# Patient Record
Sex: MALE | Race: WHITE | NOT HISPANIC OR LATINO
[De-identification: names, ages, dates, MRNs, and addresses within clinical notes are randomized per-mention and may not be internally consistent; named-entity substitution may affect disease eponyms.]

---

## 2017-01-13 ENCOUNTER — APPOINTMENT (OUTPATIENT)
Dept: FAMILY MEDICINE | Facility: CLINIC | Age: 67
End: 2017-01-13

## 2017-01-13 VITALS
SYSTOLIC BLOOD PRESSURE: 130 MMHG | BODY MASS INDEX: 23.62 KG/M2 | TEMPERATURE: 98.8 F | RESPIRATION RATE: 16 BRPM | DIASTOLIC BLOOD PRESSURE: 90 MMHG | HEIGHT: 70 IN | WEIGHT: 165 LBS | HEART RATE: 80 BPM

## 2017-01-17 ENCOUNTER — RX RENEWAL (OUTPATIENT)
Age: 67
End: 2017-01-17

## 2017-01-20 ENCOUNTER — APPOINTMENT (OUTPATIENT)
Dept: ULTRASOUND IMAGING | Facility: HOSPITAL | Age: 67
End: 2017-01-20

## 2017-01-20 ENCOUNTER — OUTPATIENT (OUTPATIENT)
Dept: OUTPATIENT SERVICES | Facility: HOSPITAL | Age: 67
LOS: 1 days | End: 2017-01-20
Payer: COMMERCIAL

## 2017-01-20 LAB
25(OH)D3 SERPL-MCNC: 39.4 NG/ML
ALBUMIN SERPL ELPH-MCNC: 4 G/DL
ALP BLD-CCNC: 72 U/L
ALT SERPL-CCNC: 29 U/L
ANION GAP SERPL CALC-SCNC: 14 MMOL/L
APPEARANCE: CLEAR
AST SERPL-CCNC: 29 U/L
BASOPHILS # BLD AUTO: 0.01 K/UL
BASOPHILS NFR BLD AUTO: 0.2 %
BILIRUB SERPL-MCNC: 0.5 MG/DL
BILIRUBIN URINE: NEGATIVE
BLOOD URINE: NEGATIVE
BUN SERPL-MCNC: 16 MG/DL
CALCIUM SERPL-MCNC: 9.2 MG/DL
CHLORIDE SERPL-SCNC: 93 MMOL/L
CO2 SERPL-SCNC: 27 MMOL/L
COLOR: YELLOW
CREAT SERPL-MCNC: 0.93 MG/DL
CRP SERPL HS-MCNC: 0.6 MG/L
CRP SERPL-MCNC: <0.2 MG/DL
EOSINOPHIL # BLD AUTO: 0.1 K/UL
EOSINOPHIL NFR BLD AUTO: 1.8 %
ERYTHROCYTE [SEDIMENTATION RATE] IN BLOOD BY WESTERGREN METHOD: 5 MM/HR
FOLATE SERPL-MCNC: >20 NG/ML
GLUCOSE QUALITATIVE U: NORMAL MG/DL
GLUCOSE SERPL-MCNC: 67 MG/DL
HCT VFR BLD CALC: 42.2 %
HGB BLD-MCNC: 14.7 G/DL
IMM GRANULOCYTES NFR BLD AUTO: 0.2 %
KETONES URINE: NEGATIVE
LEUKOCYTE ESTERASE URINE: NEGATIVE
LYMPHOCYTES # BLD AUTO: 1.85 K/UL
LYMPHOCYTES NFR BLD AUTO: 33.1 %
MAN DIFF?: NORMAL
MCHC RBC-ENTMCNC: 31.7 PG
MCHC RBC-ENTMCNC: 34.8 GM/DL
MCV RBC AUTO: 91.1 FL
MONOCYTES # BLD AUTO: 0.36 K/UL
MONOCYTES NFR BLD AUTO: 6.4 %
NEUTROPHILS # BLD AUTO: 3.26 K/UL
NEUTROPHILS NFR BLD AUTO: 58.3 %
NITRITE URINE: NEGATIVE
PH URINE: 7
PLATELET # BLD AUTO: 225 K/UL
POTASSIUM SERPL-SCNC: 3.6 MMOL/L
PROT SERPL-MCNC: 6.5 G/DL
PROTEIN URINE: NEGATIVE MG/DL
PSA FREE FLD-MCNC: 34.9 %
PSA FREE SERPL-MCNC: 0.45 NG/ML
PSA SERPL-MCNC: 1.3 NG/ML
RBC # BLD: 4.63 M/UL
RBC # FLD: 12.8 %
SODIUM SERPL-SCNC: 134 MMOL/L
SPECIFIC GRAVITY URINE: 1.01
T4 FREE SERPL-MCNC: 1.4 NG/DL
TSH SERPL-ACNC: 1.61 UIU/ML
UROBILINOGEN URINE: NORMAL MG/DL
VIT B12 SERPL-MCNC: 411 PG/ML
WBC # FLD AUTO: 5.59 K/UL

## 2017-01-20 PROCEDURE — 93880 EXTRACRANIAL BILAT STUDY: CPT

## 2017-01-27 ENCOUNTER — APPOINTMENT (OUTPATIENT)
Dept: FAMILY MEDICINE | Facility: CLINIC | Age: 67
End: 2017-01-27

## 2017-01-27 VITALS
WEIGHT: 165 LBS | DIASTOLIC BLOOD PRESSURE: 78 MMHG | SYSTOLIC BLOOD PRESSURE: 120 MMHG | RESPIRATION RATE: 14 BRPM | HEIGHT: 70 IN | TEMPERATURE: 98.6 F | BODY MASS INDEX: 23.62 KG/M2 | HEART RATE: 74 BPM

## 2017-02-02 ENCOUNTER — RX RENEWAL (OUTPATIENT)
Age: 67
End: 2017-02-02

## 2017-02-07 ENCOUNTER — APPOINTMENT (OUTPATIENT)
Dept: MRI IMAGING | Facility: HOSPITAL | Age: 67
End: 2017-02-07

## 2017-02-07 ENCOUNTER — OUTPATIENT (OUTPATIENT)
Dept: OUTPATIENT SERVICES | Facility: HOSPITAL | Age: 67
LOS: 1 days | End: 2017-02-07
Payer: COMMERCIAL

## 2017-02-07 PROCEDURE — 70547 MR ANGIOGRAPHY NECK W/O DYE: CPT

## 2017-02-07 PROCEDURE — 70544 MR ANGIOGRAPHY HEAD W/O DYE: CPT

## 2017-02-07 PROCEDURE — 70551 MRI BRAIN STEM W/O DYE: CPT

## 2017-03-10 ENCOUNTER — OUTPATIENT (OUTPATIENT)
Dept: OUTPATIENT SERVICES | Facility: HOSPITAL | Age: 67
LOS: 1 days | End: 2017-03-10
Payer: COMMERCIAL

## 2017-03-10 PROCEDURE — 70498 CT ANGIOGRAPHY NECK: CPT

## 2017-03-10 PROCEDURE — 70496 CT ANGIOGRAPHY HEAD: CPT

## 2017-03-10 PROCEDURE — 70496 CT ANGIOGRAPHY HEAD: CPT | Mod: 26

## 2017-03-10 PROCEDURE — 70498 CT ANGIOGRAPHY NECK: CPT | Mod: 26

## 2017-03-13 ENCOUNTER — RX RENEWAL (OUTPATIENT)
Age: 67
End: 2017-03-13

## 2017-03-23 ENCOUNTER — RX RENEWAL (OUTPATIENT)
Age: 67
End: 2017-03-23

## 2017-04-04 ENCOUNTER — APPOINTMENT (OUTPATIENT)
Dept: FAMILY MEDICINE | Facility: CLINIC | Age: 67
End: 2017-04-04

## 2017-04-04 VITALS
DIASTOLIC BLOOD PRESSURE: 70 MMHG | RESPIRATION RATE: 20 BRPM | BODY MASS INDEX: 24.34 KG/M2 | HEIGHT: 70 IN | WEIGHT: 170 LBS | SYSTOLIC BLOOD PRESSURE: 122 MMHG | HEART RATE: 64 BPM

## 2017-04-07 ENCOUNTER — RESULT REVIEW (OUTPATIENT)
Age: 67
End: 2017-04-07

## 2017-04-07 LAB
ALBUMIN SERPL ELPH-MCNC: 4.1 G/DL
ALP BLD-CCNC: 78 U/L
ALT SERPL-CCNC: 27 U/L
ANION GAP SERPL CALC-SCNC: 18 MMOL/L
AST SERPL-CCNC: 30 U/L
BASOPHILS # BLD AUTO: 0.02 K/UL
BASOPHILS NFR BLD AUTO: 0.3 %
BILIRUB SERPL-MCNC: 0.5 MG/DL
BUN SERPL-MCNC: 17 MG/DL
CALCIUM SERPL-MCNC: 9.3 MG/DL
CHLORIDE SERPL-SCNC: 94 MMOL/L
CHOLEST SERPL-MCNC: 165 MG/DL
CHOLEST/HDLC SERPL: 2.7 RATIO
CO2 SERPL-SCNC: 26 MMOL/L
CREAT SERPL-MCNC: 1 MG/DL
EOSINOPHIL # BLD AUTO: 0.12 K/UL
EOSINOPHIL NFR BLD AUTO: 2 %
GLUCOSE SERPL-MCNC: 84 MG/DL
HBA1C MFR BLD HPLC: 4.9 %
HCT VFR BLD CALC: 43.6 %
HDLC SERPL-MCNC: 62 MG/DL
HGB BLD-MCNC: 15 G/DL
IMM GRANULOCYTES NFR BLD AUTO: 0.2 %
LDLC SERPL CALC-MCNC: 68 MG/DL
LYMPHOCYTES # BLD AUTO: 1.98 K/UL
LYMPHOCYTES NFR BLD AUTO: 33.7 %
MAN DIFF?: NORMAL
MCHC RBC-ENTMCNC: 31.6 PG
MCHC RBC-ENTMCNC: 34.4 GM/DL
MCV RBC AUTO: 92 FL
MONOCYTES # BLD AUTO: 0.39 K/UL
MONOCYTES NFR BLD AUTO: 6.6 %
NEUTROPHILS # BLD AUTO: 3.36 K/UL
NEUTROPHILS NFR BLD AUTO: 57.2 %
PLATELET # BLD AUTO: 205 K/UL
POTASSIUM SERPL-SCNC: 3.9 MMOL/L
PROT SERPL-MCNC: 6.9 G/DL
PSA SERPL-MCNC: 1.3 NG/ML
RBC # BLD: 4.74 M/UL
RBC # FLD: 12.9 %
SODIUM SERPL-SCNC: 138 MMOL/L
TRIGL SERPL-MCNC: 173 MG/DL
WBC # FLD AUTO: 5.88 K/UL

## 2017-04-10 ENCOUNTER — RESULT REVIEW (OUTPATIENT)
Age: 67
End: 2017-04-10

## 2017-04-11 ENCOUNTER — APPOINTMENT (OUTPATIENT)
Dept: NEUROLOGY | Facility: CLINIC | Age: 67
End: 2017-04-11

## 2017-04-11 VITALS
DIASTOLIC BLOOD PRESSURE: 99 MMHG | SYSTOLIC BLOOD PRESSURE: 157 MMHG | HEART RATE: 61 BPM | OXYGEN SATURATION: 97 % | RESPIRATION RATE: 14 BRPM

## 2017-05-24 ENCOUNTER — RX RENEWAL (OUTPATIENT)
Age: 67
End: 2017-05-24

## 2017-06-09 ENCOUNTER — MEDICATION RENEWAL (OUTPATIENT)
Age: 67
End: 2017-06-09

## 2017-06-15 ENCOUNTER — APPOINTMENT (OUTPATIENT)
Dept: UROLOGY | Facility: CLINIC | Age: 67
End: 2017-06-15

## 2017-06-15 VITALS
HEIGHT: 70 IN | WEIGHT: 165 LBS | SYSTOLIC BLOOD PRESSURE: 157 MMHG | TEMPERATURE: 97.9 F | HEART RATE: 63 BPM | DIASTOLIC BLOOD PRESSURE: 94 MMHG | RESPIRATION RATE: 16 BRPM | BODY MASS INDEX: 23.62 KG/M2

## 2017-06-15 DIAGNOSIS — N52.9 MALE ERECTILE DYSFUNCTION, UNSPECIFIED: ICD-10-CM

## 2017-06-16 LAB
25(OH)D3 SERPL-MCNC: 42.2 NG/ML
ALBUMIN SERPL ELPH-MCNC: 4 G/DL
ALP BLD-CCNC: 77 U/L
ALT SERPL-CCNC: 22 U/L
ANION GAP SERPL CALC-SCNC: 18 MMOL/L
APPEARANCE: CLEAR
AST SERPL-CCNC: 29 U/L
BASOPHILS # BLD AUTO: 0.02 K/UL
BASOPHILS NFR BLD AUTO: 0.3 %
BILIRUB SERPL-MCNC: 0.5 MG/DL
BILIRUBIN URINE: NEGATIVE
BLOOD URINE: NEGATIVE
BUN SERPL-MCNC: 17 MG/DL
CALCIUM SERPL-MCNC: 9.3 MG/DL
CHLORIDE SERPL-SCNC: 94 MMOL/L
CHOLEST SERPL-MCNC: 140 MG/DL
CHOLEST/HDLC SERPL: 3 RATIO
CO2 SERPL-SCNC: 23 MMOL/L
COLOR: YELLOW
CREAT SERPL-MCNC: 0.92 MG/DL
EOSINOPHIL # BLD AUTO: 0.12 K/UL
EOSINOPHIL NFR BLD AUTO: 1.5 %
ESTRADIOL SERPL-MCNC: 23 PG/ML
FSH SERPL-MCNC: 7.8 IU/L
GLUCOSE QUALITATIVE U: NORMAL MG/DL
GLUCOSE SERPL-MCNC: 88 MG/DL
HBA1C MFR BLD HPLC: 5 %
HCT VFR BLD CALC: 40.7 %
HDLC SERPL-MCNC: 47 MG/DL
HGB BLD-MCNC: 14.5 G/DL
IMM GRANULOCYTES NFR BLD AUTO: 0 %
KETONES URINE: NEGATIVE
LDLC SERPL CALC-MCNC: 62 MG/DL
LEUKOCYTE ESTERASE URINE: NEGATIVE
LH SERPL-ACNC: 7.8 IU/L
LYMPHOCYTES # BLD AUTO: 2.05 K/UL
LYMPHOCYTES NFR BLD AUTO: 25.8 %
MAN DIFF?: NORMAL
MCHC RBC-ENTMCNC: 32.2 PG
MCHC RBC-ENTMCNC: 35.6 GM/DL
MCV RBC AUTO: 90.2 FL
MONOCYTES # BLD AUTO: 0.62 K/UL
MONOCYTES NFR BLD AUTO: 7.8 %
NEUTROPHILS # BLD AUTO: 5.14 K/UL
NEUTROPHILS NFR BLD AUTO: 64.6 %
NITRITE URINE: NEGATIVE
PH URINE: 7
PLATELET # BLD AUTO: 228 K/UL
POTASSIUM SERPL-SCNC: 3.9 MMOL/L
PROLACTIN SERPL-MCNC: 9.7 NG/ML
PROT SERPL-MCNC: 6.6 G/DL
PROTEIN URINE: NEGATIVE MG/DL
PSA SERPL-MCNC: 0.96 NG/ML
RBC # BLD: 4.51 M/UL
RBC # FLD: 12.8 %
SODIUM SERPL-SCNC: 135 MMOL/L
SPECIFIC GRAVITY URINE: 1.01
TRIGL SERPL-MCNC: 155 MG/DL
UROBILINOGEN URINE: NORMAL MG/DL
WBC # FLD AUTO: 7.95 K/UL

## 2017-06-20 LAB
TESTOST BND SERPL-MCNC: 5.4 PG/ML
TESTOST SERPL-MCNC: 551.5 NG/DL

## 2017-07-10 ENCOUNTER — RX RENEWAL (OUTPATIENT)
Age: 67
End: 2017-07-10

## 2017-07-10 DIAGNOSIS — J30.2 OTHER SEASONAL ALLERGIC RHINITIS: ICD-10-CM

## 2017-07-13 ENCOUNTER — APPOINTMENT (OUTPATIENT)
Dept: UROLOGY | Facility: CLINIC | Age: 67
End: 2017-07-13

## 2017-07-13 DIAGNOSIS — F52.32 MALE ORGASMIC DISORDER: ICD-10-CM

## 2017-07-23 ENCOUNTER — RX RENEWAL (OUTPATIENT)
Age: 67
End: 2017-07-23

## 2017-08-23 ENCOUNTER — RX RENEWAL (OUTPATIENT)
Age: 67
End: 2017-08-23

## 2017-09-10 ENCOUNTER — RX RENEWAL (OUTPATIENT)
Age: 67
End: 2017-09-10

## 2017-09-20 ENCOUNTER — RX RENEWAL (OUTPATIENT)
Age: 67
End: 2017-09-20

## 2017-11-27 RX ORDER — CLOPIDOGREL BISULFATE 75 MG/1
75 TABLET, FILM COATED ORAL DAILY
Qty: 90 | Refills: 3 | Status: DISCONTINUED | COMMUNITY
Start: 2017-01-27 | End: 2017-11-27

## 2017-12-01 ENCOUNTER — RX RENEWAL (OUTPATIENT)
Age: 67
End: 2017-12-01

## 2017-12-14 ENCOUNTER — APPOINTMENT (OUTPATIENT)
Dept: CARDIOLOGY | Facility: CLINIC | Age: 67
End: 2017-12-14
Payer: COMMERCIAL

## 2017-12-14 ENCOUNTER — NON-APPOINTMENT (OUTPATIENT)
Age: 67
End: 2017-12-14

## 2017-12-14 VITALS
OXYGEN SATURATION: 100 % | WEIGHT: 169 LBS | RESPIRATION RATE: 15 BRPM | SYSTOLIC BLOOD PRESSURE: 124 MMHG | BODY MASS INDEX: 24.2 KG/M2 | DIASTOLIC BLOOD PRESSURE: 82 MMHG | HEIGHT: 70 IN | HEART RATE: 62 BPM

## 2017-12-14 DIAGNOSIS — R74.8 ABNORMAL LEVELS OF OTHER SERUM ENZYMES: ICD-10-CM

## 2017-12-14 PROCEDURE — 93000 ELECTROCARDIOGRAM COMPLETE: CPT

## 2017-12-14 PROCEDURE — 93306 TTE W/DOPPLER COMPLETE: CPT

## 2017-12-14 PROCEDURE — 99215 OFFICE O/P EST HI 40 MIN: CPT

## 2017-12-19 ENCOUNTER — APPOINTMENT (OUTPATIENT)
Dept: CARDIOLOGY | Facility: CLINIC | Age: 67
End: 2017-12-19
Payer: COMMERCIAL

## 2017-12-19 PROCEDURE — 93224 XTRNL ECG REC UP TO 48 HRS: CPT

## 2017-12-22 ENCOUNTER — NON-APPOINTMENT (OUTPATIENT)
Age: 67
End: 2017-12-22

## 2017-12-26 ENCOUNTER — RX RENEWAL (OUTPATIENT)
Age: 67
End: 2017-12-26

## 2017-12-26 ENCOUNTER — NON-APPOINTMENT (OUTPATIENT)
Age: 67
End: 2017-12-26

## 2018-01-09 ENCOUNTER — RX RENEWAL (OUTPATIENT)
Age: 68
End: 2018-01-09

## 2018-01-09 RX ORDER — METOPROLOL SUCCINATE 25 MG/1
25 TABLET, EXTENDED RELEASE ORAL
Qty: 30 | Refills: 3 | Status: DISCONTINUED | COMMUNITY
Start: 2017-09-10 | End: 2018-01-09

## 2018-01-15 ENCOUNTER — APPOINTMENT (OUTPATIENT)
Dept: CARDIOLOGY | Facility: CLINIC | Age: 68
End: 2018-01-15
Payer: COMMERCIAL

## 2018-01-15 PROCEDURE — 78452 HT MUSCLE IMAGE SPECT MULT: CPT

## 2018-01-15 PROCEDURE — 93015 CV STRESS TEST SUPVJ I&R: CPT

## 2018-01-15 PROCEDURE — A9500: CPT

## 2018-01-22 ENCOUNTER — RX RENEWAL (OUTPATIENT)
Age: 68
End: 2018-01-22

## 2018-04-06 ENCOUNTER — APPOINTMENT (OUTPATIENT)
Dept: MRI IMAGING | Facility: HOSPITAL | Age: 68
End: 2018-04-06
Payer: COMMERCIAL

## 2018-04-06 ENCOUNTER — OUTPATIENT (OUTPATIENT)
Dept: OUTPATIENT SERVICES | Facility: HOSPITAL | Age: 68
LOS: 1 days | End: 2018-04-06
Payer: COMMERCIAL

## 2018-04-06 DIAGNOSIS — Z00.8 ENCOUNTER FOR OTHER GENERAL EXAMINATION: ICD-10-CM

## 2018-04-06 PROCEDURE — 70547 MR ANGIOGRAPHY NECK W/O DYE: CPT | Mod: 26

## 2018-04-06 PROCEDURE — 70551 MRI BRAIN STEM W/O DYE: CPT | Mod: 26

## 2018-04-06 PROCEDURE — 70551 MRI BRAIN STEM W/O DYE: CPT

## 2018-04-06 PROCEDURE — 70544 MR ANGIOGRAPHY HEAD W/O DYE: CPT

## 2018-04-06 PROCEDURE — 70547 MR ANGIOGRAPHY NECK W/O DYE: CPT

## 2018-04-27 ENCOUNTER — RX RENEWAL (OUTPATIENT)
Age: 68
End: 2018-04-27

## 2018-06-12 ENCOUNTER — APPOINTMENT (OUTPATIENT)
Dept: FAMILY MEDICINE | Facility: CLINIC | Age: 68
End: 2018-06-12
Payer: COMMERCIAL

## 2018-06-12 VITALS
TEMPERATURE: 97.7 F | HEART RATE: 55 BPM | OXYGEN SATURATION: 100 % | WEIGHT: 169 LBS | DIASTOLIC BLOOD PRESSURE: 94 MMHG | SYSTOLIC BLOOD PRESSURE: 144 MMHG | HEIGHT: 68 IN | BODY MASS INDEX: 25.61 KG/M2

## 2018-06-12 PROCEDURE — 96372 THER/PROPH/DIAG INJ SC/IM: CPT

## 2018-06-12 PROCEDURE — 99215 OFFICE O/P EST HI 40 MIN: CPT | Mod: 25

## 2018-06-12 RX ORDER — FLUTICASONE PROPIONATE 50 UG/1
50 SPRAY, METERED NASAL
Qty: 3 | Refills: 2 | Status: COMPLETED | COMMUNITY
Start: 2017-04-06 | End: 2018-06-12

## 2018-06-12 RX ORDER — METHYLPRED ACET/NACL,ISO-OS/PF 40 MG/ML
40 VIAL (ML) INJECTION
Qty: 1 | Refills: 0 | Status: DISCONTINUED | COMMUNITY
Start: 2018-06-12 | End: 2018-06-12

## 2018-06-18 NOTE — HISTORY OF PRESENT ILLNESS
[___ Weeks ago] : [unfilled] weeks ago [Constant] : constant [Rest] : rest [Activity] : with activity [Stable] : stable [de-identified] : Left Achilles Tendon pain for 1 week [FreeTextEntry8] : Patient states he plays tennis and has noted a pain of the left Achilles tendon for 1 week. He has been going to physical therapy  but the pain persists.  Patient states the pain is mild to moderate but persistent and increase  with activity.  The patient has not stopped playing tennis.  Patient states he has some pain when walking but denies any foot or ankle pain or instability with gait.  He has been wearing an bandage.\par No recent  injury or fall stated.

## 2018-06-18 NOTE — HEALTH RISK ASSESSMENT
[No falls in past year] : Patient reported no falls in the past year [0] : 2) Feeling down, depressed, or hopeless: Not at all (0) [] : No [de-identified] : none

## 2018-06-18 NOTE — PHYSICAL EXAM
[No Acute Distress] : no acute distress [Well Nourished] : well nourished [Well Developed] : well developed [Well-Appearing] : well-appearing [Normal Sclera/Conjunctiva] : normal sclera/conjunctiva [PERRL] : pupils equal round and reactive to light [EOMI] : extraocular movements intact [Normal Outer Ear/Nose] : the outer ears and nose were normal in appearance [Normal Oropharynx] : the oropharynx was normal [No JVD] : no jugular venous distention [Supple] : supple [No Lymphadenopathy] : no lymphadenopathy [Thyroid Normal, No Nodules] : the thyroid was normal and there were no nodules present [No Respiratory Distress] : no respiratory distress  [Clear to Auscultation] : lungs were clear to auscultation bilaterally [No Accessory Muscle Use] : no accessory muscle use [Normal Rate] : normal rate  [Regular Rhythm] : with a regular rhythm [Normal S1, S2] : normal S1 and S2 [No Murmur] : no murmur heard [No Carotid Bruits] : no carotid bruits [No Abdominal Bruit] : a ~M bruit was not heard ~T in the abdomen [No Varicosities] : no varicosities [Pedal Pulses Present] : the pedal pulses are present [No Edema] : there was no peripheral edema [No Extremity Clubbing/Cyanosis] : no extremity clubbing/cyanosis [No Palpable Aorta] : no palpable aorta [Soft] : abdomen soft [Non Tender] : non-tender [Non-distended] : non-distended [No Masses] : no abdominal mass palpated [No HSM] : no HSM [Normal Bowel Sounds] : normal bowel sounds [Declined Rectal Exam] : declined rectal exam [Normal Posterior Cervical Nodes] : no posterior cervical lymphadenopathy [Normal Anterior Cervical Nodes] : no anterior cervical lymphadenopathy [No CVA Tenderness] : no CVA  tenderness [No Spinal Tenderness] : no spinal tenderness [No Joint Swelling] : no joint swelling [Grossly Normal Strength/Tone] : grossly normal strength/tone [No Rash] : no rash [Normal Gait] : normal gait [Coordination Grossly Intact] : coordination grossly intact [No Focal Deficits] : no focal deficits [Deep Tendon Reflexes (DTR)] : deep tendon reflexes were 2+ and symmetric [Speech Grossly Normal] : speech grossly normal [Memory Grossly Normal] : memory grossly normal [Normal Affect] : the affect was normal [Normal Mood] : the mood was normal [Normal Insight/Judgement] : insight and judgment were intact [Kyphosis] : no kyphosis [Scoliosis] : no scoliosis [Acne] : no acne [de-identified] : point tenderness of left Achilles tendon no problem with flexion of left foot

## 2018-06-18 NOTE — PLAN
[FreeTextEntry1] : Blood work requisition sent to home .\par Recommend to make an appointment for annual physical which is due.

## 2018-06-19 DIAGNOSIS — M76.60 ACHILLES TENDINITIS, UNSPECIFIED LEG: ICD-10-CM

## 2018-06-19 LAB
24R-OH-CALCIDIOL SERPL-MCNC: 35.9 PG/ML
ALBUMIN SERPL ELPH-MCNC: 3.7 G/DL
ALP BLD-CCNC: 58 U/L
ALT SERPL-CCNC: 19 U/L
ANION GAP SERPL CALC-SCNC: 14 MMOL/L
AST SERPL-CCNC: 23 U/L
BASOPHILS # BLD AUTO: 0.02 K/UL
BASOPHILS NFR BLD AUTO: 0.2 %
BILIRUB SERPL-MCNC: 0.6 MG/DL
BUN SERPL-MCNC: 23 MG/DL
CALCIUM SERPL-MCNC: 9.4 MG/DL
CHLORIDE SERPL-SCNC: 100 MMOL/L
CO2 SERPL-SCNC: 23 MMOL/L
CREAT SERPL-MCNC: 0.94 MG/DL
EOSINOPHIL # BLD AUTO: 0.16 K/UL
EOSINOPHIL NFR BLD AUTO: 1.9 %
ERYTHROCYTE [SEDIMENTATION RATE] IN BLOOD BY WESTERGREN METHOD: 4 MM/HR
FOLATE SERPL-MCNC: >20 NG/ML
GLUCOSE SERPL-MCNC: 78 MG/DL
HCT VFR BLD CALC: 42.8 %
HGB BLD-MCNC: 14.9 G/DL
IMM GRANULOCYTES NFR BLD AUTO: 0.2 %
LYMPHOCYTES # BLD AUTO: 2.9 K/UL
LYMPHOCYTES NFR BLD AUTO: 33.7 %
MAN DIFF?: NORMAL
MCHC RBC-ENTMCNC: 31.9 PG
MCHC RBC-ENTMCNC: 34.8 GM/DL
MCV RBC AUTO: 91.6 FL
MONOCYTES # BLD AUTO: 0.75 K/UL
MONOCYTES NFR BLD AUTO: 8.7 %
NEUTROPHILS # BLD AUTO: 4.75 K/UL
NEUTROPHILS NFR BLD AUTO: 55.3 %
PLATELET # BLD AUTO: 210 K/UL
POTASSIUM SERPL-SCNC: 3.8 MMOL/L
PROT SERPL-MCNC: 6.5 G/DL
RBC # BLD: 4.67 M/UL
RBC # FLD: 13.2 %
RHEUMATOID FACT SER QL: <7 IU/ML
SODIUM SERPL-SCNC: 137 MMOL/L
VIT B12 SERPL-MCNC: 631 PG/ML
WBC # FLD AUTO: 8.6 K/UL

## 2018-06-22 ENCOUNTER — RX RENEWAL (OUTPATIENT)
Age: 68
End: 2018-06-22

## 2018-07-10 ENCOUNTER — OUTPATIENT (OUTPATIENT)
Dept: OUTPATIENT SERVICES | Facility: HOSPITAL | Age: 68
LOS: 1 days | End: 2018-07-10
Payer: COMMERCIAL

## 2018-07-10 ENCOUNTER — APPOINTMENT (OUTPATIENT)
Dept: RADIOLOGY | Facility: HOSPITAL | Age: 68
End: 2018-07-10
Payer: COMMERCIAL

## 2018-07-10 DIAGNOSIS — M72.2 PLANTAR FASCIAL FIBROMATOSIS: ICD-10-CM

## 2018-07-10 PROCEDURE — 73630 X-RAY EXAM OF FOOT: CPT

## 2018-07-10 PROCEDURE — 73630 X-RAY EXAM OF FOOT: CPT | Mod: 26,LT

## 2018-07-17 ENCOUNTER — APPOINTMENT (OUTPATIENT)
Dept: MRI IMAGING | Facility: HOSPITAL | Age: 68
End: 2018-07-17

## 2018-07-17 ENCOUNTER — OUTPATIENT (OUTPATIENT)
Dept: OUTPATIENT SERVICES | Facility: HOSPITAL | Age: 68
LOS: 1 days | End: 2018-07-17
Payer: COMMERCIAL

## 2018-07-17 DIAGNOSIS — Z00.8 ENCOUNTER FOR OTHER GENERAL EXAMINATION: ICD-10-CM

## 2018-07-17 PROCEDURE — 73721 MRI JNT OF LWR EXTRE W/O DYE: CPT

## 2018-07-17 PROCEDURE — 73721 MRI JNT OF LWR EXTRE W/O DYE: CPT | Mod: 26,LT

## 2018-08-21 ENCOUNTER — APPOINTMENT (OUTPATIENT)
Dept: FAMILY MEDICINE | Facility: CLINIC | Age: 68
End: 2018-08-21
Payer: COMMERCIAL

## 2018-08-21 VITALS
HEART RATE: 68 BPM | HEIGHT: 68 IN | BODY MASS INDEX: 23.79 KG/M2 | DIASTOLIC BLOOD PRESSURE: 80 MMHG | SYSTOLIC BLOOD PRESSURE: 128 MMHG | RESPIRATION RATE: 20 BRPM | WEIGHT: 157 LBS

## 2018-08-21 PROCEDURE — 99496 TRANSJ CARE MGMT HIGH F2F 7D: CPT

## 2018-08-21 RX ORDER — ASPIRIN ENTERIC COATED TABLETS 81 MG 81 MG/1
81 TABLET, DELAYED RELEASE ORAL
Refills: 0 | Status: ACTIVE | COMMUNITY
Start: 2018-08-21

## 2018-08-21 RX ORDER — METOPROLOL SUCCINATE 25 MG/1
25 TABLET, EXTENDED RELEASE ORAL
Qty: 45 | Refills: 3 | Status: DISCONTINUED | COMMUNITY
Start: 2017-11-27 | End: 2018-08-21

## 2018-08-21 NOTE — HISTORY OF PRESENT ILLNESS
[Post-hospitalization from ___ Hospital] : Post-hospitalization from [unfilled] Hospital [Admitted on: ___] : The patient was admitted on [unfilled] [Discharged on ___] : discharged on [unfilled] [Discharge Summary] : discharge summary [Pertinent Labs] : pertinent labs [Discharge Med List] : discharge medication list [Med Reconciliation] : medication reconciliation has been completed [Patient Contacted By: ____] : and contacted by [unfilled] [FreeTextEntry2] : Admitted for acute CVA with R hemiplegia; noted to be bradycardic requiring PPM; transferred to inpatient rehab prior to discharge.

## 2018-08-21 NOTE — ASSESSMENT
[FreeTextEntry1] : S/P CVA with R hemiplegia; S/P PPM for bradyarrhythmia; BP stable at this encounter\par Cardiac exam stable

## 2018-08-21 NOTE — PLAN
[FreeTextEntry1] : Current medication reviewed and transmitted to pharmacy; F/U here one month; referrals for PT, OT, Speech Therapy placed; to F/U with Neuro and Cardiology

## 2018-08-21 NOTE — PHYSICAL EXAM
[No Acute Distress] : no acute distress [No JVD] : no jugular venous distention [Supple] : supple [No Respiratory Distress] : no respiratory distress  [Clear to Auscultation] : lungs were clear to auscultation bilaterally [No Accessory Muscle Use] : no accessory muscle use [Normal Rate] : normal rate  [Regular Rhythm] : with a regular rhythm [Normal S1, S2] : normal S1 and S2 [No Murmur] : no murmur heard [No Edema] : there was no peripheral edema [Soft] : abdomen soft [Non Tender] : non-tender [Non-distended] : non-distended [No Masses] : no abdominal mass palpated [No HSM] : no HSM [Normal Bowel Sounds] : normal bowel sounds [Hemiplegia Of Right Side] : hemiplegia was present on the right [Limited Balance] : the patient's balance was impaired [Alert and Oriented x3] : oriented to person, place, and time [de-identified] : very mild dysarthria with mild R hemiplegia

## 2018-08-22 ENCOUNTER — OUTPATIENT (OUTPATIENT)
Dept: OUTPATIENT SERVICES | Facility: HOSPITAL | Age: 68
LOS: 1 days | End: 2018-08-22
Payer: COMMERCIAL

## 2018-08-22 DIAGNOSIS — I67.9 CEREBROVASCULAR DISEASE, UNSPECIFIED: ICD-10-CM

## 2018-08-28 ENCOUNTER — CLINICAL ADVICE (OUTPATIENT)
Age: 68
End: 2018-08-28

## 2018-09-05 ENCOUNTER — APPOINTMENT (OUTPATIENT)
Dept: FAMILY MEDICINE | Facility: CLINIC | Age: 68
End: 2018-09-05
Payer: COMMERCIAL

## 2018-09-05 VITALS
BODY MASS INDEX: 23.95 KG/M2 | SYSTOLIC BLOOD PRESSURE: 130 MMHG | WEIGHT: 158 LBS | HEIGHT: 68 IN | DIASTOLIC BLOOD PRESSURE: 80 MMHG | HEART RATE: 68 BPM | RESPIRATION RATE: 20 BRPM

## 2018-09-05 PROCEDURE — 99213 OFFICE O/P EST LOW 20 MIN: CPT

## 2018-09-05 NOTE — HISTORY OF PRESENT ILLNESS
[de-identified] : Presents for BP check.  Note BP's had "spiked" - now on Ramipril 10mg; BP's at home running in 130's-140's/80; pt has no new complaints.

## 2018-09-05 NOTE — ASSESSMENT
[FreeTextEntry1] : BP acceptable at this encounter - will maintain present medication regime; plan F/U 1 month

## 2018-09-05 NOTE — PHYSICAL EXAM
[No Acute Distress] : no acute distress [Supple] : supple [No Respiratory Distress] : no respiratory distress  [Clear to Auscultation] : lungs were clear to auscultation bilaterally [No Accessory Muscle Use] : no accessory muscle use [Normal Rate] : normal rate  [Regular Rhythm] : with a regular rhythm [Normal S1, S2] : normal S1 and S2 [No Murmur] : no murmur heard [No Edema] : there was no peripheral edema [Soft] : abdomen soft [Non Tender] : non-tender

## 2018-09-13 ENCOUNTER — APPOINTMENT (OUTPATIENT)
Dept: CARDIOLOGY | Facility: CLINIC | Age: 68
End: 2018-09-13
Payer: COMMERCIAL

## 2018-09-13 ENCOUNTER — NON-APPOINTMENT (OUTPATIENT)
Age: 68
End: 2018-09-13

## 2018-09-13 VITALS
OXYGEN SATURATION: 97 % | HEART RATE: 60 BPM | SYSTOLIC BLOOD PRESSURE: 131 MMHG | BODY MASS INDEX: 29.1 KG/M2 | DIASTOLIC BLOOD PRESSURE: 77 MMHG | WEIGHT: 192 LBS | RESPIRATION RATE: 17 BRPM | HEIGHT: 68 IN

## 2018-09-13 PROCEDURE — 99214 OFFICE O/P EST MOD 30 MIN: CPT

## 2018-09-13 PROCEDURE — 93000 ELECTROCARDIOGRAM COMPLETE: CPT

## 2018-09-18 ENCOUNTER — APPOINTMENT (OUTPATIENT)
Dept: FAMILY MEDICINE | Facility: CLINIC | Age: 68
End: 2018-09-18

## 2018-10-01 ENCOUNTER — OUTPATIENT (OUTPATIENT)
Dept: OUTPATIENT SERVICES | Facility: HOSPITAL | Age: 68
LOS: 1 days | End: 2018-10-01
Payer: MEDICARE

## 2018-10-02 DIAGNOSIS — I63.9 CEREBRAL INFARCTION, UNSPECIFIED: ICD-10-CM

## 2018-10-02 DIAGNOSIS — I67.9 CEREBROVASCULAR DISEASE, UNSPECIFIED: ICD-10-CM

## 2018-10-11 ENCOUNTER — APPOINTMENT (OUTPATIENT)
Dept: FAMILY MEDICINE | Facility: CLINIC | Age: 68
End: 2018-10-11
Payer: MEDICARE

## 2018-10-11 VITALS
RESPIRATION RATE: 20 BRPM | WEIGHT: 158 LBS | HEIGHT: 68 IN | HEART RATE: 64 BPM | DIASTOLIC BLOOD PRESSURE: 70 MMHG | SYSTOLIC BLOOD PRESSURE: 124 MMHG | BODY MASS INDEX: 23.95 KG/M2

## 2018-10-11 PROCEDURE — G0008: CPT

## 2018-10-11 PROCEDURE — 90686 IIV4 VACC NO PRSV 0.5 ML IM: CPT

## 2018-10-11 PROCEDURE — 90732 PPSV23 VACC 2 YRS+ SUBQ/IM: CPT

## 2018-10-11 PROCEDURE — 36415 COLL VENOUS BLD VENIPUNCTURE: CPT

## 2018-10-11 PROCEDURE — 99215 OFFICE O/P EST HI 40 MIN: CPT | Mod: 25

## 2018-10-11 PROCEDURE — G0009: CPT

## 2018-10-11 NOTE — ASSESSMENT
[FreeTextEntry1] : Comprehensive exam reveals patient to be hemodynamically stable with acceptable BP\par Cardiac findings stable; neuro findings consistent with history\par Lab profiles sent\par Flu vaccine given L deltoid\par Pneumovax given R deltoid

## 2018-10-11 NOTE — HISTORY OF PRESENT ILLNESS
[FreeTextEntry1] : Requests comprehensive exam [de-identified] : Presents for comprehensive exam.  States feeling generally well; feels he is improving; continues in PT.  Continues to have some mobility and speaking issues.  Reviewed immunizations - due for flu vaccine and Pneumovax - pt in agreement.  Also discussed the recommendation for Hep C screening - pt is amenable.

## 2018-10-11 NOTE — PHYSICAL EXAM
[No Acute Distress] : no acute distress [Well Nourished] : well nourished [Well Developed] : well developed [Well-Appearing] : well-appearing [Normal Sclera/Conjunctiva] : normal sclera/conjunctiva [PERRL] : pupils equal round and reactive to light [EOMI] : extraocular movements intact [Normal Outer Ear/Nose] : the outer ears and nose were normal in appearance [Normal Oropharynx] : the oropharynx was normal [Normal TMs] : both tympanic membranes were normal [Normal Nasal Mucosa] : the nasal mucosa was normal [No JVD] : no jugular venous distention [Supple] : supple [No Lymphadenopathy] : no lymphadenopathy [Thyroid Normal, No Nodules] : the thyroid was normal and there were no nodules present [No Respiratory Distress] : no respiratory distress  [Clear to Auscultation] : lungs were clear to auscultation bilaterally [No Accessory Muscle Use] : no accessory muscle use [Normal Rate] : normal rate  [Regular Rhythm] : with a regular rhythm [Normal S1, S2] : normal S1 and S2 [No Murmur] : no murmur heard [No Carotid Bruits] : no carotid bruits [No Abdominal Bruit] : a ~M bruit was not heard ~T in the abdomen [No Varicosities] : no varicosities [Pedal Pulses Present] : the pedal pulses are present [No Edema] : there was no peripheral edema [No Extremity Clubbing/Cyanosis] : no extremity clubbing/cyanosis [No Palpable Aorta] : no palpable aorta [Soft] : abdomen soft [Non Tender] : non-tender [Non-distended] : non-distended [No Masses] : no abdominal mass palpated [No HSM] : no HSM [Normal Bowel Sounds] : normal bowel sounds [Normal Posterior Cervical Nodes] : no posterior cervical lymphadenopathy [Normal Femoral Nodes] : no femoral lymphadenopathy [Normal Anterior Cervical Nodes] : no anterior cervical lymphadenopathy [Normal Inguinal Nodes] : no inguinal lymphadenopathy [No CVA Tenderness] : no CVA  tenderness [No Spinal Tenderness] : no spinal tenderness [Paresis Pronator Drift Right-Sided] : a right-sided pronator drift was present [Motor Strength Upper Extremities Right] : there was weakness of the right upper extremity [Motor Strength Lower Extremities Right] : there was weakness of the right lower extremity [Limited Balance] : the patient's balance was impaired [Normal Affect] : the affect was normal [Alert and Oriented x3] : oriented to person, place, and time [Normal Mood] : the mood was normal [Normal Insight/Judgement] : insight and judgment were intact [de-identified] : mild R-sided weakness and expressive aphasia consistent with history

## 2018-10-12 LAB
ALBUMIN SERPL ELPH-MCNC: 4.3 G/DL
ALP BLD-CCNC: 82 U/L
ALT SERPL-CCNC: 23 U/L
ANION GAP SERPL CALC-SCNC: 18 MMOL/L
AST SERPL-CCNC: 25 U/L
BASOPHILS # BLD AUTO: 0.03 K/UL
BASOPHILS NFR BLD AUTO: 0.5 %
BILIRUB SERPL-MCNC: 0.6 MG/DL
BUN SERPL-MCNC: 15 MG/DL
CALCIUM SERPL-MCNC: 9.5 MG/DL
CHLORIDE SERPL-SCNC: 99 MMOL/L
CHOLEST SERPL-MCNC: 156 MG/DL
CHOLEST/HDLC SERPL: 2.5 RATIO
CO2 SERPL-SCNC: 25 MMOL/L
CREAT SERPL-MCNC: 0.92 MG/DL
EOSINOPHIL # BLD AUTO: 0.13 K/UL
EOSINOPHIL NFR BLD AUTO: 2.1 %
GLUCOSE SERPL-MCNC: 63 MG/DL
HBA1C MFR BLD HPLC: 5 %
HCT VFR BLD CALC: 44.6 %
HCV AB SER QL: NONREACTIVE
HCV S/CO RATIO: 0.16 S/CO
HDLC SERPL-MCNC: 62 MG/DL
HGB BLD-MCNC: 14.4 G/DL
IMM GRANULOCYTES NFR BLD AUTO: 0.2 %
LDLC SERPL CALC-MCNC: 66 MG/DL
LYMPHOCYTES # BLD AUTO: 2.21 K/UL
LYMPHOCYTES NFR BLD AUTO: 36.5 %
MAN DIFF?: NORMAL
MCHC RBC-ENTMCNC: 30.4 PG
MCHC RBC-ENTMCNC: 32.3 GM/DL
MCV RBC AUTO: 94.3 FL
MONOCYTES # BLD AUTO: 0.58 K/UL
MONOCYTES NFR BLD AUTO: 9.6 %
NEUTROPHILS # BLD AUTO: 3.09 K/UL
NEUTROPHILS NFR BLD AUTO: 51.1 %
PLATELET # BLD AUTO: 215 K/UL
POTASSIUM SERPL-SCNC: 4.2 MMOL/L
PROT SERPL-MCNC: 6.8 G/DL
PSA SERPL-MCNC: 1.32 NG/ML
RBC # BLD: 4.73 M/UL
RBC # FLD: 13 %
SODIUM SERPL-SCNC: 142 MMOL/L
T4 FREE SERPL-MCNC: 1.3 NG/DL
TRIGL SERPL-MCNC: 141 MG/DL
TSH SERPL-ACNC: 2.2 UIU/ML
WBC # FLD AUTO: 6.05 K/UL

## 2018-11-15 ENCOUNTER — APPOINTMENT (OUTPATIENT)
Dept: FAMILY MEDICINE | Facility: CLINIC | Age: 68
End: 2018-11-15
Payer: MEDICARE

## 2018-11-15 ENCOUNTER — FORM ENCOUNTER (OUTPATIENT)
Age: 68
End: 2018-11-15

## 2018-11-15 VITALS — RESPIRATION RATE: 20 BRPM | HEART RATE: 68 BPM | DIASTOLIC BLOOD PRESSURE: 70 MMHG | SYSTOLIC BLOOD PRESSURE: 122 MMHG

## 2018-11-15 DIAGNOSIS — M54.2 CERVICALGIA: ICD-10-CM

## 2018-11-15 PROCEDURE — 99213 OFFICE O/P EST LOW 20 MIN: CPT

## 2018-11-15 NOTE — PHYSICAL EXAM
[No Acute Distress] : no acute distress [Tenderness] : was tender [Left Side] : left side of the posterior [No Respiratory Distress] : no respiratory distress  [Clear to Auscultation] : lungs were clear to auscultation bilaterally [No Accessory Muscle Use] : no accessory muscle use [Normal Rate] : normal rate  [Regular Rhythm] : with a regular rhythm [Normal S1, S2] : normal S1 and S2 [No Murmur] : no murmur heard [Muscle Spasms, Left] : left-sided muscle spasms [de-identified] : palpable spasm; mild decreased ROM on rotation

## 2018-11-15 NOTE — HISTORY OF PRESENT ILLNESS
[FreeTextEntry8] : Presents on acute basis - C/O persistent pain L side of neck; has been to Chiropractor with some relief - does not wish to proceed without x-ray.  States occasionally noted pain L upper arm, but denies weakness, numbness, tingling.  No recollection of direct trauma; does state he holds cane in L hand.
Detail Level: Detailed

## 2018-11-16 ENCOUNTER — APPOINTMENT (OUTPATIENT)
Dept: RADIOLOGY | Facility: HOSPITAL | Age: 68
End: 2018-11-16
Payer: MEDICARE

## 2018-11-16 ENCOUNTER — OUTPATIENT (OUTPATIENT)
Dept: OUTPATIENT SERVICES | Facility: HOSPITAL | Age: 68
LOS: 1 days | End: 2018-11-16
Payer: MEDICARE

## 2018-11-16 DIAGNOSIS — M54.2 CERVICALGIA: ICD-10-CM

## 2018-11-16 PROCEDURE — 72040 X-RAY EXAM NECK SPINE 2-3 VW: CPT | Mod: 26

## 2018-11-16 PROCEDURE — 72040 X-RAY EXAM NECK SPINE 2-3 VW: CPT

## 2018-11-19 ENCOUNTER — RESULT REVIEW (OUTPATIENT)
Age: 68
End: 2018-11-19

## 2018-12-05 PROCEDURE — 97116 GAIT TRAINING THERAPY: CPT

## 2018-12-05 PROCEDURE — 97124 MASSAGE THERAPY: CPT

## 2018-12-05 PROCEDURE — 97110 THERAPEUTIC EXERCISES: CPT

## 2018-12-05 PROCEDURE — G8984: CPT | Mod: CK

## 2018-12-05 PROCEDURE — 97140 MANUAL THERAPY 1/> REGIONS: CPT

## 2018-12-05 PROCEDURE — 92507 TX SP LANG VOICE COMM INDIV: CPT

## 2018-12-05 PROCEDURE — G8979: CPT | Mod: CJ

## 2018-12-05 PROCEDURE — 97530 THERAPEUTIC ACTIVITIES: CPT

## 2018-12-05 PROCEDURE — G8985: CPT | Mod: CI

## 2018-12-05 PROCEDURE — G8978: CPT | Mod: CL

## 2018-12-05 PROCEDURE — 97112 NEUROMUSCULAR REEDUCATION: CPT

## 2018-12-05 PROCEDURE — 97162 PT EVAL MOD COMPLEX 30 MIN: CPT

## 2018-12-05 PROCEDURE — 92523 SPEECH SOUND LANG COMPREHEN: CPT

## 2018-12-05 PROCEDURE — 97167 OT EVAL HIGH COMPLEX 60 MIN: CPT

## 2018-12-13 ENCOUNTER — APPOINTMENT (OUTPATIENT)
Dept: CARDIOLOGY | Facility: CLINIC | Age: 68
End: 2018-12-13
Payer: MEDICARE

## 2018-12-13 ENCOUNTER — NON-APPOINTMENT (OUTPATIENT)
Age: 68
End: 2018-12-13

## 2018-12-13 VITALS
OXYGEN SATURATION: 98 % | SYSTOLIC BLOOD PRESSURE: 147 MMHG | RESPIRATION RATE: 15 BRPM | DIASTOLIC BLOOD PRESSURE: 84 MMHG | HEART RATE: 66 BPM | HEIGHT: 68 IN | BODY MASS INDEX: 25.16 KG/M2 | WEIGHT: 166 LBS

## 2018-12-13 VITALS — DIASTOLIC BLOOD PRESSURE: 76 MMHG | SYSTOLIC BLOOD PRESSURE: 128 MMHG

## 2018-12-13 PROCEDURE — 93000 ELECTROCARDIOGRAM COMPLETE: CPT

## 2018-12-13 PROCEDURE — 99214 OFFICE O/P EST MOD 30 MIN: CPT

## 2018-12-13 NOTE — REVIEW OF SYSTEMS
[Urinary Frequency] : urinary frequency [Nocturia] : nocturia [see HPI] : see HPI [Negative] : Heme/Lymph

## 2018-12-13 NOTE — PHYSICAL EXAM
[General Appearance - Well Developed] : well developed [Normal Appearance] : normal appearance [Well Groomed] : well groomed [General Appearance - Well Nourished] : well nourished [No Deformities] : no deformities [General Appearance - In No Acute Distress] : no acute distress [Normal Conjunctiva] : the conjunctiva exhibited no abnormalities [Eyelids - No Xanthelasma] : the eyelids demonstrated no xanthelasmas [Normal Oral Mucosa] : normal oral mucosa [No Oral Pallor] : no oral pallor [No Oral Cyanosis] : no oral cyanosis [FreeTextEntry1] : JVP normal. No bruits. [Respiration, Rhythm And Depth] : normal respiratory rhythm and effort [Exaggerated Use Of Accessory Muscles For Inspiration] : no accessory muscle use [Auscultation Breath Sounds / Voice Sounds] : lungs were clear to auscultation bilaterally [Heart Rate And Rhythm] : heart rate and rhythm were normal [Heart Sounds] : normal S1 and S2 [Murmurs] : no murmurs present [Arterial Pulses Normal] : the arterial pulses were normal [Edema] : no peripheral edema present [Abdomen Soft] : soft [Abdomen Tenderness] : non-tender [Abdomen Mass (___ Cm)] : no abdominal mass palpated [Abnormal Walk] : normal gait [Gait - Sufficient For Exercise Testing] : the gait was sufficient for exercise testing [Nail Clubbing] : no clubbing of the fingernails [Cyanosis, Localized] : no localized cyanosis [Petechial Hemorrhages (___cm)] : no petechial hemorrhages [] : no ischemic changes [Skin Color & Pigmentation] : normal skin color and pigmentation [No Venous Stasis] : no venous stasis [Affect] : the affect was normal [Mood] : the mood was normal

## 2018-12-13 NOTE — ASSESSMENT
[FreeTextEntry1] : Patient status post stroke status post pacemaker insertion paroxysmal A. fib. Hypertension controlled. Improving residua from his CVA.

## 2018-12-13 NOTE — HISTORY OF PRESENT ILLNESS
[FreeTextEntry1] : \par \par Patient s/p CVA with history of hypertension,  history of bradycardia and insertion of pacemaker. Iscompleting rehabilitation.\par \par Has been doing well, no bleeding, chest pain, palpitations, dyspnea, edema.\par

## 2018-12-13 NOTE — DISCUSSION/SUMMARY
[FreeTextEntry1] : Discussed with patient. Will have pacemaker interrogation next week. Should be repeated in 6 months. We'll see him after next interrogation. Patient given verbal and written information on low sodium diet. Discussed cardiac risk factor management in terms of diet reduction of red meat Mediterranean diet. Follow with PMD. Continue oral anticoagulation. Interactions with medications and avoidance of nonsteroidal drugs discussed

## 2018-12-19 ENCOUNTER — APPOINTMENT (OUTPATIENT)
Dept: CARDIOLOGY | Facility: CLINIC | Age: 68
End: 2018-12-19
Payer: MEDICARE

## 2018-12-19 PROCEDURE — 93288 INTERROG EVL PM/LDLS PM IP: CPT

## 2018-12-19 NOTE — PROCEDURE
[Pacemaker] : pacemaker [AAIR] : AAIR [Voltage: ___ volts] : Voltage was [unfilled] volts [Longevity: ___ months] : The estimated remaining battery life is [unfilled] months [Lead Imp:  ___ohms] : lead impedance was [unfilled] ohms [Sensing Amplitude ___mv] : sensing amplitude was [unfilled] mv [___V @] : [unfilled] V [___ ms] : [unfilled] ms [Asense-Vsense ___ %] : Asense-Vsense [unfilled]% [Asense-Vpace ___ %] : Asense-Vpace [unfilled]% [Apace-Vsense ___ %] : Apace-Vsense [unfilled]% [Apace-Vpace ___ %] : Apace-Vpace [unfilled]% [de-identified] : a sense v sense [de-identified] : [employer] [de-identified] : ASSURE  [de-identified] : 8/3/2018 [de-identified] :  [de-identified] : ONE EPISODE OF AFLUTTER 2:1 9 BEATS RATE 180 BPM\par normal pm function

## 2018-12-26 PROCEDURE — G8988: CPT | Mod: CJ

## 2018-12-26 PROCEDURE — G0515: CPT

## 2018-12-26 PROCEDURE — 97535 SELF CARE MNGMENT TRAINING: CPT | Mod: KX

## 2018-12-26 PROCEDURE — 97140 MANUAL THERAPY 1/> REGIONS: CPT | Mod: KX

## 2018-12-26 PROCEDURE — G8987: CPT | Mod: CK

## 2018-12-26 PROCEDURE — 97530 THERAPEUTIC ACTIVITIES: CPT

## 2018-12-26 PROCEDURE — 97116 GAIT TRAINING THERAPY: CPT

## 2018-12-26 PROCEDURE — 92507 TX SP LANG VOICE COMM INDIV: CPT

## 2018-12-26 PROCEDURE — G8978: CPT | Mod: CJ

## 2018-12-26 PROCEDURE — G8979: CPT | Mod: CJ

## 2018-12-26 PROCEDURE — 97110 THERAPEUTIC EXERCISES: CPT

## 2018-12-26 PROCEDURE — G9164: CPT | Mod: CH

## 2018-12-26 PROCEDURE — G8980: CPT | Mod: CI

## 2018-12-26 PROCEDURE — G8989: CPT | Mod: KX,CJ

## 2018-12-26 PROCEDURE — G8990: CPT | Mod: KX,CJ

## 2018-12-26 PROCEDURE — G9162: CPT | Mod: CI

## 2018-12-26 PROCEDURE — G9163: CPT | Mod: CH

## 2018-12-26 PROCEDURE — 97112 NEUROMUSCULAR REEDUCATION: CPT | Mod: KX

## 2018-12-26 PROCEDURE — 97162 PT EVAL MOD COMPLEX 30 MIN: CPT

## 2019-01-09 ENCOUNTER — RX RENEWAL (OUTPATIENT)
Age: 69
End: 2019-01-09

## 2019-01-10 ENCOUNTER — APPOINTMENT (OUTPATIENT)
Dept: FAMILY MEDICINE | Facility: CLINIC | Age: 69
End: 2019-01-10
Payer: MEDICARE

## 2019-01-10 VITALS
DIASTOLIC BLOOD PRESSURE: 70 MMHG | HEIGHT: 68 IN | SYSTOLIC BLOOD PRESSURE: 118 MMHG | OXYGEN SATURATION: 96 % | WEIGHT: 164 LBS | BODY MASS INDEX: 24.86 KG/M2 | RESPIRATION RATE: 20 BRPM | HEART RATE: 64 BPM

## 2019-01-10 PROCEDURE — 36415 COLL VENOUS BLD VENIPUNCTURE: CPT

## 2019-01-10 PROCEDURE — 99214 OFFICE O/P EST MOD 30 MIN: CPT | Mod: 25

## 2019-01-10 NOTE — HISTORY OF PRESENT ILLNESS
[de-identified] : Presents for BP check, labs, and general follow-up.  Note has completed PT - "goals achieved" - and will be starting exercise program ath the "[employer]" ("[employer]").  Reviewed recent visits with Neuro and Cardiology - no med changes.

## 2019-01-10 NOTE — ASSESSMENT
[FreeTextEntry1] : Hemodynamically stable with acceptable BP\par Cardiac exam stable with regular rhythm clinically\par Very minimal R-sided weakness consistent with history\par Lab profiles sent

## 2019-01-10 NOTE — PHYSICAL EXAM
[No Acute Distress] : no acute distress [No JVD] : no jugular venous distention [Supple] : supple [No Lymphadenopathy] : no lymphadenopathy [Thyroid Normal, No Nodules] : the thyroid was normal and there were no nodules present [No Respiratory Distress] : no respiratory distress  [Clear to Auscultation] : lungs were clear to auscultation bilaterally [No Accessory Muscle Use] : no accessory muscle use [Normal Rate] : normal rate  [Regular Rhythm] : with a regular rhythm [Normal S1, S2] : normal S1 and S2 [No Murmur] : no murmur heard [No Edema] : there was no peripheral edema [Soft] : abdomen soft [Non Tender] : non-tender [Non-distended] : non-distended [No Masses] : no abdominal mass palpated [No HSM] : no HSM [Normal Bowel Sounds] : normal bowel sounds [Normal Gait] : normal gait [Coordination Grossly Intact] : coordination grossly intact [Normal Affect] : the affect was normal [de-identified] : very minimal R ulnar drift

## 2019-01-11 LAB
ALBUMIN SERPL ELPH-MCNC: 3.9 G/DL
ALP BLD-CCNC: 79 U/L
ALT SERPL-CCNC: 28 U/L
ANION GAP SERPL CALC-SCNC: 11 MMOL/L
AST SERPL-CCNC: 28 U/L
BASOPHILS # BLD AUTO: 0.02 K/UL
BASOPHILS NFR BLD AUTO: 0.3 %
BILIRUB SERPL-MCNC: 0.7 MG/DL
BUN SERPL-MCNC: 16 MG/DL
CALCIUM SERPL-MCNC: 9.3 MG/DL
CHLORIDE SERPL-SCNC: 101 MMOL/L
CHOLEST SERPL-MCNC: 159 MG/DL
CHOLEST/HDLC SERPL: 2.5 RATIO
CO2 SERPL-SCNC: 30 MMOL/L
CREAT SERPL-MCNC: 0.96 MG/DL
EOSINOPHIL # BLD AUTO: 0.25 K/UL
EOSINOPHIL NFR BLD AUTO: 4.2 %
GLUCOSE SERPL-MCNC: 74 MG/DL
HBA1C MFR BLD HPLC: 6 %
HCT VFR BLD CALC: 44.5 %
HDLC SERPL-MCNC: 63 MG/DL
HGB BLD-MCNC: 14.4 G/DL
IMM GRANULOCYTES NFR BLD AUTO: 0.2 %
LDLC SERPL CALC-MCNC: 69 MG/DL
LYMPHOCYTES # BLD AUTO: 1.65 K/UL
LYMPHOCYTES NFR BLD AUTO: 27.7 %
MAN DIFF?: NORMAL
MCHC RBC-ENTMCNC: 30.3 PG
MCHC RBC-ENTMCNC: 32.4 GM/DL
MCV RBC AUTO: 93.5 FL
MONOCYTES # BLD AUTO: 0.49 K/UL
MONOCYTES NFR BLD AUTO: 8.2 %
NEUTROPHILS # BLD AUTO: 3.54 K/UL
NEUTROPHILS NFR BLD AUTO: 59.4 %
PLATELET # BLD AUTO: 273 K/UL
POTASSIUM SERPL-SCNC: 4.1 MMOL/L
PROT SERPL-MCNC: 6.9 G/DL
RBC # BLD: 4.76 M/UL
RBC # FLD: 13.3 %
SODIUM SERPL-SCNC: 142 MMOL/L
TRIGL SERPL-MCNC: 136 MG/DL
WBC # FLD AUTO: 5.96 K/UL

## 2019-01-24 ENCOUNTER — APPOINTMENT (OUTPATIENT)
Dept: FAMILY MEDICINE | Facility: CLINIC | Age: 69
End: 2019-01-24
Payer: MEDICARE

## 2019-01-24 DIAGNOSIS — H61.23 IMPACTED CERUMEN, BILATERAL: ICD-10-CM

## 2019-01-24 PROCEDURE — 69210 REMOVE IMPACTED EAR WAX UNI: CPT

## 2019-01-24 NOTE — ASSESSMENT
[FreeTextEntry1] : 120/75\par Bilateral impacted cerumen - ear irrigation bilat with lg amt cerumen evacuated; TMs wnl post procedure.

## 2019-02-12 ENCOUNTER — APPOINTMENT (OUTPATIENT)
Dept: FAMILY MEDICINE | Facility: CLINIC | Age: 69
End: 2019-02-12
Payer: MEDICARE

## 2019-02-12 VITALS
WEIGHT: 164 LBS | TEMPERATURE: 97.5 F | OXYGEN SATURATION: 95 % | BODY MASS INDEX: 24.86 KG/M2 | DIASTOLIC BLOOD PRESSURE: 74 MMHG | SYSTOLIC BLOOD PRESSURE: 112 MMHG | HEART RATE: 60 BPM | HEIGHT: 68 IN

## 2019-02-12 VITALS — SYSTOLIC BLOOD PRESSURE: 110 MMHG | DIASTOLIC BLOOD PRESSURE: 80 MMHG

## 2019-02-12 PROCEDURE — 99213 OFFICE O/P EST LOW 20 MIN: CPT

## 2019-02-12 RX ORDER — CYANOCOBALAMIN 1000 UG/ML
1000 INJECTION INTRAMUSCULAR; SUBCUTANEOUS
Refills: 0 | Status: DISCONTINUED | COMMUNITY
Start: 2018-06-12 | End: 2019-02-12

## 2019-02-12 RX ORDER — METHYLPREDNISOLONE 4 MG/1
4 TABLET ORAL
Qty: 1 | Refills: 0 | Status: DISCONTINUED | COMMUNITY
Start: 2018-06-12 | End: 2019-02-12

## 2019-02-12 NOTE — REVIEW OF SYSTEMS
[Negative] : Heme/Lymph [Fever] : no fever [Chills] : no chills [Pain] : no pain [Earache] : no earache [Hearing Loss] : no hearing loss [Chest Pain] : no chest pain [Palpitations] : no palpitations [Shortness Of Breath] : no shortness of breath [Abdominal Pain] : no abdominal pain [Nausea] : no nausea [Constipation] : no constipation [Dysuria] : no dysuria [Joint Pain] : no joint pain [Skin Rash] : no skin rash [Headache] : no headache [Dizziness] : no dizziness [Suicidal] : not suicidal

## 2019-02-12 NOTE — HISTORY OF PRESENT ILLNESS
[FreeTextEntry8] : CC: bp check\par Pt is a 69 yo here for bp check. Pt states been medications as prescribed regularly. Over the past couple of days, noticed higher than normal bp. /80 to 160/90 recorded at home, however denies any headaches, dizziness, nausea, vomiting. Pt also has seen cardiologist Dr. Hartman in Dec 2018, had pace maker interogated, and recommend to continue with same medication.

## 2019-02-12 NOTE — HEALTH RISK ASSESSMENT
[No falls in past year] : Patient reported no falls in the past year [0] : 2) Feeling down, depressed, or hopeless: Not at all (0) [] : No [de-identified] : red wine [[id number]] : 0

## 2019-02-12 NOTE — COUNSELING
[Weight management counseling provided] : Weight management [Healthy eating counseling provided] : healthy eating [Activity counseling provided] : activity [___ min/wk activity recommended] : [unfilled] min/wk activity recommended [Sleep ___ hours/day] : Sleep [unfilled] hours/day

## 2019-02-12 NOTE — PHYSICAL EXAM
[No Acute Distress] : no acute distress [Well Nourished] : well nourished [Well Developed] : well developed [PERRL] : pupils equal round and reactive to light [EOMI] : extraocular movements intact [Normal Outer Ear/Nose] : the outer ears and nose were normal in appearance [Normal Oropharynx] : the oropharynx was normal [Supple] : supple [No Respiratory Distress] : no respiratory distress  [Clear to Auscultation] : lungs were clear to auscultation bilaterally [Normal Rate] : normal rate  [Normal S1, S2] : normal S1 and S2 [No Edema] : there was no peripheral edema [Soft] : abdomen soft [Non Tender] : non-tender [Normal Anterior Cervical Nodes] : no anterior cervical lymphadenopathy [No Joint Swelling] : no joint swelling [No Rash] : no rash [Normal Gait] : normal gait

## 2019-02-20 ENCOUNTER — RX RENEWAL (OUTPATIENT)
Age: 69
End: 2019-02-20

## 2019-03-18 ENCOUNTER — RX RENEWAL (OUTPATIENT)
Age: 69
End: 2019-03-18

## 2019-03-19 ENCOUNTER — RX RENEWAL (OUTPATIENT)
Age: 69
End: 2019-03-19

## 2019-06-03 PROBLEM — N52.9 MALE ERECTILE DISORDER: Status: ACTIVE | Noted: 2017-06-15

## 2019-06-19 ENCOUNTER — APPOINTMENT (OUTPATIENT)
Dept: CARDIOLOGY | Facility: CLINIC | Age: 69
End: 2019-06-19
Payer: MEDICARE

## 2019-06-19 PROCEDURE — 93288 INTERROG EVL PM/LDLS PM IP: CPT

## 2019-06-19 NOTE — PROCEDURE
[Pacemaker] : pacemaker [AAIR] : AAIR [Voltage: ___ volts] : Voltage was [unfilled] volts [Longevity: ___ months] : The estimated remaining battery life is [unfilled] months [Lead Imp:  ___ohms] : lead impedance was [unfilled] ohms [Sensing Amplitude ___mv] : sensing amplitude was [unfilled] mv [___V @] : [unfilled] V [___ ms] : [unfilled] ms [Asense-Vsense ___ %] : Asense-Vsense [unfilled]% [Asense-Vpace ___ %] : Asense-Vpace [unfilled]% [Apace-Vsense ___ %] : Apace-Vsense [unfilled]% [Apace-Vpace ___ %] : Apace-Vpace [unfilled]% [de-identified] : a pace  v sense [de-identified] : [employer] [de-identified] : ASSURE  [de-identified] : 8/3/2018 [de-identified] :  [de-identified] : a pace 41 per cent 2 per cent v pace\par ONE episode svt 7 beats  158 bpm\par normal pm function

## 2019-06-27 ENCOUNTER — APPOINTMENT (OUTPATIENT)
Dept: CARDIOLOGY | Facility: CLINIC | Age: 69
End: 2019-06-27
Payer: MEDICARE

## 2019-06-27 ENCOUNTER — NON-APPOINTMENT (OUTPATIENT)
Age: 69
End: 2019-06-27

## 2019-06-27 VITALS
WEIGHT: 164 LBS | DIASTOLIC BLOOD PRESSURE: 75 MMHG | HEIGHT: 68 IN | OXYGEN SATURATION: 97 % | BODY MASS INDEX: 24.86 KG/M2 | HEART RATE: 60 BPM | RESPIRATION RATE: 17 BRPM | SYSTOLIC BLOOD PRESSURE: 110 MMHG

## 2019-06-27 PROCEDURE — 99214 OFFICE O/P EST MOD 30 MIN: CPT

## 2019-06-27 PROCEDURE — 93000 ELECTROCARDIOGRAM COMPLETE: CPT

## 2019-06-27 NOTE — ASSESSMENT
[FreeTextEntry1] : 68-year-old man status post a stroke. Hypertension in therapy. Fatigue perhaps related to medication and relative hypotension. Paroxysmal A. fib. Status post pacemaker.

## 2019-06-27 NOTE — REVIEW OF SYSTEMS
[Feeling Fatigued] : feeling fatigued [Urinary Frequency] : urinary frequency [Nocturia] : nocturia [see HPI] : see HPI [Negative] : Heme/Lymph

## 2019-06-27 NOTE — PHYSICAL EXAM
[Normal Appearance] : normal appearance [General Appearance - Well Developed] : well developed [Well Groomed] : well groomed [No Deformities] : no deformities [General Appearance - Well Nourished] : well nourished [Normal Conjunctiva] : the conjunctiva exhibited no abnormalities [Eyelids - No Xanthelasma] : the eyelids demonstrated no xanthelasmas [General Appearance - In No Acute Distress] : no acute distress [No Oral Pallor] : no oral pallor [Normal Oral Mucosa] : normal oral mucosa [No Oral Cyanosis] : no oral cyanosis [FreeTextEntry1] : JVP normal. No bruits. [Exaggerated Use Of Accessory Muscles For Inspiration] : no accessory muscle use [Respiration, Rhythm And Depth] : normal respiratory rhythm and effort [Auscultation Breath Sounds / Voice Sounds] : lungs were clear to auscultation bilaterally [Heart Rate And Rhythm] : heart rate and rhythm were normal [Murmurs] : no murmurs present [Heart Sounds] : normal S1 and S2 [Arterial Pulses Normal] : the arterial pulses were normal [Edema] : no peripheral edema present [Abdomen Soft] : soft [Abdomen Tenderness] : non-tender [Gait - Sufficient For Exercise Testing] : the gait was sufficient for exercise testing [Abnormal Walk] : normal gait [Abdomen Mass (___ Cm)] : no abdominal mass palpated [Cyanosis, Localized] : no localized cyanosis [Nail Clubbing] : no clubbing of the fingernails [] : no ischemic changes [No Venous Stasis] : no venous stasis [Petechial Hemorrhages (___cm)] : no petechial hemorrhages [Skin Color & Pigmentation] : normal skin color and pigmentation [Mood] : the mood was normal [Affect] : the affect was normal

## 2019-06-27 NOTE — DISCUSSION/SUMMARY
[Patient] : the patient [Risks] : risks [Alternatives] : alternatives [___ Month(s)] : [unfilled] month(s) [Benefits] : benefits [FreeTextEntry1] : If he continues to feel fatigued- advised to contact PMD for reevaluation. Would consider reduction of antihypertensive therapy if symptoms persist. Discussed hydration. Reviewed device interrogation. Followup device interrogation December see me again in approximately 6 months earlier as needed. Risk factor management discussed

## 2019-06-27 NOTE — HISTORY OF PRESENT ILLNESS
[FreeTextEntry1] : \par \par Patient s/p CVA with history of hypertension,  history of bradycardia and insertion of pacemaker. \par \par Has been doing well, no bleeding, chest pain, palpitations, dyspnea, edema.\par \par Complains of fatigue for past few weeks.\par \par \par

## 2019-07-15 ENCOUNTER — RX RENEWAL (OUTPATIENT)
Age: 69
End: 2019-07-15

## 2019-08-23 ENCOUNTER — APPOINTMENT (OUTPATIENT)
Dept: FAMILY MEDICINE | Facility: CLINIC | Age: 69
End: 2019-08-23
Payer: MEDICARE

## 2019-08-23 VITALS — HEART RATE: 64 BPM | SYSTOLIC BLOOD PRESSURE: 124 MMHG | DIASTOLIC BLOOD PRESSURE: 78 MMHG | RESPIRATION RATE: 20 BRPM

## 2019-08-23 PROCEDURE — 99214 OFFICE O/P EST MOD 30 MIN: CPT | Mod: 25

## 2019-08-23 PROCEDURE — 36415 COLL VENOUS BLD VENIPUNCTURE: CPT

## 2019-08-23 NOTE — HISTORY OF PRESENT ILLNESS
[de-identified] : Presents for follow-up on emergent basis.  States had gone to the [employer] at [place] yesterday, was "rushing" to make the shuttle van to the car; was noted to be talking "gibberish."  States was unable to form words.  Denies headache, palpitations, or any other associated symptom.  States this lasted 30-40 minutes then "I was fine."  No re-occurrence and now states feels as usual.

## 2019-08-23 NOTE — PHYSICAL EXAM
[No Acute Distress] : no acute distress [Normal] : no jugular venous distention, supple, no lymphadenopathy and the thyroid was normal and there were no nodules present [No Edema] : there was no peripheral edema [Soft] : abdomen soft [No Carotid Bruits] : no carotid bruits [No Focal Deficits] : no focal deficits [Coordination Grossly Intact] : coordination grossly intact [Non Tender] : non-tender [Speech Grossly Normal] : speech grossly normal [Normal Gait] : normal gait [Memory Grossly Normal] : memory grossly normal [Normal Affect] : the affect was normal [Normal Mood] : the mood was normal [Alert and Oriented x3] : oriented to person, place, and time [Normal Insight/Judgement] : insight and judgment were intact

## 2019-08-26 LAB
ALBUMIN SERPL ELPH-MCNC: 4.2 G/DL
ALP BLD-CCNC: 75 U/L
ALT SERPL-CCNC: 22 U/L
ANION GAP SERPL CALC-SCNC: 12 MMOL/L
AST SERPL-CCNC: 26 U/L
BASOPHILS # BLD AUTO: 0.05 K/UL
BASOPHILS NFR BLD AUTO: 0.8 %
BILIRUB SERPL-MCNC: 0.5 MG/DL
BUN SERPL-MCNC: 18 MG/DL
CALCIUM SERPL-MCNC: 9.1 MG/DL
CHLORIDE SERPL-SCNC: 101 MMOL/L
CHOLEST SERPL-MCNC: 145 MG/DL
CHOLEST/HDLC SERPL: 2.7 RATIO
CO2 SERPL-SCNC: 28 MMOL/L
CREAT SERPL-MCNC: 1.1 MG/DL
EOSINOPHIL # BLD AUTO: 0.17 K/UL
EOSINOPHIL NFR BLD AUTO: 2.7 %
ESTIMATED AVERAGE GLUCOSE: 94 MG/DL
FOLATE SERPL-MCNC: 17.3 NG/ML
GLUCOSE SERPL-MCNC: 99 MG/DL
HBA1C MFR BLD HPLC: 4.9 %
HCT VFR BLD CALC: 46 %
HDLC SERPL-MCNC: 54 MG/DL
HGB BLD-MCNC: 15 G/DL
IMM GRANULOCYTES NFR BLD AUTO: 0.2 %
LDLC SERPL CALC-MCNC: 72 MG/DL
LYMPHOCYTES # BLD AUTO: 1.73 K/UL
LYMPHOCYTES NFR BLD AUTO: 27.7 %
MAN DIFF?: NORMAL
MCHC RBC-ENTMCNC: 31.6 PG
MCHC RBC-ENTMCNC: 32.6 GM/DL
MCV RBC AUTO: 97 FL
MONOCYTES # BLD AUTO: 0.48 K/UL
MONOCYTES NFR BLD AUTO: 7.7 %
NEUTROPHILS # BLD AUTO: 3.8 K/UL
NEUTROPHILS NFR BLD AUTO: 60.9 %
PLATELET # BLD AUTO: 188 K/UL
POTASSIUM SERPL-SCNC: 4.4 MMOL/L
PROT SERPL-MCNC: 6.7 G/DL
RBC # BLD: 4.74 M/UL
RBC # FLD: 12.7 %
SODIUM SERPL-SCNC: 141 MMOL/L
T4 FREE SERPL-MCNC: 1.2 NG/DL
TRIGL SERPL-MCNC: 93 MG/DL
TSH SERPL-ACNC: 2.34 UIU/ML
VIT B12 SERPL-MCNC: 346 PG/ML
WBC # FLD AUTO: 6.24 K/UL

## 2019-09-11 ENCOUNTER — APPOINTMENT (OUTPATIENT)
Dept: CT IMAGING | Facility: HOSPITAL | Age: 69
End: 2019-09-11
Payer: MEDICARE

## 2019-09-11 ENCOUNTER — OUTPATIENT (OUTPATIENT)
Dept: OUTPATIENT SERVICES | Facility: HOSPITAL | Age: 69
LOS: 1 days | End: 2019-09-11
Payer: MEDICARE

## 2019-09-11 DIAGNOSIS — I63.9 CEREBRAL INFARCTION, UNSPECIFIED: ICD-10-CM

## 2019-09-11 DIAGNOSIS — G45.9 TRANSIENT CEREBRAL ISCHEMIC ATTACK, UNSPECIFIED: ICD-10-CM

## 2019-09-11 PROCEDURE — 70498 CT ANGIOGRAPHY NECK: CPT | Mod: 26

## 2019-09-11 PROCEDURE — 70496 CT ANGIOGRAPHY HEAD: CPT | Mod: 26

## 2019-09-11 PROCEDURE — 70450 CT HEAD/BRAIN W/O DYE: CPT

## 2019-09-11 PROCEDURE — 70498 CT ANGIOGRAPHY NECK: CPT

## 2019-09-11 PROCEDURE — 70496 CT ANGIOGRAPHY HEAD: CPT

## 2019-09-12 ENCOUNTER — RX RENEWAL (OUTPATIENT)
Age: 69
End: 2019-09-12

## 2019-10-23 ENCOUNTER — APPOINTMENT (OUTPATIENT)
Dept: NEUROLOGY | Facility: CLINIC | Age: 69
End: 2019-10-23
Payer: MEDICARE

## 2019-10-23 VITALS
HEIGHT: 68 IN | HEART RATE: 60 BPM | WEIGHT: 164 LBS | BODY MASS INDEX: 24.86 KG/M2 | RESPIRATION RATE: 16 BRPM | OXYGEN SATURATION: 96 % | SYSTOLIC BLOOD PRESSURE: 124 MMHG | DIASTOLIC BLOOD PRESSURE: 78 MMHG | TEMPERATURE: 97.7 F

## 2019-10-23 DIAGNOSIS — I66.02 OCCLUSION AND STENOSIS OF LEFT MIDDLE CEREBRAL ARTERY: ICD-10-CM

## 2019-10-23 PROCEDURE — 99215 OFFICE O/P EST HI 40 MIN: CPT

## 2019-10-23 NOTE — DISCUSSION/SUMMARY
[FreeTextEntry1] : Mr. Browne is a 68 year old man with a PMHx of HTN, HLD that saw me in the office in 2017 for evaluation of what was at that time an asymptomatic left MCA stenosis. He is 3.5 year s/p lacunar infarct due to small vessel disease and 1.5 year s/p left MCA stroke due to hypotension in the setting of the stenosis. We discussed there is still no role for stenting of the left MCA stenosis since the stroke occurred because of hypotension and he was treated with a pacemaker. He has been stable since with normal BP. I recommend that he continue his current medication regimen and avoid dehydration. He will continue to follow with Dr. Hickman and follow with us as needed. All of his questions and concerns were addressed.

## 2019-10-23 NOTE — REVIEW OF SYSTEMS
[Fever] : no fever [Feeling Poorly] : not feeling poorly [Chills] : no chills [Feeling Tired] : not feeling tired [Suicidal] : not suicidal [Anxiety] : no anxiety [Depression] : no depression [Memory Lapses or Loss] : no memory loss [Decr. Concentrating Ability] : no decrease in concentrating ability [Confused or Disoriented] : no confusion [Difficulty with Language] : no ~M difficulty with language [Changed Thought Patterns] : no change in thought patterns [Repeating Questions] : no repeated questioning about recent events [Arm Weakness] : no arm weakness [Facial Weakness] : no facial weakness [Hand Weakness] : no hand weakness [Leg Weakness] : no leg weakness [Difficulty Writing] : no difficulty writing [Poor Coordination] : good coordination [Numbness] : no numbness [Difficulties in Speech] : no speech difficulties [Tingling] : no tingling [Dizziness] : no dizziness [Seizures] : no convulsions [Vertigo] : no vertigo [Fainting] : no fainting [Lightheadedness] : no lightheadedness [Tension Headache] : no tension-type headache [Difficulty Walking] : no difficulty walking [Inability to Walk] : able to walk [Ataxia] : no ataxia [Red Eyes] : eyes not red [Eye Pain] : no eye pain [Eyesight Problems] : no eyesight problems [Earache] : no earache [Loss Of Hearing] : no hearing loss [Chest Pain] : no chest pain [Palpitations] : no palpitations [Shortness Of Breath] : no shortness of breath [Wheezing] : no wheezing [Cough] : no cough [Diarrhea] : no diarrhea [Constipation] : no constipation [Joint Pain] : no joint pain [Joint Swelling] : no joint swelling [Skin Lesions] : no skin lesions [Easy Bleeding] : no tendency for easy bleeding [Skin Wound] : no skin wound [Easy Bruising] : no tendency for easy bruising

## 2019-10-23 NOTE — PHYSICAL EXAM
[General Appearance - Alert] : alert [General Appearance - Well Nourished] : well nourished [General Appearance - Well Developed] : well developed [Oriented To Time, Place, And Person] : oriented to person, place, and time [Affect] : the affect was normal [Person] : oriented to person [Mood] : the mood was normal [Time] : oriented to time [Place] : oriented to place [Short Term Intact] : short term memory intact [Concentration Intact] : normal concentrating ability [Visual Intact] : visual attention was ~T not ~L decreased [Naming Objects] : no difficulty naming common objects [Writing A Sentence] : no difficulty writing a sentence [Repeating Phrases] : no difficulty repeating a phrase [Fluency] : fluency intact [Comprehension] : comprehension intact [Reading] : reading intact [Cranial Nerves Optic (II)] : visual acuity intact bilaterally,  visual fields full to confrontation, pupils equal round and reactive to light [Past History] : adequate knowledge of personal past history [Cranial Nerves Trigeminal (V)] : facial sensation intact symmetrically [Cranial Nerves Oculomotor (III)] : extraocular motion intact [Cranial Nerves Vestibulocochlear (VIII)] : hearing was intact bilaterally [Cranial Nerves Facial (VII)] : face symmetrical [Cranial Nerves Accessory (XI - Cranial And Spinal)] : head turning and shoulder shrug symmetric [Cranial Nerves Glossopharyngeal (IX)] : tongue and palate midline [Motor Tone] : muscle tone was normal in all four extremities [Cranial Nerves Hypoglossal (XII)] : there was no tongue deviation with protrusion [No Muscle Atrophy] : normal bulk in all four extremities [Motor Strength] : muscle strength was normal in all four extremities [Motor Handedness Right-Handed] : the patient is right hand dominant [Sensation Tactile Decrease] : light touch was intact [Balance] : balance was intact [Extraocular Movements] : extraocular movements were intact [Optic Disc Abnormality] : the optic disc were normal in size and color [Hearing Threshold Finger Rub Not [place]] : hearing was normal [Full Visual Field] : full visual field [] : no respiratory distress [Neck Appearance] : the appearance of the neck was normal [Heart Rate And Rhythm] : heart rate was normal and rhythm regular [Edema] : there was no peripheral edema [Abdomen Soft] : soft [Involuntary Movements] : no involuntary movements were seen [Abnormal Walk] : normal gait [Musculoskeletal - Swelling] : no joint swelling seen [Paresis Pronator Drift Right-Sided] : no pronator drift on the right [Paresis Pronator Drift Left-Sided] : no pronator drift on the left [Motor Strength Upper Extremities Bilaterally] : strength was normal in both upper extremities [Motor Strength Lower Extremities Bilaterally] : strength was normal in both lower extremities [Dysdiadochokinesia Bilaterally] : not present [Coordination - Dysmetria Impaired Finger-to-Nose Bilateral] : not present

## 2019-10-23 NOTE — HISTORY OF PRESENT ILLNESS
[FreeTextEntry1] : Mr. 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 Hickman since the first stroke in 2016. Since our last visit in 2017, he was diagnosed with A. fib in November of 2017 and was started on Eliquis. He was in [place] in July 2018 when he became hypotensive and had a left MCA [place] stroke likely due to the left MCA stenosis. Patient reports having residual decreased fine motor skills in his right hand and some word finding difficulty. Patient comes in today discuss the left MCA stenosis. Patient is on Eliquis/ASA and Crestor for stroke prevention.

## 2019-12-18 ENCOUNTER — APPOINTMENT (OUTPATIENT)
Dept: CARDIOLOGY | Facility: CLINIC | Age: 69
End: 2019-12-18
Payer: MEDICARE

## 2019-12-18 PROCEDURE — 93288 INTERROG EVL PM/LDLS PM IP: CPT

## 2019-12-18 NOTE — PROCEDURE
[Pacemaker] : pacemaker [AAIR] : AAIR [Voltage: ___ volts] : Voltage was [unfilled] volts [Longevity: ___ months] : The estimated remaining battery life is [unfilled] months [Lead Imp:  ___ohms] : lead impedance was [unfilled] ohms [Sensing Amplitude ___mv] : sensing amplitude was [unfilled] mv [___V @] : [unfilled] V [___ ms] : [unfilled] ms [Asense-Vsense ___ %] : Asense-Vsense [unfilled]% [Asense-Vpace ___ %] : Asense-Vpace [unfilled]% [Apace-Vpace ___ %] : Apace-Vpace [unfilled]% [Apace-Vsense ___ %] : Apace-Vsense [unfilled]% [de-identified] : a pace  v sense [de-identified] : [employer] [de-identified] : ASSURE  [de-identified] : 8/3/2018 [de-identified] :  [de-identified] : \par ONE episode nsvt 9 beats  158 bpm\par normal pm function

## 2020-01-13 ENCOUNTER — RX RENEWAL (OUTPATIENT)
Age: 70
End: 2020-01-13

## 2020-01-15 ENCOUNTER — APPOINTMENT (OUTPATIENT)
Dept: FAMILY MEDICINE | Facility: CLINIC | Age: 70
End: 2020-01-15
Payer: MEDICARE

## 2020-01-15 PROCEDURE — G0008: CPT

## 2020-01-15 PROCEDURE — 90686 IIV4 VACC NO PRSV 0.5 ML IM: CPT

## 2020-02-03 ENCOUNTER — NON-APPOINTMENT (OUTPATIENT)
Age: 70
End: 2020-02-03

## 2020-02-03 ENCOUNTER — APPOINTMENT (OUTPATIENT)
Dept: CARDIOLOGY | Facility: CLINIC | Age: 70
End: 2020-02-03
Payer: MEDICARE

## 2020-02-03 VITALS
OXYGEN SATURATION: 96 % | WEIGHT: 172 LBS | SYSTOLIC BLOOD PRESSURE: 119 MMHG | RESPIRATION RATE: 16 BRPM | DIASTOLIC BLOOD PRESSURE: 74 MMHG | HEIGHT: 68 IN | HEART RATE: 63 BPM | BODY MASS INDEX: 26.07 KG/M2

## 2020-02-03 PROCEDURE — 93000 ELECTROCARDIOGRAM COMPLETE: CPT

## 2020-02-03 PROCEDURE — 99214 OFFICE O/P EST MOD 30 MIN: CPT

## 2020-02-03 NOTE — REVIEW OF SYSTEMS
[Feeling Fatigued] : not feeling fatigued [Recent Weight Gain (___ Lbs)] : recent [unfilled] ~Ulb weight gain [Nocturia] : nocturia [Urinary Frequency] : urinary frequency [see HPI] : see HPI [Negative] : Endocrine

## 2020-02-03 NOTE — HISTORY OF PRESENT ILLNESS
[FreeTextEntry1] : \par \par Patient s/p CVA with history of hypertension,  history of bradycardia and insertion of pacemaker.\par History of PAF \par \par Has been doing well, no bleeding, chest pain, palpitations, dyspnea, edema.\par \par Aware of weight gain, although goes to [facility] regularly. \par \par Had recent pacer interrogation\par \par \par

## 2020-02-03 NOTE — DISCUSSION/SUMMARY
[Risks] : risks [Benefits] : benefits [Patient] : the patient [___ Month(s)] : [unfilled] month(s) [Alternatives] : alternatives [FreeTextEntry1] : Reviewe pacer interrogation- wide-complex tachycardia noted nonsustained asymptomatic. Normal pacer function.\par \par Blood pressure well controlled. No bleeding complications on current therapy. Lipids satisfactory.\par \par Discussed with patient maintain current cardiac regimen. Pacemaker followup as scheduled in a few months. See me again in 6 months and follow up with PMD in his neurologist.

## 2020-02-03 NOTE — PHYSICAL EXAM
[General Appearance - Well Developed] : well developed [Normal Appearance] : normal appearance [Well Groomed] : well groomed [General Appearance - Well Nourished] : well nourished [General Appearance - In No Acute Distress] : no acute distress [Normal Conjunctiva] : the conjunctiva exhibited no abnormalities [No Deformities] : no deformities [Eyelids - No Xanthelasma] : the eyelids demonstrated no xanthelasmas [Normal Oral Mucosa] : normal oral mucosa [No Oral Pallor] : no oral pallor [No Oral Cyanosis] : no oral cyanosis [FreeTextEntry1] : JVP normal. No bruits. [Respiration, Rhythm And Depth] : normal respiratory rhythm and effort [Exaggerated Use Of Accessory Muscles For Inspiration] : no accessory muscle use [Heart Rate And Rhythm] : heart rate and rhythm were normal [Heart Sounds] : normal S1 and S2 [Auscultation Breath Sounds / Voice Sounds] : lungs were clear to auscultation bilaterally [Murmurs] : no murmurs present [Arterial Pulses Normal] : the arterial pulses were normal [Abdomen Soft] : soft [Edema] : no peripheral edema present [Abdomen Tenderness] : non-tender [Abdomen Mass (___ Cm)] : no abdominal mass palpated [Nail Clubbing] : no clubbing of the fingernails [Abnormal Walk] : normal gait [Gait - Sufficient For Exercise Testing] : the gait was sufficient for exercise testing [] : no ischemic changes [Cyanosis, Localized] : no localized cyanosis [Petechial Hemorrhages (___cm)] : no petechial hemorrhages [Affect] : the affect was normal [Skin Color & Pigmentation] : normal skin color and pigmentation [No Venous Stasis] : no venous stasis [Mood] : the mood was normal

## 2020-02-03 NOTE — ASSESSMENT
[FreeTextEntry1] : 69-year-old man status post a stroke. Hypertension, controlled. PAF on oral A/c.\par  Status post pacemaker.

## 2020-04-08 ENCOUNTER — RX RENEWAL (OUTPATIENT)
Age: 70
End: 2020-04-08

## 2020-04-23 ENCOUNTER — RX RENEWAL (OUTPATIENT)
Age: 70
End: 2020-04-23

## 2020-08-05 ENCOUNTER — APPOINTMENT (OUTPATIENT)
Dept: CARDIOLOGY | Facility: CLINIC | Age: 70
End: 2020-08-05

## 2020-08-26 ENCOUNTER — APPOINTMENT (OUTPATIENT)
Dept: CARDIOLOGY | Facility: CLINIC | Age: 70
End: 2020-08-26
Payer: MEDICARE

## 2020-08-26 PROCEDURE — 93288 INTERROG EVL PM/LDLS PM IP: CPT

## 2020-08-27 ENCOUNTER — APPOINTMENT (OUTPATIENT)
Dept: CARDIOLOGY | Facility: CLINIC | Age: 70
End: 2020-08-27
Payer: MEDICARE

## 2020-08-27 ENCOUNTER — NON-APPOINTMENT (OUTPATIENT)
Age: 70
End: 2020-08-27

## 2020-08-27 VITALS
OXYGEN SATURATION: 95 % | HEART RATE: 61 BPM | WEIGHT: 165 LBS | HEIGHT: 68 IN | BODY MASS INDEX: 25.01 KG/M2 | DIASTOLIC BLOOD PRESSURE: 83 MMHG | RESPIRATION RATE: 16 BRPM | TEMPERATURE: 97.5 F | SYSTOLIC BLOOD PRESSURE: 122 MMHG

## 2020-08-27 PROCEDURE — 93000 ELECTROCARDIOGRAM COMPLETE: CPT

## 2020-08-27 PROCEDURE — 99214 OFFICE O/P EST MOD 30 MIN: CPT

## 2020-08-27 NOTE — PHYSICAL EXAM
[General Appearance - Well Developed] : well developed [Normal Appearance] : normal appearance [Well Groomed] : well groomed [General Appearance - Well Nourished] : well nourished [General Appearance - In No Acute Distress] : no acute distress [No Deformities] : no deformities [Eyelids - No Xanthelasma] : the eyelids demonstrated no xanthelasmas [Normal Conjunctiva] : the conjunctiva exhibited no abnormalities [FreeTextEntry1] : JVP normal. No bruits. [Exaggerated Use Of Accessory Muscles For Inspiration] : no accessory muscle use [Respiration, Rhythm And Depth] : normal respiratory rhythm and effort [Auscultation Breath Sounds / Voice Sounds] : lungs were clear to auscultation bilaterally [Heart Rate And Rhythm] : heart rate and rhythm were normal [Heart Sounds] : normal S1 and S2 [Murmurs] : no murmurs present [Arterial Pulses Normal] : the arterial pulses were normal [Edema] : no peripheral edema present [Abdomen Tenderness] : non-tender [Abdomen Soft] : soft [Abdomen Mass (___ Cm)] : no abdominal mass palpated [Abnormal Walk] : normal gait [Nail Clubbing] : no clubbing of the fingernails [Gait - Sufficient For Exercise Testing] : the gait was sufficient for exercise testing [Petechial Hemorrhages (___cm)] : no petechial hemorrhages [Cyanosis, Localized] : no localized cyanosis [] : no ischemic changes [Skin Color & Pigmentation] : normal skin color and pigmentation [Affect] : the affect was normal [No Venous Stasis] : no venous stasis [Mood] : the mood was normal

## 2020-08-27 NOTE — REVIEW OF SYSTEMS
[Recent Weight Gain (___ Lbs)] : no recent weight gain [Urinary Frequency] : urinary frequency [Feeling Fatigued] : not feeling fatigued [Nocturia] : nocturia [see HPI] : see HPI [Negative] : Heme/Lymph

## 2020-08-27 NOTE — ASSESSMENT
[FreeTextEntry1] : 69-year-old man status post  stroke. Hypertension, controlled. PAF on oral A/c.\par  Status post pacemaker.

## 2020-08-27 NOTE — DISCUSSION/SUMMARY
[Patient] : the patient [Risks] : risks [Benefits] : benefits [Alternatives] : alternatives [___ Month(s)] : [unfilled] month(s) [FreeTextEntry1] : Reviewed pacer interrogation Normal pacer function.\par \par Blood pressure well controlled. No bleeding complications on current therapy. \par \par Discussed with patient maintain current cardiac regimen. Pacemaker followup as scheduled in 6 months. See me again in 6 months and follow up with PMD and  his neurologist.

## 2020-08-27 NOTE — HISTORY OF PRESENT ILLNESS
[FreeTextEntry1] : \par \par Patient s/p CVA with history of hypertension,  history of bradycardia and insertion of pacemaker.\par History of PAF \par \par Has been doing well, no bleeding, chest pain, palpitations, dyspnea, edema.\par \par Had recent pacer interrogation\par \par Goes for walks/hikes.\par \par \par

## 2020-08-27 NOTE — PROCEDURE
[Pacemaker] : pacemaker [AAIR] : AAIR [Voltage: ___ volts] : Voltage was [unfilled] volts [Longevity: ___ months] : The estimated remaining battery life is [unfilled] months [Lead Imp:  ___ohms] : lead impedance was [unfilled] ohms [Sensing Amplitude ___mv] : sensing amplitude was [unfilled] mv [___V @] : [unfilled] V [___ ms] : [unfilled] ms [Asense-Vsense ___ %] : Asense-Vsense [unfilled]% [Asense-Vpace ___ %] : Asense-Vpace [unfilled]% [Apace-Vsense ___ %] : Apace-Vsense [unfilled]% [Apace-Vpace ___ %] : Apace-Vpace [unfilled]% [de-identified] : [employer] [de-identified] : 8/3/2018 [de-identified] : [id number] [de-identified] : ASSURE  [de-identified] : AP 29.2%\par VP1.8%\par normal pm function [de-identified] :

## 2020-08-28 ENCOUNTER — RX RENEWAL (OUTPATIENT)
Age: 70
End: 2020-08-28

## 2020-09-11 ENCOUNTER — RX RENEWAL (OUTPATIENT)
Age: 70
End: 2020-09-11

## 2020-10-19 ENCOUNTER — RX RENEWAL (OUTPATIENT)
Age: 70
End: 2020-10-19

## 2020-10-24 ENCOUNTER — APPOINTMENT (OUTPATIENT)
Dept: RADIOLOGY | Facility: HOSPITAL | Age: 70
End: 2020-10-24
Payer: MEDICARE

## 2020-10-24 ENCOUNTER — OUTPATIENT (OUTPATIENT)
Dept: OUTPATIENT SERVICES | Facility: HOSPITAL | Age: 70
LOS: 1 days | End: 2020-10-24
Payer: MEDICARE

## 2020-10-24 DIAGNOSIS — Z00.8 ENCOUNTER FOR OTHER GENERAL EXAMINATION: ICD-10-CM

## 2020-10-24 PROCEDURE — 72040 X-RAY EXAM NECK SPINE 2-3 VW: CPT | Mod: 26

## 2020-10-24 PROCEDURE — 72040 X-RAY EXAM NECK SPINE 2-3 VW: CPT

## 2020-11-05 ENCOUNTER — APPOINTMENT (OUTPATIENT)
Dept: FAMILY MEDICINE | Facility: CLINIC | Age: 70
End: 2020-11-05
Payer: MEDICARE

## 2020-11-05 VITALS
HEIGHT: 68 IN | BODY MASS INDEX: 24.86 KG/M2 | SYSTOLIC BLOOD PRESSURE: 125 MMHG | WEIGHT: 164 LBS | DIASTOLIC BLOOD PRESSURE: 78 MMHG | RESPIRATION RATE: 20 BRPM | HEART RATE: 76 BPM

## 2020-11-05 PROCEDURE — 93000 ELECTROCARDIOGRAM COMPLETE: CPT

## 2020-11-05 PROCEDURE — 36415 COLL VENOUS BLD VENIPUNCTURE: CPT

## 2020-11-05 PROCEDURE — 99072 ADDL SUPL MATRL&STAF TM PHE: CPT

## 2020-11-05 PROCEDURE — G0439: CPT

## 2020-11-05 PROCEDURE — 90686 IIV4 VACC NO PRSV 0.5 ML IM: CPT

## 2020-11-05 PROCEDURE — G0008: CPT

## 2020-11-05 NOTE — PHYSICAL EXAM
[Normal Posterior Cervical Nodes] : no posterior cervical lymphadenopathy [Normal Anterior Cervical Nodes] : no anterior cervical lymphadenopathy [Normal] : no rash [Coordination Grossly Intact] : coordination grossly intact [Normal Gait] : normal gait [Memory Grossly Normal] : memory grossly normal [Normal Affect] : the affect was normal [Alert and Oriented x3] : oriented to person, place, and time [Normal Mood] : the mood was normal [Normal Insight/Judgement] : insight and judgment were intact [de-identified] : very mild expressive aphasia consistent with history

## 2020-11-05 NOTE — HISTORY OF PRESENT ILLNESS
[FreeTextEntry1] : Requests comprehensive exam [de-identified] : Presents for comprehensive exam.  States at this time feeling generally well.  Does relate an episode several weeks ago while on a hiking trip of about 30-60 minutes of inability to speak coherently.  Pt states he had eaten and had fluids; states this resolved spontaneously with no subsequent issue.  Of note he had a similar episode while at the [employer] about a year ago.  Did see Neuro recently with no new issues and no changes in medication.  Denies any symptoms at this encounter.  Does request the current flu vaccine.

## 2020-11-05 NOTE — ASSESSMENT
[FreeTextEntry1] : Comprehensive exam reveals patient to be hemodynamically stable with acceptable BP\par Cardiac status stable with rhythm regular clinically and by EKG\par No new focal deficits on neuro exam - mild expressive aphasia consistent with history and is stable\par Strongly advise F/U with Neurology at this time\par Lab profiles drawn in office and sent\par Flu vaccine given L deltoid

## 2020-11-05 NOTE — HEALTH RISK ASSESSMENT
[No] : In the past 12 months have you used drugs other than those required for medical reasons? No [No falls in past year] : Patient reported no falls in the past year [0] : 2) Feeling down, depressed, or hopeless: Not at all (0) [Fully functional (bathing, dressing, toileting, transferring, walking, feeding)] : Fully functional (bathing, dressing, toileting, transferring, walking, feeding) [Fully functional (using the telephone, shopping, preparing meals, housekeeping, doing laundry, using] : Fully functional and needs no help or supervision to perform IADLs (using the telephone, shopping, preparing meals, housekeeping, doing laundry, using transportation, managing medications and managing finances) [With Patient/Caregiver] : With Patient/Caregiver [] : No [YearQuit] : 1973 [Audit-CScore] : 0 [AdvancecareDate] : 11/20 [FreeTextEntry4] : to discuss with wife

## 2020-11-05 NOTE — COUNSELING
[de-identified] : Healthy eating and activities [None] : None [Good understanding] : Patient has a good understanding of lifestyle changes and steps needed to achieve self management goal

## 2020-11-06 LAB
ALBUMIN SERPL ELPH-MCNC: 4.5 G/DL
ALP BLD-CCNC: 83 U/L
ALT SERPL-CCNC: 27 U/L
ANION GAP SERPL CALC-SCNC: 13 MMOL/L
APPEARANCE: CLEAR
AST SERPL-CCNC: 32 U/L
BASOPHILS # BLD AUTO: 0.05 K/UL
BASOPHILS NFR BLD AUTO: 0.7 %
BILIRUB SERPL-MCNC: 0.8 MG/DL
BILIRUBIN URINE: NEGATIVE
BLOOD URINE: NEGATIVE
BUN SERPL-MCNC: 16 MG/DL
CALCIUM SERPL-MCNC: 9.5 MG/DL
CHLORIDE SERPL-SCNC: 100 MMOL/L
CHOLEST SERPL-MCNC: 173 MG/DL
CO2 SERPL-SCNC: 28 MMOL/L
COLOR: NORMAL
CREAT SERPL-MCNC: 0.92 MG/DL
CRP SERPL-MCNC: 0.1 MG/DL
EOSINOPHIL # BLD AUTO: 0.17 K/UL
EOSINOPHIL NFR BLD AUTO: 2.4 %
ERYTHROCYTE [SEDIMENTATION RATE] IN BLOOD BY WESTERGREN METHOD: 17 MM/HR
FOLATE SERPL-MCNC: 18 NG/ML
GLUCOSE QUALITATIVE U: NEGATIVE
GLUCOSE SERPL-MCNC: 76 MG/DL
HCT VFR BLD CALC: 48.2 %
HDLC SERPL-MCNC: 67 MG/DL
HGB BLD-MCNC: 15.7 G/DL
IMM GRANULOCYTES NFR BLD AUTO: 0.1 %
KETONES URINE: NORMAL
LDLC SERPL CALC-MCNC: 88 MG/DL
LEUKOCYTE ESTERASE URINE: NEGATIVE
LYMPHOCYTES # BLD AUTO: 2.4 K/UL
LYMPHOCYTES NFR BLD AUTO: 34.4 %
MAN DIFF?: NORMAL
MCHC RBC-ENTMCNC: 31 PG
MCHC RBC-ENTMCNC: 32.6 GM/DL
MCV RBC AUTO: 95.3 FL
MONOCYTES # BLD AUTO: 0.48 K/UL
MONOCYTES NFR BLD AUTO: 6.9 %
NEUTROPHILS # BLD AUTO: 3.87 K/UL
NEUTROPHILS NFR BLD AUTO: 55.5 %
NITRITE URINE: NEGATIVE
NONHDLC SERPL-MCNC: 106 MG/DL
PH URINE: 6
PLATELET # BLD AUTO: 204 K/UL
POTASSIUM SERPL-SCNC: 4 MMOL/L
PROT SERPL-MCNC: 7.1 G/DL
PROTEIN URINE: NEGATIVE
PSA SERPL-MCNC: 1.79 NG/ML
RBC # BLD: 5.06 M/UL
RBC # FLD: 12.7 %
SARS-COV-2 IGG SERPL IA-ACNC: 0.15 INDEX
SARS-COV-2 IGG SERPL QL IA: NEGATIVE
SODIUM SERPL-SCNC: 141 MMOL/L
SPECIFIC GRAVITY URINE: 1.01
T4 FREE SERPL-MCNC: 1.3 NG/DL
TRIGL SERPL-MCNC: 91 MG/DL
TSH SERPL-ACNC: 2.55 UIU/ML
UROBILINOGEN URINE: NORMAL
VIT B12 SERPL-MCNC: 463 PG/ML
WBC # FLD AUTO: 6.98 K/UL

## 2020-11-11 ENCOUNTER — APPOINTMENT (OUTPATIENT)
Dept: CT IMAGING | Facility: HOSPITAL | Age: 70
End: 2020-11-11
Payer: MEDICARE

## 2020-11-11 ENCOUNTER — OUTPATIENT (OUTPATIENT)
Dept: OUTPATIENT SERVICES | Facility: HOSPITAL | Age: 70
LOS: 1 days | End: 2020-11-11
Payer: MEDICARE

## 2020-11-11 DIAGNOSIS — Z00.8 ENCOUNTER FOR OTHER GENERAL EXAMINATION: ICD-10-CM

## 2020-11-11 PROCEDURE — 70496 CT ANGIOGRAPHY HEAD: CPT | Mod: 26

## 2020-11-11 PROCEDURE — 70498 CT ANGIOGRAPHY NECK: CPT | Mod: 26

## 2020-11-11 PROCEDURE — 70498 CT ANGIOGRAPHY NECK: CPT

## 2020-11-11 PROCEDURE — 70496 CT ANGIOGRAPHY HEAD: CPT

## 2021-01-14 ENCOUNTER — RX RENEWAL (OUTPATIENT)
Age: 71
End: 2021-01-14

## 2021-03-04 ENCOUNTER — NON-APPOINTMENT (OUTPATIENT)
Age: 71
End: 2021-03-04

## 2021-03-25 ENCOUNTER — APPOINTMENT (OUTPATIENT)
Dept: CARDIOLOGY | Facility: CLINIC | Age: 71
End: 2021-03-25

## 2021-04-07 ENCOUNTER — APPOINTMENT (OUTPATIENT)
Dept: CARDIOLOGY | Facility: CLINIC | Age: 71
End: 2021-04-07
Payer: MEDICARE

## 2021-04-07 PROCEDURE — 93288 INTERROG EVL PM/LDLS PM IP: CPT

## 2021-04-07 PROCEDURE — 99072 ADDL SUPL MATRL&STAF TM PHE: CPT

## 2021-04-07 NOTE — PROCEDURE
[Pacemaker] : pacemaker [AAIR] : AAIR [Voltage: ___ volts] : Voltage was [unfilled] volts [Longevity: ___ months] : The estimated remaining battery life is [unfilled] months [Lead Imp:  ___ohms] : lead impedance was [unfilled] ohms [Sensing Amplitude ___mv] : sensing amplitude was [unfilled] mv [___V @] : [unfilled] V [___ ms] : [unfilled] ms [Asense-Vsense ___ %] : Asense-Vsense [unfilled]% [Asense-Vpace ___ %] : Asense-Vpace [unfilled]% [Apace-Vsense ___ %] : Apace-Vsense [unfilled]% [Apace-Vpace ___ %] : Apace-Vpace [unfilled]% [de-identified] : [employer] [de-identified] : ASSURE  [de-identified] : [id number] [de-identified] : 8/3/2018 [de-identified] :  [de-identified] : AP 45.1%\par VP2.7%\par normal pm function

## 2021-04-08 ENCOUNTER — APPOINTMENT (OUTPATIENT)
Dept: CARDIOLOGY | Facility: CLINIC | Age: 71
End: 2021-04-08
Payer: MEDICARE

## 2021-04-08 ENCOUNTER — NON-APPOINTMENT (OUTPATIENT)
Age: 71
End: 2021-04-08

## 2021-04-08 VITALS
WEIGHT: 172 LBS | HEIGHT: 68 IN | SYSTOLIC BLOOD PRESSURE: 126 MMHG | RESPIRATION RATE: 16 BRPM | OXYGEN SATURATION: 96 % | DIASTOLIC BLOOD PRESSURE: 85 MMHG | HEART RATE: 60 BPM | BODY MASS INDEX: 26.07 KG/M2

## 2021-04-08 PROCEDURE — 99214 OFFICE O/P EST MOD 30 MIN: CPT

## 2021-04-08 PROCEDURE — 99072 ADDL SUPL MATRL&STAF TM PHE: CPT

## 2021-04-08 PROCEDURE — 93000 ELECTROCARDIOGRAM COMPLETE: CPT

## 2021-04-08 NOTE — HISTORY OF PRESENT ILLNESS
[FreeTextEntry1] : \par \par Patient s/p CVA with history of hypertension,  history of bradycardia and insertion of pacemaker.\par History of PAF \par \par Has been doing well, no bleeding, chest pain, palpitations, dyspnea, edema.\par \par Had recent pacer interrogation, reviewed Dr. Akbar's note.\par \par \par \par \par

## 2021-04-08 NOTE — REVIEW OF SYSTEMS
[Recent Weight Gain (___ Lbs)] : recent [unfilled] ~Ulb weight gain [Feeling Fatigued] : not feeling fatigued [Urinary Frequency] : urinary frequency [Nocturia] : nocturia [Negative] : Heme/Lymph

## 2021-04-08 NOTE — DISCUSSION/SUMMARY
[Patient] : the patient [Risks] : risks [Benefits] : benefits [Alternatives] : alternatives [___ Month(s)] : [unfilled] month(s) [FreeTextEntry1] : Reviewed pacer interrogation Normal pacer function.\par Last labs noted.\par \par Blood pressure well controlled. No bleeding complications on current therapy. \par \par Discussed with patient maintain current cardiac regimen. Pacemaker followup as scheduled in 6 months. See me again in 6 months and follow up with PMD and  his neurologist.\par \par Patient advised to continue current cardiac medication at this time.\par Patient's questions were addressed to their satisfaction.\par

## 2021-04-08 NOTE — PHYSICAL EXAM
[General Appearance - Well Developed] : well developed [Normal Appearance] : normal appearance [Well Groomed] : well groomed [General Appearance - Well Nourished] : well nourished [No Deformities] : no deformities [General Appearance - In No Acute Distress] : no acute distress [Normal Conjunctiva] : the conjunctiva exhibited no abnormalities [Eyelids - No Xanthelasma] : the eyelids demonstrated no xanthelasmas [FreeTextEntry1] : JVP normal. No bruits. [Respiration, Rhythm And Depth] : normal respiratory rhythm and effort [Exaggerated Use Of Accessory Muscles For Inspiration] : no accessory muscle use [Auscultation Breath Sounds / Voice Sounds] : lungs were clear to auscultation bilaterally [Heart Rate And Rhythm] : heart rate and rhythm were normal [Heart Sounds] : normal S1 and S2 [Murmurs] : no murmurs present [Arterial Pulses Normal] : the arterial pulses were normal [Edema] : no peripheral edema present [Abdomen Soft] : soft [Abdomen Tenderness] : non-tender [Abdomen Mass (___ Cm)] : no abdominal mass palpated [Nail Clubbing] : no clubbing of the fingernails [Cyanosis, Localized] : no localized cyanosis [Petechial Hemorrhages (___cm)] : no petechial hemorrhages [] : no ischemic changes [Affect] : the affect was normal [Mood] : the mood was normal

## 2021-04-16 ENCOUNTER — APPOINTMENT (OUTPATIENT)
Dept: FAMILY MEDICINE | Facility: CLINIC | Age: 71
End: 2021-04-16
Payer: MEDICARE

## 2021-04-16 ENCOUNTER — RX RENEWAL (OUTPATIENT)
Age: 71
End: 2021-04-16

## 2021-04-16 VITALS
WEIGHT: 172 LBS | HEART RATE: 76 BPM | BODY MASS INDEX: 26.07 KG/M2 | DIASTOLIC BLOOD PRESSURE: 80 MMHG | SYSTOLIC BLOOD PRESSURE: 125 MMHG | RESPIRATION RATE: 20 BRPM | HEIGHT: 68 IN

## 2021-04-16 DIAGNOSIS — R06.83 SNORING: ICD-10-CM

## 2021-04-16 PROCEDURE — 36415 COLL VENOUS BLD VENIPUNCTURE: CPT

## 2021-04-16 PROCEDURE — 99072 ADDL SUPL MATRL&STAF TM PHE: CPT

## 2021-04-16 PROCEDURE — 99214 OFFICE O/P EST MOD 30 MIN: CPT | Mod: 25

## 2021-04-16 NOTE — ASSESSMENT
[FreeTextEntry1] : Hemodynamically stable with acceptable BP\par Cardiac status stable with rhythm clinically regular\par Snoring by history - must R/O ISIS - ENT evaluation advised - request placed and staff to assist\par Lab profiles drawn in office and sent

## 2021-04-16 NOTE — PHYSICAL EXAM
[No Acute Distress] : no acute distress [Normal Outer Ear/Nose] : the outer ears and nose were normal in appearance [Normal] : normal rate, regular rhythm, normal S1 and S2 and no murmur heard [No Edema] : there was no peripheral edema [Non Tender] : non-tender [Soft] : abdomen soft [Normal Posterior Cervical Nodes] : no posterior cervical lymphadenopathy [Normal Anterior Cervical Nodes] : no anterior cervical lymphadenopathy [Coordination Grossly Intact] : coordination grossly intact [No Focal Deficits] : no focal deficits [Normal Gait] : normal gait [Alert and Oriented x3] : oriented to person, place, and time [de-identified] : mild dysarthria [de-identified] : Uvula wnl; ? redundant tissue posterior pharynx

## 2021-04-16 NOTE — HISTORY OF PRESENT ILLNESS
[de-identified] : Presents for BP check, labs, and general follow-up.  Also now states he snores "so loud that my wife has to leave the room" - states this occurs several times a week; denies being told he stops breathing; denies waking suddenly with air hunger.  Otherwise states feeling generally well; trying to stay as active as possible.

## 2021-04-17 LAB
ALBUMIN SERPL ELPH-MCNC: 4.5 G/DL
ALP BLD-CCNC: 90 U/L
ALT SERPL-CCNC: 19 U/L
ANION GAP SERPL CALC-SCNC: 11 MMOL/L
AST SERPL-CCNC: 31 U/L
BASOPHILS # BLD AUTO: 0.05 K/UL
BASOPHILS NFR BLD AUTO: 0.8 %
BILIRUB SERPL-MCNC: 0.6 MG/DL
BUN SERPL-MCNC: 18 MG/DL
CALCIUM SERPL-MCNC: 9.2 MG/DL
CHLORIDE SERPL-SCNC: 100 MMOL/L
CHOLEST SERPL-MCNC: 161 MG/DL
CO2 SERPL-SCNC: 29 MMOL/L
CREAT SERPL-MCNC: 0.92 MG/DL
EOSINOPHIL # BLD AUTO: 0.18 K/UL
EOSINOPHIL NFR BLD AUTO: 2.9 %
ESTIMATED AVERAGE GLUCOSE: 97 MG/DL
FOLATE SERPL-MCNC: 11.4 NG/ML
GLUCOSE SERPL-MCNC: 73 MG/DL
HBA1C MFR BLD HPLC: 5 %
HCT VFR BLD CALC: 49.7 %
HDLC SERPL-MCNC: 56 MG/DL
HGB BLD-MCNC: 16.5 G/DL
IMM GRANULOCYTES NFR BLD AUTO: 0.3 %
LDLC SERPL CALC-MCNC: 45 MG/DL
LYMPHOCYTES # BLD AUTO: 2.17 K/UL
LYMPHOCYTES NFR BLD AUTO: 35.4 %
MAN DIFF?: NORMAL
MCHC RBC-ENTMCNC: 31.2 PG
MCHC RBC-ENTMCNC: 33.2 GM/DL
MCV RBC AUTO: 94 FL
MONOCYTES # BLD AUTO: 0.46 K/UL
MONOCYTES NFR BLD AUTO: 7.5 %
NEUTROPHILS # BLD AUTO: 3.25 K/UL
NEUTROPHILS NFR BLD AUTO: 53.1 %
NONHDLC SERPL-MCNC: 105 MG/DL
PLATELET # BLD AUTO: 207 K/UL
POTASSIUM SERPL-SCNC: 4.3 MMOL/L
PROT SERPL-MCNC: 6.9 G/DL
RBC # BLD: 5.29 M/UL
RBC # FLD: 12.4 %
SODIUM SERPL-SCNC: 140 MMOL/L
T4 FREE SERPL-MCNC: 1.2 NG/DL
TRIGL SERPL-MCNC: 298 MG/DL
TSH SERPL-ACNC: 3.24 UIU/ML
VIT B12 SERPL-MCNC: 371 PG/ML
WBC # FLD AUTO: 6.13 K/UL

## 2021-06-28 ENCOUNTER — APPOINTMENT (OUTPATIENT)
Dept: CARDIOLOGY | Facility: CLINIC | Age: 71
End: 2021-06-28
Payer: MEDICARE

## 2021-06-28 ENCOUNTER — NON-APPOINTMENT (OUTPATIENT)
Age: 71
End: 2021-06-28

## 2021-06-28 VITALS
OXYGEN SATURATION: 96 % | DIASTOLIC BLOOD PRESSURE: 85 MMHG | WEIGHT: 172 LBS | RESPIRATION RATE: 16 BRPM | BODY MASS INDEX: 26.07 KG/M2 | SYSTOLIC BLOOD PRESSURE: 131 MMHG | HEIGHT: 68 IN | TEMPERATURE: 97 F | HEART RATE: 59 BPM

## 2021-06-28 DIAGNOSIS — E78.1 PURE HYPERGLYCERIDEMIA: ICD-10-CM

## 2021-06-28 PROCEDURE — 99072 ADDL SUPL MATRL&STAF TM PHE: CPT

## 2021-06-28 PROCEDURE — 93000 ELECTROCARDIOGRAM COMPLETE: CPT

## 2021-06-28 PROCEDURE — 99214 OFFICE O/P EST MOD 30 MIN: CPT

## 2021-06-28 PROCEDURE — 93306 TTE W/DOPPLER COMPLETE: CPT

## 2021-06-28 NOTE — REASON FOR VISIT
[Arrhythmia/ECG Abnorrmalities] : arrhythmia/ECG abnormalities [Hypertension] : hypertension [FreeTextEntry1] : S

## 2021-06-28 NOTE — ASSESSMENT
[FreeTextEntry1] : S/p CVA\par Controlled HBP\par PAF\par S/p pacer, recently interrogation normal function ( April 2021)

## 2021-06-28 NOTE — HISTORY OF PRESENT ILLNESS
[FreeTextEntry1] : \par \par Patient s/p CVA with history of hypertension,  history of bradycardia and insertion of pacemaker.\par History of PAF \par Being evaluated  for ISIS.\par \par Has been doing well, no bleeding, chest pain, palpitations, dyspnea, edema.\par \par Had recent pacer interrogation, reviewed Dr. Akbar's note.\par \par Had recent labs, reviewed.\par \par For cataract surgery next month  Dr. Walls\par \par \par \par \par

## 2021-06-28 NOTE — DISCUSSION/SUMMARY
[FreeTextEntry1] : Labs reviewed, patient counseled regarding carbohydrates/TG level.\par Continue current cardiac medications including Eliquis perioperatively.\par Cardiologically stable for procedure/cataracts.\par F/u 4-6 months.

## 2021-07-09 ENCOUNTER — NON-APPOINTMENT (OUTPATIENT)
Age: 71
End: 2021-07-09

## 2021-07-09 ENCOUNTER — APPOINTMENT (OUTPATIENT)
Dept: FAMILY MEDICINE | Facility: CLINIC | Age: 71
End: 2021-07-09
Payer: MEDICARE

## 2021-07-09 VITALS
HEIGHT: 68 IN | WEIGHT: 172 LBS | HEART RATE: 60 BPM | BODY MASS INDEX: 26.07 KG/M2 | SYSTOLIC BLOOD PRESSURE: 120 MMHG | OXYGEN SATURATION: 98 % | TEMPERATURE: 97.1 F | RESPIRATION RATE: 15 BRPM | DIASTOLIC BLOOD PRESSURE: 82 MMHG

## 2021-07-09 DIAGNOSIS — H26.9 UNSPECIFIED CATARACT: ICD-10-CM

## 2021-07-09 DIAGNOSIS — Z86.73 PERSONAL HISTORY OF TRANSIENT ISCHEMIC ATTACK (TIA), AND CEREBRAL INFARCTION W/OUT RESIDUAL DEFICITS: ICD-10-CM

## 2021-07-09 DIAGNOSIS — Z01.818 ENCOUNTER FOR OTHER PREPROCEDURAL EXAMINATION: ICD-10-CM

## 2021-07-09 DIAGNOSIS — I63.81 OTHER CEREBRAL INFARCTION DUE TO OCCLUSION OR STENOSIS OF SMALL ARTERY: ICD-10-CM

## 2021-07-09 PROCEDURE — 99072 ADDL SUPL MATRL&STAF TM PHE: CPT

## 2021-07-09 PROCEDURE — 99214 OFFICE O/P EST MOD 30 MIN: CPT

## 2021-07-09 NOTE — HISTORY OF PRESENT ILLNESS
[Aortic Stenosis] : no aortic stenosis [Atrial Fibrillation] : atrial fibrillation [Coronary Artery Disease] : coronary artery disease [Recent Myocardial Infarction] : no recent myocardial infarction [Implantable Device/Pacemaker] : implantable device/pacemaker [Asthma] : no asthma [COPD] : no COPD [Sleep Apnea] : sleep apnea [Smoker] : not a smoker [Family Member] : no family member with adverse anesthesia reaction/sudden death [Self] : no previous adverse anesthesia reaction [Chronic Anticoagulation] : chronic anticoagulation [Chronic Kidney Disease] : no chronic kidney disease [Diabetes] : no diabetes [(Patient denies any chest pain, claudication, dyspnea on exertion, orthopnea, palpitations or syncope)] : Patient denies any chest pain, claudication, dyspnea on exertion, orthopnea, palpitations or syncope [FreeTextEntry1] : cataract extraction left eye [FreeTextEntry2] : 07/14/2021 [FreeTextEntry3] : Dr. Walls [FreeTextEntry4] : Mr Everett Browne is a 69 yo male presents today for preop evaluation for left eye cataract extraction/cataract repair, to be done by Dr. Walls on 07/14/2021, at [facility]. Pt has multiple medical comorbidities as listed below, hx of A.fib on Eliquis, bradycardia with pacemaker, hx of CVA. Pt also recently seen by cardiologist, Dr. Butler, advised to continue all medication as prescribed and cleared for surgery.   [FreeTextEntry7] : recently evaluated by cardiology, Dr. Butler, cleared for procedure

## 2021-07-09 NOTE — ASSESSMENT
[High Risk Surgery - Intraperitoneal, Intrathoracic or Supringuinal Vascular Procedures] : High Risk Surgery - Intraperitoneal, Intrathoracic or Supringuinal Vascular Procedures - No (0) [Ischemic Heart Disease] : Ischemic Heart Disease - No (0) [Congestive Heart Failure] : Congestive Heart Failure - No (0) [Prior Cerebrovascular Accident or TIA] : Prior Cerebrovascular Accident or TIA - Yes (1) [Creatinine >= 2mg/dL (1 Point)] : Creatinine >= 2mg/dL - No (0) [Insulin-dependent Diabetic (1 Point)] : Insulin-dependent Diabetic - No (0) [1] : 1 , RCRI Class: II, Risk of Post-Op Cardiac Complications: 6.0%, 95% CI for Risk Estimate: 4.9% - 7.4% [Patient Optimized for Surgery] : Patient optimized for surgery [FreeTextEntry4] : Completed physical Exam, reviewed EKG, pre op labs not indicated\par Advised pt continue all medication as prescribed\par  Avoid any food or drinks past midnight, the day prior to surgery. \par  RTO for a follow up appointment post surgery.\par \par RCRI class II: moderate risk pt for low risk procedure\par Cardiac clearance received\par Pt is now optimized for surgery

## 2021-07-09 NOTE — PHYSICAL EXAM
[No Acute Distress] : no acute distress [Well Nourished] : well nourished [EOMI] : extraocular movements intact [Supple] : supple [Clear to Auscultation] : lungs were clear to auscultation bilaterally [Normal S1, S2] : normal S1 and S2 [No Murmur] : no murmur heard [Non Tender] : non-tender [No CVA Tenderness] : no CVA  tenderness [No Rash] : no rash [Normal Gait] : normal gait [Normal Affect] : the affect was normal

## 2021-08-24 ENCOUNTER — RX RENEWAL (OUTPATIENT)
Age: 71
End: 2021-08-24

## 2021-09-01 ENCOUNTER — RX RENEWAL (OUTPATIENT)
Age: 71
End: 2021-09-01

## 2021-10-04 ENCOUNTER — RX RENEWAL (OUTPATIENT)
Age: 71
End: 2021-10-04

## 2021-10-06 ENCOUNTER — APPOINTMENT (OUTPATIENT)
Dept: CARDIOLOGY | Facility: CLINIC | Age: 71
End: 2021-10-06
Payer: MEDICARE

## 2021-10-06 PROCEDURE — 93288 INTERROG EVL PM/LDLS PM IP: CPT

## 2021-10-06 NOTE — PROCEDURE
[Pacemaker] : pacemaker [AAIR] : AAIR [Voltage: ___ volts] : Voltage was [unfilled] volts [Longevity: ___ months] : The estimated remaining battery life is [unfilled] months [Lead Imp:  ___ohms] : lead impedance was [unfilled] ohms [Sensing Amplitude ___mv] : sensing amplitude was [unfilled] mv [___V @] : [unfilled] V [___ ms] : [unfilled] ms [Asense-Vsense ___ %] : Asense-Vsense [unfilled]% [Asense-Vpace ___ %] : Asense-Vpace [unfilled]% [Apace-Vsense ___ %] : Apace-Vsense [unfilled]% [Apace-Vpace ___ %] : Apace-Vpace [unfilled]% [de-identified] : MEDTRONIC [de-identified] : ASSURE  [de-identified] : [id number] [de-identified] : 8/3/2018 [de-identified] :  [de-identified] : AP 48.9%\par VP2.4%\par normal pm function

## 2021-11-10 ENCOUNTER — APPOINTMENT (OUTPATIENT)
Dept: FAMILY MEDICINE | Facility: CLINIC | Age: 71
End: 2021-11-10
Payer: MEDICARE

## 2021-11-10 VITALS
DIASTOLIC BLOOD PRESSURE: 75 MMHG | BODY MASS INDEX: 25.61 KG/M2 | SYSTOLIC BLOOD PRESSURE: 125 MMHG | HEART RATE: 68 BPM | WEIGHT: 169 LBS | RESPIRATION RATE: 20 BRPM | HEIGHT: 68 IN

## 2021-11-10 DIAGNOSIS — Z87.891 PERSONAL HISTORY OF NICOTINE DEPENDENCE: ICD-10-CM

## 2021-11-10 DIAGNOSIS — K90.49 MALABSORPTION DUE TO INTOLERANCE, NOT ELSEWHERE CLASSIFIED: ICD-10-CM

## 2021-11-10 PROCEDURE — 90686 IIV4 VACC NO PRSV 0.5 ML IM: CPT

## 2021-11-10 PROCEDURE — G0008: CPT

## 2021-11-10 PROCEDURE — G0439: CPT

## 2021-11-10 PROCEDURE — 36415 COLL VENOUS BLD VENIPUNCTURE: CPT

## 2021-11-10 NOTE — HISTORY OF PRESENT ILLNESS
[FreeTextEntry1] : Requests comprehensive exam [de-identified] : Presents for comprehensive exam.  States feeling generally well; trying to watch diet and walk on a regular basis.  Does have some occasional word-finding difficulty, but denies any major neuro episodes within the past year (see last year's comprehensive exam for a description).  Reviewed all recent Cardiology documentation.  Reviewed screening and immunizations - note pt is fully COVID vaccinated; requests current flu vaccine at this encounter; also plans on updating colonoscopy (will be seeing Dr. Templeton).

## 2021-11-10 NOTE — ASSESSMENT
[FreeTextEntry1] : Comprehensive exam reveals patient to be hemodynamically stable with acceptable BP\par Cardiac status stable with rhythm clinically regullar\par Neuro findings consistent with history and are stable; no new deficits appreciated\par Lab profiles drawn in office and sent\par Flu vaccine given L deltoid

## 2021-11-10 NOTE — COUNSELING
[de-identified] : Healthy eating and activities [None] : None [Good understanding] : Patient has a good understanding of lifestyle changes and steps needed to achieve self management goal

## 2021-11-10 NOTE — PHYSICAL EXAM
[Normal Posterior Cervical Nodes] : no posterior cervical lymphadenopathy [Normal Anterior Cervical Nodes] : no anterior cervical lymphadenopathy [Normal] : no rash [Coordination Grossly Intact] : coordination grossly intact [Normal Gait] : normal gait [Memory Grossly Normal] : memory grossly normal [Normal Affect] : the affect was normal [Alert and Oriented x3] : oriented to person, place, and time [Normal Mood] : the mood was normal [Normal Insight/Judgement] : insight and judgment were intact [de-identified] : mild expressive aphasia noted consistent with history

## 2021-11-10 NOTE — HEALTH RISK ASSESSMENT
[Yes] : Yes [2 - 3 times a week (3 pts)] : 2 - 3  times a week (3 points) [1 or 2 (0 pts)] : 1 or 2 (0 points) [Never (0 pts)] : Never (0 points) [No] : In the past 12 months have you used drugs other than those required for medical reasons? No [No falls in past year] : Patient reported no falls in the past year [0] : 2) Feeling down, depressed, or hopeless: Not at all (0) [Fully functional (bathing, dressing, toileting, transferring, walking, feeding)] : Fully functional (bathing, dressing, toileting, transferring, walking, feeding) [Fully functional (using the telephone, shopping, preparing meals, housekeeping, doing laundry, using] : Fully functional and needs no help or supervision to perform IADLs (using the telephone, shopping, preparing meals, housekeeping, doing laundry, using transportation, managing medications and managing finances) [With Patient/Caregiver] : , with patient/caregiver [Reviewed no changes] : Reviewed, no changes [] : No [Audit-CScore] : 3 [AdvancecareDate] : 11/21

## 2021-11-11 LAB
ALBUMIN SERPL ELPH-MCNC: 4 G/DL
ALP BLD-CCNC: 77 U/L
ALT SERPL-CCNC: 23 U/L
ANION GAP SERPL CALC-SCNC: 12 MMOL/L
APPEARANCE: CLEAR
AST SERPL-CCNC: 28 U/L
BASOPHILS # BLD AUTO: 0.05 K/UL
BASOPHILS NFR BLD AUTO: 1 %
BILIRUB SERPL-MCNC: 0.6 MG/DL
BILIRUBIN URINE: NEGATIVE
BLOOD URINE: NEGATIVE
BUN SERPL-MCNC: 15 MG/DL
CALCIUM SERPL-MCNC: 9.1 MG/DL
CHLORIDE SERPL-SCNC: 104 MMOL/L
CHOLEST SERPL-MCNC: 156 MG/DL
CO2 SERPL-SCNC: 24 MMOL/L
COLOR: NORMAL
CREAT SERPL-MCNC: 0.95 MG/DL
EOSINOPHIL # BLD AUTO: 0.19 K/UL
EOSINOPHIL NFR BLD AUTO: 3.9 %
ESTIMATED AVERAGE GLUCOSE: 94 MG/DL
FOLATE SERPL-MCNC: 11.1 NG/ML
GLUCOSE QUALITATIVE U: NEGATIVE
GLUCOSE SERPL-MCNC: 88 MG/DL
HBA1C MFR BLD HPLC: 4.9 %
HCT VFR BLD CALC: 45.7 %
HDLC SERPL-MCNC: 56 MG/DL
HGB BLD-MCNC: 14.9 G/DL
IMM GRANULOCYTES NFR BLD AUTO: 0 %
KETONES URINE: NEGATIVE
LDLC SERPL CALC-MCNC: 74 MG/DL
LEUKOCYTE ESTERASE URINE: NEGATIVE
LYMPHOCYTES # BLD AUTO: 1.54 K/UL
LYMPHOCYTES NFR BLD AUTO: 31.5 %
MAN DIFF?: NORMAL
MCHC RBC-ENTMCNC: 30.7 PG
MCHC RBC-ENTMCNC: 32.6 GM/DL
MCV RBC AUTO: 94.2 FL
MONOCYTES # BLD AUTO: 0.5 K/UL
MONOCYTES NFR BLD AUTO: 10.2 %
NEUTROPHILS # BLD AUTO: 2.61 K/UL
NEUTROPHILS NFR BLD AUTO: 53.4 %
NITRITE URINE: NEGATIVE
NONHDLC SERPL-MCNC: 100 MG/DL
PH URINE: 7
PLATELET # BLD AUTO: 180 K/UL
POTASSIUM SERPL-SCNC: 4 MMOL/L
PROT SERPL-MCNC: 6.2 G/DL
PROTEIN URINE: NEGATIVE
PSA SERPL-MCNC: 1.42 NG/ML
RBC # BLD: 4.85 M/UL
RBC # FLD: 12.9 %
SODIUM SERPL-SCNC: 140 MMOL/L
SPECIFIC GRAVITY URINE: 1.02
T4 FREE SERPL-MCNC: 1.2 NG/DL
TRIGL SERPL-MCNC: 130 MG/DL
TSH SERPL-ACNC: 2.73 UIU/ML
UROBILINOGEN URINE: NORMAL
VIT B12 SERPL-MCNC: 336 PG/ML
WBC # FLD AUTO: 4.89 K/UL

## 2021-12-30 ENCOUNTER — RX RENEWAL (OUTPATIENT)
Age: 71
End: 2021-12-30

## 2022-02-24 ENCOUNTER — NON-APPOINTMENT (OUTPATIENT)
Age: 72
End: 2022-02-24

## 2022-04-15 ENCOUNTER — RX RENEWAL (OUTPATIENT)
Age: 72
End: 2022-04-15

## 2022-05-05 NOTE — ASSESSMENT
[FreeTextEntry1] : Hemodynamically stable with acceptable BP\par Neuro exam unremarkable - feel patient at baseline\par Lab profiles sent\par Advised continue present medication; plan F/U with Neurology (Dr. Hickman - on vacation this week); advised any recurrence present to ER immediately
.

## 2022-05-23 ENCOUNTER — RX RENEWAL (OUTPATIENT)
Age: 72
End: 2022-05-23

## 2022-06-02 ENCOUNTER — RX RENEWAL (OUTPATIENT)
Age: 72
End: 2022-06-02

## 2022-10-05 ENCOUNTER — NON-APPOINTMENT (OUTPATIENT)
Age: 72
End: 2022-10-05

## 2022-10-06 ENCOUNTER — NON-APPOINTMENT (OUTPATIENT)
Age: 72
End: 2022-10-06

## 2022-11-16 ENCOUNTER — APPOINTMENT (OUTPATIENT)
Dept: FAMILY MEDICINE | Facility: CLINIC | Age: 72
End: 2022-11-16

## 2022-11-16 VITALS
DIASTOLIC BLOOD PRESSURE: 78 MMHG | RESPIRATION RATE: 20 BRPM | WEIGHT: 170 LBS | BODY MASS INDEX: 25.76 KG/M2 | SYSTOLIC BLOOD PRESSURE: 122 MMHG | HEART RATE: 64 BPM | HEIGHT: 68 IN

## 2022-11-16 DIAGNOSIS — Z11.59 ENCOUNTER FOR SCREENING FOR OTHER VIRAL DISEASES: ICD-10-CM

## 2022-11-16 PROCEDURE — G0008: CPT

## 2022-11-16 PROCEDURE — 90686 IIV4 VACC NO PRSV 0.5 ML IM: CPT

## 2022-11-16 PROCEDURE — G0439: CPT

## 2022-11-16 PROCEDURE — 93000 ELECTROCARDIOGRAM COMPLETE: CPT

## 2022-11-16 PROCEDURE — 36415 COLL VENOUS BLD VENIPUNCTURE: CPT

## 2022-11-16 NOTE — ASSESSMENT
[FreeTextEntry1] : Hemodynamically stable with acceptable BP\par Lab profiles drawn in office and sent\par Flu vaccine given L deltoid\par Note - advised to make F/U appointments with Dory Hickman and Luke

## 2022-11-16 NOTE — COUNSELING
[de-identified] : Healthy eating and activities [None] : None [Good understanding] : Patient has a good understanding of lifestyle changes and steps needed to achieve self management goal

## 2022-11-16 NOTE — HEALTH RISK ASSESSMENT
[Former] : Former [Yes] : Yes [2 - 3 times a week (3 pts)] : 2 - 3  times a week (3 points) [1 or 2 (0 pts)] : 1 or 2 (0 points) [Never (0 pts)] : Never (0 points) [No] : In the past 12 months have you used drugs other than those required for medical reasons? No [No falls in past year] : Patient reported no falls in the past year [0] : 2) Feeling down, depressed, or hopeless: Not at all (0) [Fully functional (bathing, dressing, toileting, transferring, walking, feeding)] : Fully functional (bathing, dressing, toileting, transferring, walking, feeding) [Fully functional (using the telephone, shopping, preparing meals, housekeeping, doing laundry, using] : Fully functional and needs no help or supervision to perform IADLs (using the telephone, shopping, preparing meals, housekeeping, doing laundry, using transportation, managing medications and managing finances) [With Patient/Caregiver] : , with patient/caregiver [Reviewed no changes] : Reviewed, no changes [Audit-CScore] : 3 [[id number]] : 0 [AdvancecareDate] : 11/22

## 2022-11-16 NOTE — PHYSICAL EXAM
[Normal Posterior Cervical Nodes] : no posterior cervical lymphadenopathy [Normal Anterior Cervical Nodes] : no anterior cervical lymphadenopathy [Normal] : no rash [Coordination Grossly Intact] : coordination grossly intact [Normal Gait] : normal gait [Speech Grossly Normal] : speech grossly normal [Memory Grossly Normal] : memory grossly normal [Normal Affect] : the affect was normal [Alert and Oriented x3] : oriented to person, place, and time [Normal Mood] : the mood was normal [Normal Insight/Judgement] : insight and judgment were intact [de-identified] : very mild word-finding difficulty noted

## 2022-11-16 NOTE — HISTORY OF PRESENT ILLNESS
[FreeTextEntry1] : Requests comprehensive exam [de-identified] : Presents for comprehensive exam.  States feeling generally well; remains very active.  Does have some residual word-finding issues and "a crick" in the RLE, otherwise functioning well S/P CVA several years ago; does plan on resuming PT.  Reviewed screening and immunizations - due for current flu vaccine - pt in agreement.

## 2022-11-17 LAB
ALBUMIN SERPL ELPH-MCNC: 4.1 G/DL
ALP BLD-CCNC: 86 U/L
ALT SERPL-CCNC: 22 U/L
ANION GAP SERPL CALC-SCNC: 9 MMOL/L
APPEARANCE: CLEAR
AST SERPL-CCNC: 27 U/L
BASOPHILS # BLD AUTO: 0.05 K/UL
BASOPHILS NFR BLD AUTO: 0.9 %
BILIRUB SERPL-MCNC: 0.6 MG/DL
BILIRUBIN URINE: NEGATIVE
BLOOD URINE: NEGATIVE
BUN SERPL-MCNC: 13 MG/DL
CALCIUM SERPL-MCNC: 9 MG/DL
CHLORIDE SERPL-SCNC: 104 MMOL/L
CHOLEST SERPL-MCNC: 158 MG/DL
CO2 SERPL-SCNC: 29 MMOL/L
COLOR: COLORLESS
COVID-19 NUCLEOCAPSID  GAM ANTIBODY INTERPRETATION: POSITIVE
COVID-19 SPIKE DOMAIN ANTIBODY INTERPRETATION: POSITIVE
CREAT SERPL-MCNC: 0.92 MG/DL
EGFR: 88 ML/MIN/1.73M2
EOSINOPHIL # BLD AUTO: 0.2 K/UL
EOSINOPHIL NFR BLD AUTO: 3.5 %
ESTIMATED AVERAGE GLUCOSE: 103 MG/DL
FOLATE SERPL-MCNC: 8.8 NG/ML
GLUCOSE QUALITATIVE U: NEGATIVE
GLUCOSE SERPL-MCNC: 78 MG/DL
HBA1C MFR BLD HPLC: 5.2 %
HCT VFR BLD CALC: 46.1 %
HDLC SERPL-MCNC: 59 MG/DL
HGB BLD-MCNC: 15.2 G/DL
IMM GRANULOCYTES NFR BLD AUTO: 0.3 %
KETONES URINE: NEGATIVE
LDLC SERPL CALC-MCNC: 71 MG/DL
LEUKOCYTE ESTERASE URINE: NEGATIVE
LYMPHOCYTES # BLD AUTO: 1.71 K/UL
LYMPHOCYTES NFR BLD AUTO: 29.8 %
MAN DIFF?: NORMAL
MCHC RBC-ENTMCNC: 31.5 PG
MCHC RBC-ENTMCNC: 33 GM/DL
MCV RBC AUTO: 95.6 FL
MONOCYTES # BLD AUTO: 0.54 K/UL
MONOCYTES NFR BLD AUTO: 9.4 %
NEUTROPHILS # BLD AUTO: 3.21 K/UL
NEUTROPHILS NFR BLD AUTO: 56.1 %
NITRITE URINE: NEGATIVE
NONHDLC SERPL-MCNC: 99 MG/DL
PH URINE: 7.5
PLATELET # BLD AUTO: 190 K/UL
POTASSIUM SERPL-SCNC: 4.1 MMOL/L
PROT SERPL-MCNC: 6.5 G/DL
PROTEIN URINE: NEGATIVE
PSA SERPL-MCNC: 1.88 NG/ML
RBC # BLD: 4.82 M/UL
RBC # FLD: 12.7 %
SARS-COV-2 AB SERPL IA-ACNC: >250 U/ML
SARS-COV-2 AB SERPL QL IA: 8.45 INDEX
SODIUM SERPL-SCNC: 142 MMOL/L
SPECIFIC GRAVITY URINE: 1
T4 FREE SERPL-MCNC: 1.1 NG/DL
TRIGL SERPL-MCNC: 139 MG/DL
TSH SERPL-ACNC: 2.64 UIU/ML
UROBILINOGEN URINE: NORMAL
VIT B12 SERPL-MCNC: 293 PG/ML
WBC # FLD AUTO: 5.73 K/UL

## 2022-12-06 ENCOUNTER — NON-APPOINTMENT (OUTPATIENT)
Age: 72
End: 2022-12-06

## 2022-12-07 ENCOUNTER — APPOINTMENT (OUTPATIENT)
Dept: CARDIOLOGY | Facility: CLINIC | Age: 72
End: 2022-12-07

## 2022-12-07 PROCEDURE — 93288 INTERROG EVL PM/LDLS PM IP: CPT

## 2022-12-07 NOTE — PROCEDURE
[Pacemaker] : pacemaker [AAIR] : AAIR [Longevity: ___ months] : The estimated remaining battery life is [unfilled] months [Lead Imp:  ___ohms] : lead impedance was [unfilled] ohms [Sensing Amplitude ___mv] : sensing amplitude was [unfilled] mv [___V @] : [unfilled] V [___ ms] : [unfilled] ms [Asense-Vsense ___ %] : Asense-Vsense [unfilled]% [Asense-Vpace ___ %] : Asense-Vpace [unfilled]% [Apace-Vsense ___ %] : Apace-Vsense [unfilled]% [Apace-Vpace ___ %] : Apace-Vpace [unfilled]% [de-identified] : [employer] [de-identified] : ASSURE  [de-identified] : [id number] [de-identified] : 8/3/2018 [de-identified] :  [de-identified] : AP 41.7%\par VP2.8%\par 3 episodes of nsvt longest 2 seconds\par normal pm function

## 2022-12-08 ENCOUNTER — NON-APPOINTMENT (OUTPATIENT)
Age: 72
End: 2022-12-08

## 2022-12-08 ENCOUNTER — APPOINTMENT (OUTPATIENT)
Dept: CARDIOLOGY | Facility: CLINIC | Age: 72
End: 2022-12-08

## 2022-12-08 VITALS
HEART RATE: 63 BPM | TEMPERATURE: 97.4 F | SYSTOLIC BLOOD PRESSURE: 126 MMHG | OXYGEN SATURATION: 96 % | BODY MASS INDEX: 26.07 KG/M2 | WEIGHT: 172 LBS | HEIGHT: 68 IN | RESPIRATION RATE: 16 BRPM | DIASTOLIC BLOOD PRESSURE: 79 MMHG

## 2022-12-08 PROCEDURE — 93000 ELECTROCARDIOGRAM COMPLETE: CPT

## 2022-12-08 PROCEDURE — 99214 OFFICE O/P EST MOD 30 MIN: CPT | Mod: 25

## 2022-12-08 NOTE — DISCUSSION/SUMMARY
[Patient] : the patient [Risks] : risks [Benefits] : benefits [Alternatives] : alternatives [___ Month(s)] : in [unfilled] month(s) [FreeTextEntry1] : Labs reviewed, patient counseled \par Continue current cardiac medications including Eliquis \par Schedule pharmacologic stress study to assess for ischemia.  Medical follow-up with Dr. Lopez.  Continue pacemaker monitoring [EKG obtained to assist in diagnosis and management of assessed problem(s)] : EKG obtained to assist in diagnosis and management of assessed problem(s)

## 2022-12-08 NOTE — ASSESSMENT
[FreeTextEntry1] : S/p CVA\par Controlled HBP\par PAF\par S/p pacer, recently interrogation normal function and short VT.

## 2022-12-08 NOTE — CARDIOLOGY SUMMARY
[de-identified] : 12/8/22 sinus nstwc [de-identified] : 2018 normal [de-identified] : 2017 normal LV [de-identified] : Aug 3 2018 Medtronic DDD at 58

## 2022-12-08 NOTE — HISTORY OF PRESENT ILLNESS
[FreeTextEntry1] : \par \par Patient s/p CVA with history of hypertension,  history of bradycardia and insertion of pacemaker.\par History of PAF \par \par Has been doing well, no bleeding, chest pain, palpitations, dyspnea, edema.\par \par Had recent pacer interrogation, reviewed Dr. Akbar's note.\par Short VT noted\par \par Had recent labs, reviewed.\par \par \par \par \par \par \par

## 2022-12-31 ENCOUNTER — RX RENEWAL (OUTPATIENT)
Age: 72
End: 2022-12-31

## 2023-01-19 ENCOUNTER — APPOINTMENT (OUTPATIENT)
Dept: CARDIOLOGY | Facility: CLINIC | Age: 73
End: 2023-01-19

## 2023-05-11 ENCOUNTER — APPOINTMENT (OUTPATIENT)
Dept: NEUROLOGY | Facility: CLINIC | Age: 73
End: 2023-05-11
Payer: MEDICARE

## 2023-05-11 VITALS
SYSTOLIC BLOOD PRESSURE: 146 MMHG | TEMPERATURE: 97.2 F | HEIGHT: 68 IN | OXYGEN SATURATION: 99 % | BODY MASS INDEX: 26.07 KG/M2 | HEART RATE: 60 BPM | DIASTOLIC BLOOD PRESSURE: 85 MMHG | RESPIRATION RATE: 16 BRPM | WEIGHT: 172 LBS

## 2023-05-11 PROCEDURE — 99214 OFFICE O/P EST MOD 30 MIN: CPT

## 2023-05-11 PROCEDURE — 99204 OFFICE O/P NEW MOD 45 MIN: CPT

## 2023-05-11 NOTE — ASSESSMENT
[FreeTextEntry1] : Impression: This 72-year-old patient with atrial fibrillation hypertension hyperlipidemia pacemaker who is status post 2/20/2016 right centrum semiovale lacunar infarct and status post left hemisphere infarct July 2018 in the setting of left MCA stenosis new onset atrial fibrillation and hypotension status post pacemaker.  He is also status post TIA 8/22/2019 with transient expressive aphasia without recurrence.  He has been remarkably stable since that last episode.  Neurological exam is stable as compared to 7/21/2020 office visit.\par \par Recommendations:  continue Eliquis statin and blood pressure control.  Office follow-up in 1 year or else as needed.\par

## 2023-05-11 NOTE — CONSULT LETTER
[Dear  ___] : Dear  [unfilled], [Consult Letter:] : I had the pleasure of evaluating your patient, [unfilled]. [Please see my note below.] : Please see my note below. [Consult Closing:] : Thank you very much for allowing me to participate in the care of this patient.  If you have any questions, please do not hesitate to contact me. [Sincerely,] : Sincerely, [FreeTextEntry3] : Baltazar Hickman MD\par

## 2023-05-11 NOTE — HISTORY OF PRESENT ILLNESS
[FreeTextEntry1] : This patient is seen for an office consultation.  He is known to me and was last seen on 7/21/2020.  He is a 72-year-old male who is status post a right centrum semiovale lacunar infarct in February 2016 at which she made an excellent recovery.  He did have a known left MCA stenosis which was being managed medically.  While at a wedding in [place] in July 2018 he had another stroke in the left MCA distribution.  At that time he was hypotensive and had new onset atrial fibrillation.  He had prolonged hospitalization including pacemaker and subsequent speech physical and occupational therapy.  He has had marked clinical improvement since the July 2018 event.  On 8/22/2019 he had a probable TIA with an episode of transient expressive aphasia without recurrence.  He has been remarkably stable for the last several years and is basically stable since his last visit with me on 7/21/2020.  He has seen Dr. Mazin Mao vascular neurologist as well and conservative management has been recommended regarding the left MCA stenosis.\par \par He has some residual mild word finding.  He has some right hemimotor symptomatology especially affecting right lower extremity.  He is no longer able to play tennis.  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

## 2023-05-11 NOTE — PHYSICAL EXAM
[FreeTextEntry1] : Head:  Normocephalic Neck: Supple nontender no carotid bruits.\par \par Mental Status:  Alert Oriented X3 Speech normal and no aphasia or dysarthria.\par \par Cranial Nerves:  PERRL, Fundi normal Visual Fields full  EOMI no diplopia no ptosis no nystagmus, normal facial sensation, facial asymmetry, tongue in the midline.\par \par Motor: Right upper extremity drift and mild right hemimotor deficit 5-/5 with decreased rapid movements and there is spasticity affecting right lower limb.\par \par DTRs: Mild right hyperreflexia..  Plantars flexor.  No Clonus.\par \par Sensory:  Normal testing with pin light touch bilaterally.\par \par Gait: Impairment in right lower extremity function with spasticity.\par

## 2023-05-18 ENCOUNTER — RX RENEWAL (OUTPATIENT)
Age: 73
End: 2023-05-18

## 2023-06-07 ENCOUNTER — APPOINTMENT (OUTPATIENT)
Dept: CARDIOLOGY | Facility: CLINIC | Age: 73
End: 2023-06-07
Payer: MEDICARE

## 2023-06-07 PROCEDURE — 93288 INTERROG EVL PM/LDLS PM IP: CPT

## 2023-06-07 NOTE — PROCEDURE
[Pacemaker] : pacemaker [AAIR] : AAIR [Longevity: ___ months] : The estimated remaining battery life is [unfilled] months [Lead Imp:  ___ohms] : lead impedance was [unfilled] ohms [Sensing Amplitude ___mv] : sensing amplitude was [unfilled] mv [___V @] : [unfilled] V [___ ms] : [unfilled] ms [Asense-Vsense ___ %] : Asense-Vsense [unfilled]% [Asense-Vpace ___ %] : Asense-Vpace [unfilled]% [Apace-Vsense ___ %] : Apace-Vsense [unfilled]% [Apace-Vpace ___ %] : Apace-Vpace [unfilled]% [de-identified] : [employer] [de-identified] : ASSURE  [de-identified] : [id number] [de-identified] : 8/3/2018 [de-identified] :  [de-identified] : AP 48.1%\par VP2.5%\par 4 episodes of avnrt\par normal pm function

## 2023-06-15 ENCOUNTER — NON-APPOINTMENT (OUTPATIENT)
Age: 73
End: 2023-06-15

## 2023-06-15 ENCOUNTER — APPOINTMENT (OUTPATIENT)
Dept: CARDIOLOGY | Facility: CLINIC | Age: 73
End: 2023-06-15
Payer: MEDICARE

## 2023-06-15 VITALS
SYSTOLIC BLOOD PRESSURE: 126 MMHG | HEART RATE: 59 BPM | DIASTOLIC BLOOD PRESSURE: 79 MMHG | RESPIRATION RATE: 17 BRPM | OXYGEN SATURATION: 96 % | WEIGHT: 173 LBS | BODY MASS INDEX: 26.22 KG/M2 | HEIGHT: 68 IN

## 2023-06-15 PROCEDURE — 93000 ELECTROCARDIOGRAM COMPLETE: CPT

## 2023-06-15 PROCEDURE — 99214 OFFICE O/P EST MOD 30 MIN: CPT | Mod: 25

## 2023-06-15 NOTE — CARDIOLOGY SUMMARY
[de-identified] : 12/8/22 sinus nstwc\par 6/15/23  [de-identified] : 2018 normal [de-identified] : 2017 normal LV [de-identified] : Aug 3 2018 Medtronic DDD at 58

## 2023-06-15 NOTE — HISTORY OF PRESENT ILLNESS
[FreeTextEntry1] : \par \par Patient s/p CVA with history of hypertension,  history of bradycardia and insertion of pacemaker.\par History of PAF \par \par Has been doing well, no bleeding, chest pain, palpitations, dyspnea, edema.\par \par Had recent pacer interrogation, reviewed Dr. Akbar's note.\par \par \par \par \par \par \par \par \par

## 2023-06-15 NOTE — DISCUSSION/SUMMARY
[Patient] : the patient [Risks] : risks [Benefits] : benefits [Alternatives] : alternatives [___ Month(s)] : in [unfilled] month(s) [FreeTextEntry1] : Labs reviewed, patient counseled \par Continue current cardiac medications including Eliquis  Will d/w dr Hickman, neuro, if need aspirin, since on eliquis.\par Risks benefits and alternatives discussed with the patient questions addressed.\par \par .  Medical follow-up with Dr. Lopez.  Continue pacemaker monitoring

## 2023-06-15 NOTE — ASSESSMENT
[FreeTextEntry1] : S/p CVA\par Controlled HBP\par PAF\par S/p pacer, recently interrogation normal function, atrial arrhythmia noted, no symptoms

## 2023-08-01 ENCOUNTER — RX RENEWAL (OUTPATIENT)
Age: 73
End: 2023-08-01

## 2023-08-24 ENCOUNTER — RX RENEWAL (OUTPATIENT)
Age: 73
End: 2023-08-24

## 2023-08-24 RX ORDER — METOPROLOL TARTRATE 25 MG/1
25 TABLET, FILM COATED ORAL
Qty: 180 | Refills: 3 | Status: ACTIVE | COMMUNITY
Start: 2018-08-21 | End: 1900-01-01

## 2023-09-28 ENCOUNTER — RX RENEWAL (OUTPATIENT)
Age: 73
End: 2023-09-28

## 2023-12-01 ENCOUNTER — APPOINTMENT (OUTPATIENT)
Dept: FAMILY MEDICINE | Facility: CLINIC | Age: 73
End: 2023-12-01
Payer: MEDICARE

## 2023-12-01 VITALS
RESPIRATION RATE: 20 BRPM | SYSTOLIC BLOOD PRESSURE: 125 MMHG | HEART RATE: 68 BPM | BODY MASS INDEX: 25.91 KG/M2 | DIASTOLIC BLOOD PRESSURE: 72 MMHG | HEIGHT: 68 IN | WEIGHT: 171 LBS

## 2023-12-01 DIAGNOSIS — Z00.00 ENCOUNTER FOR GENERAL ADULT MEDICAL EXAMINATION W/OUT ABNORMAL FINDINGS: ICD-10-CM

## 2023-12-01 DIAGNOSIS — E78.5 HYPERLIPIDEMIA, UNSPECIFIED: ICD-10-CM

## 2023-12-01 DIAGNOSIS — Z23 ENCOUNTER FOR IMMUNIZATION: ICD-10-CM

## 2023-12-01 DIAGNOSIS — R73.01 IMPAIRED FASTING GLUCOSE: ICD-10-CM

## 2023-12-01 PROCEDURE — 90677 PCV20 VACCINE IM: CPT

## 2023-12-01 PROCEDURE — G0009: CPT

## 2023-12-01 PROCEDURE — G0439: CPT

## 2023-12-01 PROCEDURE — 36415 COLL VENOUS BLD VENIPUNCTURE: CPT

## 2023-12-02 LAB
ALBUMIN SERPL ELPH-MCNC: 4.4 G/DL
ALP BLD-CCNC: 90 U/L
ALT SERPL-CCNC: 26 U/L
ANION GAP SERPL CALC-SCNC: 12 MMOL/L
APPEARANCE: CLEAR
AST SERPL-CCNC: 29 U/L
BILIRUB SERPL-MCNC: 0.6 MG/DL
BILIRUBIN URINE: NEGATIVE
BLOOD URINE: NEGATIVE
BUN SERPL-MCNC: 14 MG/DL
CALCIUM SERPL-MCNC: 9.6 MG/DL
CHLORIDE SERPL-SCNC: 101 MMOL/L
CHOLEST SERPL-MCNC: 167 MG/DL
CO2 SERPL-SCNC: 26 MMOL/L
COLOR: YELLOW
CREAT SERPL-MCNC: 0.95 MG/DL
EGFR: 84 ML/MIN/1.73M2
ESTIMATED AVERAGE GLUCOSE: 94 MG/DL
FOLATE SERPL-MCNC: 10.3 NG/ML
GLUCOSE QUALITATIVE U: NEGATIVE MG/DL
GLUCOSE SERPL-MCNC: 77 MG/DL
HBA1C MFR BLD HPLC: 4.9 %
HCT VFR BLD CALC: 45.9 %
HDLC SERPL-MCNC: 62 MG/DL
HGB BLD-MCNC: 15.8 G/DL
KETONES URINE: NEGATIVE MG/DL
LDLC SERPL CALC-MCNC: 84 MG/DL
LEUKOCYTE ESTERASE URINE: NEGATIVE
MCHC RBC-ENTMCNC: 32.4 PG
MCHC RBC-ENTMCNC: 34.4 GM/DL
MCV RBC AUTO: 94.3 FL
NITRITE URINE: NEGATIVE
NONHDLC SERPL-MCNC: 105 MG/DL
PH URINE: 6.5
PLATELET # BLD AUTO: 221 K/UL
POTASSIUM SERPL-SCNC: 4.2 MMOL/L
PROT SERPL-MCNC: 7.2 G/DL
PROTEIN URINE: NEGATIVE MG/DL
PSA SERPL-MCNC: 2.16 NG/ML
RBC # BLD: 4.87 M/UL
RBC # FLD: 13.1 %
SODIUM SERPL-SCNC: 139 MMOL/L
SPECIFIC GRAVITY URINE: 1.01
T4 FREE SERPL-MCNC: 1.2 NG/DL
TRIGL SERPL-MCNC: 123 MG/DL
TSH SERPL-ACNC: 3.24 UIU/ML
UROBILINOGEN URINE: 0.2 MG/DL
VIT B12 SERPL-MCNC: 903 PG/ML
WBC # FLD AUTO: 6.29 K/UL

## 2023-12-04 ENCOUNTER — NON-APPOINTMENT (OUTPATIENT)
Age: 73
End: 2023-12-04

## 2023-12-06 ENCOUNTER — APPOINTMENT (OUTPATIENT)
Dept: CARDIOLOGY | Facility: CLINIC | Age: 73
End: 2023-12-06
Payer: MEDICARE

## 2023-12-06 DIAGNOSIS — I48.91 UNSPECIFIED ATRIAL FIBRILLATION: ICD-10-CM

## 2023-12-06 PROCEDURE — 93288 INTERROG EVL PM/LDLS PM IP: CPT

## 2023-12-14 ENCOUNTER — NON-APPOINTMENT (OUTPATIENT)
Age: 73
End: 2023-12-14

## 2023-12-14 ENCOUNTER — APPOINTMENT (OUTPATIENT)
Dept: CARDIOLOGY | Facility: CLINIC | Age: 73
End: 2023-12-14
Payer: MEDICARE

## 2023-12-14 VITALS
RESPIRATION RATE: 16 BRPM | HEIGHT: 68 IN | HEART RATE: 60 BPM | BODY MASS INDEX: 25.76 KG/M2 | OXYGEN SATURATION: 96 % | SYSTOLIC BLOOD PRESSURE: 131 MMHG | DIASTOLIC BLOOD PRESSURE: 80 MMHG | WEIGHT: 170 LBS

## 2023-12-14 PROCEDURE — 99214 OFFICE O/P EST MOD 30 MIN: CPT | Mod: 25

## 2023-12-14 PROCEDURE — 93000 ELECTROCARDIOGRAM COMPLETE: CPT

## 2023-12-14 NOTE — CARDIOLOGY SUMMARY
[de-identified] : 12/8/22 sinus nstwc 6/15/23  December 14, 2023 atrial pacing [de-identified] : 2018 normal [de-identified] : 2017 normal LV [de-identified] : Aug 3 2018 Medtronic DDD at 58

## 2023-12-14 NOTE — ASSESSMENT
[FreeTextEntry1] : Status post CVA i.  Paroxysmal AF.  Status post pacemaker doing well satisfactory lipids

## 2023-12-14 NOTE — DISCUSSION/SUMMARY
[Patient] : the patient [Risks] : risks [Benefits] : benefits [Alternatives] : alternatives [___ Month(s)] : in [unfilled] month(s) [FreeTextEntry1] : Labs reviewed, patient counseled  Continue current cardiac medications including Eliquis patient reports Dr. Hickman wishes him to stay on aspirin Risks benefits and alternatives discussed with the patient questions addressed.  .  Medical follow-up with Dr. Willis .  Continue pacemaker monitoring [EKG obtained to assist in diagnosis and management of assessed problem(s)] : EKG obtained to assist in diagnosis and management of assessed problem(s)

## 2023-12-28 ENCOUNTER — RX RENEWAL (OUTPATIENT)
Age: 73
End: 2023-12-28

## 2023-12-28 RX ORDER — AMLODIPINE BESYLATE 5 MG/1
5 TABLET ORAL DAILY
Qty: 90 | Refills: 3 | Status: ACTIVE | COMMUNITY
Start: 2018-09-12 | End: 1900-01-01

## 2024-01-26 RX ORDER — APIXABAN 5 MG/1
5 TABLET, FILM COATED ORAL
Qty: 14 | Refills: 1 | Status: ACTIVE | COMMUNITY
Start: 2017-11-27 | End: 1900-01-01

## 2024-02-01 DIAGNOSIS — M12.9 ARTHROPATHY, UNSPECIFIED: ICD-10-CM

## 2024-02-02 ENCOUNTER — OUTPATIENT (OUTPATIENT)
Dept: OUTPATIENT SERVICES | Facility: HOSPITAL | Age: 74
LOS: 1 days | End: 2024-02-02
Payer: MEDICARE

## 2024-02-02 ENCOUNTER — APPOINTMENT (OUTPATIENT)
Dept: RADIOLOGY | Facility: HOSPITAL | Age: 74
End: 2024-02-02

## 2024-02-02 DIAGNOSIS — M12.9 ARTHROPATHY, UNSPECIFIED: ICD-10-CM

## 2024-02-02 PROCEDURE — 73060 X-RAY EXAM OF HUMERUS: CPT | Mod: 26,LT

## 2024-02-02 PROCEDURE — 73060 X-RAY EXAM OF HUMERUS: CPT

## 2024-05-01 ENCOUNTER — APPOINTMENT (OUTPATIENT)
Dept: NEUROLOGY | Facility: CLINIC | Age: 74
End: 2024-05-01
Payer: MEDICARE

## 2024-05-01 VITALS
OXYGEN SATURATION: 97 % | TEMPERATURE: 97.3 F | HEIGHT: 68 IN | BODY MASS INDEX: 25.76 KG/M2 | SYSTOLIC BLOOD PRESSURE: 144 MMHG | HEART RATE: 60 BPM | WEIGHT: 170 LBS | DIASTOLIC BLOOD PRESSURE: 89 MMHG

## 2024-05-01 DIAGNOSIS — I67.9 CEREBROVASCULAR DISEASE, UNSPECIFIED: ICD-10-CM

## 2024-05-01 PROCEDURE — 99215 OFFICE O/P EST HI 40 MIN: CPT

## 2024-05-01 PROCEDURE — G2211 COMPLEX E/M VISIT ADD ON: CPT

## 2024-05-01 NOTE — PHYSICAL EXAM
[FreeTextEntry1] : Head:  Normocephalic Neck: Supple nontender no carotid bruits.    Mental Status:  Alert Oriented X3 Speech normal and no aphasia or dysarthria.  Cranial Nerves:  PERRL, Visual Fields full  EOMI no diplopia no ptosis no nystagmus, V through XII intact.  Motor: Mild right upper extremity drift and decreased right upper extremity rapid alternating movements and mild right lower extremity weakness and increased tone.  DTRs: Mildly increased on the right.  Sensory:  Normal testing with pin light touch.  Gait: Favors the right lower limb unchanged.

## 2024-05-01 NOTE — ASSESSMENT
[FreeTextEntry1] : Impression: This 73-year-old male patient with atrial fibrillation hypertension hyperlipidemia pacemaker status post 2/20/2016 right centrum semiovale lacunar infarct and status post left MCA July 2018 infarct in the setting of left MCA stenosis and new onset A-fib and hypotension status post TIA 8/22/2019 with transient expressive aphasia without recurrence.  This patient is doing well and is neurologically stable.  Recommendations: Repeat MRI brain and MRA studies brain and neck and compare to prior exams.  Continue medication as ordered including Eliquis.  Office follow-up in 1 year or as needed.

## 2024-05-01 NOTE — HISTORY OF PRESENT ILLNESS
[FreeTextEntry1] : This patient is seen for an office visit.  He was last seen on 5/11/2023.  His condition is unchanged.  He is a 72-year-old male with atrial fibrillation hypertension hyperlipidemia pacemaker status post 2/20/2016 right centrum semiovale lacunar infarct and status post left MCA infarct July 2018 with left MCA stenosis and new onset atrial fibrillation and hypotension.  Since then the patient has been taking Eliquis.  He is status post TIA 2/22/2019 with transient expressive aphasia without recurrence.  He has followed with vascular neurology in the past.  He has a residual weakness of the right lower extremity and slight issues with word finding but otherwise he is asymptomatic and doing very well.  He recently saw his cardiologist.  Medications include Eliquis rosuvastatin metoprolol ramipril amlodipine aspirin.

## 2024-05-17 ENCOUNTER — RX RENEWAL (OUTPATIENT)
Age: 74
End: 2024-05-17

## 2024-05-19 ENCOUNTER — NON-APPOINTMENT (OUTPATIENT)
Age: 74
End: 2024-05-19

## 2024-06-05 ENCOUNTER — APPOINTMENT (OUTPATIENT)
Dept: CARDIOLOGY | Facility: CLINIC | Age: 74
End: 2024-06-05
Payer: MEDICARE

## 2024-06-05 DIAGNOSIS — I49.5 SICK SINUS SYNDROME: ICD-10-CM

## 2024-06-05 PROCEDURE — 93288 INTERROG EVL PM/LDLS PM IP: CPT

## 2024-06-05 NOTE — PROCEDURE
[Pacemaker] : pacemaker [AAIR] : AAIR [Longevity: ___ months] : The estimated remaining battery life is [unfilled] months [Lead Imp:  ___ohms] : lead impedance was [unfilled] ohms [Sensing Amplitude ___mv] : sensing amplitude was [unfilled] mv [___V @] : [unfilled] V [___ ms] : [unfilled] ms [Asense-Vsense ___ %] : Asense-Vsense [unfilled]% [Asense-Vpace ___ %] : Asense-Vpace [unfilled]% [Apace-Vsense ___ %] : Apace-Vsense [unfilled]% [Apace-Vpace ___ %] : Apace-Vpace [unfilled]% [de-identified] : [employer] [de-identified] : ASSURE  [de-identified] : [id number] [de-identified] : 8/3/2018 [de-identified] :  [de-identified] : AP 54.5% VP4.2%  normal pm function

## 2024-06-13 ENCOUNTER — APPOINTMENT (OUTPATIENT)
Dept: CARDIOLOGY | Facility: CLINIC | Age: 74
End: 2024-06-13
Payer: MEDICARE

## 2024-06-13 ENCOUNTER — NON-APPOINTMENT (OUTPATIENT)
Age: 74
End: 2024-06-13

## 2024-06-13 VITALS
OXYGEN SATURATION: 95 % | HEART RATE: 60 BPM | WEIGHT: 173 LBS | DIASTOLIC BLOOD PRESSURE: 73 MMHG | BODY MASS INDEX: 26.22 KG/M2 | SYSTOLIC BLOOD PRESSURE: 119 MMHG | HEIGHT: 68 IN

## 2024-06-13 DIAGNOSIS — Z87.891 PERSONAL HISTORY OF NICOTINE DEPENDENCE: ICD-10-CM

## 2024-06-13 DIAGNOSIS — I10 ESSENTIAL (PRIMARY) HYPERTENSION: ICD-10-CM

## 2024-06-13 DIAGNOSIS — Z95.0 PRESENCE OF CARDIAC PACEMAKER: ICD-10-CM

## 2024-06-13 PROCEDURE — 93000 ELECTROCARDIOGRAM COMPLETE: CPT

## 2024-06-13 PROCEDURE — 99214 OFFICE O/P EST MOD 30 MIN: CPT | Mod: 25

## 2024-06-13 NOTE — HISTORY OF PRESENT ILLNESS
[FreeTextEntry1] :  Patient s/p CVA with history of hypertension,  history of bradycardia and insertion of pacemaker. History of PAF   Has been doing well, no bleeding, chest pain, palpitations, dyspnea, edema.  [facility]  Had recent pacer interrogation, reviewed Dr. Akbar's note.

## 2024-06-13 NOTE — ASSESSMENT
[FreeTextEntry1] : Status post CVA   Paroxysmal AF.  Status post pacemaker doing well satisfactory lipids

## 2024-06-13 NOTE — CARDIOLOGY SUMMARY
[de-identified] : 12/8/22 sinus nstwc 6/15/23  December 14, 2023 atrial pacing June 2024 [de-identified] : 2018 normal [de-identified] : 2017 normal LV [de-identified] : Aug 3 2018 Medtronic DDD at 58

## 2024-11-18 ENCOUNTER — RX RENEWAL (OUTPATIENT)
Age: 74
End: 2024-11-18

## 2024-12-04 ENCOUNTER — APPOINTMENT (OUTPATIENT)
Dept: CARDIOLOGY | Facility: CLINIC | Age: 74
End: 2024-12-04
Payer: MEDICARE

## 2024-12-04 PROCEDURE — 93288 INTERROG EVL PM/LDLS PM IP: CPT

## 2024-12-12 ENCOUNTER — APPOINTMENT (OUTPATIENT)
Dept: CARDIOLOGY | Facility: CLINIC | Age: 74
End: 2024-12-12
Payer: MEDICARE

## 2024-12-12 VITALS
SYSTOLIC BLOOD PRESSURE: 130 MMHG | OXYGEN SATURATION: 96 % | WEIGHT: 177 LBS | RESPIRATION RATE: 18 BRPM | HEART RATE: 64 BPM | BODY MASS INDEX: 26.83 KG/M2 | HEIGHT: 68 IN | DIASTOLIC BLOOD PRESSURE: 77 MMHG

## 2024-12-12 VITALS — DIASTOLIC BLOOD PRESSURE: 77 MMHG | SYSTOLIC BLOOD PRESSURE: 130 MMHG

## 2024-12-12 DIAGNOSIS — I48.91 UNSPECIFIED ATRIAL FIBRILLATION: ICD-10-CM

## 2024-12-12 DIAGNOSIS — Z87.891 PERSONAL HISTORY OF NICOTINE DEPENDENCE: ICD-10-CM

## 2024-12-12 PROCEDURE — 99214 OFFICE O/P EST MOD 30 MIN: CPT

## 2024-12-12 PROCEDURE — 93000 ELECTROCARDIOGRAM COMPLETE: CPT

## 2024-12-12 PROCEDURE — G2211 COMPLEX E/M VISIT ADD ON: CPT

## 2024-12-20 ENCOUNTER — LABORATORY RESULT (OUTPATIENT)
Age: 74
End: 2024-12-20

## 2024-12-20 ENCOUNTER — APPOINTMENT (OUTPATIENT)
Dept: FAMILY MEDICINE | Facility: CLINIC | Age: 74
End: 2024-12-20
Payer: MEDICARE

## 2024-12-20 VITALS
RESPIRATION RATE: 20 BRPM | DIASTOLIC BLOOD PRESSURE: 70 MMHG | WEIGHT: 173 LBS | HEART RATE: 68 BPM | BODY MASS INDEX: 26.22 KG/M2 | HEIGHT: 68 IN | SYSTOLIC BLOOD PRESSURE: 124 MMHG

## 2024-12-20 DIAGNOSIS — Z87.891 PERSONAL HISTORY OF NICOTINE DEPENDENCE: ICD-10-CM

## 2024-12-20 DIAGNOSIS — R73.01 IMPAIRED FASTING GLUCOSE: ICD-10-CM

## 2024-12-20 DIAGNOSIS — I10 ESSENTIAL (PRIMARY) HYPERTENSION: ICD-10-CM

## 2024-12-20 DIAGNOSIS — E78.5 HYPERLIPIDEMIA, UNSPECIFIED: ICD-10-CM

## 2024-12-20 DIAGNOSIS — Z00.00 ENCOUNTER FOR GENERAL ADULT MEDICAL EXAMINATION W/OUT ABNORMAL FINDINGS: ICD-10-CM

## 2024-12-20 PROCEDURE — G0439: CPT

## 2024-12-20 PROCEDURE — 36415 COLL VENOUS BLD VENIPUNCTURE: CPT

## 2024-12-21 LAB
ALBUMIN SERPL ELPH-MCNC: 3.9 G/DL
ALP BLD-CCNC: 83 U/L
ALT SERPL-CCNC: 22 U/L
ANION GAP SERPL CALC-SCNC: 11 MMOL/L
APPEARANCE: CLEAR
AST SERPL-CCNC: 27 U/L
BILIRUB SERPL-MCNC: 0.5 MG/DL
BILIRUBIN URINE: NEGATIVE
BLOOD URINE: NEGATIVE
BUN SERPL-MCNC: 19 MG/DL
CALCIUM SERPL-MCNC: 9 MG/DL
CHLORIDE SERPL-SCNC: 102 MMOL/L
CHOLEST SERPL-MCNC: 159 MG/DL
CO2 SERPL-SCNC: 30 MMOL/L
COLOR: YELLOW
CREAT SERPL-MCNC: 1.01 MG/DL
EGFR: 78 ML/MIN/1.73M2
ESTIMATED AVERAGE GLUCOSE: 97 MG/DL
FOLATE SERPL-MCNC: 9.7 NG/ML
GLUCOSE QUALITATIVE U: NEGATIVE MG/DL
GLUCOSE SERPL-MCNC: 67 MG/DL
HBA1C MFR BLD HPLC: 5 %
HCT VFR BLD CALC: 45.1 %
HDLC SERPL-MCNC: 58 MG/DL
HGB BLD-MCNC: 15.1 G/DL
KETONES URINE: NEGATIVE MG/DL
LDLC SERPL CALC-MCNC: 79 MG/DL
LEUKOCYTE ESTERASE URINE: ABNORMAL
MCHC RBC-ENTMCNC: 31.3 PG
MCHC RBC-ENTMCNC: 33.5 G/DL
MCV RBC AUTO: 93.6 FL
NITRITE URINE: NEGATIVE
NONHDLC SERPL-MCNC: 101 MG/DL
PH URINE: 6
PLATELET # BLD AUTO: 194 K/UL
POTASSIUM SERPL-SCNC: 4.4 MMOL/L
PROT SERPL-MCNC: 6.7 G/DL
PROTEIN URINE: NEGATIVE MG/DL
PSA SERPL-MCNC: 2.67 NG/ML
RBC # BLD: 4.82 M/UL
RBC # FLD: 13.1 %
SODIUM SERPL-SCNC: 143 MMOL/L
SPECIFIC GRAVITY URINE: 1.02
T4 FREE SERPL-MCNC: 1.1 NG/DL
TRIGL SERPL-MCNC: 126 MG/DL
TSH SERPL-ACNC: 2.64 UIU/ML
UROBILINOGEN URINE: 0.2 MG/DL
VIT B12 SERPL-MCNC: 737 PG/ML
WBC # FLD AUTO: 6.96 K/UL

## 2024-12-23 ENCOUNTER — NON-APPOINTMENT (OUTPATIENT)
Age: 74
End: 2024-12-23

## 2025-01-26 ENCOUNTER — INPATIENT (INPATIENT)
Facility: HOSPITAL | Age: 75
LOS: 3 days | Discharge: NURSING, REHAB OR LONG-TERM CARE | DRG: 64 | End: 2025-01-30
Attending: PSYCHIATRY & NEUROLOGY | Admitting: PSYCHIATRY & NEUROLOGY
Payer: MEDICARE

## 2025-01-26 ENCOUNTER — EMERGENCY (EMERGENCY)
Facility: HOSPITAL | Age: 75
LOS: 1 days | Discharge: ANOTHER HOSPITAL | End: 2025-01-26
Attending: STUDENT IN AN ORGANIZED HEALTH CARE EDUCATION/TRAINING PROGRAM | Admitting: STUDENT IN AN ORGANIZED HEALTH CARE EDUCATION/TRAINING PROGRAM
Payer: MEDICARE

## 2025-01-26 VITALS
OXYGEN SATURATION: 99 % | HEART RATE: 62 BPM | SYSTOLIC BLOOD PRESSURE: 157 MMHG | RESPIRATION RATE: 18 BRPM | DIASTOLIC BLOOD PRESSURE: 88 MMHG

## 2025-01-26 VITALS
SYSTOLIC BLOOD PRESSURE: 172 MMHG | WEIGHT: 171.96 LBS | OXYGEN SATURATION: 97 % | HEART RATE: 60 BPM | RESPIRATION RATE: 18 BRPM | HEIGHT: 70 IN | TEMPERATURE: 98 F | DIASTOLIC BLOOD PRESSURE: 108 MMHG

## 2025-01-26 VITALS
HEART RATE: 60 BPM | DIASTOLIC BLOOD PRESSURE: 80 MMHG | TEMPERATURE: 97 F | SYSTOLIC BLOOD PRESSURE: 149 MMHG | RESPIRATION RATE: 16 BRPM | WEIGHT: 160.94 LBS | OXYGEN SATURATION: 98 %

## 2025-01-26 DIAGNOSIS — I62.9 NONTRAUMATIC INTRACRANIAL HEMORRHAGE, UNSPECIFIED: ICD-10-CM

## 2025-01-26 LAB
ADD ON TEST-SPECIMEN IN LAB: SIGNIFICANT CHANGE UP
ALBUMIN SERPL ELPH-MCNC: 3.1 G/DL — LOW (ref 3.3–5)
ALBUMIN SERPL ELPH-MCNC: 3.7 G/DL — SIGNIFICANT CHANGE UP (ref 3.3–5)
ALP SERPL-CCNC: 77 U/L — SIGNIFICANT CHANGE UP (ref 40–120)
ALP SERPL-CCNC: 79 U/L — SIGNIFICANT CHANGE UP (ref 40–120)
ALT FLD-CCNC: 23 U/L — SIGNIFICANT CHANGE UP (ref 10–45)
ALT FLD-CCNC: 26 U/L — SIGNIFICANT CHANGE UP (ref 10–45)
ANION GAP SERPL CALC-SCNC: 16 MMOL/L — SIGNIFICANT CHANGE UP (ref 5–17)
ANION GAP SERPL CALC-SCNC: 8 MMOL/L — SIGNIFICANT CHANGE UP (ref 5–17)
APTT BLD: 31.5 SEC — SIGNIFICANT CHANGE UP (ref 24.5–35.6)
APTT BLD: 31.9 SEC — SIGNIFICANT CHANGE UP (ref 24.5–35.6)
AST SERPL-CCNC: 33 U/L — SIGNIFICANT CHANGE UP (ref 10–40)
AST SERPL-CCNC: 36 U/L — SIGNIFICANT CHANGE UP (ref 10–40)
BASOPHILS # BLD AUTO: 0.04 K/UL — SIGNIFICANT CHANGE UP (ref 0–0.2)
BASOPHILS # BLD AUTO: 0.05 K/UL — SIGNIFICANT CHANGE UP (ref 0–0.2)
BASOPHILS NFR BLD AUTO: 0.7 % — SIGNIFICANT CHANGE UP (ref 0–2)
BASOPHILS NFR BLD AUTO: 0.8 % — SIGNIFICANT CHANGE UP (ref 0–2)
BILIRUB SERPL-MCNC: 0.6 MG/DL — SIGNIFICANT CHANGE UP (ref 0.2–1.2)
BILIRUB SERPL-MCNC: 0.6 MG/DL — SIGNIFICANT CHANGE UP (ref 0.2–1.2)
BUN SERPL-MCNC: 13 MG/DL — SIGNIFICANT CHANGE UP (ref 7–23)
BUN SERPL-MCNC: 13 MG/DL — SIGNIFICANT CHANGE UP (ref 7–23)
CALCIUM SERPL-MCNC: 8.8 MG/DL — SIGNIFICANT CHANGE UP (ref 8.4–10.5)
CALCIUM SERPL-MCNC: 9 MG/DL — SIGNIFICANT CHANGE UP (ref 8.4–10.5)
CHLORIDE SERPL-SCNC: 105 MMOL/L — SIGNIFICANT CHANGE UP (ref 96–108)
CHLORIDE SERPL-SCNC: 106 MMOL/L — SIGNIFICANT CHANGE UP (ref 96–108)
CO2 SERPL-SCNC: 19 MMOL/L — LOW (ref 22–31)
CO2 SERPL-SCNC: 29 MMOL/L — SIGNIFICANT CHANGE UP (ref 22–31)
CREAT SERPL-MCNC: 0.91 MG/DL — SIGNIFICANT CHANGE UP (ref 0.5–1.3)
CREAT SERPL-MCNC: 0.96 MG/DL — SIGNIFICANT CHANGE UP (ref 0.5–1.3)
EGFR: 82 ML/MIN/1.73M2 — SIGNIFICANT CHANGE UP
EGFR: 88 ML/MIN/1.73M2 — SIGNIFICANT CHANGE UP
EOSINOPHIL # BLD AUTO: 0.13 K/UL — SIGNIFICANT CHANGE UP (ref 0–0.5)
EOSINOPHIL # BLD AUTO: 0.17 K/UL — SIGNIFICANT CHANGE UP (ref 0–0.5)
EOSINOPHIL NFR BLD AUTO: 1.8 % — SIGNIFICANT CHANGE UP (ref 0–6)
EOSINOPHIL NFR BLD AUTO: 3.3 % — SIGNIFICANT CHANGE UP (ref 0–6)
GAS PNL BLDV: SIGNIFICANT CHANGE UP
GLUCOSE BLDC GLUCOMTR-MCNC: 101 MG/DL — HIGH (ref 70–99)
GLUCOSE BLDC GLUCOMTR-MCNC: 75 MG/DL — SIGNIFICANT CHANGE UP (ref 70–99)
GLUCOSE SERPL-MCNC: 79 MG/DL — SIGNIFICANT CHANGE UP (ref 70–99)
GLUCOSE SERPL-MCNC: 86 MG/DL — SIGNIFICANT CHANGE UP (ref 70–99)
HCT VFR BLD CALC: 43.6 % — SIGNIFICANT CHANGE UP (ref 39–50)
HCT VFR BLD CALC: 46.8 % — SIGNIFICANT CHANGE UP (ref 39–50)
HGB BLD-MCNC: 14.9 G/DL — SIGNIFICANT CHANGE UP (ref 13–17)
HGB BLD-MCNC: 15.5 G/DL — SIGNIFICANT CHANGE UP (ref 13–17)
IMM GRANULOCYTES NFR BLD AUTO: 0.1 % — SIGNIFICANT CHANGE UP (ref 0–0.9)
IMM GRANULOCYTES NFR BLD AUTO: 0.2 % — SIGNIFICANT CHANGE UP (ref 0–0.9)
INR BLD: 0.92 RATIO — SIGNIFICANT CHANGE UP (ref 0.85–1.16)
INR BLD: 0.97 RATIO — SIGNIFICANT CHANGE UP (ref 0.85–1.16)
LYMPHOCYTES # BLD AUTO: 1.67 K/UL — SIGNIFICANT CHANGE UP (ref 1–3.3)
LYMPHOCYTES # BLD AUTO: 2.32 K/UL — SIGNIFICANT CHANGE UP (ref 1–3.3)
LYMPHOCYTES # BLD AUTO: 32.7 % — SIGNIFICANT CHANGE UP (ref 13–44)
LYMPHOCYTES # BLD AUTO: 32.8 % — SIGNIFICANT CHANGE UP (ref 13–44)
MCHC RBC-ENTMCNC: 30.7 PG — SIGNIFICANT CHANGE UP (ref 27–34)
MCHC RBC-ENTMCNC: 31.4 PG — SIGNIFICANT CHANGE UP (ref 27–34)
MCHC RBC-ENTMCNC: 33.1 G/DL — SIGNIFICANT CHANGE UP (ref 32–36)
MCHC RBC-ENTMCNC: 34.2 G/DL — SIGNIFICANT CHANGE UP (ref 32–36)
MCV RBC AUTO: 91.8 FL — SIGNIFICANT CHANGE UP (ref 80–100)
MCV RBC AUTO: 92.7 FL — SIGNIFICANT CHANGE UP (ref 80–100)
MONOCYTES # BLD AUTO: 0.39 K/UL — SIGNIFICANT CHANGE UP (ref 0–0.9)
MONOCYTES # BLD AUTO: 0.73 K/UL — SIGNIFICANT CHANGE UP (ref 0–0.9)
MONOCYTES NFR BLD AUTO: 10.3 % — SIGNIFICANT CHANGE UP (ref 2–14)
MONOCYTES NFR BLD AUTO: 7.7 % — SIGNIFICANT CHANGE UP (ref 2–14)
NEUTROPHILS # BLD AUTO: 2.81 K/UL — SIGNIFICANT CHANGE UP (ref 1.8–7.4)
NEUTROPHILS # BLD AUTO: 3.85 K/UL — SIGNIFICANT CHANGE UP (ref 1.8–7.4)
NEUTROPHILS NFR BLD AUTO: 54.4 % — SIGNIFICANT CHANGE UP (ref 43–77)
NEUTROPHILS NFR BLD AUTO: 55.2 % — SIGNIFICANT CHANGE UP (ref 43–77)
NRBC # BLD: 0 /100 WBCS — SIGNIFICANT CHANGE UP (ref 0–0)
NRBC # BLD: 0 /100 WBCS — SIGNIFICANT CHANGE UP (ref 0–0)
NRBC BLD-RTO: 0 /100 WBCS — SIGNIFICANT CHANGE UP (ref 0–0)
PLATELET # BLD AUTO: 166 K/UL — SIGNIFICANT CHANGE UP (ref 150–400)
PLATELET # BLD AUTO: 178 K/UL — SIGNIFICANT CHANGE UP (ref 150–400)
PLATELET RESPONSE ASPIRIN RESULT: 435 ARU — SIGNIFICANT CHANGE UP
POTASSIUM SERPL-MCNC: 3.8 MMOL/L — SIGNIFICANT CHANGE UP (ref 3.5–5.3)
POTASSIUM SERPL-MCNC: 4.7 MMOL/L — SIGNIFICANT CHANGE UP (ref 3.5–5.3)
POTASSIUM SERPL-SCNC: 3.8 MMOL/L — SIGNIFICANT CHANGE UP (ref 3.5–5.3)
POTASSIUM SERPL-SCNC: 4.7 MMOL/L — SIGNIFICANT CHANGE UP (ref 3.5–5.3)
PROT SERPL-MCNC: 6.8 G/DL — SIGNIFICANT CHANGE UP (ref 6–8.3)
PROT SERPL-MCNC: 7 G/DL — SIGNIFICANT CHANGE UP (ref 6–8.3)
PROTHROM AB SERPL-ACNC: 10.5 SEC — SIGNIFICANT CHANGE UP (ref 9.9–13.4)
PROTHROM AB SERPL-ACNC: 11.5 SEC — SIGNIFICANT CHANGE UP (ref 9.9–13.4)
RBC # BLD: 4.75 M/UL — SIGNIFICANT CHANGE UP (ref 4.2–5.8)
RBC # BLD: 5.05 M/UL — SIGNIFICANT CHANGE UP (ref 4.2–5.8)
RBC # FLD: 12.9 % — SIGNIFICANT CHANGE UP (ref 10.3–14.5)
RBC # FLD: 13.1 % — SIGNIFICANT CHANGE UP (ref 10.3–14.5)
SODIUM SERPL-SCNC: 140 MMOL/L — SIGNIFICANT CHANGE UP (ref 135–145)
SODIUM SERPL-SCNC: 143 MMOL/L — SIGNIFICANT CHANGE UP (ref 135–145)
TROPONIN I, HIGH SENSITIVITY RESULT: 7.6 NG/L — SIGNIFICANT CHANGE UP
TROPONIN T, HIGH SENSITIVITY RESULT: 13 NG/L — SIGNIFICANT CHANGE UP (ref 0–51)
TROPONIN T, HIGH SENSITIVITY RESULT: 14 NG/L — SIGNIFICANT CHANGE UP (ref 0–51)
WBC # BLD: 5.09 K/UL — SIGNIFICANT CHANGE UP (ref 3.8–10.5)
WBC # BLD: 7.09 K/UL — SIGNIFICANT CHANGE UP (ref 3.8–10.5)
WBC # FLD AUTO: 5.09 K/UL — SIGNIFICANT CHANGE UP (ref 3.8–10.5)
WBC # FLD AUTO: 7.09 K/UL — SIGNIFICANT CHANGE UP (ref 3.8–10.5)

## 2025-01-26 PROCEDURE — 96375 TX/PRO/DX INJ NEW DRUG ADDON: CPT | Mod: XU

## 2025-01-26 PROCEDURE — 99291 CRITICAL CARE FIRST HOUR: CPT

## 2025-01-26 PROCEDURE — 70496 CT ANGIOGRAPHY HEAD: CPT | Mod: MC

## 2025-01-26 PROCEDURE — 36415 COLL VENOUS BLD VENIPUNCTURE: CPT

## 2025-01-26 PROCEDURE — 99291 CRITICAL CARE FIRST HOUR: CPT | Mod: 25

## 2025-01-26 PROCEDURE — 85610 PROTHROMBIN TIME: CPT

## 2025-01-26 PROCEDURE — 70498 CT ANGIOGRAPHY NECK: CPT | Mod: MC

## 2025-01-26 PROCEDURE — 0042T: CPT | Mod: MC

## 2025-01-26 PROCEDURE — 96374 THER/PROPH/DIAG INJ IV PUSH: CPT | Mod: XU

## 2025-01-26 PROCEDURE — 70498 CT ANGIOGRAPHY NECK: CPT | Mod: 26

## 2025-01-26 PROCEDURE — 85730 THROMBOPLASTIN TIME PARTIAL: CPT

## 2025-01-26 PROCEDURE — 70450 CT HEAD/BRAIN W/O DYE: CPT | Mod: MC

## 2025-01-26 PROCEDURE — 0042T: CPT

## 2025-01-26 PROCEDURE — 70496 CT ANGIOGRAPHY HEAD: CPT | Mod: 26

## 2025-01-26 PROCEDURE — 80053 COMPREHEN METABOLIC PANEL: CPT

## 2025-01-26 PROCEDURE — 84484 ASSAY OF TROPONIN QUANT: CPT

## 2025-01-26 PROCEDURE — 99292 CRITICAL CARE ADDL 30 MIN: CPT

## 2025-01-26 PROCEDURE — 96376 TX/PRO/DX INJ SAME DRUG ADON: CPT | Mod: XU

## 2025-01-26 PROCEDURE — 70450 CT HEAD/BRAIN W/O DYE: CPT | Mod: 26,XU

## 2025-01-26 PROCEDURE — 85025 COMPLETE CBC W/AUTO DIFF WBC: CPT

## 2025-01-26 PROCEDURE — 93005 ELECTROCARDIOGRAM TRACING: CPT

## 2025-01-26 PROCEDURE — 82962 GLUCOSE BLOOD TEST: CPT

## 2025-01-26 PROCEDURE — 93010 ELECTROCARDIOGRAM REPORT: CPT

## 2025-01-26 RX ORDER — SODIUM CHLORIDE 9 G/ML
1000 INJECTION, SOLUTION INTRAVENOUS
Refills: 0 | Status: DISCONTINUED | OUTPATIENT
Start: 2025-01-26 | End: 2025-01-27

## 2025-01-26 RX ORDER — LEVETIRACETAM 100 MG/ML
1000 SOLUTION ORAL ONCE
Refills: 0 | Status: DISCONTINUED | OUTPATIENT
Start: 2025-01-26 | End: 2025-01-26

## 2025-01-26 RX ORDER — DM/PSEUDOEPHED/ACETAMINOPHEN 10-30-250
25 CAPSULE ORAL ONCE
Refills: 0 | Status: COMPLETED | OUTPATIENT
Start: 2025-01-26 | End: 2025-01-26

## 2025-01-26 RX ORDER — ANDEXANET ALFA 200 MG/20ML
480 INJECTION, POWDER, LYOPHILIZED, FOR SOLUTION INTRAVENOUS ONCE
Refills: 0 | Status: COMPLETED | OUTPATIENT
Start: 2025-01-26 | End: 2025-01-26

## 2025-01-26 RX ORDER — LEVETIRACETAM 100 MG/ML
1000 SOLUTION ORAL ONCE
Refills: 0 | Status: COMPLETED | OUTPATIENT
Start: 2025-01-26 | End: 2025-01-26

## 2025-01-26 RX ORDER — INSULIN LISPRO 100/ML
VIAL (ML) SUBCUTANEOUS EVERY 6 HOURS
Refills: 0 | Status: DISCONTINUED | OUTPATIENT
Start: 2025-01-26 | End: 2025-01-27

## 2025-01-26 RX ORDER — ANDEXANET ALFA 200 MG/20ML
800 INJECTION, POWDER, LYOPHILIZED, FOR SOLUTION INTRAVENOUS ONCE
Refills: 0 | Status: DISCONTINUED | OUTPATIENT
Start: 2025-01-26 | End: 2025-01-26

## 2025-01-26 RX ORDER — DM/PSEUDOEPHED/ACETAMINOPHEN 10-30-250
25 CAPSULE ORAL ONCE
Refills: 0 | Status: DISCONTINUED | OUTPATIENT
Start: 2025-01-26 | End: 2025-01-27

## 2025-01-26 RX ORDER — ANDEXANET ALFA 200 MG/20ML
960 INJECTION, POWDER, LYOPHILIZED, FOR SOLUTION INTRAVENOUS ONCE
Refills: 0 | Status: DISCONTINUED | OUTPATIENT
Start: 2025-01-26 | End: 2025-01-26

## 2025-01-26 RX ORDER — GLUCAGON 3 MG/1
1 POWDER NASAL ONCE
Refills: 0 | Status: DISCONTINUED | OUTPATIENT
Start: 2025-01-26 | End: 2025-01-27

## 2025-01-26 RX ORDER — BACTERIOSTATIC SODIUM CHLORIDE 0.9 %
1000 VIAL (ML) INJECTION
Refills: 0 | Status: DISCONTINUED | OUTPATIENT
Start: 2025-01-26 | End: 2025-01-27

## 2025-01-26 RX ORDER — DESMOPRESSIN ACETATE 4 UG/ML
30 INJECTION, SOLUTION INTRAVENOUS; SUBCUTANEOUS ONCE
Refills: 0 | Status: DISCONTINUED | OUTPATIENT
Start: 2025-01-26 | End: 2025-01-26

## 2025-01-26 RX ORDER — DESMOPRESSIN ACETATE 4 UG/ML
30 INJECTION, SOLUTION INTRAVENOUS; SUBCUTANEOUS ONCE
Refills: 0 | Status: COMPLETED | OUTPATIENT
Start: 2025-01-26 | End: 2025-01-26

## 2025-01-26 RX ORDER — ANDEXANET ALFA 200 MG/20ML
400 INJECTION, POWDER, LYOPHILIZED, FOR SOLUTION INTRAVENOUS ONCE
Refills: 0 | Status: COMPLETED | OUTPATIENT
Start: 2025-01-26 | End: 2025-01-26

## 2025-01-26 RX ORDER — DM/PSEUDOEPHED/ACETAMINOPHEN 10-30-250
12.5 CAPSULE ORAL ONCE
Refills: 0 | Status: DISCONTINUED | OUTPATIENT
Start: 2025-01-26 | End: 2025-01-27

## 2025-01-26 RX ORDER — BACTERIOSTATIC SODIUM CHLORIDE 0.9 %
1000 VIAL (ML) INJECTION ONCE
Refills: 0 | Status: COMPLETED | OUTPATIENT
Start: 2025-01-26 | End: 2025-01-26

## 2025-01-26 RX ORDER — HYDRALAZINE HYDROCHLORIDE 10 MG/1
10 TABLET ORAL ONCE
Refills: 0 | Status: COMPLETED | OUTPATIENT
Start: 2025-01-26 | End: 2025-01-26

## 2025-01-26 RX ORDER — DM/PSEUDOEPHED/ACETAMINOPHEN 10-30-250
15 CAPSULE ORAL ONCE
Refills: 0 | Status: DISCONTINUED | OUTPATIENT
Start: 2025-01-26 | End: 2025-01-27

## 2025-01-26 RX ADMIN — HYDRALAZINE HYDROCHLORIDE 10 MILLIGRAM(S): 10 TABLET ORAL at 07:15

## 2025-01-26 RX ADMIN — LEVETIRACETAM 400 MILLIGRAM(S): 100 SOLUTION ORAL at 07:32

## 2025-01-26 RX ADMIN — Medication 1000 MILLILITER(S): at 09:06

## 2025-01-26 RX ADMIN — ANDEXANET ALFA 24 MILLIGRAM(S): 200 INJECTION, POWDER, LYOPHILIZED, FOR SOLUTION INTRAVENOUS at 07:45

## 2025-01-26 RX ADMIN — Medication 25 MILLILITER(S): at 11:11

## 2025-01-26 RX ADMIN — Medication 50 MILLILITER(S): at 17:24

## 2025-01-26 RX ADMIN — ANDEXANET ALFA 184.62 MILLIGRAM(S): 200 INJECTION, POWDER, LYOPHILIZED, FOR SOLUTION INTRAVENOUS at 07:42

## 2025-01-26 RX ADMIN — DESMOPRESSIN ACETATE 230 MICROGRAM(S): 4 INJECTION, SOLUTION INTRAVENOUS; SUBCUTANEOUS at 09:44

## 2025-01-26 NOTE — H&P ADULT - HISTORY OF PRESENT ILLNESS
74-year-old R-H male with a history of hypertension hyperlipidemia, stroke in 2018 with minimal right-sided weakness, Afib on apixaban (started in 2017) presented as a transfer from [place]. Originally lives at home. Wife woke up in 0545 1/26 finding the patient on the floor and complaining of right sided weakness and word finding difficulties. LKN 1/25 2230 when the patient went to bed. He took his apixaban and medications for HLD and HTN prior to going to bed. Upon finding the patient on the floor, the patient was transferred to the [place] ED. NIHSS 10 (+1 face, +3 RUE, +3 RLE, +1 Aphasia, +2, dysarthria). mRS 0. CT head demonstrated 5.8 x 2.6 cm hemorrhage with Rightward midline shift of 1 to 2 mm. CT angio with severe stenosis and near complete occlusion of the left middle cerebral artery at the distal left M1 segment, similar in appearance to the prior exam from November 2020.  Received andexanet and keppra 1g x1. Afterwards, transferred to [facility] for further evaluation.    CODE STROKE called at 0807 1/26. NIHSS 11, though noted to be improved in R sided weakness. ICH 1. Stroke cancelled after conversation with ED and Vascular Neurology fellow. Wife at bedside provided collateral. During the interview, the patient stated that he takes aspirin (however not in his pharmacy prescribed medications).    Of note, the patient suffered a fall in 2017, and family member in medicine checked his pulse. Found to have irregular heart beat. Was then started on apixaban for atrial fibrillation. Suffered a stroke while in [place] with right sided deficits. Went to [place] for medical care and acute inpatient rehab for 3 weeks. Afterwards, went back to [place] for home rehab. No tobacco, wine every night for dinner, no illicit drugs  74-year-old R-H male with a history of hypertension hyperlipidemia, stroke in 2018 with minimal right-sided weakness, Afib on apixaban (started in 2017) presented as a transfer from [place]. Originally lives at home. Wife woke up in 0545 1/26 finding the patient on the floor and complaining of right sided weakness and word finding difficulties. LKN 1/25 2230 when the patient went to bed. He took his apixaban and medications for HLD and HTN prior to going to bed. Upon finding the patient on the floor, the patient was transferred to the [place] ED. NIHSS 10 (+1 face, +3 RUE, +3 RLE, +1 Aphasia, +2, dysarthria). mRS 0. CT head demonstrated 5.8 x 2.6 cm hemorrhage with Rightward midline shift of 1 to 2 mm. CT angio with severe stenosis and near complete occlusion of the left middle cerebral artery at the distal left M1 segment, similar in appearance to the prior exam from November 2020.  Received andexanet and keppra 1g x1. Afterwards, transferred to [facility] for further evaluation.    CODE STROKE called at 0807 1/26. NIHSS 11, though noted to be improved in R sided weakness. ICH 1. Stroke cancelled after conversation with ED and Vascular Neurology fellow. Wife at bedside provided collateral. During the interview, the patient stated that he takes aspirin (however not in his pharmacy prescribed medications).    Of note, the patient suffered a fall in 2017, and family member in medicine checked his pulse. Found to have irregular heart beat. Was then started on apixaban for atrial fibrillation. Suffered a stroke while in [place] with right sided deficits. Went to [place] for medical care and acute inpatient rehab for 3 weeks. Afterwards, went back to [place] for home rehab. No tobacco, wine every night for dinner, no illicit drugs. Not candidate for tenecteplase nor thrombectomy due to bleed.  74-year-old R-H male with a history of hypertension hyperlipidemia, stroke in 2018 with minimal right-sided weakness, Afib on apixaban (started in 2017) presented as a transfer from [place]. Originally lives at home. Wife woke up in 0545 1/26 finding the patient on the floor and complaining of right sided weakness and word finding difficulties. LKN 1/25 2230 when the patient went to bed. He took his apixaban and medications for HLD and HTN prior to going to bed. Upon finding the patient on the floor, the patient was transferred to the [place] ED. NIHSS 10 (+1 face, +3 RUE, +3 RLE, +1 Aphasia, +2, dysarthria). mRS 0. CT head demonstrated 5.8 x 2.6 cm hemorrhage with Rightward midline shift of 1 to 2 mm. CT angio with severe stenosis and near complete occlusion of the left middle cerebral artery at the distal left M1 segment, similar in appearance to the prior exam from November 2020.  Received andexanet and keppra 1g x1. Afterwards, transferred to [facility] for further evaluation.    CODE STROKE called at 0807 1/26. NIHSS 11, though noted to be improved in R sided weakness. ICH 1. Stroke cancelled after conversation with ED and Vascular Neurology fellow. Wife at bedside provided collateral. During the interview, the patient stated that he takes aspirin (however not in his pharmacy prescribed medications).    Of note, the patient suffered a fall in 2017, and family member in medicine checked his pulse. Found to have irregular heart beat. Was then started on apixaban for atrial fibrillation. Suffered a stroke while in [place] with right sided deficits. Went to [place] for medical care and acute inpatient rehab for 3 weeks. Afterwards, went back to [place] for home rehab. No tobacco, wine every night for dinner, no illicit drugs. Not candidate for tenecteplase due to bleed. Not candidate for thrombectomy due to no new LVO. .

## 2025-01-26 NOTE — ED PROVIDER NOTE - CRITICAL CARE ATTENDING CONTRIBUTION TO CARE
Upon my evaluation, this patient had a high probability of imminent or life-threatening deterioration due to ich stroke which required my direct attention, intervention, and personal management.  The patient has a  medical condition that impairs one or more vital organ systems.  Frequent personal assessment and adjustment of medical interventions was performed.      I have personally provided 45 minutes of critical care time exclusive of time spent on separately billable procedures. Time includes review of laboratory data, radiology results, discussion with consultants, patient and family; monitoring for potential decompensation, as well as time spent retrieving data and reviewing the chart and documenting the visit. Interventions were performed as documented above.

## 2025-01-26 NOTE — PATIENT PROFILE ADULT - FALL HARM RISK - ATTEMPT OOB
December 13, 2021        Jadon Harrington  [address] [address]    To Whom It May Concern:    This is to certify Jadon Harrington was evaluated on 12/13/21 and is unable to return to work.    Jadon Harrington should self-isolate.  ?  CDC guidelines for return to work are as follows:  · At least 24 hours have passed since fever resolution without use of fever reducing medication and   · Symptoms have improved and  · At least 10 days have passed since symptoms first appeared  · At least 10 days have passed since the collection date for a positive COVID-19 test.     **The loss of taste and smell may persist for weeks or months after recovery and do not need to delay the end of isolation.     Per CDC recommendations, employers should not require a COVID-19 test result or a healthcare provider’s note for employees who are sick to validate their illness, qualify for sick leave, or to return to work.    The Coronavirus is a rapidly evolving situation and recommendations are changing regularly to prevent spread of the disease and further loss of life.    Thank you for your understanding during these unusual times.     Electronically signed by:     Yovani Pelletier NP                 [address] [facility] [address]    
No

## 2025-01-26 NOTE — H&P ADULT - NSHPPHYSICALEXAM_GEN_ALL_CORE
General - NAD, pleasant  Neurologic Exam:  Mental status - Awake, Alert, Oriented to person, place, but not to time. Speech dysfluent, but repetition and naming intact. Follows simple but not complex commands. Fund of knowledge not intact    Cranial nerves:  CN II: Visual fields are full to confrontation. Pupils are 3 mm and briskly reactive to light.   CN III, IV, VI: EOMI, no nystagmus, no ptosis  CN V: Facial sensation is intact to pinprick in all 3 divisions bilaterally.  CN VII: Right sided facial droop   CN VII: Hearing is normal to rubbing fingers  CN IX, X: Palate elevates symmetrically.   CN XI: Head turning and shoulder shrug are intact  CN XII: Tongue is midline with normal movements and no atrophy.    Motor - Normal bulk and tone throughout.   Strength testing  RUE and RLE droop             Deltoid(C5)  Biceps(C6)    Triceps(C7)     Wrist Extension    Wrist Flexion (C8)          R            4+                 4+                       4+                    4+                            4+                                     4-                           L             5                 5                        5                     5                              5                                      5                                        Hip Flexion(L2/3)     Knee Flexion (L4/5/S1)    Knee Extension (L2/3/4)   Dorsiflexion (L4/5)   Plantar Flexion (S1/2)  R              4+                                   4+                                    4+                                                   4+                                          4+                            L              5                                     5                                     5                                                     5                                              5                              Sensation - Light touch intact in L fingers and L toes. Grimaces to pinprick on R fingers and R toes     DTR's -             Biceps      Triceps     Brachioradialis      Patellar    Ankle    Toes/plantar response  R             2+             1+                  2+                       2+            1+                 Up   L              2+             1+                 2+                        2+           1+                 mute     Coordination - unable to be assessed due to not following commands     Gait and station - unable to assessed due to weakness

## 2025-01-26 NOTE — PATIENT PROFILE ADULT - NSPRESCRALCSCORE_GEN_A_NUR_CAL
Telephone call: Late entry  Telephone call 04/08/2017 regarding status update, etc. Doing better. Also informed regarding Infectious Disease opinion (see IDSA guideline on strep pharyngitis): \"If I am recalling right peds data = high failures with CTX (ceftriaxone) single dose.\" NKDA. Rx called to [facility], [place], Azithromycin 500 mg tab, one tablet by mouth once a day x 3 days.  Monitor symptoms.  Follow up with PCP, Dr. Annette Rodriguez.  
4

## 2025-01-26 NOTE — ED ADULT NURSE NOTE - OBJECTIVE STATEMENT
Pt is a 74y M PMH HTN, HLD, CVA 2018 w/ residual R sided weakness presenting as transfer from [facility] for neuro eval / intracranial hemmorhage w/ midline shift. Pt LKW was 1030PM, was found on floor s/p falling out of bed at approx 0545AM. Pt presents aphasic, dysarthric, R sided weakness, w/ partial facial paralysis. Pt is alert, breathing unlabored and spontaneous, moves all extremities. Pt in gown, resting in stretcher, on cardiac monitor, bed in lowest position, wife at bedside, aware of plan of care. Comfort and safety measures maintained.

## 2025-01-26 NOTE — ED PROVIDER NOTE - PHYSICAL EXAMINATION
see MDM see MDM  Attending Maria Teresa Sands: Gen: NAD, heent: atrauamtic perrla, mmm, op pink, uvula midline, neck; nttp, no nuchal rigidity, chest: nttp, no crepitus, cv: rrr,  lungs: ctab, abd: soft, nontender, nondistended, no peritoneal signs,  no guarding, ext: wwp,  skin: no rash, neuro: awake and alert,aphasic, right upper and lower ext weakenss. facial droop present

## 2025-01-26 NOTE — CONSULT NOTE ADULT - ASSESSMENT
- No acute neurosurgical intervention  - Already s/p Andexxa at    - ?Give DDAVP now  - SBP <160  - Rpt 4hr CTH @ 10AM, if stable no neurosurgical ci to q2 or q4 neurochecks, remaining care per stroke neurology - No acute neurosurgical intervention  - Already s/p Andexxa at    - ?Give DDAVP now  - SBP <160  - Rpt 4hr CTH @ 10AM, if stable no neurosurgical ci to q2 neurochecks, remaining care per stroke neurology  - Rpt 24hr CTH 1/27 8 AM - No acute neurosurgical intervention  - Already s/p Andexxa at    - ?Give DDAVP now  - SBP <160  - Rpt 4hr CTH @ 10AM, if stable no neurosurgical ci to q2 neurochecks, remaining care per stroke neurology  - Long interval CTH 1/27 3 AM - No acute neurosurgical intervention  - Hold AC/AP  - Already s/p Andexxa at    - Give DDAVP now  - SBP <160  - Rpt 4hr CTH @ 10AM, if stable no neurosurgical ci to q2 neurochecks, remaining care per stroke neurology  - Long interval CTH 1/27 3 AM

## 2025-01-26 NOTE — H&P ADULT - TIME BILLING
74-year-old right-handed gentleman evaluated at [facility] on 1/27/2025 with aphasia.  History and exam as above, with minor changes.  He has worked for the past 26 years as a  on [employer].  ROS otherwise negative.  Exam.  Alert and attentive; speech fluent, prosodic, with innumerable semantic paraphasias, possible neologisms; followed some but not all crossed commands; repetition mildly impaired; remainder of neurologic exam as above.  CT head (1/26/2025) to my eye showed a moderate or moderately large left basal ganglia hemorrhage, measured at 5.8 x 2.6 cm.  CTA neck and head (1/26/2025) to my eye showed a distal left M1 occlusion, unchanged compared to 2020.    Impression.  He had a previous stroke with minimal residual left hemiparesis and a right M1 occlusion, presumably due to cardioembolism related to AF.  Now admitted with a moderate Wernicke's aphasia (occasionally sounding like a jargon aphasia), due to a left basal ganglia hemorrhage, likely due to some combination of chronic hypertension and anticoagulation with apixaban.  Plan.  At this point, target should be normotension (approximately 130/70); hold apixaban for 5-6 weeks, then will make further decisions regarding restarting and/or a Watchman; he may be enrolled in the ASPIRE trial and we would then follow that protocol; PT/OT/speech therapy.

## 2025-01-26 NOTE — PATIENT PROFILE ADULT - FALL HARM RISK - HARM RISK INTERVENTIONS

## 2025-01-26 NOTE — ED ADULT NURSE NOTE - NSFALLRISKINTERV_ED_ALL_ED
Assistance OOB with selected safe patient handling equipment if applicable/Assistance with ambulation/Communicate fall risk and risk factors to all staff, patient, and family/Monitor gait and stability/Monitor for mental status changes and reorient to person, place, and time, as needed/Move patient closer to nursing station/within visual sight of ED staff/Provide visual cue: yellow wristband, yellow gown, etc/Reinforce activity limits and safety measures with patient and family/Toileting schedule using arm’s reach rule for commode and bathroom/Use of alarms - bed, stretcher, chair and/or video monitoring/Call bell, personal items and telephone in reach/Instruct patient to call for assistance before getting out of bed/chair/stretcher/Non-slip footwear applied when patient is off stretcher/[place] to call system/Physically safe environment - no spills, clutter or unnecessary equipment/Purposeful Proactive Rounding/Room/bathroom lighting operational, light cord in reach

## 2025-01-26 NOTE — ED PROVIDER NOTE - PHYSICAL EXAMINATION
CONSTITUTIONAL: NAD  SKIN: Warm dry  HEAD: NCAT  EYES: NL inspection  ENT: MMM  NECK: Supple  CARD: RRR  RESP: Unlabored respirations  ABD: S/NT no R/G  NEURO: Grossly unremarkable, +RT upper/lower extremity weakness  PSYCH: Cooperative, appropriate.

## 2025-01-26 NOTE — H&P ADULT - NSHPLABSRESULTS_GEN_ALL_CORE
LABS: Personally reviewed labs, imaging, and ECG                          15.5   7.09  )-----------( 166      ( 26 Jan 2025 08:34 )             46.8       01-26    140  |  105  |  13  ----------------------------<  86  4.7   |  19[L]  |  0.96    Ca    9.0      26 Jan 2025 08:34  Mg     2.4     01-26    TPro  7.0  /  Alb  3.7  /  TBili  0.6  /  DBili  x   /  AST  36  /  ALT  23  /  AlkPhos  77  01-26       LIVER FUNCTIONS - ( 26 Jan 2025 08:34 )  Alb: 3.7 g/dL / Pro: 7.0 g/dL / ALK PHOS: 77 U/L / ALT: 23 U/L / AST: 36 U/L / GGT: x                    Urinalysis Basic - ( 26 Jan 2025 08:34 )    Color: x / Appearance: x / SG: x / pH: x  Gluc: 86 mg/dL / Ketone: x  / Bili: x / Urobili: x   Blood: x / Protein: x / Nitrite: x   Leuk Esterase: x / RBC: x / WBC x   Sq Epi: x / Non Sq Epi: x / Bacteria: x        PT/INR - ( 26 Jan 2025 09:50 )   PT: 10.5 sec;   INR: 0.92 ratio         PTT - ( 26 Jan 2025 09:50 )  PTT:31.9 sec    Lactate Trend            CAPILLARY BLOOD GLUCOSE      POCT Blood Glucose.: 67 mg/dL (26 Jan 2025 11:05)            RADIOLOGY & ADDITIONAL TESTS:  Repeat CT head non con     Since prior CT head 1/26/2025 performed at 6:47 AM, stable left basal   ganglia intraparenchymal material, with mass effect and mild   left-to-right midline shift.    _______________  CT PERFUSION:  Markedly limited exam due to existing intraparenchymal hemorrhage at the   left basal ganglia, which represents the dominant focus of signal   abnormality on the current exam. Quantitative data analysis is reported   above.    Within these constraints, there is an additional area of decreased   cerebral blood flow and decreased cerebral blood volume at the posterior   left temporoparietal lobe, corresponding to stable chronic infarct   related to known prior left MCA occlusion.    CTA NECK:  No evidence of significant stenosis or occlusion.    CTA HEAD:  Severe stenosis and near complete occlusion of the left middle cerebral artery at the distal left M1 segment, similar in appearance to the prior exam from November 2020. Associated chronic infarct at the posterior left temporoparietal lobe is similarly unchanged in appearance.    Moderate to severe stenosis involving the bilateral cavernous and supraclinoid ICA segments.    No evidence of intracranial vascular malformation or aneurysm. No evidence of active extravasation within the aforementioned intraparenchymal hemorrhage.    CT head non con   Atrophy out of proportion to patient's age.  INTRA-AXIAL: Acute parenchymal hemorrhage is identified in the left basal   ganglia/subinsular regions with surrounding vasogenic edema, with central   clot measuring approximately 5.8 x 2.6 cm. Rightward midline shift of 1   to 2 mm is suggested.There are periventricular and subcortical white   matter hypodensities, consistent with microvascular type changes.  IMPRESSION   Acute intraparenchymal hemorrhage identified at the left basal ganglia,   with mild rightward midline shift measuring 1-2 mm. No intraventricular   extension of hemorrhage at this time.

## 2025-01-26 NOTE — H&P ADULT - ASSESSMENT
ASSESSMENT   74-year-old R-H male with a history of hypertension hyperlipidemia, stroke in 2018 with minimal right-sided weakness, Afib on apixaban (started in 2017) presented as a transfer from [place]. [facility] 1/25 2230 when the patient went to bed. NIHSS 10 (+1 face, +3 RUE, +3 RLE, +1 Aphasia, +2, dysarthria). mRS 0. CT head demonstrated 5.8 x 2.6 cm hemorrhage with Rightward midline shift of 1 to 2 mm. CT angio with severe stenosis and near complete occlusion of the left middle cerebral artery at the distal left M1 segment, similar in appearance to the prior exam from November 2020.  Received andexanet and keppra 1g x1. Transferred to [facility] for further evaluation. NIHSS 11. ICH 1 mRS 0. Repeat CT head stable. Transferred to Stroke Unit for further management.     IMPRESSION   Overall stable neurologically. Word finding difficulties and acute on chronic right hemiparesis i/s/o L sided BG hemorrhage likely hypertensive in mechanism     PLAN    NEURO   - stroke unit  [x] repeat 3hr CT head stable   [x] 24 hour CT head, or PRN for worsening mental status/ neuro exam  [ ] BP control goal 140-160 given history of LM1 occlusion    [x] Neurosurg consultation appreciated   [ ] Q2H neuro checks and vital checks  [ ] MRI brain w/ and w/o con and MRA brain w/ con to look for underlying lesions and/or malformations  [ ] HgA1c, lipid profile, CBC, PT/PTT, Fibrinogen, CMP, Type and Screen, Urine Toxicology Screen, TEG platelet mapping        - signs of increased intracranial pressure or herniation (stupor/coma, nausea, vomiting, acute hypertension with bradycardia, unilateral dilated pupil), may need to consider hypertonic saline    - Keep T< 38.0° C (100.1° F) with acetaminophen and/or cooling blanket  - Maintain blood glucose  mg/dL, [target serum sodium 145-155mEq/L, DISCUSS FOR LARGE BLEEDS WITH STROKE FELLOW]  - Elevate head of bed to 30º  - Speech and swallow   - Start mechanical DVT ppx (intermittent pneumatic device)   - Hold all antiplatelets/ anticoagulation; Time to resume dependent on repeat imaging   - Seizure, fall, aspiration precautions  - Monitor patient on cardiac tele  - NPO for now  - PT/OT consult     Discussed with stroke fellow       and under supervision of attending       regarding decision against candidacy for tenecteplase/ thrombectomy. Will be formally staffed on morning rounds with attending. Recommendations will be complete once signed by attending.         Patient to be seen by team and attending. Note finalized upon attending attestation.  ASSESSMENT   74-year-old R-H male with a history of hypertension hyperlipidemia, stroke in 2018 with minimal right-sided weakness, Afib on apixaban (started in 2017) presented as a transfer from [place]. [facility] 1/25 2230 when the patient went to bed. NIHSS 10 (+1 face, +3 RUE, +3 RLE, +1 Aphasia, +2, dysarthria). mRS 0. CT head demonstrated 5.8 x 2.6 cm hemorrhage with Rightward midline shift of 1 to 2 mm. CT angio with severe stenosis and near complete occlusion of the left middle cerebral artery at the distal left M1 segment, similar in appearance to the prior exam from November 2020.  Received andexanet and keppra 1g x1. Transferred to [facility] for further evaluation. NIHSS 11. ICH 1 mRS 0. Repeat CT head stable. Transferred to Stroke Unit for further management.     IMPRESSION   Overall stable neurologically. Word finding difficulties and acute on chronic right hemiparesis i/s/o L sided BG hemorrhage likely hypertensive in mechanism     PLAN    NEURO   - stroke unit  - repeat 3hr CT head stable   - 24 hour CT head, or PRN for worsening mental status/ neuro exam  - BP control goal 140-160 given history of LM1 occlusion    - Neurosurg consultation appreciated   - Q2H neuro checks and vital checks  - MRI brain w/ and w/o con and MRA brain w/ con to look for underlying lesions and/or malformations  - HgA1c, lipid profile, CBC, PT/PTT, CMP  - signs of increased intracranial pressure or herniation (stupor/coma, nausea, vomiting, acute hypertension with bradycardia, unilateral dilated pupil), may need to consider hypertonic saline    - Keep T< 38.0° C (100.1° F) with acetaminophen and/or cooling blanket  - Maintain blood glucose  mg/dL  - Elevate head of bed to 30º   - Seizure, fall, aspiration precautions  - Monitor patient on cardiac tele    Cardiology   #HTN  Hold home antihypertensives for now (on metop, amlo, and ramipril)    Respiratory   on RA     GI   failed bedside swallow, pending full SLP   regular diet upon passing S/S     ETHICS   DVT ppx - SCD for now start after CT head 24 hr   PT/OT/SLP        Discussed with ED and stroke fellow  Angel Robles     and under supervision of attending    Kerri Cruz   regarding decision against candidacy for tenecteplase/ thrombectomy. Will be formally staffed on morning rounds with attending. Recommendations will be complete once signed by attending.

## 2025-01-26 NOTE — ED PROVIDER NOTE - ATTENDING CONTRIBUTION TO CARE
Attending MD Maria Teresa Sands:  I personally have seen and examined this patient.  Resident note reviewed and agree on plan of care and except where noted.  See HPI, PE, and MDM for details.

## 2025-01-26 NOTE — H&P ADULT - NSICDXPASTMEDICALHX_GEN_ALL_CORE_FT
PAST MEDICAL HISTORY:  Chronic atrial fibrillation     HLD (hyperlipidemia)     HTN (hypertension)     Stroke

## 2025-01-26 NOTE — ED ADULT NURSE NOTE - OBJECTIVE STATEMENT
Pt is alert, brought to the ER as an EMS Stroke notification  from home with slurred speech and right sided weakness. Pt Last Known Well was 1030 PM when he went to bed. Wife heard him fall in the bathroom and was found on his knees and was noted to be with slurred speech. Pt is on Eliquis. Had a previous Stroke couple of years ago.

## 2025-01-26 NOTE — ED PROVIDER NOTE - CLINICAL SUMMARY MEDICAL DECISION MAKING FREE TEXT BOX
73 yo M w/ PMHx of HTN, HLD, CVA (2018, w/ minimal R-sided residual weakness) presents as a transfer for intraparenchymal hemorrhage versus andexanet ja.  Patient presented to [facility] emergency department after waking up ~5:45 AM and falling off his bed (LKW 1/25 10:30 PM).  Patient was awake and alert when wife found him on the floor.  In  ED, patient was complaining of worsening right-sided weakness and dysarthria.  Collateral provided by wife. His vital signs remarkable for /108.    Code stroke was initiated at  ED and CT scans were performed at 6:46 AM with findings of acute intraparenchymal hemorrhage of the left basal ganglia with mild rightward midline shift (1-2 mm).  Additional findings of decrease cerebral blood flow to posterior left temporoparietal lobe with severe stenosis/near complete occlusion of L MCA distal L M1 segment (similar to November 2020), moderate–severe stenosis of bilateral cavernous/supraclinoid ICA segments.  Labs are unremarkable.  Patient was given hydralazine 10 mg (7:15), levetiracetam 1000 mg (7:32) andexanet ja 400 mg (bolus, 7:42) and 480 mg IV infusion over 2 hours (starting 7:45).  Patient was transferred for neurosurgery evaluation/intervention -accepting physician Dr. Ayala.  ( ED MRN: [id number])    In the ED, patient is aphasic, but is able to follow basic commands.    Vital signs are remarkable for /80.  Physical exam is remarkable for right-sided facial droop, RUE 4/5 strength, and aphasia. 2mm RENÉE. Unable to perform entire NIHSS. Otherwise atruamtic head, normal heart sounds, clear lung sounds.  Concern for intraparenchymal hemorrhage requiring repeat CT scan to evaluate for bleed evolution, neurosurgery/neurology consult/intervention, and admission to neurosurgery ICU.  Plan for nicardipine the patient's blood pressure remained elevated. 73 yo M w/ PMHx of HTN, HLD, CVA (2018, w/ minimal R-sided residual weakness) presents as a transfer for intraparenchymal hemorrhage versus andexanet ja.  Patient presented to [facility] emergency department after waking up ~5:45 AM and falling off his bed (LKW 1/25 10:30 PM).  Patient was awake and alert when wife found him on the floor.  In  ED, patient was complaining of worsening right-sided weakness and dysarthria.  Collateral provided by wife. His vital signs remarkable for /108.    Code stroke was initiated at  ED and CT scans were performed at 6:46 AM with findings of acute intraparenchymal hemorrhage of the left basal ganglia with mild rightward midline shift (1-2 mm).  Additional findings of decrease cerebral blood flow to posterior left temporoparietal lobe with severe stenosis/near complete occlusion of L MCA distal L M1 segment (similar to November 2020), moderate–severe stenosis of bilateral cavernous/supraclinoid ICA segments.  Labs are unremarkable.  Patient was given hydralazine 10 mg (7:15), levetiracetam 1000 mg (7:32) andexanet ja 400 mg (bolus, 7:42) and 480 mg IV infusion over 2 hours (starting 7:45).  Patient was transferred for neurosurgery evaluation/intervention -accepting physician Dr. Ayala.  ( ED MRN: [id number])    In the ED, patient is aphasic, but is able to follow basic commands.    Vital signs are remarkable for /80.  Physical exam is remarkable for right-sided facial droop, RUE 4/5 strength, and aphasia. 2mm RENÉE. Unable to perform entire NIHSS. Otherwise atruamtic head, normal heart sounds, clear lung sounds.  Concern for intraparenchymal hemorrhage requiring repeat CT scan to evaluate for bleed evolution, neurosurgery/neurology consult/intervention, and admission to neurosurgery ICU.  Plan for nicardipine the patient's blood pressure remained elevated.  Attending Maria Teresa Sands: 74-year-old male with history of A-fib on Eliquis transferred from outside hospital for concern for intracranial hemorrhage.  Per report patient last known normal at approximately 1030 last night.  This a.m. was found by wife.  Patient was found to have facial droop as well as slurred speech and weakness and sent to outside hospital.  They are CT scans performed to evaluate for any CVA.  Patient was found to have intraparenchymal hemorrhage and given Andexxab.  Patient transferred for further stroke evaluation.  Upon arrival patient is awake with right-sided weakness as well as aphasia and facial droop.  Code stroke was called based on transfer request and neurology at bedside.  Per neurology canceled the code stroke as patient with known head bleed and not a candidate for tenecteplase for any IR intervention.  Patient received Keppra prior to arrival.  Reviewed imaging from outside hospital and CTs performed at approximately 6:30 AM.  Will discuss with neurosurgery as well as neurology.  Monitor neuroexam.  Monitor patient's blood pressure.  Patient will need admission to the hospital. 75 yo M w/ PMHx of HTN, HLD, CVA (2018, w/ minimal R-sided residual weakness) presents as a transfer for intraparenchymal hemorrhage versus andexanet ja.  Patient presented to [facility] emergency department after waking up ~5:45 AM and falling off his bed (LKW 1/25 10:30 PM).  Patient was awake and alert when wife found him on the floor.  In  ED, patient was complaining of worsening right-sided weakness and dysarthria.  Collateral provided by wife. His vital signs remarkable for /108.    Code stroke was initiated at  ED and CT scans were performed at 6:46 AM with findings of acute intraparenchymal hemorrhage of the left basal ganglia with mild rightward midline shift (1-2 mm).  Additional findings of decrease cerebral blood flow to posterior left temporoparietal lobe with severe stenosis/near complete occlusion of L MCA distal L M1 segment (similar to November 2020), moderate–severe stenosis of bilateral cavernous/supraclinoid ICA segments.  Labs are unremarkable.  Patient was given hydralazine 10 mg (7:15), levetiracetam 1000 mg (7:32) andexanet ja 400 mg (bolus, 7:42) and 480 mg IV infusion over 2 hours (starting 7:45).  Patient was transferred for neurosurgery evaluation/intervention -accepting physician Dr. Ayala.  ( ED MRN: [id number])    In the ED, patient is aphasic, but is able to follow basic commands.    Vital signs are remarkable for /80.  Physical exam is remarkable for right-sided facial droop, RUE 4/5 strength, and aphasia. 2mm RENÉE. Unable to perform entire NIHSS. Otherwise atruamtic head, normal heart sounds, clear lung sounds.  Concern for intraparenchymal hemorrhage requiring repeat CT scan to evaluate for bleed evolution, neurosurgery/neurology consult/intervention, and admission to neurosurgery ICU.  Plan for nicardipine ggt if patient's blood pressure remained elevated.    Attending Maria Teresa Sands: 74-year-old male with history of A-fib on Eliquis transferred from outside hospital for concern for intracranial hemorrhage.  Per report patient last known normal at approximately 1030 last night.  This a.m. was found by wife.  Patient was found to have facial droop as well as slurred speech and weakness and sent to outside hospital.  They are CT scans performed to evaluate for any CVA.  Patient was found to have intraparenchymal hemorrhage and given Andexxab.  Patient transferred for further stroke evaluation.  Upon arrival patient is awake with right-sided weakness as well as aphasia and facial droop.  Code stroke was called based on transfer request and neurology at bedside.  Per neurology canceled the code stroke as patient with known head bleed and not a candidate for tenecteplase for any IR intervention.  Patient received Keppra prior to arrival.  Reviewed imaging from outside hospital and CTs performed at approximately 6:30 AM.  Will discuss with neurosurgery as well as neurology.  Monitor neuroexam.  Monitor patient's blood pressure.  Patient will need admission to the hospital. 75 yo M w/ PMHx of HTN, HLD, CVA (2018, w/ minimal R-sided residual weakness) presents as a transfer for intraparenchymal hemorrhage versus andexanet ja.  Patient presented to [facility] emergency department after waking up ~5:45 AM and falling off his bed (LKW 1/25 10:30 PM). Wife found him not speaking with worse R sided weakness.  In  ED, patient was complaining of worsening right-sided weakness and dysarthria.  Collateral provided by wife. His vital signs remarkable for /108.    Code stroke was initiated at  ED and CT scans were performed at 6:46 AM with findings of acute intraparenchymal hemorrhage of the left basal ganglia with mild rightward midline shift (1-2 mm).  Additional findings of decrease cerebral blood flow to posterior left temporoparietal lobe with severe stenosis/near complete occlusion of L MCA distal L M1 segment (similar to November 2020), moderate–severe stenosis of bilateral cavernous/supraclinoid ICA segments.  Labs are unremarkable.  Patient was given hydralazine 10 mg (7:15), levetiracetam 1000 mg (7:32) andexanet ja 400 mg (bolus, 7:42) and 480 mg IV infusion over 2 hours (starting 7:45).  Patient was transferred for neurosurgery evaluation/intervention -accepting physician Dr. Ayala.  ( ED MRN: [id number])    In the ED, patient is aphasic, but is able to follow basic commands.    Vital signs are remarkable for /80.  Physical exam is remarkable for right-sided facial droop, RUE 4/5 strength, and aphasia. 2mm RENÉE. Unable to perform entire NIHSS. Otherwise atruamtic head, normal heart sounds, clear lung sounds.  Concern for intraparenchymal hemorrhage requiring repeat CT scan to evaluate for bleed evolution, neurosurgery/neurology consult/intervention, and admission to neurosurgery ICU.  Plan for nicardipine ggt if patient's blood pressure remained elevated.    Attending Maria Teresa Sands: 74-year-old male with history of A-fib on Eliquis transferred from outside hospital for concern for intracranial hemorrhage.  Per report patient last known normal at approximately 1030 last night.  This a.m. was found by wife.  Patient was found to have facial droop as well as slurred speech and weakness and sent to outside hospital.  They are CT scans performed to evaluate for any CVA.  Patient was found to have intraparenchymal hemorrhage and given Andexxab.  Patient transferred for further stroke evaluation.  Upon arrival patient is awake with right-sided weakness as well as aphasia and facial droop.  Code stroke was called based on transfer request and neurology at bedside.  Per neurology canceled the code stroke as patient with known head bleed and not a candidate for tenecteplase for any IR intervention.  Patient received Keppra prior to arrival.  Reviewed imaging from outside hospital and CTs performed at approximately 6:30 AM.  Will discuss with neurosurgery as well as neurology.  Monitor neuroexam.  Monitor patient's blood pressure.  Patient will need admission to the hospital.

## 2025-01-26 NOTE — CONSULT NOTE ADULT - ATTENDING COMMENTS
Everett Browne  74M on Eliquis for Afib,  L parietotemporal CVA 2018, on ASA, p/t GC this AM w/ dysarthria and R sided weakness. LKW 10:30PM yesterday. . CTH w/ 17cc L BG IPH. CTA neg for vasc malformation, but shows chronic ICAD, severe L M1 stenosis, stable c/t 2020.  S/p Andexxa and keppra @ . Tx to [facility] for further care. Already s/p Andexxa at      - Hold AC/AP  - Give DDAVP now  - Rpt 4hr CTH @ 10AM, if stable no neurosurgical ci to q2 neurochecks, remaining care per stroke neurology  - Long interval CT 1/27 3 AM

## 2025-01-26 NOTE — ED PROVIDER NOTE - CLINICAL SUMMARY MEDICAL DECISION MAKING FREE TEXT BOX
74-year-old male with a history of hypertension hyperlipidemia CVA in 2018 with minimal right-sided weakness, on Eliquis presents with fall facial droop and right-sided weakness.  As per wife at bedside for collateral patient was at his normal state at 10:30 PM/last known well.  Woke up at about 5:45 AM and fell off his bed.  Wife came immediately patient was awake and alert.  Denies any head trauma.  Complaining of worsening right-sided weakness and dysarthria.  Stroke pre note prior to arrival  Exam as stated  Plan for labs, CT/A 74-year-old male with a history of hypertension hyperlipidemia CVA in 2018 with minimal right-sided weakness, on Eliquis presents with fall facial droop and right-sided weakness.  As per wife at bedside for collateral patient was at his normal state at 10:30 PM/last known well.  Woke up at about 5:45 AM and fell off his bed.  Wife came immediately patient was awake and alert.  Denies any head trauma.  Complaining of worsening right-sided weakness and dysarthria.  Stroke pre note prior to arrival  Exam as stated  Plan for labs, CT/A    Found to have intraparenychal hemorrhage with shift > consulted NSG via CTC. Plan for eliquis reversal, 5mg pt last took last night >8 hours ago. Approved by NSG at [facility]. Pharmacy aware of stat order.  Will give reversal and keppra prior to transfer, tier 1   Pt and wife aware and agreeable to plan  Pt protecting airway, mentating well.

## 2025-01-26 NOTE — ED ADULT TRIAGE NOTE - CHIEF COMPLAINT QUOTE
Pt BIBEMS from home LKW 22:30 when patient went to bed. Pt woke up this morning at 5:30 and fell. Denies head strike/ LOC. Pt noted to have Right sided facial droop, slurred speech, and right sided weakness. Prior stroke in 2018. Pt on ELIQUIS.

## 2025-01-26 NOTE — ED PROVIDER NOTE - CARE PLAN
1 Principal Discharge DX:	Intraparenchymal hemorrhage of brain  Secondary Diagnosis:	Right sided weakness

## 2025-01-26 NOTE — ED PROVIDER NOTE - PROGRESS NOTE DETAILS
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  Pacemaker was interrogated on 12/12/2024 with normal function.  Patient also follow-up with PCP on 12/20/2024 with no significant changes to health/medications.    PCP: Dr. Aleksandr Willis  Cardiologist: Dr. Wang Butler  Neurology: Dr. Baltazar Hickman    Medication list: amlodipine besilate 5 mg, aspirin 81 mg, Eliquis 5 mg BID, metoprolol tartrate 25 mg BID, ramipril 10 mg, rosuvastatin calcium 40 mg    Since patient is on ASA, neurosurgery also recommending DDAVP. Initial EKG remarkable for sinus rhythm with first-degree AV block () with no T wave versions.  Repeat EKG shows AV dual paced rhythm with rate of 70 and isolated T wave inversions in lead V6.    SBP goals 140-160. Attending Maria Teresa Sands: d/w neuro will admit

## 2025-01-26 NOTE — ED PROVIDER NOTE - OBJECTIVE STATEMENT
74-year-old male with a history of hypertension hyperlipidemia CVA in 2018 with minimal right-sided weakness, on Eliquis presents with fall facial droop and right-sided weakness.  As per wife at bedside for collateral patient was at his normal state at 10:30 PM/last known well.  Woke up at about 5:45 AM and fell off his bed.  Wife came immediately patient was awake and alert.  Denies any head trauma.  Complaining of worsening right-sided weakness and dysarthria.  Stroke pre note prior to arrival

## 2025-01-26 NOTE — ED ADULT NURSE NOTE - NSFALLHARMRISKINTERV_ED_ALL_ED
Assistance OOB with selected safe patient handling equipment if applicable/Assistance with ambulation/Communicate risk of Fall with Harm to all staff, patient, and family/Monitor gait and stability/Provide visual cue: red socks, yellow wristband, yellow gown, etc/Reinforce activity limits and safety measures with patient and family/Bed in lowest position, wheels locked, appropriate side rails in place/Call bell, personal items and telephone in reach/Instruct patient to call for assistance before getting out of bed/chair/stretcher/Non-slip footwear applied when patient is off stretcher/[place] to call system/Physically safe environment - no spills, clutter or unnecessary equipment/Purposeful Proactive Rounding/Room/bathroom lighting operational, light cord in reach

## 2025-01-27 LAB
A1C WITH ESTIMATED AVERAGE GLUCOSE RESULT: 5 % — SIGNIFICANT CHANGE UP (ref 4–5.6)
ALBUMIN SERPL ELPH-MCNC: 3.6 G/DL — SIGNIFICANT CHANGE UP (ref 3.3–5)
ALP SERPL-CCNC: 73 U/L — SIGNIFICANT CHANGE UP (ref 40–120)
ALT FLD-CCNC: 19 U/L — SIGNIFICANT CHANGE UP (ref 10–45)
ANION GAP SERPL CALC-SCNC: 14 MMOL/L — SIGNIFICANT CHANGE UP (ref 5–17)
APTT BLD: 26.8 SEC — SIGNIFICANT CHANGE UP (ref 24.5–35.6)
AST SERPL-CCNC: 26 U/L — SIGNIFICANT CHANGE UP (ref 10–40)
BILIRUB SERPL-MCNC: 0.9 MG/DL — SIGNIFICANT CHANGE UP (ref 0.2–1.2)
BUN SERPL-MCNC: 12 MG/DL — SIGNIFICANT CHANGE UP (ref 7–23)
CALCIUM SERPL-MCNC: 8.8 MG/DL — SIGNIFICANT CHANGE UP (ref 8.4–10.5)
CHLORIDE SERPL-SCNC: 105 MMOL/L — SIGNIFICANT CHANGE UP (ref 96–108)
CHOLEST SERPL-MCNC: 142 MG/DL — SIGNIFICANT CHANGE UP
CO2 SERPL-SCNC: 21 MMOL/L — LOW (ref 22–31)
CREAT SERPL-MCNC: 0.83 MG/DL — SIGNIFICANT CHANGE UP (ref 0.5–1.3)
EGFR: 91 ML/MIN/1.73M2 — SIGNIFICANT CHANGE UP
ESTIMATED AVERAGE GLUCOSE: 97 MG/DL — SIGNIFICANT CHANGE UP (ref 68–114)
GLUCOSE BLDC GLUCOMTR-MCNC: 80 MG/DL — SIGNIFICANT CHANGE UP (ref 70–99)
GLUCOSE BLDC GLUCOMTR-MCNC: 89 MG/DL — SIGNIFICANT CHANGE UP (ref 70–99)
GLUCOSE SERPL-MCNC: 79 MG/DL — SIGNIFICANT CHANGE UP (ref 70–99)
HCT VFR BLD CALC: 43.2 % — SIGNIFICANT CHANGE UP (ref 39–50)
HDLC SERPL-MCNC: 51 MG/DL — SIGNIFICANT CHANGE UP
HGB BLD-MCNC: 14.4 G/DL — SIGNIFICANT CHANGE UP (ref 13–17)
INR BLD: 0.98 RATIO — SIGNIFICANT CHANGE UP (ref 0.85–1.16)
LIPID PNL WITH DIRECT LDL SERPL: 69 MG/DL — SIGNIFICANT CHANGE UP
MAGNESIUM SERPL-MCNC: 2.2 MG/DL — SIGNIFICANT CHANGE UP (ref 1.6–2.6)
MCHC RBC-ENTMCNC: 30.7 PG — SIGNIFICANT CHANGE UP (ref 27–34)
MCHC RBC-ENTMCNC: 33.3 G/DL — SIGNIFICANT CHANGE UP (ref 32–36)
MCV RBC AUTO: 92.1 FL — SIGNIFICANT CHANGE UP (ref 80–100)
NON HDL CHOLESTEROL: 91 MG/DL — SIGNIFICANT CHANGE UP
NRBC # BLD: 0 /100 WBCS — SIGNIFICANT CHANGE UP (ref 0–0)
NRBC BLD-RTO: 0 /100 WBCS — SIGNIFICANT CHANGE UP (ref 0–0)
PHOSPHATE SERPL-MCNC: 3.2 MG/DL — SIGNIFICANT CHANGE UP (ref 2.5–4.5)
PLATELET # BLD AUTO: 158 K/UL — SIGNIFICANT CHANGE UP (ref 150–400)
POTASSIUM SERPL-MCNC: 3.2 MMOL/L — LOW (ref 3.5–5.3)
POTASSIUM SERPL-SCNC: 3.2 MMOL/L — LOW (ref 3.5–5.3)
PROT SERPL-MCNC: 6.4 G/DL — SIGNIFICANT CHANGE UP (ref 6–8.3)
PROTHROM AB SERPL-ACNC: 11.2 SEC — SIGNIFICANT CHANGE UP (ref 9.9–13.4)
RBC # BLD: 4.69 M/UL — SIGNIFICANT CHANGE UP (ref 4.2–5.8)
RBC # FLD: 13.2 % — SIGNIFICANT CHANGE UP (ref 10.3–14.5)
SODIUM SERPL-SCNC: 140 MMOL/L — SIGNIFICANT CHANGE UP (ref 135–145)
TRIGL SERPL-MCNC: 124 MG/DL — SIGNIFICANT CHANGE UP
WBC # BLD: 7.79 K/UL — SIGNIFICANT CHANGE UP (ref 3.8–10.5)
WBC # FLD AUTO: 7.79 K/UL — SIGNIFICANT CHANGE UP (ref 3.8–10.5)

## 2025-01-27 PROCEDURE — 99223 1ST HOSP IP/OBS HIGH 75: CPT

## 2025-01-27 PROCEDURE — 70450 CT HEAD/BRAIN W/O DYE: CPT | Mod: 26

## 2025-01-27 PROCEDURE — 99222 1ST HOSP IP/OBS MODERATE 55: CPT

## 2025-01-27 RX ORDER — METOPROLOL SUCCINATE 25 MG
25 TABLET, EXTENDED RELEASE 24 HR ORAL EVERY 12 HOURS
Refills: 0 | Status: DISCONTINUED | OUTPATIENT
Start: 2025-01-27 | End: 2025-01-27

## 2025-01-27 RX ORDER — METOPROLOL SUCCINATE 25 MG
25 TABLET, EXTENDED RELEASE 24 HR ORAL EVERY 12 HOURS
Refills: 0 | Status: DISCONTINUED | OUTPATIENT
Start: 2025-01-27 | End: 2025-01-30

## 2025-01-27 RX ORDER — AMLODIPINE BESYLATE 5 MG
5 TABLET ORAL DAILY
Refills: 0 | Status: DISCONTINUED | OUTPATIENT
Start: 2025-01-27 | End: 2025-01-27

## 2025-01-27 RX ORDER — POTASSIUM CHLORIDE 750 MG/1
40 TABLET, EXTENDED RELEASE ORAL ONCE
Refills: 0 | Status: DISCONTINUED | OUTPATIENT
Start: 2025-01-27 | End: 2025-01-27

## 2025-01-27 RX ORDER — AMLODIPINE BESYLATE 5 MG
5 TABLET ORAL ONCE
Refills: 0 | Status: COMPLETED | OUTPATIENT
Start: 2025-01-27 | End: 2025-01-27

## 2025-01-27 RX ORDER — ROSUVASTATIN CALCIUM 10 MG/1
40 TABLET, FILM COATED ORAL AT BEDTIME
Refills: 0 | Status: DISCONTINUED | OUTPATIENT
Start: 2025-01-27 | End: 2025-01-27

## 2025-01-27 RX ORDER — ROSUVASTATIN CALCIUM 10 MG/1
40 TABLET, FILM COATED ORAL AT BEDTIME
Refills: 0 | Status: DISCONTINUED | OUTPATIENT
Start: 2025-01-27 | End: 2025-01-30

## 2025-01-27 RX ORDER — HYDRALAZINE HCL 100 MG
5 TABLET ORAL ONCE
Refills: 0 | Status: COMPLETED | OUTPATIENT
Start: 2025-01-27 | End: 2025-01-27

## 2025-01-27 RX ORDER — HYDRALAZINE HCL 100 MG
10 TABLET ORAL ONCE
Refills: 0 | Status: DISCONTINUED | OUTPATIENT
Start: 2025-01-27 | End: 2025-01-27

## 2025-01-27 RX ORDER — POTASSIUM CHLORIDE 750 MG/1
40 TABLET, EXTENDED RELEASE ORAL ONCE
Refills: 0 | Status: COMPLETED | OUTPATIENT
Start: 2025-01-27 | End: 2025-01-27

## 2025-01-27 RX ORDER — AMLODIPINE BESYLATE 5 MG
10 TABLET ORAL DAILY
Refills: 0 | Status: DISCONTINUED | OUTPATIENT
Start: 2025-01-28 | End: 2025-01-30

## 2025-01-27 RX ADMIN — Medication 25 MILLIGRAM(S): at 17:00

## 2025-01-27 RX ADMIN — Medication 5 MILLIGRAM(S): at 12:17

## 2025-01-27 RX ADMIN — POTASSIUM CHLORIDE 40 MILLIEQUIVALENT(S): 750 TABLET, EXTENDED RELEASE ORAL at 13:19

## 2025-01-27 RX ADMIN — Medication 5 MILLIGRAM(S): at 16:26

## 2025-01-27 RX ADMIN — ROSUVASTATIN CALCIUM 40 MILLIGRAM(S): 10 TABLET, FILM COATED ORAL at 21:37

## 2025-01-27 RX ADMIN — Medication 5 MILLIGRAM(S): at 16:53

## 2025-01-27 RX ADMIN — Medication 25 MILLIGRAM(S): at 12:17

## 2025-01-27 NOTE — PHYSICAL THERAPY INITIAL EVALUATION ADULT - LEVEL OF INDEPENDENCE: STAND/SIT, REHAB EVAL
Patient is requesting a refill for hydrocodone. Please advise. Patient is out of medication.      HYDROcodone-acetaminophen  MG Oral Tab 30 tablet minimum assist (75% patients effort)

## 2025-01-27 NOTE — SWALLOW BEDSIDE ASSESSMENT ADULT - COMMENTS
CODE STROKE called at 0807 1/26. NIHSS 11, though noted to be improved in R sided weakness. ICH 1. Stroke cancelled after conversation with ED and Vascular Neurology fellow. Wife at bedside provided collateral. During the interview, the patient stated that he takes aspirin (however not in his pharmacy prescribed medications).    Of note, the patient suffered a fall in 2017, and family member in medicine checked his pulse. Found to have irregular heart beat. Was then started on apixaban for atrial fibrillation. Suffered a stroke while in [place] with right sided deficits. Went to [place] for medical care and acute inpatient rehab for 3 weeks. Afterwards, went back to [place] for home rehab. No tobacco, wine every night for dinner, no illicit drugs     1/26 failed dysphagia screen: wet/gurgly voice

## 2025-01-27 NOTE — PHYSICAL THERAPY INITIAL EVALUATION ADULT - PERTINENT HX OF CURRENT PROBLEM, REHAB EVAL
Pt is a 75 y/o male admitted with R-sided weakness. PMH: Hypertension hyperlipidemia, stroke in 2018 with minimal right-sided weakness, Afib on apixaban (started in 2017) presented as a transfer from [place]. Originally lives at home. Wife woke up in 0545 1/26 finding the patient on the floor and complaining of right sided weakness and word finding difficulties. LKN 1/25 2230 when the patient went to bed. He took his apixaban and medications for HLD and HTN prior to going to bed. Upon finding the patient on the floor, the patient was transferred to the [place] ED. NIHSS 10 (+1 face, +3 RUE, +3 RLE, +1 Aphasia, +2, dysarthria). mRS 0. CT head demonstrated 5.8 x 2.6 cm hemorrhage with Rightward midline shift of 1 to 2 mm. CT angio with severe stenosis and near complete occlusion of the left middle cerebral artery at the distal left M1 segment, similar in appearance to the prior exam from November 2020.  Received andexanet and keppra 1g x1. Afterwards, transferred to [facility] for further evaluation.    CODE STROKE called at 0807 1/26. NIHSS 11, though noted to be improved in R sided weakness. ICH 1. Stroke cancelled after conversation with ED and Vascular Neurology fellow. Wife at bedside provided collateral. During the interview, the patient stated that he takes aspirin (however not in his pharmacy prescribed medications).    Of note, the patient suffered a fall in 2017, and family member in medicine checked his pulse. Found to have irregular heart beat. Was then started on apixaban for atrial fibrillation. Suffered a stroke while in [place] with right sided deficits. Went to [place] for medical care and acute inpatient rehab for 3 weeks. Afterwards, went back to [place] for home rehab. No tobacco, wine every night for dinner, no illicit drugs

## 2025-01-27 NOTE — SWALLOW BEDSIDE ASSESSMENT ADULT - SWALLOW EVAL: DIAGNOSIS
74yMale h/o CVA 2018,  presents with code stroke, found to have acute intraparenchymal hemorrhage identified at the left basal ganglia. Pt presents with clinical signs or an oropharyngeal dysphagia. Swallow sequence is marked by prolonged mastication with delayed oral transit time vs. delay in swallow initiation. Post swallow residue in the right lateral sulcus with soft solids. Improved oral management of minced and moist solids. Immediate cough post mildly thick liquids. No overt signs or symptoms of penetration or aspiration on purees, minced/moist and moderately thick liquids.

## 2025-01-27 NOTE — SWALLOW BEDSIDE ASSESSMENT ADULT - ADDITIONAL RECOMMENDATIONS
Monitor for s/s aspiration/laryngeal penetration. If noted:  D/C p.o. intake, provide non-oral nutrition/hydration/meds, and contact this service @ [phone]  Maintain good oral hygiene

## 2025-01-27 NOTE — PHYSICAL THERAPY INITIAL EVALUATION ADULT - RANGE OF MOTION EXAMINATION, REHAB EVAL
Physical Therapy Visit    Visit Type: Daily Treatment Note  Visit: 15  Referring Provider: Maco Avendano MD  Medical Diagnosis (from order): Z96.651 - Status post total right knee replacement     SUBJECTIVE                                                                                                               States she has been using the cane less and less. Continues to work on her endurance going to the gym and doing her exercises and walking. Can almost get her right leg into the tub without assistance.      OBJECTIVE                                                                                                                         Palpation  Right  - Rectus Femoris: tender  - Vastus Medialis Oblique (VMO): tender  - Vastus Lateralis Obliques (VLO): tender  Knee: [place]/Tendon/Bone  - Quad Tendon: - Right: tenderness                 Treatment    Un-attended Electrical Stimulation: Interferential Current  - Purpose: pain relief  - Location: right knee  - Position: long sitting  - Pulse Rate:  Hz  - Intensity: patient comfort  - Delivered via: pads  - In conjunction with: cold pack  - Duration: 15 minutes    Results: decreased pain  Reaction: no adverse reaction to treatment    Therapeutic Exercise  Supine passive and active assisted knee flexion range of motion  Bike x 8 min        Manual Therapy   Soft tissue mobilization to right quads, popliteal fossa, peripatellar margins  Patellar mobilizations all directions grade 3    Neuromuscular Re-Education  Swissball hamstring curls x 20  Sit to stand from 25.5 inch plinth x 10 without upper extremities  6 inch step ups/downs x 10  12 inch hurdles step overs in parallel bars fwd and retro x 3 laps  12 inch hurdles lateral step overs x 2 laps  Tandem walk on airex x 2 laps  Total gym squats 34 deg x 15, single leg squat at 15 degree x 10 right  Side steps green band x 2 laps   Seated hip flexion over 5 lb kb x 10  Monster walks green theraband x 2  laps (Forward/backwards)  Short arc quad 2 lb x 20, 5 seconds  Straight leg raise x 8  Lunge over airex x 5 each side     Only 1:1 skilled therapy time billed for intervention, additional time spent completing intervention with physical therapy extender recorded under additional time.      Skilled input: verbal instruction/cues, tactile instruction/cues and posture correction    Writer verbally educated and received verbal consent for hand placement, positioning of patient, and techniques to be performed today from patient for clothing adjustments for techniques and hand placement and palpation for techniques as described above and how they are pertinent to the patient's plan of care.  Home Exercise Program  Access Code: [email or web address]  URL: [email or web address]  Date: 12/30/2024  Prepared by: Saumya Ramsey    Exercises  - Prone Knee Flexion  - 1 x daily - 5-7 x weekly - 2-3 sets - 5-10 reps  - Mini Squat with Counter Support  - 1 x daily - 5-7 x weekly - 2-3 sets - 5-10 reps  - Forward lunge on step or chair  - 1 x daily - 5-7 x weekly - 2-3 sets - 5-10 reps  - Forward Step Up  - 1 x daily - 3-4 x weekly - 2-3 sets - 5-10 reps  - Heel Toe Raises with Counter Support  - 1 x daily - 5-7 x weekly - 2-3 sets - 8-12 reps  - Hooklying Isometric Hip Flexion  - 1 x daily - 7 x weekly - 2 sets - 5 reps - 5 hold  - Supine Bridge  - 1 x daily - 7 x weekly - 2-3 sets - 10 reps  - Seated March  - 1 x daily - 7 x weekly - 1-2 sets - 10 reps  - Supine Pelvic Tilt with Straight Leg Raise  - 1 x daily - 7 x weekly - 3 sets - 5 reps      ASSESSMENT                                                                                                            Continues to struggle with functional quad strength during lunges, steps and squatting. Hip flexion in sitting and supine is challenging which affects lower body dressing and transfers. More stable on her feet with less reliance on the cane. Moderate fatigue  after the exercises.  Education:   - Results of above outlined education: Verbalizes understanding and Demonstrates understanding    PLAN                                                                                                                           Suggestions for next session as indicated: Progress per plan of care       Therapy procedure time and total treatment time can be found documented on the Time Entry flowsheet     PROM WFL/bilateral upper extremity ROM was WFL (within functional limits)/bilateral lower extremity ROM was WFL (within functional limits)

## 2025-01-27 NOTE — OCCUPATIONAL THERAPY INITIAL EVALUATION ADULT - PERTINENT HX OF CURRENT PROBLEM, REHAB EVAL
74-year-old R-H male with a history of hypertension hyperlipidemia, stroke in 2018 with minimal right-sided weakness, Afib on apixaban (started in 2017) presented as a transfer from [place]. Originally lives at home. Wife woke up in 0545 1/26 finding the patient on the floor and complaining of right sided weakness and word finding difficulties. LKN 1/25 2230 when the patient went to bed. He took his apixaban and medications for HLD and HTN prior to going to bed. Upon finding the patient on the floor, the patient was transferred to the [place] ED. NIHSS 10 (+1 face, +3 RUE, +3 RLE, +1 Aphasia, +2, dysarthria). mRS 0. CT head demonstrated 5.8 x 2.6 cm hemorrhage with Rightward midline shift of 1 to 2 mm. CT angio with severe stenosis and near complete occlusion of the left middle cerebral artery at the distal left M1 segment, similar in appearance to the prior exam from November 2020.  Received andexanet and keppra 1g x1. Afterwards, transferred to [facility] for further evaluation. CODE STROKE called at 0807 1/26. NIHSS 11, though noted to be improved in R sided weakness. ICH 1. Stroke cancelled after conversation with ED and Vascular Neurology fellow. Wife at bedside provided collateral. During the interview, the patient stated that he takes aspirin (however not in his pharmacy prescribed medications).  Of note, the patient suffered a fall in 2017, and family member in medicine checked his pulse. Found to have irregular heart beat. Was then started on apixaban for atrial fibrillation. Suffered a stroke while in [place] with right sided deficits. Went to [place] for medical care and acute inpatient rehab for 3 weeks. Afterwards, went back to [place] for home rehab. No tobacco, wine every night for dinner, no illicit drugs   CT head-Since prior CT head 1/26/2025 performed at 6:47 AM, stable left basal ganglia intraparenchymal material, with mass effect and mild left-to-right midline shift.

## 2025-01-27 NOTE — SWALLOW BEDSIDE ASSESSMENT ADULT - SLP GENERAL OBSERVATIONS
Pt encountered bedside, AA&Ox2, pleasant and cooperative. +Fluent aphasia. Pt follows simple commands only and answers simple yes/no questions. Verbal output marked by nonsensical strings of utterances with frequent semantic paraphasias and neologisms. Please see full speech-language evaluation for details.

## 2025-01-27 NOTE — SPEECH LANGUAGE PATHOLOGY EVALUATION - SLP REPETITION
Intact for 2 simple words; breakdown with increased complexity; phonemic paraphasias. Pt states "I can't do that" referring to repeating a string of 4 words.

## 2025-01-27 NOTE — PHYSICAL THERAPY INITIAL EVALUATION ADULT - NSPTDISCHREC_GEN_A_CORE
Acute rehab. Pt was previously independent and currently requires assist with all mobility and ADLs. Pt can tolerate 3 hours of therapy/day and will require multiple disciplines of therapy to restore functional mobility./Acute Inpatient Rehab

## 2025-01-27 NOTE — SPEECH LANGUAGE PATHOLOGY EVALUATION - SLP DIAGNOSIS
Pt presents with a fluent aphasia marked by fluent, yet nonsensical verbal output with semantic/phonemic paraphasias, neologisms, word-finding difficulties. Pt able to follow simple commands and answer simple yes/no questions only. Reading intact at the simple sentence level. Writing deferred at this time 2/2 fatigue - to be further assessed. Cannot fully assess cognitive skills due to severity of aphasia Pt presents with a fluent aphasia, somewhat resembling transcortical sensory aphasia with the ability to repeat single words only, with breakdown with increased length of utterance. Verbal expression is fluent, yet nonsensical verbal output with semantic/phonemic paraphasias, neologisms, word-finding difficulties. Pt able to follow simple commands and answer simple yes/no questions only. Reading intact at the simple sentence level. Writing deferred at this time 2/2 fatigue - to be further assessed. Cannot fully assess cognitive skills due to severity of aphasia. Fairly good sustained attention throughout evaluation. Noticeable fatigue with progression of evaluation marked by heaviness of eye-lids and slowed responses to questions. Moderate dysarthria with imprecise articulatory precision and overall fair speech intelligibility.

## 2025-01-27 NOTE — OCCUPATIONAL THERAPY INITIAL EVALUATION ADULT - LIVES WITH, PROFILE
Pvt home, 2 steps to enter, 1st floor set-up. (I) ADLs and functional transfers. +. Stall shower with grab bars. Owns rolling walker, cane, and commode/spouse

## 2025-01-27 NOTE — OCCUPATIONAL THERAPY INITIAL EVALUATION ADULT - IMPAIRMENTS CONTRIBUTING IMPAIRED BED MOBILITY, REHAB EVAL
impaired balance/cognition/impaired coordination/decreased ROM/impaired sensory feedback/decreased strength

## 2025-01-27 NOTE — PROGRESS NOTE ADULT - ASSESSMENT
- No acute neurosurgical intervention  - Hold AC/AP  - Already s/p Andexxa at [facility]   - SBP <160  - Rpt 24hr CTH  - f/u ARU

## 2025-01-27 NOTE — OCCUPATIONAL THERAPY INITIAL EVALUATION ADULT - VISUAL ASSESSMENT: VISUAL NEGLECT
[facility]   7/20/2018 9:45 AM   Anticoagulation Therapy Visit    Description:  66 year old male   Provider:  CAMILO ANTI COAG   Department:  [facility]           INR as of 7/20/2018     Today's INR 2.0      Anticoagulation Summary as of 7/20/2018     INR goal 2.0-3.0   Today's INR 2.0   Full warfarin instructions 7/21: 2.5 mg; Otherwise 7.5 mg on Tue, Thu, Sat; 5 mg all other days   Next INR check 7/23/2018    Indications   Atrial fibrillation with RVR (H) [I48.91]  Long-term (current) use of anticoagulants [Z79.01] [Z79.01]         Your next Anticoagulation Clinic appointment(s)     Jul 20, 2018  9:45 AM CDT   [facility]    [address] [facility] [address]   [phone]            Jul 23, 2018  8:15 AM CDT   Anticoagulation Visit with CAMILO ANTI COAG   [facility]    [address] [facility] [address]   [phone]              Contact Numbers     Clinic Number:         July 2018 Details    Sun Mon Tue Wed Thu Fri Sat     1               2               3               4               5               6               7                 8               9               10               11               12               13               14                 15               16               17               18               19               20      5 mg   See details      21      2.5 mg           22      5 mg         23            24               25               26               27               28                 29               30               31                    Date Details   07/20 This INR check       Date of next INR:  7/23/2018         How to take your warfarin dose     To take:  2.5 mg Take 1 of the 2.5 mg tablets.    To take:  5 mg Take 2 of the 2.5 mg tablets.           
mild R inattention; will attend with cues/right

## 2025-01-27 NOTE — SPEECH LANGUAGE PATHOLOGY EVALUATION - SLP PERTINENT HISTORY OF CURRENT PROBLEM
74-year-old R-H male with a history of hypertension hyperlipidemia, stroke in 2018 with minimal right-sided weakness, Afib on apixaban (started in 2017) presented as a transfer from [place]. Originally lives at home. Wife woke up in 0545 1/26 finding the patient on the floor and complaining of right sided weakness and word finding difficulties. LKN 1/25 2230 when the patient went to bed. He took his apixaban and medications for HLD and HTN prior to going to bed. Upon finding the patient on the floor, the patient was transferred to the [place] ED. NIHSS 10 (+1 face, +3 RUE, +3 RLE, +1 Aphasia, +2, dysarthria). mRS 0. CT head demonstrated 5.8 x 2.6 cm hemorrhage with Rightward midline shift of 1 to 2 mm. CT angio with severe stenosis and near complete occlusion of the left middle cerebral artery at the distal left M1 segment, similar in appearance to the prior exam from November 2020.  Received andexanet and keppra 1g x1. Afterwards, transferred to [facility] for further evaluation.

## 2025-01-27 NOTE — SPEECH LANGUAGE PATHOLOGY EVALUATION - SLP GENERAL OBSERVATIONS
Pt encountered bedside, AA&Ox2, pleasant and cooperative. +Fluent aphasia. Pt follows simple commands only and answers simple yes/no questions. Verbal output marked by nonsensical strings of utterances with frequent semantic paraphasias and neologisms.

## 2025-01-27 NOTE — SWALLOW BEDSIDE ASSESSMENT ADULT - SLP PERTINENT HISTORY OF CURRENT PROBLEM
74-year-old R-H male with a history of hypertension hyperlipidemia, stroke in 2018 with minimal right-sided weakness, Afib on apixaban (started in 2017) presented as a transfer from [place]. Originally lives at home. Wife woke up in 0545 1/26 finding the patient on the floor and complaining of right sided weakness and word finding difficulties. LKN 1/25 2230 when the patient went to bed. He took his apixaban and medications for HLD and HTN prior to going to bed. Upon finding the patient on the floor, the patient was transferred to the [place] ED. NIHSS 10 (+1 face, +3 RUE, +3 RLE, +1 Aphasia, +2, dysarthria). mRS 0. CT head demonstrated 5.8 x 2.6 cm hemorrhage with Rightward midline shift of 1 to 2 mm. CT angio with severe stenosis and near complete occlusion of the left middle cerebral artery at the distal left M1 segment, similar in appearance to the prior exam from November 2020.  Received andexanet and keppra 1g x1. Afterwards, transferred to [facility] for further evaluation.

## 2025-01-27 NOTE — PHYSICAL THERAPY INITIAL EVALUATION ADULT - WEIGHT-BEARING RESTRICTIONS: STAND/SIT, REHAB EVAL
December 21, 2022       Aashish Lancaster MD  [address] [facility] [address]  Via In Basket      Patient: Simona Abbott   YOB: 1967   Date of Visit: 12/21/2022       Dear Dr. Lancaster:    Thank you for referring Simona Abbott to me for evaluation. Below are my notes for this visit with her.    If you have questions, please do not hesitate to call me. I look forward to following your patient along with you.      Sincerely,        Leonela Corey CNP        CC: No Recipients  Leonela Corey CNP  12/21/2022  3:25 PM  Signed      SLEEP EVALUATION NOTE     Simona Abbott is a 55 year old female presenting for evaluation of: Consultation, Sleep Problem, and Office Visit   .    Referring provider: Aashish Lancaster MD  PCP: Aashish Lancaster MD       HPI     New patient, with no significant past medical history is reporting to sleep center for establishment of care.  Per patient, she complains of some daytime sleepiness and non restorative sleep.     Bedtime: 16:00 p.m.-17:00 p.m.  Awake time: 04:00 a.m.  Sleep position:right lateral   Sleep onset latency: 5 minutes   Sleeping aid medications: No  Awakenings # and episodes of nocturia: 1-2    [x]Snoring  []Witnessed apneas  []Dyspneic arousal  []Morning dry mouth  []Morning headache    [x]Non-restorative sleep  [x]Excessive daytime sleepiness or tired during the day  [x]Naps sometimes     []Urge to move legs and/or uncomfortable tingling or burning in legs  []Cramping or jerking of the legs during sleep    []Cataplexy  []Sleep paralysis  [] Hypnagogic or hypnopompic hallucinations  []Sleep attacks    []Parasomnias:    STOP BANG SCREENING  STOP  S: Do you snore loudly?: Yes  T: Do you often feel tired, fatigued or sleepy during daytime?: Yes  O: Has anyone observed you stop breathing during your sleep?: No  P: Do you have or are you being treated for high blood pressure?: No  BANG  B: BMI more than 35 kg/m2?: Yes  A: Is age over 50 years old?: Yes  N: Neck  circumfrence >16 inches (40 cm)?: No  G: Is gender Male?: No  Total Score  Stop Bang Total Score (out of 8): 4                   CURRENT CO-MORBIDITIES:    [] HTN     [] AF/ARRHYTHMIA    [] HYPOTHYROIDISM     [] CAD     [x] OBESITY                  [] INSOMNIA  [] CHF     [] DM                         [] STROKE/TIA  []OTHER:           [place] Sleepiness Scale:     0 = would never doze  1 = slight chance of dozing  2 = moderate chance of dozing  3 = high chance of dozing    Situation     Chance of Dozing       1. Sitting and reading   2  2. Watching TV    2  3. Sitting, inactive in a public place       (e.g. a theatre or a meeting)  1  4. As a passenger in a car for an hour       without a break    2  5. Lying down to rest in the afternoon      when circumstances permit  1  6. Sitting and talking to someone  0    7. Sitting quietly after lunch without      Alcohol     1  8. In a car, while stopped for a few      Minutes in traffic    0            TOTAL: 9      Past Medical History:     History reviewed. No pertinent past medical history.  Past Surgical History:   Procedure Laterality Date   • Hysterectomy       Family History   Problem Relation Age of Onset   • Patient is unaware of any medical problems Mother      Social History     Tobacco Use   • Smoking status: Former     Packs/day: 0.00     Types: Cigarettes     Quit date: 1999     Years since quittin.8   • Smokeless tobacco: Former   Substance Use Topics   • Alcohol use: No       ALLERGIES:  No Known Allergies  Current Outpatient Medications   Medication Sig   • rosuvastatin (CRESTOR) 20 MG tablet Take 1 tablet by mouth daily.   • Semaglutide 3 MG Tab Take 1 tablet by mouth daily (before breakfast).     No current facility-administered medications for this visit.          Review of Systems:     Review of Systems   Constitutional: Negative for chills, fever and weight loss.   HENT: Negative for congestion, ear discharge and sore throat.    Eyes:  Negative for blurred vision and photophobia.   Cardiovascular: Negative for chest pain and palpitations.   Respiratory: Positive for snoring. Negative for sleep disturbances due to breathing.    Skin: Negative for rash.   Musculoskeletal: Negative for back pain, joint swelling, muscle weakness and neck pain.   Gastrointestinal: Negative for constipation and diarrhea.   Genitourinary: Positive for nocturia.   Neurological: Positive for excessive daytime sleepiness. Negative for numbness and paresthesias.        Physical Examination:     Vitals:    12/21/22 1407   BP: 116/60   Pulse: 68   Temp: 97.4 °F (36.3 °C)   SpO2: 97%   Weight: 90.4 kg (199 lb 4.7 oz)   Height: 5' 1\" (1.549 m)     Body mass index is 37.66 kg/m².      Neck Circumference: 15.5 in  Nasal Passages: [x]Clear   [] Congested  Palate: [place] [] I  [] II   [] III   [x] IV              [place]/edema [x]none []+1  []+2  []+3  []+4    Gen: No acute distress. Pleasant and interactive.  Head:  Normocephalic and atraumatic.  Eyes: Conjunctiva and sclera normal.   Heart:  Normal rate and regular rhythm, no murmur.  Lungs:  Normal respiratory rate and effort, breath sounds equal.  Extremities:  No edema in lower extremities.   Skin: Warm and dry, no rash.  Musculoskeletal:  No joint swelling or tenderness  Psych:  Affect was appropriate to situation and mood was normal.  Neuro:  Alert and oriented, attention,gait normal.     Assessment and Plan:     PROBLEMS ADDRESSED THIS VISIT:  Problem List Items Addressed This Visit        Endocrine and Metabolic    Severe obesity (BMI 35.0-39.9) with comorbidity (CMS/HCC)       Sleep    Risk factors for obstructive sleep apnea   Other Visit Diagnoses     Hypersomnia, unspecified    -  Primary    Relevant Orders    SLEEP STUDY HOME 4 CHANNEL PORTABLE UNATTENDED    Snoring        Relevant Orders    SLEEP STUDY HOME 4 CHANNEL PORTABLE UNATTENDED    Dysfunctions of sleep stages or arousal from sleep        Relevant Orders     SLEEP STUDY HOME 4 CHANNEL PORTABLE UNATTENDED           Comments: Based on her symptoms and medical history she will have a home sleep study and follow up to review results to assess for obstructive sleep apnea.   Discussed the nature of sleep apnea and the cardiovascular risk factors of not treating it.   Explained the different treatment options such as CPAP and MAD.  All questions and concerns were addressed and answered.  Aashish Lancaster MD received this note via EPIC.      PLAN & Patient Counseling:     • We reviewed the nature of sleep apnea, the elevated cardiovascular risks and other health consequences associated with this condition and reviewed treatment options in detail.  • Pt. to undergo home sleep study and follow up.   • The patient was cautioned not to drive while sleepy.    Return in about 4 weeks (around 1/18/2023).  Leonela KISER       full weight-bearing

## 2025-01-27 NOTE — PROGRESS NOTE ADULT - SUBJECTIVE AND OBJECTIVE BOX
Patient seen and examined at bedside.    --Anticoagulation--    T(C): 37.1 (01-27-25 @ 00:00), Max: 37.1 (01-27-25 @ 00:00)  HR: 63 (01-27-25 @ 04:00) (60 - 66)  BP: 151/82 (01-27-25 @ 04:00) (124/76 - 183/89)  RR: 22 (01-27-25 @ 04:00) (16 - 22)  SpO2: 94% (01-27-25 @ 04:00) (94% - 99%)  Wt(kg): --    Exam:  Wide awake, Ox2, FC, PERRL, EOMI, Rt facial, Rt side 4/5 (has baseline weakness), Lt side 5/5.

## 2025-01-27 NOTE — SPEECH LANGUAGE PATHOLOGY EVALUATION - COMMENTS
CODE STROKE called at 0807 1/26. NIHSS 11, though noted to be improved in R sided weakness. ICH 1. Stroke cancelled after conversation with ED and Vascular Neurology fellow. Wife at bedside provided collateral. During the interview, the patient stated that he takes aspirin (however not in his pharmacy prescribed medications).    Of note, the patient suffered a fall in 2017, and family member in medicine checked his pulse. Found to have irregular heart beat. Was then started on apixaban for atrial fibrillation. Suffered a stroke while in [place] with right sided deficits. Went to [place] for medical care and acute inpatient rehab for 3 weeks. Afterwards, went back to [place] for home rehab. No tobacco, wine every night for dinner, no illicit drugs     1/26 failed dysphagia screen: wet/gurgly voice LTG:  Pt will maximize receptive and expressive language skills    Acute rehab Pt does not currently utilize AAC/gestures to communicate wants/needs. Pt benefits from pointing to items, able to read single words and able to identify target word in F:2 with 100% accuracy. Educated family at bedside re: compensatory strategies including gestures, use of written words, pictures to aid in communication of thoughts/ideas. LTG:  Pt will maximize receptive and expressive language skills  Pt will improve speech intelligibility with good results    Acute rehab Deferred at this time due to fatigue Unable to fully assess cognitive skills due to severity of aphasia. Good sustained attention during lengthy evaluation with fatigue towards the very end. Pt smiles and laughs appropriately. Receptive language skills impaired. Pt follows 1-step directives only; breakdown beyond this point. Pt answers simple yes/no questions with clear breakdown at the semi-complex level involving comparatives (i.e. longer/shorter). Verbal expression is fluent, nonsensical, frequent semantic/phonemic paraphasias, neologisms. Pt is somewhat aware of errors in speech and moments of frustration, however, unable to self-correct. Pt benefits from carrier phrases, gestures, initial phoneme cues, and written words to improve ability to verbalize a target word/idea. Examples of errors include: "pint" for target "key", "seara" for "scissors", "dennys-cuppa" for target "cup". Picture description task: "Well, the jin is making a movie. The kid is on the cookie par. Move and choking to the tree and applesauce. And then the other side." +Moderate dysarthria marked by imprecise articulatory precision with overall fair speech intelligibility once pt applies slow rate. Pt able to read single words and identify target in F:2. Reading at the 3-4 word level impaired, however, appears to be emerging.

## 2025-01-27 NOTE — CHART NOTE - NSCHARTNOTEFT_GEN_A_CORE
24h CTH reviewed, appears stable.   -No neurosurgical intervention is indicated   -Hold all AC/AP until outpatient f/u with Dr. Barger in the office. At time of outpatient f/u, resuming AC/AP can be considered   -DVT PPX is OK 24h after stable long-interval scan

## 2025-01-28 ENCOUNTER — TRANSCRIPTION ENCOUNTER (OUTPATIENT)
Age: 75
End: 2025-01-28

## 2025-01-28 LAB
ALBUMIN SERPL ELPH-MCNC: 3.5 G/DL — SIGNIFICANT CHANGE UP (ref 3.3–5)
ALP SERPL-CCNC: 70 U/L — SIGNIFICANT CHANGE UP (ref 40–120)
ALT FLD-CCNC: 16 U/L — SIGNIFICANT CHANGE UP (ref 10–45)
ANION GAP SERPL CALC-SCNC: 15 MMOL/L — SIGNIFICANT CHANGE UP (ref 5–17)
AST SERPL-CCNC: 22 U/L — SIGNIFICANT CHANGE UP (ref 10–40)
BILIRUB SERPL-MCNC: 0.7 MG/DL — SIGNIFICANT CHANGE UP (ref 0.2–1.2)
BUN SERPL-MCNC: 16 MG/DL — SIGNIFICANT CHANGE UP (ref 7–23)
CALCIUM SERPL-MCNC: 8.8 MG/DL — SIGNIFICANT CHANGE UP (ref 8.4–10.5)
CHLORIDE SERPL-SCNC: 105 MMOL/L — SIGNIFICANT CHANGE UP (ref 96–108)
CO2 SERPL-SCNC: 21 MMOL/L — LOW (ref 22–31)
CREAT SERPL-MCNC: 0.89 MG/DL — SIGNIFICANT CHANGE UP (ref 0.5–1.3)
EGFR: 89 ML/MIN/1.73M2 — SIGNIFICANT CHANGE UP
GLUCOSE SERPL-MCNC: 96 MG/DL — SIGNIFICANT CHANGE UP (ref 70–99)
HCT VFR BLD CALC: 41.8 % — SIGNIFICANT CHANGE UP (ref 39–50)
HGB BLD-MCNC: 14.5 G/DL — SIGNIFICANT CHANGE UP (ref 13–17)
MCHC RBC-ENTMCNC: 32 PG — SIGNIFICANT CHANGE UP (ref 27–34)
MCHC RBC-ENTMCNC: 34.7 G/DL — SIGNIFICANT CHANGE UP (ref 32–36)
MCV RBC AUTO: 92.3 FL — SIGNIFICANT CHANGE UP (ref 80–100)
NRBC # BLD: 0 /100 WBCS — SIGNIFICANT CHANGE UP (ref 0–0)
NRBC BLD-RTO: 0 /100 WBCS — SIGNIFICANT CHANGE UP (ref 0–0)
PLATELET # BLD AUTO: 161 K/UL — SIGNIFICANT CHANGE UP (ref 150–400)
POTASSIUM SERPL-MCNC: 3.3 MMOL/L — LOW (ref 3.5–5.3)
POTASSIUM SERPL-SCNC: 3.3 MMOL/L — LOW (ref 3.5–5.3)
PROT SERPL-MCNC: 6.4 G/DL — SIGNIFICANT CHANGE UP (ref 6–8.3)
RBC # BLD: 4.53 M/UL — SIGNIFICANT CHANGE UP (ref 4.2–5.8)
RBC # FLD: 13.1 % — SIGNIFICANT CHANGE UP (ref 10.3–14.5)
SODIUM SERPL-SCNC: 141 MMOL/L — SIGNIFICANT CHANGE UP (ref 135–145)
WBC # BLD: 7.76 K/UL — SIGNIFICANT CHANGE UP (ref 3.8–10.5)
WBC # FLD AUTO: 7.76 K/UL — SIGNIFICANT CHANGE UP (ref 3.8–10.5)

## 2025-01-28 PROCEDURE — 93308 TTE F-UP OR LMTD: CPT | Mod: 26

## 2025-01-28 PROCEDURE — 74230 X-RAY XM SWLNG FUNCJ C+: CPT | Mod: 26

## 2025-01-28 PROCEDURE — 99232 SBSQ HOSP IP/OBS MODERATE 35: CPT

## 2025-01-28 PROCEDURE — 99233 SBSQ HOSP IP/OBS HIGH 50: CPT

## 2025-01-28 RX ORDER — RAMIPRIL 2.5 MG/1
0 CAPSULE ORAL
Refills: 0 | DISCHARGE

## 2025-01-28 RX ORDER — AMLODIPINE BESYLATE 5 MG
1 TABLET ORAL
Refills: 0 | DISCHARGE

## 2025-01-28 RX ORDER — METOPROLOL SUCCINATE 25 MG
1 TABLET, EXTENDED RELEASE 24 HR ORAL
Qty: 0 | Refills: 0 | DISCHARGE
Start: 2025-01-28

## 2025-01-28 RX ORDER — APIXABAN 5 MG/1
1 TABLET, FILM COATED ORAL
Refills: 0 | DISCHARGE

## 2025-01-28 RX ORDER — ROSUVASTATIN CALCIUM 10 MG/1
1 TABLET, FILM COATED ORAL
Qty: 0 | Refills: 0 | DISCHARGE
Start: 2025-01-28

## 2025-01-28 RX ORDER — METOPROLOL SUCCINATE 25 MG
1 TABLET, EXTENDED RELEASE 24 HR ORAL
Refills: 0 | DISCHARGE

## 2025-01-28 RX ORDER — AMLODIPINE BESYLATE 5 MG
1 TABLET ORAL
Qty: 0 | Refills: 0 | DISCHARGE
Start: 2025-01-28

## 2025-01-28 RX ORDER — POTASSIUM CHLORIDE 750 MG/1
40 TABLET, EXTENDED RELEASE ORAL EVERY 4 HOURS
Refills: 0 | Status: COMPLETED | OUTPATIENT
Start: 2025-01-28 | End: 2025-01-28

## 2025-01-28 RX ORDER — ROSUVASTATIN CALCIUM 10 MG/1
1 TABLET, FILM COATED ORAL
Refills: 0 | DISCHARGE

## 2025-01-28 RX ADMIN — Medication 10 MILLIGRAM(S): at 05:06

## 2025-01-28 RX ADMIN — Medication 25 MILLIGRAM(S): at 17:36

## 2025-01-28 RX ADMIN — POTASSIUM CHLORIDE 40 MILLIEQUIVALENT(S): 750 TABLET, EXTENDED RELEASE ORAL at 08:59

## 2025-01-28 RX ADMIN — POTASSIUM CHLORIDE 40 MILLIEQUIVALENT(S): 750 TABLET, EXTENDED RELEASE ORAL at 17:37

## 2025-01-28 RX ADMIN — Medication 25 MILLIGRAM(S): at 08:33

## 2025-01-28 RX ADMIN — ROSUVASTATIN CALCIUM 40 MILLIGRAM(S): 10 TABLET, FILM COATED ORAL at 21:40

## 2025-01-28 NOTE — DISCHARGE NOTE PROVIDER - CARE PROVIDERS DIRECT ADDRESSES
,[email or web address]

## 2025-01-28 NOTE — CHART NOTE - NSCHARTNOTEFT_GEN_A_CORE
Plan to restart AC 6 weeks from stroke, admitted date 1/26/2025. Pt is medically cleared to be discharged to rehab

## 2025-01-28 NOTE — DISCHARGE NOTE PROVIDER - NSDCMRMEDTOKEN_GEN_ALL_CORE_FT
amLODIPine 5 mg oral tablet: 1 tab(s) orally once a day  apixaban 5 mg oral tablet: 1 tab(s) orally 2 times a day  metoprolol tartrate 25 mg oral tablet: 1 tab(s) orally 2 times a day  ramipril 10 mg oral tablet: orally once a day  rosuvastatin 40 mg oral tablet: 1 tab(s) orally once a day (at bedtime)   amLODIPine 10 mg oral tablet: 1 tab(s) orally once a day  metoprolol tartrate 25 mg oral tablet: 1 tab(s) orally every 12 hours  rosuvastatin 40 mg oral tablet: 1 tab(s) orally once a day (at bedtime)   amLODIPine 10 mg oral tablet: 1 tab(s) orally once a day  enoxaparin: 40 milligram(s) subcutaneous once a day (at bedtime)  losartan 100 mg oral tablet: 1 tab(s) orally once a day  metoprolol tartrate 25 mg oral tablet: 1 tab(s) orally every 12 hours  rosuvastatin 40 mg oral tablet: 1 tab(s) orally once a day (at bedtime)   amLODIPine 10 mg oral tablet: 1 tab(s) orally once a day  enoxaparin: 40 milligram(s) subcutaneous once a day (at bedtime)  metoprolol tartrate 25 mg oral tablet: 1 tab(s) orally every 12 hours  rosuvastatin 40 mg oral tablet: 1 tab(s) orally once a day (at bedtime)  valsartan 320 mg oral tablet: 1 tab(s) orally once a day (at bedtime)

## 2025-01-28 NOTE — SWALLOW VFSS/MBS ASSESSMENT ADULT - DIAGNOSTIC IMPRESSIONS
74YOM past stroke in 2018 presented as transfer from [place] as code stroke after being found on the floor and c/o right sided weakness and word finding difficulties. Pt p/w oropharyngeal dysphagia. Oral phase of swallow marked by incomplete bolus formation resulting in piecemeal deglutition. Pharyngeal swallow was delayed for liquid trials with reduced hyoid excursion across consistencies, resulting in incomplete closure of the laryngeal vestibule. Intermittent incomplete epiglottic deflection and reduced pharyngeal stripping wave resulted in hypopharyngeal residue, which cleared upon spontaneous re-swallow. There was penetration to the level of the vocal cords during the swallow of mildly thick liquids. +cough response, however, residue remained in the upper laryngeal vestibule despite cough. Bolus hold strategy reduced severity of penetration to flash penetration with complete retrieval, however, did not full eliminate penetration. There was no airway invasion given solids, puree, and moderately thick liquids.  Disorders: delay in trigger of the swallow reflex, reduced hyo-laryngeal excursion, reduced laryngeal closure, reduced pharyngeal contractility.

## 2025-01-28 NOTE — SWALLOW VFSS/MBS ASSESSMENT ADULT - INCOMPLETE EPIGLOTTIC DEFLECTION
Intermittent incomplete epiglottic deflection/Trace Intermittent incomplete epiglottic deflection/Mild Intermittent incomplete epiglottic retroflection/Mild

## 2025-01-28 NOTE — SWALLOW VFSS/MBS ASSESSMENT ADULT - DELAYED INITIATION OF THE PHARYNGEAL SWALLOW (IN SECONDS)
Head of the bolus spilling out of the valleculae upon swallow trigger Timely Head of the bolus in the valleculae upon swallow trigger

## 2025-01-28 NOTE — DISCHARGE NOTE PROVIDER - HOSPITAL COURSE
74-year-old R-H male with a history of hypertension hyperlipidemia, stroke in 2018 with minimal right-sided weakness, Afib on apixaban (started in 2017) presented as a transfer from [place]. Originally lives at home. Wife woke up in 0545 1/26 finding the patient on the floor and complaining of right sided weakness and word finding difficulties. LKN 1/25 2230 when the patient went to bed. He took his apixaban and medications for HLD and HTN prior to going to bed. Upon finding the patient on the floor, the patient was transferred to the [place] ED. NIHSS 10 (+1 face, +3 RUE, +3 RLE, +1 Aphasia, +2, dysarthria). mRS 0. CT head demonstrated 5.8 x 2.6 cm hemorrhage with Rightward midline shift of 1 to 2 mm. CT angio with severe stenosis and near complete occlusion of the left middle cerebral artery at the distal left M1 segment, similar in appearance to the prior exam from November 2020.  Received andexanet and keppra 1g x1. Afterwards, transferred to [facility] for further evaluation.    CODE STROKE called at 0807 1/26. NIHSS 11, though noted to be improved in R sided weakness. ICH 1. Stroke cancelled after conversation with ED and Vascular Neurology fellow. Wife at bedside provided collateral. During the interview, the patient stated that he takes aspirin (however not in his pharmacy prescribed medications).    Of note, the patient suffered a fall in 2017, and family member in medicine checked his pulse. Found to have irregular heart beat. Was then started on apixaban for atrial fibrillation. Suffered a stroke while in [place] with right sided deficits. Went to [place] for medical care and acute inpatient rehab for 3 weeks. Afterwards, went back to [place] for home rehab. No tobacco, wine every night for dinner, no illicit drugs. Not candidate for tenecteplase due to bleed. Not candidate for thrombectomy due to no new LVO.     Patient seen by physical therapy and Occupational therapy, both whom recommend acute rehab.  Patient cleared by neurology and medically stable for discharge.   74-year-old R-H male with a history of hypertension hyperlipidemia, stroke in 2018 with minimal right-sided weakness, Afib on apixaban (started in 2017) presented as a transfer from [place]. Originally lives at home. Wife woke up in 0545 1/26 finding the patient on the floor and complaining of right sided weakness and word finding difficulties. LKN 1/25 2230 when the patient went to bed. He took his apixaban and medications for HLD and HTN prior to going to bed. Upon finding the patient on the floor, the patient was transferred to the [place] ED. NIHSS 10 (+1 face, +3 RUE, +3 RLE, +1 Aphasia, +2, dysarthria). mRS 0. CT head demonstrated 5.8 x 2.6 cm hemorrhage with Rightward midline shift of 1 to 2 mm. CT angio with severe stenosis and near complete occlusion of the left middle cerebral artery at the distal left M1 segment, similar in appearance to the prior exam from November 2020.  Received andexanet and keppra 1g x1. Afterwards, transferred to [facility] for further evaluation.    CODE STROKE called at 0807 1/26. NIHSS 11, though noted to be improved in R sided weakness. ICH 1. Stroke cancelled after conversation with ED and Vascular Neurology fellow. Wife at bedside provided collateral. During the interview, the patient stated that he takes aspirin (however not in his pharmacy prescribed medications).    Of note, the patient suffered a fall in 2017, and family member in medicine checked his pulse. Found to have irregular heart beat. Was then started on apixaban for atrial fibrillation. Suffered a stroke while in [place] with right sided deficits. Went to [place] for medical care and acute inpatient rehab for 3 weeks. Afterwards, went back to [place] for home rehab. No tobacco, wine every night for dinner, no illicit drugs. Not candidate for tenecteplase due to bleed. Not candidate for thrombectomy due to no new LVO.     Pt was admitted to the stroke unit, no MRI was done since it wouldn't . Will get MRI on a outpatient setting.     Patient seen by physical therapy and Occupational therapy, both whom recommend acute rehab.  Patient cleared by neurology and medically stable for discharge.   74-year-old R-H male with a history of hypertension hyperlipidemia, stroke in 2018 with minimal right-sided weakness, Afib on apixaban (started in 2017) presented as a transfer from [place]. Originally lives at home. Wife woke up in 0545 1/26 finding the patient on the floor and complaining of right sided weakness and word finding difficulties. LKN 1/25 2230 when the patient went to bed. He took his apixaban and medications for HLD and HTN prior to going to bed. Upon finding the patient on the floor, the patient was transferred to the [place] ED. NIHSS 10 (+1 face, +3 RUE, +3 RLE, +1 Aphasia, +2, dysarthria). mRS 0. CT head demonstrated 5.8 x 2.6 cm hemorrhage with Rightward midline shift of 1 to 2 mm. CT angio with severe stenosis and near complete occlusion of the left middle cerebral artery at the distal left M1 segment, similar in appearance to the prior exam from November 2020.  Received andexanet and keppra 1g x1. Afterwards, transferred to [facility] for further evaluation.    CODE STROKE called at 0807 1/26. NIHSS 11, though noted to be improved in R sided weakness. ICH 1. Stroke cancelled after conversation with ED and Vascular Neurology fellow. Wife at bedside provided collateral. During the interview, the patient stated that he takes aspirin (however not in his pharmacy prescribed medications).    Of note, the patient suffered a fall in 2017, and family member in medicine checked his pulse. Found to have irregular heart beat. Was then started on apixaban for atrial fibrillation. Suffered a stroke while in [place] with right sided deficits. Went to [place] for medical care and acute inpatient rehab for 3 weeks. Afterwards, went back to [place] for home rehab. No tobacco, wine every night for dinner, no illicit drugs. Not candidate for tenecteplase due to bleed. Not candidate for thrombectomy due to no new LVO.     Pt was admitted to the stroke unit, no MRI was done since it would not 't . Will get MRI on a outpatient setting.     Patient seen by physical therapy and Occupational therapy, both whom recommend acute rehab.  Patient cleared by neurology and medically stable for discharge.   74-year-old R-H male with a history of hypertension hyperlipidemia, stroke in 2018 with minimal right-sided weakness, Afib on apixaban (started in 2017) presented as a transfer from [place]. Originally lives at home. Wife woke up in 0545 1/26 finding the patient on the floor and complaining of right sided weakness and word finding difficulties. LKN 1/25 2230 when the patient went to bed. He took his apixaban and medications for HLD and HTN prior to going to bed. Upon finding the patient on the floor, the patient was transferred to the [place] ED. NIHSS 10 (+1 face, +3 RUE, +3 RLE, +1 Aphasia, +2, dysarthria). mRS 0. CT head demonstrated 5.8 x 2.6 cm hemorrhage with Rightward midline shift of 1 to 2 mm. CT angio with severe stenosis and near complete occlusion of the left middle cerebral artery at the distal left M1 segment, similar in appearance to the prior exam from November 2020.  Received andexanet and keppra 1g x1. Afterwards, transferred to [facility] for further evaluation.       Imaging:  CTH 01/27/25: Stable large left basal ganglia parenchymal hemorrhage compared with 1/26/2025.    CTH 01/26/25: Since prior CT head 1/26/2025 performed at 6:47 AM, stable left basal ganglia intraparenchymal material, with mass effect and mild left-to-right midline shift.    CTA head/neck CTP 01/26/25: CT PERFUSION: Markedly limited exam due to existing intraparenchymal hemorrhage at the left basal ganglia, which represents the dominant focus of signal   abnormality on the current exam. Within these constraints, there is an additional area of decreased cerebral blood flow and decreased cerebral blood volume at the posterior left temporoparietal lobe, corresponding to stable chronic infarct related to known prior left MCA occlusion.    CTA NECK: No evidence of significant stenosis or occlusion.    CTA HEAD: Severe stenosis and near complete occlusion of the left middle cerebral artery at the distal left M1 segment, similar in appearance to the prior exam from November 2020. Associated chronic infarct at the posterior left temporoparietal lobe is similarly unchanged in appearance. Moderate to severe stenosis involving the bilateral cavernous and supraclinoid ICA segments.  No evidence of intracranial vascular malformation or aneurysm. No evidence of active extravasation within the aforementioned intraparenchymal hemorrhage.    Impression.  He had a previous stroke with minimal residual left hemiparesis and a right M1 occlusion, presumably due to cardioembolism related to AF.  Now admitted with a moderate Wernicke's aphasia (occasionally sounding like a jargon aphasia), due to a left basal ganglia hemorrhage, likely due to some combination of chronic hypertension and anticoagulation with apixaban.  ANTITHROMBOTIC THERAPY: None due to ICH. hold apixaban for 5-6 weeks, then will make further decisions regarding restarting and/or a Watchman as outpatient. Pt with Afib  s/p MBS on 01/28/25 with recommendations for Easy to chew solids and moderately thick liquids via small single sips.    Evaluated by PT/OT and was recommended AR. Patient stable for discharge.

## 2025-01-28 NOTE — SWALLOW VFSS/MBS ASSESSMENT ADULT - ADDITIONAL RECOMMENDATIONS
LTG: Pt will tolerate least restrictive diet without s/s penetration/aspiration.    Pt will benefit from skilled swallowing therapy with SLP to train bolus hold strategy for mildly thick liquid consistency 18-Aug-2023 09:13

## 2025-01-28 NOTE — DISCHARGE NOTE PROVIDER - NSDCCPCAREPLAN_GEN_ALL_CORE_FT
PRINCIPAL DISCHARGE DIAGNOSIS  Diagnosis: Intracranial hemorrhage  Assessment and Plan of Treatment: Please follow up with neurologist KELLIE Zimmerman in 1 week. The office will call you to schedule an appointment, if you do not hear from them please call [phone]. Continue taking medications as prescribed. If you were prescribed a statin for your cholesterol please make sure you have your liver enzymes checked with your primary care physician. Monitor your blood pressure. Reduce fat, cholesterol and salt in your diet. Increase intake of fruits and vegetables. Limit alcohol to minimum and do not smoke. You may be at risk for falling, make changes to your home to help you walk easier. Keep up to date on vaccinations.  If you experience any symptoms of facial drooping, slurred speech, arm or leg weakness, severe headache, vision changes or any worsening symptoms, notify provider immediately and return to ER.        SECONDARY DISCHARGE DIAGNOSES  Diagnosis: Unspecified atrial fibrillation  Assessment and Plan of Treatment: You are on apixaban 5mg twice a day for atrial fibrillation which is now on hold due to left basal ganglia hemorrhage stroke most likely due to high blood pressure/Cerebral amyloid angiopathy. Please do not take apixaban as it can increase your risk of bleeding. Plan is to restart apixaban 6 weeks after the intial stroke date. You will follow up with neurologist outpatient to formulate a plan.  Please continue taking your norvasc 5mg daily, metoprolol 25 BID, and rosuvastatin 40mg daily     PRINCIPAL DISCHARGE DIAGNOSIS  Diagnosis: Intracranial hemorrhage  Assessment and Plan of Treatment: Please follow up with neurologist KELLIE Zimmerman after being discharged from rehab/ The office will call you to schedule an appointment, if you do not hear from them please call [phone]. Continue taking medications as prescribed. If you were prescribed a statin for your cholesterol please make sure you have your liver enzymes checked with your primary care physician. Monitor your blood pressure. Reduce fat, cholesterol and salt in your diet. Increase intake of fruits and vegetables. Limit alcohol to minimum and do not smoke. You may be at risk for falling, make changes to your home to help you walk easier. Keep up to date on vaccinations.  If you experience any symptoms of facial drooping, slurred speech, arm or leg weakness, severe headache, vision changes or any worsening symptoms, notify provider immediately and return to ER.        SECONDARY DISCHARGE DIAGNOSES  Diagnosis: Unspecified atrial fibrillation  Assessment and Plan of Treatment: You are on apixaban 5mg twice a day for atrial fibrillation which is now on hold due to left basal ganglia hemorrhage stroke most likely due to high blood pressure/Cerebral amyloid angiopathy. Please do not take apixaban as it can increase your risk of bleeding. Plan is to restart apixaban 6 weeks after the intial stroke date. You will follow up with neurologist outpatient to formulate a plan.  Please continue taking your norvasc 10 mg daily, metoprolol 25 BID, Losartan 100 mg and rosuvastatin 40mg daily

## 2025-01-28 NOTE — SWALLOW VFSS/MBS ASSESSMENT ADULT - LARYNGEAL PENETRATION DURING THE SWALLOW - COUGH
Penetration during the swallow to the level of the vocal cords with residue remaining despite cough response/Mild

## 2025-01-28 NOTE — PROGRESS NOTE ADULT - SUBJECTIVE AND OBJECTIVE BOX
Patient seen for rehab follow up  CC: CVA    patient with no new complaints  ate breakfast     REVIEW OF SYSTEMS  Constitutional - No fever,  No fatigue  HEENT - No vertigo, No neck pain  Neurological - No headaches, No memory loss  Psychiatric - No depression, No anxiety    FUNCTIONAL PROGRESS  SLP 1/27  fluent aphasia, non sensical verbal output  follows simple commands, answers yes/no questions    PT 1/27  bed mobility min assist   transfers min assist x 2  gait min assist x 2, x 5 feet with RW     OT 1/27  bed mobility CG  transfers min assist x 1-2  standing balance fair to poor plus   dressing mod assist     VITALS  T(C): 37 (01-28-25 @ 08:00), Max: 37.2 (01-27-25 @ 12:00)  HR: 61 (01-28-25 @ 08:00) (60 - 75)  BP: 147/75 (01-28-25 @ 08:00) (128/82 - 164/79)  RR: 23 (01-28-25 @ 08:00) (18 - 31)  SpO2: 96% (01-28-25 @ 08:00) (93% - 96%)  Wt(kg): --    MEDICATIONS   amLODIPine   Tablet 10 milliGRAM(s) daily  metoprolol tartrate 25 milliGRAM(s) every 12 hours  potassium chloride   Powder 40 milliEquivalent(s) every 4 hours  rosuvastatin 40 milliGRAM(s) at bedtime      RECENT LABS - Reviewed                        14.5   7.76  )-----------( 161      ( 28 Jan 2025 05:17 )             41.8     01-28    141  |  105  |  16  ----------------------------<  96  3.3[L]   |  21[L]  |  0.89    Ca    8.8      28 Jan 2025 05:17  Phos  3.2     01-27  Mg     2.2     01-27    TPro  6.4  /  Alb  3.5  /  TBili  0.7  /  DBili  x   /  AST  22  /  ALT  16  /  AlkPhos  70  01-28    PT/INR - ( 27 Jan 2025 06:09 )   PT: 11.2 sec;   INR: 0.98 ratio         PTT - ( 27 Jan 2025 06:09 )  PTT:26.8 sec  Urinalysis Basic - ( 28 Jan 2025 05:17 )    Color: x / Appearance: x / SG: x / pH: x  Gluc: 96 mg/dL / Ketone: x  / Bili: x / Urobili: x   Blood: x / Protein: x / Nitrite: x   Leuk Esterase: x / RBC: x / WBC x   Sq Epi: x / Non Sq Epi: x / Bacteria: x        < from: CT Head No Cont (01.27.25 @ 09:29) >    IMPRESSION:    Stable large left basal ganglia parenchymal hemorrhage compared with   1/26/2025..      < end of copied text >        ----------------------------------------------------------------------------------------  PHYSICAL EXAM  Constitutional - NAD, Comfortable, sitting in chair   Chest - Breathing comfortably on room air   Cardiovascular - S1S2   Extremities - No C/C/E, No calf tenderness   Neurologic Exam -    follows commands                 Cognitive - Awake, Alert     Communication - Fluent, expressive aphasia, nods yes to hospital, states he lives in [place]      Cranial Nerves - right facial weakness      Motor - Rt UE/LE 4/5        Sensory - decreased on right      Psychiatric - Mood stable, Affect WNL  ----------------------------------------------------------------------------------------  ASSESSMENT/PLAN  74yMale h/o HTN, CVA 2018, afib on apixaban with functional deficits after left basal ganglia hemorrhage, with aphasia, right sided weakness   CT as above, stable  DVT PPX - SCDs  Rehab -    patient requires min assist with mobility, mod assist with dressing    continue bedside therapy while admitted to prevent secondary complications of immobility, bed mobility, transfer training, progressive ambulation, equipment evaluation, ADLs   OOB throughout the day with staff, OOB to chair with meals/3 hours daily        Recommend ACUTE inpatient rehabilitation for the functional deficits consisting of 3 hours of multidisciplinary intense therapy/day x 5 days/week x 2-4 weeks depending on progress at rehabilitation facility, 24 hour RN/daily PMR physician for comorbid medical management.      Patient will be able to participate in and benefit from intense rehabilitation therapies for 3 hours a day x 5 days/week to maximize independence.     Rehab recommendations are dependent on functional progress and participation with bedside therapy     Will continue to follow for ongoing rehab needs and recommendations.             35 minutes spent on total encounter  with chart review of PT/OT/SLP notes, exam, imaging, counseling and education on inpatient rehabilitation, coordination of care with rehab team and

## 2025-01-28 NOTE — DISCHARGE NOTE PROVIDER - CARE PROVIDER_API CALL
Lynda Zimmerman NP in Family Health  [address] [facility], [facility] [address]  [place] [address]  Phone: [phone]  Fax: [phone]  Follow Up Time:    Lynda Zimmerman  NP in Family Health  [address], [address]  [place] [address]  Phone: [phone]  Fax: [phone]  Follow Up Time:     Manoj Edge  [address], [address]  [place] [address]  Phone: [phone]  Fax: [phone]  Follow Up Time: 2 weeks

## 2025-01-28 NOTE — PROGRESS NOTE ADULT - SUBJECTIVE AND OBJECTIVE BOX
Neurology Stroke Progress Note     YADIRA AVERY 74y Male [id number]  Hospital Day: 2d    HOSPITAL COURSE:   HPI:  74-year-old R-H male with a history of hypertension hyperlipidemia, stroke in 2018 with minimal right-sided weakness, Afib on apixaban (started in 2017) presented as a transfer from [place]. Originally lives at home. Wife woke up in 0545 1/26 finding the patient on the floor and complaining of right sided weakness and word finding difficulties. LKN 1/25 2230 when the patient went to bed. He took his apixaban and medications for HLD and HTN prior to going to bed. Upon finding the patient on the floor, the patient was transferred to the [place] ED. NIHSS 10 (+1 face, +3 RUE, +3 RLE, +1 Aphasia, +2, dysarthria). mRS 0. CT head demonstrated 5.8 x 2.6 cm hemorrhage with Rightward midline shift of 1 to 2 mm. CT angio with severe stenosis and near complete occlusion of the left middle cerebral artery at the distal left M1 segment, similar in appearance to the prior exam from November 2020.  Received andexanet and keppra 1g x1. Afterwards, transferred to [facility] for further evaluation.    CODE STROKE called at 0807 1/26. NIHSS 11, though noted to be improved in R sided weakness. ICH 1. Stroke cancelled after conversation with ED and Vascular Neurology fellow. Wife at bedside provided collateral. During the interview, the patient stated that he takes aspirin (however not in his pharmacy prescribed medications).    Of note, the patient suffered a fall in 2017, and family member in medicine checked his pulse. Found to have irregular heart beat. Was then started on apixaban for atrial fibrillation. Suffered a stroke while in [place] with right sided deficits. Went to [place] for medical care and acute inpatient rehab for 3 weeks. Afterwards, went back to [place] for home rehab. No tobacco, wine every night for dinner, no illicit drugs. Not candidate for tenecteplase due to bleed. Not candidate for thrombectomy due to no new LVO. .  (26 Jan 2025 11:35)      Overnight events:  None    Subjective complaints:  Pt evaluated at bedside. Pt has no acute complaints. Patient remains stable over all and is improving.     Medications  MEDICATIONS  (STANDING):  amLODIPine   Tablet 10 milliGRAM(s) Oral daily  metoprolol tartrate 25 milliGRAM(s) Oral every 12 hours  potassium chloride   Powder 40 milliEquivalent(s) Oral every 4 hours  rosuvastatin 40 milliGRAM(s) Oral at bedtime    MEDICATIONS  (PRN):    Allergies    No Known Allergies    Intolerances      Vital Signs Last 24 Hrs  T(C): 37 (28 Jan 2025 14:15), Max: 37 (28 Jan 2025 08:00)  T(F): 98.6 (28 Jan 2025 14:15), Max: 98.6 (28 Jan 2025 08:00)  HR: 66 (28 Jan 2025 14:15) (60 - 74)  BP: 100/53 (28 Jan 2025 14:15) (100/53 - 156/81)  BP(mean): 73 (28 Jan 2025 14:15) (73 - 111)  RR: 15 (28 Jan 2025 14:15) (12 - 27)  SpO2: 95% (28 Jan 2025 14:15) (93% - 98%)    Parameters below as of 28 Jan 2025 14:15  Patient On (Oxygen Delivery Method): room air        Physical exam:  General: No acute distress  Eyes: Anicteric sclerae, moist conjunctivae, see below for CNs  Neck: trachea midline, FROM, supple, no thyromegaly or lymphadenopathy  Cardiovascular: Regular rate and rhythm, no murmurs, rubs, or gallops. No carotid bruits.   Pulmonary: Anterior breath sounds clear bilaterally, no crackles or wheezing. No use of accessory muscles  GI: Abdomen soft, non-distended, non-tender  Extremities: Radial and DP pulses +2, no edema    Neurologic Exam:  -Mental status: Awake, alert, oriented to person, place, and time. Speech is fluent but repetition impaired towards the end of the sentences. he is not able to follow commands or comprehend speech.   -Cranial nerves:   II: Visual fields are full to confrontation.  III, IV, VI: Extraocular movements are intact without nystagmus. Pupils equally round and reactive to light  V:  Facial sensation V1-V3 equal and intact   VII: R facial droop  VIII: Hearing is bilaterally intact to finger rub  IX, X: Uvula is midline and soft palate rises symmetrically  XI: Head turning and shoulder shrug are intact.  XII: Tongue protrudes midline  Motor: Normal bulk and tone. No pronator drift. RUE and RLE mild drift.   Rapid alternating movements intact and symmetric  Sensation: R hemisensory loss.   Coordination: Dysmetria on finger to nose on the R.   Reflexes: Downgoing toes bilaterally   Gait: Not assessed.     LABS:                        14.5   7.76  )-----------( 161      ( 28 Jan 2025 05:17 )             41.8     01-28    141  |  105  |  16  ----------------------------<  96  3.3[L]   |  21[L]  |  0.89    Ca    8.8      28 Jan 2025 05:17  Phos  3.2     01-27  Mg     2.2     01-27    TPro  6.4  /  Alb  3.5  /  TBili  0.7  /  DBili  x   /  AST  22  /  ALT  16  /  AlkPhos  70  01-28    PT/INR - ( 27 Jan 2025 06:09 )   PT: 11.2 sec;   INR: 0.98 ratio         PTT - ( 27 Jan 2025 06:09 )  PTT:26.8 sec  Urinalysis Basic - ( 28 Jan 2025 05:17 )    Color: x / Appearance: x / SG: x / pH: x  Gluc: 96 mg/dL / Ketone: x  / Bili: x / Urobili: x   Blood: x / Protein: x / Nitrite: x   Leuk Esterase: x / RBC: x / WBC x   Sq Epi: x / Non Sq Epi: x / Bacteria: x        RADIOLOGY & ADDITIONAL TESTS:    CT head  IMPRESSION:  Acute intraparenchymal hemorrhage identified at the left basal ganglia,   with mild rightward midline shift measuring 1-2 mm. No intraventricular   extension of hemorrhage at this time.    Repeat noncontrast head CT is recommended in 4-6 hours to assess   stability.    CTA head and neck  IMPRESSION:    CT PERFUSION:  Markedly limited exam due to existing intraparenchymal hemorrhage at the   left basal ganglia, which represents the dominant focus of signal   abnormality on the current exam. Quantitative data analysis is reported   above.    Within these constraints, there is an additional area of decreased   cerebral blood flow and decreased cerebral blood volume at the posterior   left temporoparietal lobe, corresponding to stable chronic infarct   related to known prior left MCA occlusion.    CTA NECK:  No evidence of significant stenosis or occlusion.    CTA HEAD:  Severe stenosis and near complete occlusion of the left middle cerebral   artery at the distal left M1 segment, similar in appearance to the prior   exam from November 2020. Associated chronic infarct at the posterior left   temporoparietal lobe is similarly unchanged in appearance.    Moderate to severe stenosis involving the bilateral cavernous and   supraclinoid ICA segments.    No evidence of intracranial vascular malformation or aneurysm. No   evidence of active extravasation within the aforementioned   intraparenchymal hemorrhage.       Neurology Stroke Progress Note     YADIRA AVERY 74y Male [id number]  Hospital Day: 2d    HOSPITAL COURSE:   HPI:  74-year-old R-H male with a history of hypertension hyperlipidemia, stroke in 2018 with minimal right-sided weakness, Afib on apixaban (started in 2017) presented as a transfer from [place]. Originally lives at home. Wife woke up in 0545 1/26 finding the patient on the floor and complaining of right sided weakness and word finding difficulties. LKN 1/25 2230 when the patient went to bed. He took his apixaban and medications for HLD and HTN prior to going to bed. Upon finding the patient on the floor, the patient was transferred to the [place] ED. NIHSS 10 (+1 face, +3 RUE, +3 RLE, +1 Aphasia, +2, dysarthria). mRS 0. CT head demonstrated 5.8 x 2.6 cm hemorrhage with Rightward midline shift of 1 to 2 mm. CT angio with severe stenosis and near complete occlusion of the left middle cerebral artery at the distal left M1 segment, similar in appearance to the prior exam from November 2020.  Received andexanet and keppra 1g x1. Afterwards, transferred to [facility] for further evaluation.    CODE STROKE called at 0807 1/26. NIHSS 11, though noted to be improved in R sided weakness. ICH 1. Stroke cancelled after conversation with ED and Vascular Neurology fellow. Wife at bedside provided collateral. During the interview, the patient stated that he takes aspirin (however not in his pharmacy prescribed medications).    Of note, the patient suffered a fall in 2017, and family member in medicine checked his pulse. Found to have irregular heart beat. Was then started on apixaban for atrial fibrillation. Suffered a stroke while in [place] with right sided deficits. Went to [place] for medical care and acute inpatient rehab for 3 weeks. Afterwards, went back to [place] for home rehab. No tobacco, wine every night for dinner, no illicit drugs. Not candidate for tenecteplase due to bleed. Not candidate for thrombectomy due to no new LVO. .  (26 Jan 2025 11:35)      Overnight events:  None    Subjective complaints:  Pt evaluated at bedside. Pt has no acute complaints. Patient remains stable over all and is improving.     Medications  MEDICATIONS  (STANDING):  amLODIPine   Tablet 10 milliGRAM(s) Oral daily  metoprolol tartrate 25 milliGRAM(s) Oral every 12 hours  potassium chloride   Powder 40 milliEquivalent(s) Oral every 4 hours  rosuvastatin 40 milliGRAM(s) Oral at bedtime    MEDICATIONS  (PRN):    Allergies    No Known Allergies    Intolerances      Vital Signs Last 24 Hrs  T(C): 37 (28 Jan 2025 14:15), Max: 37 (28 Jan 2025 08:00)  T(F): 98.6 (28 Jan 2025 14:15), Max: 98.6 (28 Jan 2025 08:00)  HR: 66 (28 Jan 2025 14:15) (60 - 74)  BP: 100/53 (28 Jan 2025 14:15) (100/53 - 156/81)  BP(mean): 73 (28 Jan 2025 14:15) (73 - 111)  RR: 15 (28 Jan 2025 14:15) (12 - 27)  SpO2: 95% (28 Jan 2025 14:15) (93% - 98%)    Parameters below as of 28 Jan 2025 14:15  Patient On (Oxygen Delivery Method): room air        Physical exam:  General: No acute distress  Eyes: Anicteric sclerae, moist conjunctivae, see below for CNs  Neck: trachea midline, FROM, supple, no thyromegaly or lymphadenopathy  Cardiovascular: Regular rate and rhythm, no murmurs, rubs, or gallops. No carotid bruits.   Pulmonary: Anterior breath sounds clear bilaterally, no crackles or wheezing. No use of accessory muscles  GI: Abdomen soft, non-distended, non-tender  Extremities: Radial and DP pulses +2, no edema    Neurologic Exam:  -Mental status: Awake, alert, oriented to person, place, and time. Speech is fluent with Innumerable semantic paraphasias and possibly neologisms; followed crossed commands with mild hesitancy; repetition almost perfect.  -Cranial nerves:   II: Visual fields are full to confrontation.  III, IV, VI: Extraocular movements are intact without nystagmus. Pupils equally round and reactive to light  V:  Facial sensation V1-V3 equal and intact   VII: R facial droop  VIII: Hearing is bilaterally intact to finger rub  IX, X: Uvula is midline and soft palate rises symmetrically  XI: Head turning and shoulder shrug are intact.  XII: Tongue protrudes midline  Motor: Normal bulk and tone. No pronator drift. RUE and RLE mild drift.   Rapid alternating movements intact and symmetric  Sensation: R hemisensory loss.   Coordination: Dysmetria on finger to nose on the R.   Reflexes: Downgoing toes bilaterally   Gait: Not assessed.     LABS:                        14.5   7.76  )-----------( 161      ( 28 Jan 2025 05:17 )             41.8     01-28    141  |  105  |  16  ----------------------------<  96  3.3[L]   |  21[L]  |  0.89    Ca    8.8      28 Jan 2025 05:17  Phos  3.2     01-27  Mg     2.2     01-27    TPro  6.4  /  Alb  3.5  /  TBili  0.7  /  DBili  x   /  AST  22  /  ALT  16  /  AlkPhos  70  01-28    PT/INR - ( 27 Jan 2025 06:09 )   PT: 11.2 sec;   INR: 0.98 ratio         PTT - ( 27 Jan 2025 06:09 )  PTT:26.8 sec  Urinalysis Basic - ( 28 Jan 2025 05:17 )    Color: x / Appearance: x / SG: x / pH: x  Gluc: 96 mg/dL / Ketone: x  / Bili: x / Urobili: x   Blood: x / Protein: x / Nitrite: x   Leuk Esterase: x / RBC: x / WBC x   Sq Epi: x / Non Sq Epi: x / Bacteria: x        RADIOLOGY & ADDITIONAL TESTS:    CT head  IMPRESSION:  Acute intraparenchymal hemorrhage identified at the left basal ganglia,   with mild rightward midline shift measuring 1-2 mm. No intraventricular   extension of hemorrhage at this time.    Repeat noncontrast head CT is recommended in 4-6 hours to assess   stability.    CTA head and neck  IMPRESSION:    CT PERFUSION:  Markedly limited exam due to existing intraparenchymal hemorrhage at the   left basal ganglia, which represents the dominant focus of signal   abnormality on the current exam. Quantitative data analysis is reported   above.    Within these constraints, there is an additional area of decreased   cerebral blood flow and decreased cerebral blood volume at the posterior   left temporoparietal lobe, corresponding to stable chronic infarct   related to known prior left MCA occlusion.    CTA NECK:  No evidence of significant stenosis or occlusion.    CTA HEAD:  Severe stenosis and near complete occlusion of the left middle cerebral   artery at the distal left M1 segment, similar in appearance to the prior   exam from November 2020. Associated chronic infarct at the posterior left   temporoparietal lobe is similarly unchanged in appearance.    Moderate to severe stenosis involving the bilateral cavernous and   supraclinoid ICA segments.    No evidence of intracranial vascular malformation or aneurysm. No   evidence of active extravasation within the aforementioned   intraparenchymal hemorrhage.

## 2025-01-28 NOTE — SWALLOW VFSS/MBS ASSESSMENT ADULT - ROSENBEK'S PENETRATION ASPIRATION SCALE
(1) no aspiration, contrast does not enter airway (5) contrast contacts vocal cords, visible residue remains (penetration) PAS score of 2 utilizing bolus hold strategy/(5) contrast contacts vocal cords, visible residue remains (penetration)

## 2025-01-28 NOTE — SWALLOW VFSS/MBS ASSESSMENT ADULT - SLP PERTINENT HISTORY OF CURRENT PROBLEM
74-year-old R-H male with a history of hypertension hyperlipidemia, stroke in 2018 with minimal right-sided weakness, Afib on apixaban (started in 2017) presented as a transfer from [place]. Originally lives at home. Wife woke up in 0545 1/26 finding the patient on the floor and complaining of right sided weakness and word finding difficulties. LKN 1/25 2230 when the patient went to bed. He took his apixaban and medications for HLD and HTN prior to going to bed. Upon finding the patient on the floor, the patient was transferred to the [place] ED. NIHSS 10 (+1 face, +3 RUE, +3 RLE, +1 Aphasia, +2, dysarthria). mRS 0. CT head demonstrated 5.8 x 2.6 cm hemorrhage with Rightward midline shift of 1 to 2 mm. CT angio with severe stenosis and near complete occlusion of the left middle cerebral artery at the distal left M1 segment, similar in appearance to the prior exam from November 2020.  Received andexanet and keppra 1g x1. Afterwards, transferred to [facility] for further evaluation.

## 2025-01-28 NOTE — DISCHARGE NOTE PROVIDER - NSDCACTIVITY_GEN_ALL_CORE
Do not drive or operate machinery/Do not make important decisions/Walking - Indoors allowed/No heavy lifting/straining/Activity as tolerated

## 2025-01-28 NOTE — DISCHARGE NOTE PROVIDER - NSDCFUADDAPPT_GEN_ALL_CORE_FT
APPTS ARE READY TO BE MADE: [x] YES    Best Family or Patient Contact (if needed):    Additional Information about above appointments (if needed):    1: Neurology  2:   3:     Other comments or requests:    APPTS ARE READY TO BE MADE: [x] YES    Best Family or Patient Contact (if needed):    Additional Information about above appointments (if needed):    1: Neurology  2:   3:     Other comments or requests:     Patient is being discharged to [facility]. Caregiver will arrange follow up.

## 2025-01-28 NOTE — PROGRESS NOTE ADULT - ASSESSMENT
ASSESSMENT:     NEURO: R hemiparesis with Severe transcortical sensory aphasia 2/2 to a L basal ganglia hemorrhage. Measuring 5.8x2.6 cm. Mechanism thought to be hypertensive in the setting of anticoagulation use with apixaban. Would recommend holding apixaban for 6 weeks. Patient will be seen in the outpatient setting and this will be addressed there.    ANTITHROMBOTIC THERAPY: Off AC/AP due to ICH    PULMONARY: CXR clear, protecting airway, saturating well     CARDIOVASCULAR: Hypertensive, on amlodipine 10 mg daily and metoprolol 25 mg q12h                           SBP goal: Normotension 130/70.     GASTROINTESTINAL:  dysphagia screen       Diet: Minced and moist diet, mildly thick liquids    RENAL: BUN/Cr stable, good urine output      Na Goal: Greater than 135     Jones: No    HEMATOLOGY: H/H stable, Platelets normal      DVT ppx: Heparin s.c [] LMWH [] [X SCD    ID: afebrile, no leukocytosis     OTHER:     DISPOSITION: Acute Rehab      CORE MEASURES:        Admission NIHSS: 11     TPA: [] YES [X} NO      LDL/HDL: 69     Depression Screen:      Statin Therapy: Rosuvastatin 40 mg     Dysphagia Screen: [X] PASS [] FAIL     Smoking [] YES [X] NO      Afib [X] YES [] NO     Stroke Education [X] YES [] NO ASSESSMENT:     NEURO: R hemiparesis with moderate transcortical sensory aphasia 2/2 to a L basal ganglia hemorrhage. Measuring 5.8x2.6 cm. Mechanism thought to be hypertensive in the setting of anticoagulation use with apixaban. Would recommend holding apixaban for 6 weeks. Patient will be seen in the outpatient setting and this will be addressed there.    ANTITHROMBOTIC THERAPY: Off AC/AP due to ICH    PULMONARY: CXR clear, protecting airway, saturating well     CARDIOVASCULAR: Hypertensive, on amlodipine 10 mg daily and metoprolol 25 mg q12h                           SBP goal: Normotension 130/70.     GASTROINTESTINAL:  dysphagia screen       Diet: Minced and moist diet, mildly thick liquids    RENAL: BUN/Cr stable, good urine output      Na Goal: Greater than 135     Jones: No    HEMATOLOGY: H/H stable, Platelets normal      DVT ppx: Heparin s.c [] LMWH [] [X SCD    ID: afebrile, no leukocytosis     OTHER:     DISPOSITION: Acute Rehab      CORE MEASURES:        Admission NIHSS: 11     TPA: [] YES [X} NO      LDL/HDL: 69     Depression Screen:      Statin Therapy: Rosuvastatin 40 mg     Dysphagia Screen: [X] PASS [] FAIL     Smoking [] YES [X] NO      Afib [X] YES [] NO     Stroke Education [X] YES [] NO

## 2025-01-29 ENCOUNTER — TRANSCRIPTION ENCOUNTER (OUTPATIENT)
Age: 75
End: 2025-01-29

## 2025-01-29 DIAGNOSIS — E78.5 HYPERLIPIDEMIA, UNSPECIFIED: ICD-10-CM

## 2025-01-29 DIAGNOSIS — I61.9 NONTRAUMATIC INTRACEREBRAL HEMORRHAGE, UNSPECIFIED: ICD-10-CM

## 2025-01-29 DIAGNOSIS — I48.91 UNSPECIFIED ATRIAL FIBRILLATION: ICD-10-CM

## 2025-01-29 PROBLEM — I63.9 CEREBRAL INFARCTION, UNSPECIFIED: Chronic | Status: ACTIVE | Noted: 2025-01-26

## 2025-01-29 PROBLEM — I10 ESSENTIAL (PRIMARY) HYPERTENSION: Chronic | Status: ACTIVE | Noted: 2025-01-26

## 2025-01-29 PROBLEM — I48.20 CHRONIC ATRIAL FIBRILLATION, UNSPECIFIED: Chronic | Status: ACTIVE | Noted: 2025-01-26

## 2025-01-29 LAB
ALBUMIN SERPL ELPH-MCNC: 3.5 G/DL — SIGNIFICANT CHANGE UP (ref 3.3–5)
ALP SERPL-CCNC: 68 U/L — SIGNIFICANT CHANGE UP (ref 40–120)
ALT FLD-CCNC: 12 U/L — SIGNIFICANT CHANGE UP (ref 10–45)
ANION GAP SERPL CALC-SCNC: 13 MMOL/L — SIGNIFICANT CHANGE UP (ref 5–17)
AST SERPL-CCNC: 24 U/L — SIGNIFICANT CHANGE UP (ref 10–40)
BILIRUB SERPL-MCNC: 0.7 MG/DL — SIGNIFICANT CHANGE UP (ref 0.2–1.2)
BUN SERPL-MCNC: 18 MG/DL — SIGNIFICANT CHANGE UP (ref 7–23)
CALCIUM SERPL-MCNC: 8.5 MG/DL — SIGNIFICANT CHANGE UP (ref 8.4–10.5)
CHLORIDE SERPL-SCNC: 107 MMOL/L — SIGNIFICANT CHANGE UP (ref 96–108)
CO2 SERPL-SCNC: 21 MMOL/L — LOW (ref 22–31)
CREAT SERPL-MCNC: 0.8 MG/DL — SIGNIFICANT CHANGE UP (ref 0.5–1.3)
EGFR: 92 ML/MIN/1.73M2 — SIGNIFICANT CHANGE UP
GLUCOSE SERPL-MCNC: 86 MG/DL — SIGNIFICANT CHANGE UP (ref 70–99)
HCT VFR BLD CALC: 45.3 % — SIGNIFICANT CHANGE UP (ref 39–50)
HGB BLD-MCNC: 15.2 G/DL — SIGNIFICANT CHANGE UP (ref 13–17)
MCHC RBC-ENTMCNC: 31 PG — SIGNIFICANT CHANGE UP (ref 27–34)
MCHC RBC-ENTMCNC: 33.6 G/DL — SIGNIFICANT CHANGE UP (ref 32–36)
MCV RBC AUTO: 92.3 FL — SIGNIFICANT CHANGE UP (ref 80–100)
NRBC # BLD: 0 /100 WBCS — SIGNIFICANT CHANGE UP (ref 0–0)
NRBC BLD-RTO: 0 /100 WBCS — SIGNIFICANT CHANGE UP (ref 0–0)
PLATELET # BLD AUTO: 146 K/UL — LOW (ref 150–400)
POTASSIUM SERPL-MCNC: 3.4 MMOL/L — LOW (ref 3.5–5.3)
POTASSIUM SERPL-SCNC: 3.4 MMOL/L — LOW (ref 3.5–5.3)
PROT SERPL-MCNC: 6.6 G/DL — SIGNIFICANT CHANGE UP (ref 6–8.3)
RBC # BLD: 4.91 M/UL — SIGNIFICANT CHANGE UP (ref 4.2–5.8)
RBC # FLD: 13 % — SIGNIFICANT CHANGE UP (ref 10.3–14.5)
SODIUM SERPL-SCNC: 141 MMOL/L — SIGNIFICANT CHANGE UP (ref 135–145)
WBC # BLD: 8.02 K/UL — SIGNIFICANT CHANGE UP (ref 3.8–10.5)
WBC # FLD AUTO: 8.02 K/UL — SIGNIFICANT CHANGE UP (ref 3.8–10.5)

## 2025-01-29 PROCEDURE — 99233 SBSQ HOSP IP/OBS HIGH 50: CPT

## 2025-01-29 RX ORDER — HYDRALAZINE HCL 100 MG
5 TABLET ORAL ONCE
Refills: 0 | Status: COMPLETED | OUTPATIENT
Start: 2025-01-29 | End: 2025-01-29

## 2025-01-29 RX ORDER — ENOXAPARIN SODIUM 100 MG/ML
40 INJECTION SUBCUTANEOUS EVERY 24 HOURS
Refills: 0 | Status: DISCONTINUED | OUTPATIENT
Start: 2025-01-30 | End: 2025-02-20

## 2025-01-29 RX ORDER — ROSUVASTATIN CALCIUM 20 MG/1
40 TABLET, FILM COATED ORAL AT BEDTIME
Refills: 0 | Status: DISCONTINUED | OUTPATIENT
Start: 2025-01-30 | End: 2025-02-20

## 2025-01-29 RX ORDER — LOSARTAN POTASSIUM 100 MG
1 TABLET ORAL
Qty: 0 | Refills: 0 | DISCHARGE
Start: 2025-01-29

## 2025-01-29 RX ORDER — AMLODIPINE BESYLATE 10 MG/1
10 TABLET ORAL DAILY
Refills: 0 | Status: DISCONTINUED | OUTPATIENT
Start: 2025-01-30 | End: 2025-02-20

## 2025-01-29 RX ORDER — SENNA 187 MG
2 TABLET ORAL AT BEDTIME
Refills: 0 | Status: DISCONTINUED | OUTPATIENT
Start: 2025-01-30 | End: 2025-02-20

## 2025-01-29 RX ORDER — ENOXAPARIN SODIUM 100 MG/ML
40 INJECTION SUBCUTANEOUS EVERY 24 HOURS
Refills: 0 | Status: DISCONTINUED | OUTPATIENT
Start: 2025-01-29 | End: 2025-01-30

## 2025-01-29 RX ORDER — HYDRALAZINE HCL 100 MG
25 TABLET ORAL ONCE
Refills: 0 | Status: COMPLETED | OUTPATIENT
Start: 2025-01-29 | End: 2025-01-29

## 2025-01-29 RX ORDER — LOSARTAN POTASSIUM 100 MG
100 TABLET ORAL DAILY
Refills: 0 | Status: DISCONTINUED | OUTPATIENT
Start: 2025-01-29 | End: 2025-01-30

## 2025-01-29 RX ORDER — METOPROLOL SUCCINATE 50 MG/1
25 TABLET, EXTENDED RELEASE ORAL EVERY 12 HOURS
Refills: 0 | Status: DISCONTINUED | OUTPATIENT
Start: 2025-01-30 | End: 2025-02-20

## 2025-01-29 RX ORDER — POTASSIUM CHLORIDE 750 MG/1
20 TABLET, EXTENDED RELEASE ORAL ONCE
Refills: 0 | Status: COMPLETED | OUTPATIENT
Start: 2025-01-29 | End: 2025-01-29

## 2025-01-29 RX ORDER — MELATONIN 5 MG
6 TABLET ORAL AT BEDTIME
Refills: 0 | Status: DISCONTINUED | OUTPATIENT
Start: 2025-01-30 | End: 2025-02-20

## 2025-01-29 RX ORDER — ENOXAPARIN SODIUM 100 MG/ML
40 INJECTION SUBCUTANEOUS
Qty: 0 | Refills: 0 | DISCHARGE
Start: 2025-01-29

## 2025-01-29 RX ORDER — HYDRALAZINE HCL 100 MG
10 TABLET ORAL ONCE
Refills: 0 | Status: COMPLETED | OUTPATIENT
Start: 2025-01-29 | End: 2025-01-29

## 2025-01-29 RX ORDER — POLYETHYLENE GLYCOL 3350 17 G/17G
17 POWDER, FOR SOLUTION ORAL DAILY
Refills: 0 | Status: DISCONTINUED | OUTPATIENT
Start: 2025-01-30 | End: 2025-02-20

## 2025-01-29 RX ORDER — ACETAMINOPHEN 500 MG/5ML
650 LIQUID (ML) ORAL EVERY 6 HOURS
Refills: 0 | Status: DISCONTINUED | OUTPATIENT
Start: 2025-01-30 | End: 2025-02-20

## 2025-01-29 RX ADMIN — Medication 25 MILLIGRAM(S): at 06:43

## 2025-01-29 RX ADMIN — Medication 25 MILLIGRAM(S): at 06:11

## 2025-01-29 RX ADMIN — Medication 5 MILLIGRAM(S): at 21:51

## 2025-01-29 RX ADMIN — Medication 10 MILLIGRAM(S): at 04:38

## 2025-01-29 RX ADMIN — ENOXAPARIN SODIUM 40 MILLIGRAM(S): 100 INJECTION SUBCUTANEOUS at 17:24

## 2025-01-29 RX ADMIN — ROSUVASTATIN CALCIUM 40 MILLIGRAM(S): 10 TABLET, FILM COATED ORAL at 21:36

## 2025-01-29 RX ADMIN — POTASSIUM CHLORIDE 20 MILLIEQUIVALENT(S): 750 TABLET, EXTENDED RELEASE ORAL at 11:44

## 2025-01-29 RX ADMIN — Medication 100 MILLIGRAM(S): at 21:54

## 2025-01-29 RX ADMIN — Medication 10 MILLIGRAM(S): at 20:47

## 2025-01-29 RX ADMIN — Medication 25 MILLIGRAM(S): at 17:20

## 2025-01-29 RX ADMIN — Medication 25 MILLIGRAM(S): at 00:44

## 2025-01-29 NOTE — CONSULT NOTE ADULT - PROBLEM SELECTOR RECOMMENDATION 9
left basal ganglia parenchymal hemorrhage  Likely hypertensive in the setting of anticoagulation use with apixaban  SBP <140

## 2025-01-29 NOTE — DISCHARGE NOTE NURSING/CASE MANAGEMENT/SOCIAL WORK - NSDCPEFALRISK_GEN_ALL_CORE
For information on Fall & Injury Prevention, visit: [email or web address] OR  [email or web address] OR  https://www.cdc.gov/steadi/patient.html

## 2025-01-29 NOTE — CONSULT NOTE ADULT - ASSESSMENT
74-year-old R-H male with a history of hypertension hyperlipidemia, stroke in 2018 with minimal right-sided weakness, Afib on apixaban (started in 2017) presented as a transfer from [place]. Originally lives at home. Wife woke up in 0545 1/26 finding the patient on the floor and complaining of right sided weakness and word finding difficulties. LKN 1/25 2230 when the patient went to bed. He took his apixaban and medications for HLD and HTN prior to going to bed. Upon finding the patient on the floor, the patient was transferred to the [place] ED. NIHSS 10 (+1 face, +3 RUE, +3 RLE, +1 Aphasia, +2, dysarthria). mRS 0. CT head demonstrated 5.8 x 2.6 cm hemorrhage with Rightward midline shift of 1 to 2 mm. CT angio with severe stenosis and near complete occlusion of the left middle cerebral artery at the distal left M1 segment, similar in appearance to the prior exam from November 2020.  Received andexanet and keppra 1g x1. Afterwards, transferred to [facility] for further evaluation.

## 2025-01-29 NOTE — PROGRESS NOTE ADULT - SUBJECTIVE AND OBJECTIVE BOX
THE PATIENT WAS SEEN AND EXAMINED BY ME WITH THE HOUSESTAFF AND STROKE TEAM DURING MORNING ROUNDS.   HPI:  74-year-old R-H male with a history of HTN, HLD, stroke in 2018 with minimal right-sided weakness, Afib on apixaban (started in 2017) presented as a transfer from [place]. Originally lives at home. Wife woke up in 0545 1/26 finding the patient on the floor and complaining of right sided weakness and word finding difficulties. LKN 1/25 2230 when the patient went to bed. He took his apixaban and medications for HLD and HTN prior to going to bed. Upon finding the patient on the floor, the patient was transferred to the [place] ED. NIHSS 10 (+1 face, +3 RUE, +3 RLE, +1 Aphasia, +2, dysarthria). mRS 0. CT head demonstrated 5.8 x 2.6 cm hemorrhage with Rightward midline shift of 1 to 2 mm. CT angio with severe stenosis and near complete occlusion of the left middle cerebral artery at the distal left M1 segment, similar in appearance to the prior exam from November 2020.  Received andexanet and keppra 1g x1. Afterwards, transferred to [facility] for further evaluation.    CODE STROKE called at 0807 1/26. NIHSS 11, though noted to be improved in R sided weakness. ICH 1. Stroke cancelled after conversation with ED and Vascular Neurology fellow. Wife at bedside provided collateral. During the interview, the patient stated that he takes aspirin (however not in his pharmacy prescribed medications).    Of note, the patient suffered a fall in 2017, and family member in medicine checked his pulse. Found to have irregular heart beat. Was then started on apixaban for atrial fibrillation. Suffered a stroke while in [place] with right sided deficits. Went to [place] for medical care and acute inpatient rehab for 3 weeks. Afterwards, went back to [place] for home rehab. No tobacco, wine every night for dinner, no illicit drugs. Not candidate for tenecteplase due to bleed. Not candidate for thrombectomy due to no new LVO.    SUBJECTIVE: No events overnight.  No new neurologic complaints, ROS reported negative unless otherwise noted.      amLODIPine   Tablet 10 milliGRAM(s) Oral daily  metoprolol tartrate 25 milliGRAM(s) Oral every 12 hours  potassium chloride   Powder 20 milliEquivalent(s) Oral once  rosuvastatin 40 milliGRAM(s) Oral at bedtime      PHYSICAL EXAM:   Vital Signs Last 24 Hrs  T(C): 36.6 (29 Jan 2025 08:00), Max: 37 (28 Jan 2025 14:15)  T(F): 97.8 (29 Jan 2025 08:00), Max: 98.6 (28 Jan 2025 14:15)  HR: 60 (29 Jan 2025 08:00) (60 - 74)  BP: 143/81 (29 Jan 2025 08:00) (100/53 - 158/70)  BP(mean): 105 (29 Jan 2025 08:00) (73 - 110)  RR: 20 (29 Jan 2025 08:00) (12 - 23)  SpO2: 95% (29 Jan 2025 08:00) (93% - 98%)    Parameters below as of 29 Jan 2025 08:00  Patient On (Oxygen Delivery Method): room air        General: No acute distress  HEENT: EOM intact, visual fields full  Abdomen: Soft, nontender, nondistended   Extremities: No edema    NEUROLOGICAL EXAM:  Mental status: eyes open awake, alert, oriented x3, fluent speech with semantic paraphasias and possibly neologisms, able to follow simple commands   Cranial Nerves: No facial asymmetry, no nystagmus, no dysarthria.  Motor exam: Normal tone, no drift, RUE subtle drift, RLE subtle drift, LUE 5/5, LLE 5/5.  Sensation: Intact to light touch   Coordination/ Gait: No dysmetria    LABS:                        15.2   8.02  )-----------( 146      ( 29 Jan 2025 05:54 )             45.3    01-29    141  |  107  |  18  ----------------------------<  86  3.4[L]   |  21[L]  |  0.80    Ca    8.5      29 Jan 2025 05:53    TPro  6.6  /  Alb  3.5  /  TBili  0.7  /  DBili  x   /  AST  24  /  ALT  12  /  AlkPhos  68  01-29        IMAGING: Reviewed by me.      THE PATIENT WAS SEEN AND EXAMINED BY ME WITH THE HOUSESTAFF AND STROKE TEAM DURING MORNING ROUNDS.   HPI:  74-year-old R-H male with a history of HTN, HLD, stroke in 2018 with minimal right-sided weakness, Afib on apixaban (started in 2017) presented as a transfer from [place]. Originally lives at home. Wife woke up in 0545 1/26 finding the patient on the floor and complaining of right sided weakness and word finding difficulties. LKN 1/25 2230 when the patient went to bed. He took his apixaban and medications for HLD and HTN prior to going to bed. Upon finding the patient on the floor, the patient was transferred to the [place] ED. NIHSS 10 (+1 face, +3 RUE, +3 RLE, +1 Aphasia, +2, dysarthria). mRS 0. CT head demonstrated 5.8 x 2.6 cm hemorrhage with Rightward midline shift of 1 to 2 mm. CT angio with severe stenosis and near complete occlusion of the left middle cerebral artery at the distal left M1 segment, similar in appearance to the prior exam from November 2020.  Received andexanet and keppra 1g x1. Afterwards, transferred to [facility] for further evaluation. CODE STROKE called at 0807 1/26. NIHSS 11, though noted to be improved in R sided weakness. ICH 1. Stroke cancelled after conversation with ED and Vascular Neurology fellow. Wife at bedside provided collateral. During the interview, the patient stated that he takes aspirin  Of note, the patient suffered a fall in 2017, and family member in medicine checked his pulse. Found to have irregular heart beat. Was then started on apixaban for atrial fibrillation. Suffered a stroke while in [place] with right sided deficits. Went to [place] for medical care and acute inpatient rehab for 3 weeks. Afterwards, went back to [place] for home rehab. No tobacco, wine every night for dinner, no illicit drugs. Not candidate for tenecteplase due to bleed. Not candidate for thrombectomy due to no new LVO.    SUBJECTIVE: No events overnight.  No new neurologic complaints, ROS reported negative unless otherwise noted.      amLODIPine   Tablet 10 milliGRAM(s) Oral daily  metoprolol tartrate 25 milliGRAM(s) Oral every 12 hours  potassium chloride   Powder 20 milliEquivalent(s) Oral once  rosuvastatin 40 milliGRAM(s) Oral at bedtime      PHYSICAL EXAM:   Vital Signs Last 24 Hrs  T(C): 36.6 (29 Jan 2025 08:00), Max: 37 (28 Jan 2025 14:15)  T(F): 97.8 (29 Jan 2025 08:00), Max: 98.6 (28 Jan 2025 14:15)  HR: 60 (29 Jan 2025 08:00) (60 - 74)  BP: 143/81 (29 Jan 2025 08:00) (100/53 - 158/70)  BP(mean): 105 (29 Jan 2025 08:00) (73 - 110)  RR: 20 (29 Jan 2025 08:00) (12 - 23)  SpO2: 95% (29 Jan 2025 08:00) (93% - 98%)    Parameters below as of 29 Jan 2025 08:00  Patient On (Oxygen Delivery Method): room air        General: No acute distress  HEENT: EOM intact, visual fields full  Abdomen: Soft, nontender, nondistended   Extremities: No edema    NEUROLOGICAL EXAM:  Mental status: Eyes open awake, alert, oriented x3, fluent speech with semantic paraphasias and neologisms, able to follow simple and complex commands, repetition intact   Cranial Nerves: Subtle right facial droop, no nystagmus, no dysarthria.  Motor exam: Normal tone, no drift, RUE/ RLE antigravity, LUE 5/5, LLE 5/5.  Sensation: Intact to light touch   Coordination/ Gait: No dysmetria    LABS:                        15.2   8.02  )-----------( 146      ( 29 Jan 2025 05:54 )             45.3    01-29    141  |  107  |  18  ----------------------------<  86  3.4[L]   |  21[L]  |  0.80    Ca    8.5      29 Jan 2025 05:53    TPro  6.6  /  Alb  3.5  /  TBili  0.7  /  DBili  x   /  AST  24  /  ALT  12  /  AlkPhos  68  01-29        IMAGING: Reviewed by me.     [facility] 01/27/25: Stable large left basal ganglia parenchymal hemorrhage compared with 1/26/2025.    [facility] 01/26/25: Since prior CT head 1/26/2025 performed at 6:47 AM, stable left basal ganglia intraparenchymal material, with mass effect and mild left-to-right midline shift.    CTA head/neck CTP 01/26/25: CT PERFUSION: Markedly limited exam due to existing intraparenchymal hemorrhage at the left basal ganglia, which represents the dominant focus of signal   abnormality on the current exam. Within these constraints, there is an additional area of decreased cerebral blood flow and decreased cerebral blood volume at the posterior left temporoparietal lobe, corresponding to stable chronic infarct related to known prior left MCA occlusion.    CTA NECK: No evidence of significant stenosis or occlusion.    CTA HEAD: Severe stenosis and near complete occlusion of the left middle cerebral artery at the distal left M1 segment, similar in appearance to the prior exam from November 2020. Associated chronic infarct at the posterior left temporoparietal lobe is similarly unchanged in appearance. Moderate to severe stenosis involving the bilateral cavernous and supraclinoid ICA segments.  No evidence of intracranial vascular malformation or aneurysm. No evidence of active extravasation within the aforementioned intraparenchymal hemorrhage.

## 2025-01-29 NOTE — H&P ADULT - REASON FOR ADMISSION
Right sided weakness and word finding difficulties Intracranial hemorrhage w/ Right sided weakness and word finding difficulties left basal ganglia hemorrhage w/ right hemiparesis, transcortical sensory aphasia, and dysphasia

## 2025-01-29 NOTE — DISCHARGE NOTE NURSING/CASE MANAGEMENT/SOCIAL WORK - PATIENT PORTAL LINK FT
You can access the FollowMyHealth Patient Portal offered by [facility] by registering at the following website: [email or web address]. By joining [facility]’s FollowMyHealth portal, you will also be able to view your health information using other applications (apps) compatible with our system.

## 2025-01-29 NOTE — H&P ADULT - ASSESSMENT
Assessment/Plan:  YADIRA AVERY is a 74y with a history of hypertension, hyperlipidemia, stroke in 2018 with minimal right-sided weakness, Afib on apixaban (started in 2017) presented to [facility] on 1/26/25 after pt was found on floor by wife and complaining of right sided weakness and word finding difficulties. Found to have 5.8 x 2.6 cm hemorrhage with Rightward midline shift of 1 to 2 mm. Transferred to [facility] on 1/26 for further evaluation. Now admitted to [facility] for functional deficits and initiation of a multidisciplinary rehab program consisting focused on functional mobility, transfers and ADLs.    #Right sided weakness and word finding difficulties  -CT Head revealed 5.8 x 2.6 cm intracranial hemorrhage with Rightward midline shift of 1 to 2 mm  -CT angio with severe stenosis and near complete occlusion of the left middle cerebral artery at the distal left M1 segment, similar in appearance to the prior exam from November 2020.    -Received andexanet and Keppra 1g x1.   -Apixaban to be held for 6 weeks     Deficits:   Comprehensive Multidisciplinary Rehab Program:  - Start comprehensive rehab program, 3 hours a day, 5 days a week.  - PT 1hr/day: Focused on improving strength, endurance, coordination, balance, functional mobility, and transfers  - OT 1hr/day: Focused on improving strength, fine motor skills, coordination, posture and ADLs.    - Speech Therapy 1hr/day: to diagnose and treat deficits in swallowing, cognition and communication.   - Activity Limitations: Decreased social, vocational and leisure activities, decreased self care and ADLs, decreased mobility, decreased ability to manage household and finances.     -----------------------------------------------------------------------------------  Concurrent Medical Problems    #Atrial Fibrillation  -HOLD Apixaban 5 mg BID for 6 weeks  -Plan is to restart apixaban 6 weeks after the initial stroke date. Follow-up with Neurologist outpatient   -Metoprolol 25 mg BID     #HTN  -Amlodipine 10 mg QD  -Losartan 100 mg QD    #HLD  -Rosuvastatin 40 mg QD    #Mood/Cognition:  Neuropsychology consult PRN    #Sleep:   Maintain quiet hours and low stim environment.  Melatonin 6 mg PRN to maximize participation in therapy during the day.     #Pain Management:  Analgesic: Tylenol 650 mg QID PRN  Avoid sedating medications that may interfere with cognitive recovery    #GI/Bowel:  Senna QHS, Miralax PRN Daily  GI ppx: Pantoprazole 40 mg QD    #/Bladder:   - PVR  (SC if > 400) x1 on admission     #Skin/Pressure Injury:   - Skin assessment on admission: ***  - Monitor Incisions: ***  - Turn every 2 hours while in bed, air mattress  - Soft heel protectors  - Skin barrier cream as needed  - Nursing to monitor skin Qshift    #Diet/Dysphagia:  Dysphagia: SLP consult for swallow function evaluation and treatment  Current Diet:    [X] Aspiration Precautions  Nutrition consult     #Precautions / PROPHYLAXIS:   - Falls    - ortho: Weight bearing status: WBAT   - Lungs: Aspiration, Incentive Spirometer   - DVT ppx: SCDs, Lovenox 40 mg SUBQ QHS   - Pressure injury/Skin: Turn Q2hrs while in bed, OOB to Chair, PT/OT      ---------------  Code Status: FULL  Emergency Contact:    Outpatient Follow-up (Specialty/Name of physician):    Lynda Zimmerman  NP in [address], [address]  [place] [address]  Phone: [phone]  Fax: [phone]  Follow Up Time:     Manoj Edge  [facility]  [address] [facility], [address]  [place] [address]  Phone: [phone]  Fax: [phone]  Follow Up Time: 2 weeks      --------------  Goals: Safe discharge to Home*****  Estimated Length of Stay: 10-14 days  Rehab Potential: Good  Medical Prognosis: Good  Estimated Disposition: Home with Home Care  ---------------    PRESCREEN COMPARISON:  I have reviewed the prescreen information and I have found no relevant changes between the preadmission screening and my post admission evaluation.    RATIONALE FOR INPATIENT ADMISSION: Patient demonstrates the following:  [X] Medically appropriate for rehabilitation admission  [X] Has attainable rehab goals with an appropriate initial discharge plan  [X]Has rehabilitation potential (expected to make a significant improvement within a reasonable period of time)  [X] Requires close medical management by a rehab physician, rehab nursing care, Hospitalist and comprehensive interdisciplinary team (including PT, OT and/or SLP, Prosthetics and Orthotics)       Assessment/Plan:  YADIRA AVERY is a 74y with a history of hypertension, hyperlipidemia, stroke in 2018 with minimal right-sided weakness, Afib on apixaban (started in 2017) presented to [facility] on 1/26/25 after pt was found on floor by wife and complaining of right sided weakness and word finding difficulties. Found to have 5.8 x 2.6 cm hemorrhage with Rightward midline shift of 1 to 2 mm. Transferred to [facility] on 1/26 for further evaluation. Now admitted to [facility] for functional deficits and initiation of a multidisciplinary rehab program consisting focused on functional mobility, transfers and ADLs.    #Right sided weakness and word finding difficulties  -CT Head revealed 5.8 x 2.6 cm intracranial hemorrhage with Rightward midline shift of 1 to 2 mm  -CT angio with severe stenosis and near complete occlusion of the left middle cerebral artery at the distal left M1 segment, similar in appearance to the prior exam from November 2020.    -Received andexanet and Keppra 1g x1.   -Apixaban to be held for 6 weeks     Deficits:   Comprehensive Multidisciplinary Rehab Program:  - Start comprehensive rehab program, 3 hours a day, 5 days a week.  - PT 1hr/day: Focused on improving strength, endurance, coordination, balance, functional mobility, and transfers  - OT 1hr/day: Focused on improving strength, fine motor skills, coordination, posture and ADLs.    - Speech Therapy 1hr/day: to diagnose and treat deficits in swallowing, cognition and communication.   - Activity Limitations: Decreased social, vocational and leisure activities, decreased self care and ADLs, decreased mobility, decreased ability to manage household and finances.     -----------------------------------------------------------------------------------  Concurrent Medical Problems    #Atrial Fibrillation  -HOLD Apixaban 5 mg BID for 6 weeks  -Restart apixaban 6 weeks after the initial stroke date. Follow-up with Neurologist outpatient   -Metoprolol 25 mg BID     #HTN  -Amlodipine 10 mg QD  -Losartan 100 mg QD    #HLD  -Rosuvastatin 40 mg QD    #Mood/Cognition:  Neuropsychology consult PRN    #Sleep:   Maintain quiet hours and low stim environment.  Melatonin 6 mg PRN to maximize participation in therapy during the day.     #Pain Management:  Analgesic: Tylenol 650 mg QID PRN  Avoid sedating medications that may interfere with cognitive recovery    #GI/Bowel:  Senna QHS, Miralax PRN Daily  GI ppx: Pantoprazole 40 mg QD    #/Bladder:   - PVR  (SC if > 400) x1 on admission     #Skin/Pressure Injury:   - Skin assessment on admission: ***  - Monitor Incisions: ***  - Turn every 2 hours while in bed, air mattress  - Soft heel protectors  - Skin barrier cream as needed  - Nursing to monitor skin Qshift    #Diet/Dysphagia:  Dysphagia: SLP consult for swallow function evaluation and treatment  Current Diet: Easy to chew - Moderately Thick Liquids    [X] Aspiration Precautions  Nutrition consult     #Precautions / PROPHYLAXIS:   - Falls, Aspiration   - ortho: Weight bearing status: WBAT   - Lungs: Aspiration, Incentive Spirometer   - DVT ppx: SCDs, Lovenox 40 mg SUBQ QHS   - Pressure injury/Skin: Turn Q2hrs while in bed, OOB to Chair, PT/OT      ---------------  Code Status: FULL  Emergency Contact:    Outpatient Follow-up (Specialty/Name of physician):    Lynda Zimmerman  NP in [address], [address]  [place] [address]  Phone: [phone]  Fax: [phone]  Follow Up Time:     Manoj Edge  [facility]  [address] [facility], [address]  [place] [address]  Phone: [phone]  Fax: [phone]  Follow Up Time: 2 weeks      --------------  Goals: Safe discharge to Home*****  Estimated Length of Stay: 10-14 days  Rehab Potential: Good  Medical Prognosis: Good  Estimated Disposition: Home with Home Care  ---------------    PRESCREEN COMPARISON:  I have reviewed the prescreen information and I have found no relevant changes between the preadmission screening and my post admission evaluation.    RATIONALE FOR INPATIENT ADMISSION: Patient demonstrates the following:  [X] Medically appropriate for rehabilitation admission  [X] Has attainable rehab goals with an appropriate initial discharge plan  [X]Has rehabilitation potential (expected to make a significant improvement within a reasonable period of time)  [X] Requires close medical management by a rehab physician, rehab nursing care, Hospitalist and comprehensive interdisciplinary team (including PT, OT and/or SLP, Prosthetics and Orthotics)       74M RHD w/ PMH HTN, afib (on apixaban, started 2017), and CVA (w/ residual min right-sided weakness), admitted to [facility] acute rehab for left basal ganglia hemorrhage w/ left basal ganglia hemorrhage w/ right hemiparesis, transcortical sensory aphasia, and dysphasia.    #acute left BG hemorrhage  - s/p andexanet + leviteracetam 1g x1  - hold apixaban through 02/09  - rosuvastatin 40mg qhs  - CT Head: 5.8 x 2.6 cm intracranial hemorrhage with 1-2mm rightward midline  shift  - CTA h/n: stable near complete occlusion of left distal M1  - PT: strengthening, ROM, coordination, balance, endurance, gait   - OT: strengthening, ROM, coordination, fine motor, ADLs  - SLP: cognition/aphasia/swallow eval   - weight-bearing status: full  - precautions: fall, aspiration  - outpt neuro + NSGY f/u    #atrial fibrillation  #HTN  - hold apixaban as above  - amlodipine 10mg daily  - Lopressor 25mg BID  - losartan 100mg daily    #pain  - acetaminophen 650mg q6h PRN    #bowel  - scheduled: senna 2tab qhs  - PRN: Miralax BID    #bladder   - PVR on admission, ISC for PVR >400mL    #sleep  - melatonin 3mg hs PRN    #skin  - skin assessment on admission: ***    #diet   - easy to chew w/ mod thick    #DVT ppx  - SCDs  - no chemoppx d/t [facility]  ---------------  Outpatient Follow-up:    Lynda Zimmerman  NP in [address], [address]  [place] [address]  Phone: [phone]  Fax: [phone]  Follow Up Time:     Manoj Edge  Cardiology  [address] [facility], [address]  [place] [address]  Phone: [phone]  Fax: [phone]  Follow Up Time: 2 weeks    --------------    Goals: Safe discharge to home  Estimated Length of Stay: 10-14 days  Rehab Potential: Good  Medical Prognosis: Good  Estimated Disposition: Home with Home Care  ---------------    PRESCREEN COMPARISON:  I have reviewed the prescreen information and I have found no relevant changes between the preadmission screening and my post admission evaluation.    RATIONALE FOR INPATIENT ADMISSION: Patient demonstrates the following:  [X] Medically appropriate for rehabilitation admission  [X] Has attainable rehab goals with an appropriate initial discharge plan  [X]Has rehabilitation potential (expected to make a significant improvement within a reasonable period of time)  [X] Requires close medical management by a rehab physician, rehab nursing care, Hospitalist and comprehensive interdisciplinary team (including PT, OT and/or SLP, Prosthetics and Orthotics)         74M RHD w/ PMH HTN, afib (on apixaban, started 2017), and CVA (w/ residual min right-sided weakness), admitted to [facility] acute rehab for left basal ganglia hemorrhage w/ left basal ganglia hemorrhage w/ right hemiparesis, transcortical sensory aphasia, and dysphasia.    #acute left BG hemorrhage  - s/p andexanet + levetiracetam 1g x1  - hold apixaban through 02/09  - rosuvastatin 40mg qhs  - CT Head: 5.8 x 2.6 cm intracranial hemorrhage with 1-2mm rightward midline  shift  - CTA h/n: stable near complete occlusion of left distal M1  - PT: strengthening, ROM, coordination, balance, endurance, gait   - OT: strengthening, ROM, coordination, fine motor, ADLs  - SLP: cognition/aphasia/swallow eval   - weight-bearing status: full  - precautions: fall, aspiration  - outpt neuro + NSGY f/u    #atrial fibrillation  #HTN  - hold apixaban as above  - amlodipine 10mg daily  - Lopressor 25mg BID  - losartan 100mg daily    #pain  - acetaminophen 650mg q6h PRN    #bowel  - scheduled: senna 2tab qhs  - PRN: Miralax BID    #bladder   - PVR on admission, ISC for PVR >400mL    #sleep  - melatonin 3mg hs PRN    #skin  - skin assessment on admission: ***    #diet   - easy to chew w/ mod thick    #DVT ppx  - SCDs  - no chemoppx d/t [facility]  ---------------  Outpatient Follow-up:    Lynda Zimmerman  NP in [facility]  [address] [place] [address], [address]  [place] [address]  Phone: [phone]  Fax: [phone]  Follow Up Time:     Manoj Edge  [facility]  [address] [facility], [address]  [place] [address]  Phone: [phone]  Fax: [phone]  Follow Up Time: 2 weeks    --------------    Goals: Safe discharge to home  Estimated Length of Stay: 10-14 days  Rehab Potential: Good  Medical Prognosis: Good  Estimated Disposition: Home with Home Care  ---------------    PRESCREEN COMPARISON:  I have reviewed the prescreen information and I have found no relevant changes between the preadmission screening and my post admission evaluation.    RATIONALE FOR INPATIENT ADMISSION: Patient demonstrates the following:  [X] Medically appropriate for rehabilitation admission  [X] Has attainable rehab goals with an appropriate initial discharge plan  [X]Has rehabilitation potential (expected to make a significant improvement within a reasonable period of time)  [X] Requires close medical management by a rehab physician, rehab nursing care, Hospitalist and comprehensive interdisciplinary team (including PT, OT and/or SLP, Prosthetics and Orthotics)         74M RHD w/ PMH HTN, afib (on apixaban, started 2017), and CVA (w/ residual min right-sided weakness), [facility] for left basal ganglia hemorrhage w/ left basal ganglia hemorrhage w/ right hemiparesis, transcortical sensory aphasia, and dysphasia.    # acute left BG hemorrhage with right hemibody involvement  - CT Head: 5.8 x 2.6 cm intracranial hemorrhage with 1-2mm rightward midline  shift  - CTA h/n: stable near complete occlusion of left distal M1  - s/p andexanet + levetiracetam 1g x1  - hold apixaban through 02/09. Will plan to repeat Head CT prior to restarting and f/u NSGY  - rosuvastatin 40mg qhs  - Begin comprehensive rehab program PT OT SLP 3 hours daily 5 x week  - precautions: fall, aspiration, cardiac,   - outpt neuro + NSGY f/u    # atrial fibrillation  # HTN  - hold apixaban as above  - amlodipine 10mg daily  - Lopressor 25mg BID  - losartan 100mg daily  - monitor vitals, hospitalist consult    # pain  - acetaminophen 650mg q6h PRN    # bowel  - scheduled: senna 2tab qhs  - PRN: Miralax BID    # bladder   - PVR on admission, ISC for PVR >400mL  - toileting schedule    # sleep  - melatonin 3mg hs PRN    # skin  - skin assessment on admission: ***    #diet   - easy to chew w/ mod thick    # DVT ppx  - no chemoppx d/t [facility]  - will order admission doppler r/o DVT  - SCDs if duplex negative    # LABS  duplex BLE  CBC CMP 1/31  ---------------  Outpatient Follow-up:    Lynda Zimmerman  NP in [facility]  [address] [place] [address], [address]  [place] [address]  Phone: [phone]  Fax: [phone]  Follow Up Time:     Manoj Edge  Cardiology  [address], [address]  [place] [address]  Phone: [phone]  Fax: [phone]  Follow Up Time: 2 weeks    --------------    Goals: Safe discharge to home  Estimated Length of Stay: 10-14 days  Rehab Potential: Good  Medical Prognosis: Good  Estimated Disposition: Home with Home Care  ---------------    PRESCREEN COMPARISON:  I have reviewed the prescreen information and I have found no relevant changes between the preadmission screening and my post admission evaluation.    RATIONALE FOR INPATIENT ADMISSION: Patient demonstrates the following:  [X] Medically appropriate for rehabilitation admission  [X] Has attainable rehab goals with an appropriate initial discharge plan  [X]Has rehabilitation potential (expected to make a significant improvement within a reasonable period of time)  [X] Requires close medical management by a rehab physician, rehab nursing care, Hospitalist and comprehensive interdisciplinary team (including PT, OT and/or SLP, Prosthetics and Orthotics)         74M RHD w/ PMH HTN, afib (on apixaban, started 2017), and CVA (w/ residual min right-sided weakness), admitted to [facility] acute rehab for left basal ganglia hemorrhage w/ left basal ganglia hemorrhage w/ right hemiparesis, transcortical sensory aphasia, and dysphasia.    # acute left BG hemorrhage with right hemibody involvement, Wernicke's aphasia  - CT Head: 5.8 x 2.6 cm intracranial hemorrhage with mass effect and 1-2mm rightward midline  shift  - CTA h/n: stable near complete occlusion of left distal M1  - s/p andexanet + levetiracetam 1g x1  - hold apixaban through 03/09 (5-6 weeks per neruo)  - rosuvastatin 40mg qhs  - Begin comprehensive rehab program PT OT SLP 3 hours daily 5 x week  - precautions: fall, aspiration, cardiac,   - outpt neuro + NSGY f/u    # atrial fibrillation  # HTN  - hold apixaban as above  - amlodipine 10mg daily  - Lopressor 25mg BID  - losartan 100mg daily  - monitor vitals, hospitalist consult    # pain  - acetaminophen 650mg q6h PRN    # bowel  - scheduled: senna 2tab qhs  - PRN: Miralax BID    # bladder   - PVR on admission, ISC for PVR >400mL  - toileting schedule    # sleep  - melatonin 3mg hs PRN    # skin  - skin assessment on admission: ***    #diet   - easy to chew w/ mod thick    # DVT ppx  - will order admission doppler r/o DVT  - SCDs if duplex negative  - lovenox 40 mg daily per transfer paperwork    # LABS  duplex BLE  CBC CMP 1/31  ---------------  Outpatient Follow-up:    Lynda Zimmerman  NP in [facility]  [address] [place] [address], [address]  [place] [address]  Phone: [phone]  Fax: [phone]  Follow Up Time:     Manoj Edge  Cardiology  [address], [address]  [place] [address]  Phone: [phone]  Fax: [phone]  Follow Up Time: 2 weeks             74M RHD w/ PMH HTN, afib (on apixaban, started 2017), and CVA (w/ residual min right-sided weakness), admitted to [facility] acute rehab for left basal ganglia hemorrhage w/ left basal ganglia hemorrhage w/ right hemiparesis, transcortical sensory aphasia, and dysphasia.    # acute left BG hemorrhage with right hemibody involvement, Wernicke's aphasia  - CT Head: 5.8 x 2.6 cm intracranial hemorrhage with mass effect and 1-2mm rightward midline  shift  - CTA h/n: stable near complete occlusion of left distal M1  - s/p andexanet + levetiracetam 1g x1  - hold apixaban through 03/09 (5-6 weeks per neruo)  - rosuvastatin 40mg qhs  - Begin comprehensive rehab program PT OT SLP 3 hours daily 5 x week  - precautions: fall, aspiration, cardiac,   - outpt neuro + NSGY f/u    # atrial fibrillation  # HTN  - hold apixaban as above  - amlodipine 10mg daily  - Lopressor 25mg BID  - losartan 100mg daily  - monitor vitals, hospitalist consult    # pain  - acetaminophen 650mg q6h PRN    # bowel  - scheduled: senna 2tab qhs  - PRN: Miralax BID    # bladder   - PVR on admission, ISC for PVR >400mL  - toileting schedule    # sleep  - melatonin 3mg hs PRN    # skin  - skin assessment on admission: Some blanchable erythema on bl buttocks. No wounds.    #diet   - easy to chew w/ mod thick    # DVT ppx  - will order admission doppler r/o DVT  - SCDs if duplex negative  - lovenox 40 mg daily (started 1/29)  # LABS  duplex BLE  CBC CMP 1/31  ---------------  Outpatient Follow-up:    Lynda Zimmerman  NP in [facility]  [address] [facility], [address]  [place] [address]  Phone: [phone]  Fax: [phone]  Follow Up Time:     Manoj Edge  Cardiology  [address], [address]  [place] [address]  Phone: [phone]  Fax: [phone]  Follow Up Time: 2 weeks             74M RHD w/ PMH HTN, afib (on apixaban, started 2017), and CVA (w/ residual min right-sided weakness), admitted to [facility] acute rehab for left basal ganglia hemorrhage w/ left basal ganglia hemorrhage w/ right hemiparesis, transcortical sensory aphasia, and dysphasia.    # acute left BG hemorrhage with right hemibody involvement, Wernicke's aphasia  - CT Head: 5.8 x 2.6 cm intracranial hemorrhage with mass effect and 1-2mm rightward midline  shift  - CTA h/n: stable near complete occlusion of left distal M1  - s/p andexanet + levetiracetam 1g x1  - hold apixaban through 03/09 (5-6 weeks per neruo)  - rosuvastatin 40mg qhs  - Begin comprehensive rehab program PT OT SLP 3 hours daily 5 x week  - precautions: fall, aspiration, cardiac,   - outpt neuro + NSGY f/u    # atrial fibrillation  # HTN  - hold apixaban as above  - amlodipine 10mg daily  - Lopressor 25mg BID  - Valsartan 320mg daily  - monitor vitals, hospitalist consult    # pain  - acetaminophen 650mg q6h PRN    # bowel  - scheduled: senna 2tab qhs  - PRN: Miralax BID    # bladder   - PVR on admission, ISC for PVR >400mL  - toileting schedule    #Oral Thrush  - Nystatin s/s 4xdaily x7days    # sleep  - melatonin 3mg hs PRN    # skin  - skin assessment on admission: Some blanchable erythema on bl buttocks. No wounds.    #diet   - easy to chew w/ mod thick    # DVT ppx  - will order admission doppler r/o DVT  - SCDs if duplex negative  - lovenox 40 mg daily (started 1/29)  # LABS  duplex BLE  CBC CMP 1/31  ---------------  Outpatient Follow-up:    Lynda Zimmerman  NP in [facility]  [address] [place] [address], [address]  [place] [address]  Phone: [phone]  Fax: [phone]  Follow Up Time:     Manoj Edge  Cardiology  [address] [facility], [address]  [place] [address]  Phone: [phone]  Fax: [phone]  Follow Up Time: 2 weeks             74M RHD w/ PMH HTN, afib (on apixaban, started 2017), and CVA (w/ residual min right-sided weakness), admitted to [facility] acute rehab for left basal ganglia hemorrhage w/ left basal ganglia hemorrhage w/ right hemiparesis, transcortical sensory aphasia, and dysphasia.    # acute left BG hemorrhage with right hemibody involvement, Wernicke's aphasia  - CT Head: 5.8 x 2.6 cm intracranial hemorrhage with mass effect and 1-2mm rightward midline  shift on 1/26  - CTA h/n: stable near complete occlusion of left distal M1  - S/p andexanet + levetiracetam 1g x1  - Hold apixaban through 03/09 (5-6 weeks per neruo)  - Rosuvastatin 40mg qhs  - Begin comprehensive rehab program PT OT SLP 3 hours daily 5 x week  - precautions: fall, aspiration  - outpt neuro + NSGY f/u    # atrial fibrillation  # HTN  - hold apixaban as above  - amlodipine 10mg daily  - Lopressor 25mg BID  - Valsartan 320mg daily (switched from losartan 100 Qd on 1/30 per cardiology recs @ [facility])  - monitor vitals, hospitalist consult    # pain  - acetaminophen 650mg q6h PRN    # bowel  - scheduled: senna 2tab qhs  - PRN: Miralax BID    # bladder   - PVR on admission, ISC for PVR >400mL  - toileting schedule    #Oral Thrush  - Nystatin s/s 4xdaily x7days    # sleep  - melatonin 3mg hs PRN    # skin  - skin assessment on admission: Some blanchable erythema on bl buttocks. No wounds.    #diet   - easy to chew w/ mod thick    # DVT ppx  - will order admission doppler r/o DVT  - SCDs if duplex negative  - lovenox 40 mg daily (started 1/29)  # LABS  duplex BLE  CBC CMP 1/31  ---------------  Outpatient Follow-up:    Lynda Zimmerman  NP in [address], [address]  [place] [address]  Phone: [phone]  Fax: [phone]  Follow Up Time:     Manoj Edge  Cardiology  [address] [place] [address], [address]  [place] [address]  Phone: [phone]  Fax: [phone]  Follow Up Time: 2 weeks             74M RHD w/ PMH HTN, afib (on apixaban, started 2017), and CVA (w/ residual min right-sided weakness), admitted to [facility] acute rehab for left basal ganglia hemorrhage w/ 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    # acute left BG hemorrhage with right hemibody involvement, right HP, apraxia speech, motor apraxia, incoordination, dysarthria, dysphagia  - CT Head: 5.8 x 2.6 cm intracranial hemorrhage with mass effect and 1-2mm rightward midline  shift on 1/26  - CTA h/n: stable near complete occlusion of left distal M1  - S/p andexanet + levetiracetam 1g x1  - Hold apixaban through 03/09 (5-6 weeks per tania)  - Rosuvastatin 40mg qhs  - Begin comprehensive rehab program PT OT SLP 3 hours daily 5 x week  - recreation therapy  - precautions: fall, aspiration, cardiac  - outpt neuro + NSGY f/u    # atrial fibrillation  # HTN  - hold apixaban as above  - amlodipine 10mg daily  - Lopressor 25mg BID  - Valsartan 320mg daily (switched from losartan 100 Qd on 1/30 per cardiology recs @ [facility])  - monitor vitals, hospitalist consult    # on CPAP at home per nursing  - will obtain setting from family  - supplemental O2 in meantime    # pain  - acetaminophen 650mg q6h PRN    # bowel  - scheduled: senna 2tab qhs  - PRN: Miralax BID    # bladder   - PVR on admission, ISC for PVR >400mL  - toileting schedule    #Oral Thrush  - Nystatin s/s 4xdaily x7days    # sleep  - melatonin 3mg hs PRN    # skin  - skin assessment on admission: Some blanchable erythema on bl buttocks. No wounds.    #diet   - easy to chew w/ mod thick    # DVT ppx  - will order admission doppler r/o DVT  - SCDs if duplex negative  - lovenox 40 mg daily (started 1/29)  # LABS  duplex BLE  CBC CMP 1/31  ---------------  Outpatient Follow-up:    Lynda Zimmerman  NP in [address], [address]  [place] [address]  Phone: [phone]  Fax: [phone]  Follow Up Time:     Manoj Edge  [facility]  [address], [address]  [place] [address]  Phone: [phone]  Fax: [phone]  Follow Up Time: 2 weeks             74M RHD w/ PMH HTN, afib (on apixaban, started 2017), and CVA (w/ residual min right-sided weakness), admitted to [facility] acute rehab for left basal ganglia hemorrhage w/ 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    # non traumatic acute left BG hemorrhage with right hemibody involvement, right HP, apraxia speech, motor apraxia, incoordination, dysarthria, dysphagia  - CT Head: 5.8 x 2.6 cm intracranial hemorrhage with mass effect and 1-2mm rightward midline  shift on 1/26  - CTA h/n: stable near complete occlusion of left distal M1  - S/p andexanet + levetiracetam 1g x1  - Hold apixaban through 03/09 (5-6 weeks per tania)  - Rosuvastatin 40mg qhs  - Begin comprehensive rehab program PT OT SLP 3 hours daily 5 x week  - recreation therapy  - precautions: fall, aspiration, cardiac  - outpt neuro + NSGY f/u    # atrial fibrillation  # HTN  - hold apixaban as above  - amlodipine 10mg daily  - Lopressor 25mg BID  - Valsartan 320mg daily (switched from losartan 100 Qd on 1/30 per cardiology recs @ [facility])  - monitor vitals, hospitalist consult    # on CPAP at home per nursing  - will obtain setting from family  - supplemental O2 in meantime    # pain  - acetaminophen 650mg q6h PRN    # bowel  - scheduled: senna 2tab qhs  - PRN: Miralax BID    # bladder   - PVR on admission, ISC for PVR >400mL  - toileting schedule    #Oral Thrush  - Nystatin s/s 4xdaily x7days    # sleep  - melatonin 3mg hs PRN    # skin  - skin assessment on admission: Some blanchable erythema on bl buttocks. No wounds.    #diet   - easy to chew w/ mod thick    # DVT ppx  - will order admission doppler r/o DVT  - SCDs if duplex negative  - lovenox 40 mg daily (started 1/29)  # LABS  duplex BLE  CBC CMP 1/31  ---------------  Outpatient Follow-up:    Lynda Zimmerman  NP in [address], [address]  [place] [address]  Phone: [phone]  Fax: [phone]  Follow Up Time:     Manoj Edge  Cardiology  [address], [address]  [place] [address]  Phone: [phone]  Fax: [phone]  Follow Up Time: 2 weeks

## 2025-01-29 NOTE — PROGRESS NOTE ADULT - ASSESSMENT
ASSESSMENT: 74-year-old right-handed gentleman HTN, HLD, stroke in 2018 with minimal right-sided weakness, Afib on apixaban (started in 2017) presented as a transfer from [place],   presented with right sided weakness and word finding difficulties. Pt transferred to [facility] further evaluation. CT head (1/26/2025) to my eye showed a moderate or moderately large left basal ganglia hemorrhage, measured at 5.8 x 2.6 cm. CTA neck and head (1/26/2025) to my eye showed a distal left M1 occlusion, unchanged compared to 2020.    Impression.  He had a previous stroke with minimal residual left hemiparesis and a right M1 occlusion, presumably due to cardioembolism related to AF.  Now admitted with a moderate Wernicke's aphasia (occasionally sounding like a jargon aphasia), due to a left basal ganglia hemorrhage, likely due to some combination of chronic hypertension and anticoagulation with apixaban.    NEURO: Neuro exam unchanged, continue close monitoring for neurologic deterioration, -160mmhg, continue home dose statin LDL 69, titrate statin to LDL goal less than 70, CTH CTa head/neck results as above, No need for brain MRI as this will not changed management. Physical therapy/OT/Speech eval with recommendations for AR.     ANTITHROMBOTIC THERAPY: None due to ICH. hold apixaban for 5-6 weeks, then will make further decisions regarding restarting and/or a Watchman. Pt with Afib    PULMONARY: CXR (01/26) clear, protecting airway, saturating well     CARDIOVASCULAR: check TTE, cardiac monitoring afib, rate control, started losartan 100mg daily for HTN, Dr Edge (cardio) consult appreciated                               SBP goal: 110-160mmHg    GASTROINTESTINAL: Dysphagia screen failed, s/s MBS on 01/28/25 with recommendations for Easy to chew solids and moderately thick liquids via small single sips      Diet: Easy to chew solids and moderately thick liquids via small single sips    RENAL: BUN/Cr stable, good urine output, hypokalemia, will provide KCl PO      Na Goal: Greater than 135     Jones: No    HEMATOLOGY: H/H stable, Platelets 146, thrombocytopenia, will continue to monitor      DVT ppx: will start LMWH today 01/29/25, this was on hold due to ICH    ID: afebrile, no leukocytosis     OTHER: Plan/goals of care discussed with pt and pt's family at bedside    DISPOSITION: Acute Rehab as per PT/OT eval once be dis available Pt is stable and workup is completed    CORE MEASURES:        Admission NIHSS: 11     TPA:  NO      LDL/HDL: 69/51     Depression Screen: 0     Statin Therapy: Y     Dysphagia Screen: FAIL     Smoking NO      Afib YES      Stroke Education  YES     Obtain screening ultrasound in patients who meet 1 or more of the following criteria as patient is high risk for DVT/PE upon admission:   [] History of DVT/PE  []Hypercoagulable states (Factor V Leiden, Cancer, OCP, etc. )  []Prolonged immobility (hemiplegia/hemiparesis/post operative or any other extended immobilization)   [] Transferred from outside facility (Rehab or Long term care)  [] Age </= to 50 ASSESSMENT: 74-year-old right-handed gentleman HTN, HLD, stroke in 2018 with minimal right-sided weakness, Afib on apixaban (started in 2017) presented as a transfer from [place],   presented with right sided weakness and word finding difficulties. Pt transferred to [facility] further evaluation. CT head (1/26/2025) to my eye showed a moderate or moderately large left basal ganglia hemorrhage, measured at 5.8 x 2.6 cm. CTA neck and head (1/26/2025) to my eye showed a distal left M1 occlusion, unchanged compared to 2020.    Impression.  He had a previous stroke with minimal residual left hemiparesis and a right M1 occlusion, presumably due to cardioembolism related to AF.  Now admitted with a moderate fluent (now transcortical)  aphasia (occasionally sounding like a jargon aphasia but atypical aphasia as comprehension is good), due to a left basal ganglia hemorrhage, likely due to some combination of chronic hypertension and anticoagulation with apixaban.    NEURO: Neuro exam unchanged, continue close monitoring for neurologic deterioration, -160mmhg, continue home dose statin LDL 69, titrate statin to LDL goal less than 70, CTH CTa head/neck results as above, No need for brain MRI as this will not changed management. Physical therapy/OT/Speech eval with recommendations for AR.     ANTITHROMBOTIC THERAPY: None due to ICH. hold apixaban for 5-6 weeks, then will make further decisions regarding restarting and/or a Watchman. Pt with Afib. Now enrolled in [facility] clinical trial.    PULMONARY: CXR (01/26) clear, protecting airway, saturating well     CARDIOVASCULAR: check TTE, cardiac monitoring afib, rate control, started losartan 100mg daily for HTN, Dr Edge (cardio) consult appreciated                               SBP goal: 110-160mmHg    GASTROINTESTINAL: Dysphagia screen failed, s/s MBS on 01/28/25 with recommendations for Easy to chew solids and moderately thick liquids via small single sips      Diet: Easy to chew solids and moderately thick liquids via small single sips    RENAL: BUN/Cr stable, good urine output, hypokalemia, will provide KCl PO      Na Goal: Greater than 135     Jones: No    HEMATOLOGY: H/H stable, Platelets 146, thrombocytopenia, will continue to monitor      DVT ppx: will start LMWH today 01/29/25, this was on hold due to ICH    ID: afebrile, no leukocytosis     OTHER: Plan/goals of care discussed with pt and pt's family at bedside    DISPOSITION: Acute Rehab as per PT/OT eval once be dis available Pt is stable and workup is completed    CORE MEASURES:        Admission NIHSS: 11     TPA:  NO      LDL/HDL: 69/51     Depression Screen: 0     Statin Therapy: Y     Dysphagia Screen: FAIL     Smoking NO      Afib YES      Stroke Education  YES     Obtain screening ultrasound in patients who meet 1 or more of the following criteria as patient is high risk for DVT/PE upon admission:   [] History of DVT/PE  []Hypercoagulable states (Factor V Leiden, Cancer, OCP, etc. )  []Prolonged immobility (hemiplegia/hemiparesis/post operative or any other extended immobilization)   [] Transferred from outside facility (Rehab or Long term care)  [] Age </= to 50 ASSESSMENT: 74-year-old right-handed gentleman HTN, HLD, stroke in 2018 with minimal right-sided weakness, Afib on apixaban (started in 2017) presented as a transfer from [place],   presented with right sided weakness and word finding difficulties. Pt transferred to [facility] further evaluation. CT head (1/26/2025) to my eye showed a moderate or moderately large left basal ganglia hemorrhage, measured at 5.8 x 2.6 cm. CTA neck and head (1/26/2025) to my eye showed a distal left M1 occlusion, unchanged compared to 2020.    Impression.  He had a previous stroke with minimal residual left hemiparesis and a right M1 occlusion, presumably due to cardioembolism related to AF.  Now admitted with a moderate fluent (now transcortical)  aphasia (occasionally sounding like a jargon aphasia but atypical aphasia as comprehension is good), due to a left basal ganglia hemorrhage, likely due to some combination of chronic hypertension and anticoagulation with apixaban.    NEURO: Neuro exam unchanged, continue close monitoring for neurologic deterioration, -160mmhg, continue home dose statin LDL 69, titrate statin to LDL goal less than 70, CTH CTa head/neck results as above, No need for brain MRI as this will not changed management. Stable cerebral edema with mild mass effect seen on CT with no clinical correlation.  Physical therapy/OT/Speech eval with recommendations for AR.     ANTITHROMBOTIC THERAPY: None due to ICH. hold apixaban for 5-6 weeks, then will make further decisions regarding restarting and/or a Watchman. Pt with Afib. Now enrolled in [facility] clinical trial.    PULMONARY: CXR (01/26) clear, protecting airway, saturating well     CARDIOVASCULAR: check TTE, cardiac monitoring afib, rate control, started losartan 100mg daily for HTN, Dr Edge (cardio) consult appreciated                               SBP goal: 110-160mmHg    GASTROINTESTINAL: Dysphagia screen failed, s/s MBS on 01/28/25 with recommendations for Easy to chew solids and moderately thick liquids via small single sips      Diet: Easy to chew solids and moderately thick liquids via small single sips    RENAL: BUN/Cr stable, good urine output, hypokalemia, will provide KCl PO      Na Goal: Greater than 135     Jones: No    HEMATOLOGY: H/H stable, Platelets 146, thrombocytopenia, will continue to monitor      DVT ppx: will start LMWH today 01/29/25, this was on hold due to ICH    ID: afebrile, no leukocytosis     OTHER: Plan/goals of care discussed with pt and pt's family at bedside    DISPOSITION: Acute Rehab as per PT/OT eval once be dis available Pt is stable and workup is completed    CORE MEASURES:        Admission NIHSS: 11     TPA:  NO      LDL/HDL: 69/51     Depression Screen: 0     Statin Therapy: Y     Dysphagia Screen: FAIL     Smoking NO      Afib YES      Stroke Education  YES     Obtain screening ultrasound in patients who meet 1 or more of the following criteria as patient is high risk for DVT/PE upon admission:   [] History of DVT/PE  []Hypercoagulable states (Factor V Leiden, Cancer, OCP, etc. )  []Prolonged immobility (hemiplegia/hemiparesis/post operative or any other extended immobilization)   [] Transferred from outside facility (Rehab or Long term care)  [] Age </= to 50

## 2025-01-29 NOTE — DISCHARGE NOTE NURSING/CASE MANAGEMENT/SOCIAL WORK - NSDCFUADDAPPT_GEN_ALL_CORE_FT
APPTS ARE READY TO BE MADE: [x] YES    Best Family or Patient Contact (if needed):    Additional Information about above appointments (if needed):    1: Neurology  2:   3:     Other comments or requests:

## 2025-01-29 NOTE — PROGRESS NOTE ADULT - TIME BILLING
I attest my time as attending is greater than 50% of the total combined time spent on qualifying patient care activities by the PA/NP and attending.  I have made amendments to the documentation where necessary.  Additional comments: Agree with above.  Patient seen and examined.  Discussed condition and plan with patient.
I was present with the Resident during the key portions of the history and exam. I discussed the case with the Resident and agree with the findings and plan as documented in the Resident's note, unless noted below.   ROS otherwise negative

## 2025-01-29 NOTE — H&P ADULT - NSHPPHYSICALEXAM_GEN_ALL_CORE
Gen - NAD, Comfortable  HEENT - NCAT, Oral thrush  Neck - Supple, No limited ROM  Pulm - CTAB, No wheeze, No rhonchi, No crackles  Cardiovascular - RRR, S1S2  Abdomen - Soft, NT/ND, +BS  Extremities - No C/C/E, No calf tenderness  Neuro-     Cognitive - AAOx3. Able to follow simple commands and answers yes/no questions appropriately.      Communication - Mild dysarthria and with + word finding difficulty at times although inconsistent and intact repetition. No receptive aphasia.     Attention: Able to spell world forward, not backward. Able to spell 3 and 4 letter words forward and backward without difficulty. Serial 7s intact ascending, but difficulty with descending from 100.     Memory: Recall 2/3 objects immediately and 1/3 5 min later.         Cranial Nerves - R facial droop. Otherwise CNs intact.      Motor -                     LEFT    UE - ShAB 5/5, EF 5/5, EE 5/5, WE 5/5,  /5                    RIGHT UE - ShAB 4/5, EF 4/5, EE 4/5, WE 5/5,  4/5                    LEFT    LE - HF 5/5, KE 5/5, DF 5/5, PF 5/5                    RIGHT LE - HF 5/5, KE 5/5, DF 5/5, PF 5/5        Sensory - Intact to LT in bl UEs and LEs     Coordination - FTN intact on L. FTN test limited by weakness on R. HTS intact bl.  Psychiatric - Mood stable, Affect WNL  Skin: Some blanchable erythema on bl buttocks. Foam placed.  Wounds: None Present Gen - NAD, Comfortable. Follows 1-2 step verbal commands  HEENT - NCAT, Oral thrush    Pulm - CTAB, No wheeze, No rhonchi, No crackles  Cardiovascular - RRR, S1S2  Abdomen - Soft, NT/ND, +BS  Extremities - No C/C/E, No calf tenderness  Neuro-     Cognitive - AAOx3. Able to follow simple commands and answers yes/no questions appropriately.      Communication - Mild dysarthria and with + word finding difficulty at times although inconsistent and intact repetition. No receptive aphasia.     Attention: Able to spell world forward, not backward. Able to spell 3 and 4 letter words forward and backward without difficulty. Serial 7s intact ascending, but difficulty with descending from 100.     Memory: Recall 2/3 objects immediately and 1/3 5 min later.         Cranial Nerves - R facial droop. Otherwise CNs intact.      Motor -                     LEFT    UE - ShAB 5/5, EF 5/5, EE 5/5, WE 5/5,  /5                    RIGHT UE - ShAB 4/5, EF 4/5, EE 4/5, WE 5/5,  4/5                    LEFT    LE - HF 5/5, KE 5/5, DF 5/5, PF 5/5                    RIGHT LE - HF 5/5, KE 5/5, DF 5/5, PF 5/5        Sensory - Intact to LT in bl UEs and LEs     Coordination - FTN intact on L. FTN test limited by weakness on R. HTS intact bl.  Psychiatric - Mood stable, Affect WNL  Skin: Some blanchable erythema on bl buttocks. Foam placed.  Wounds: None Present

## 2025-01-29 NOTE — DISCHARGE NOTE NURSING/CASE MANAGEMENT/SOCIAL WORK - FINANCIAL ASSISTANCE
[facility] provides services at a reduced cost to those who are determined to be eligible through [facility]’s financial assistance program. Information regarding [facility]’s financial assistance program can be found by going to [email or web address] or by calling [phone].

## 2025-01-29 NOTE — H&P ADULT - HISTORY OF PRESENT ILLNESS
74-year-old R-H male with a history of hypertension hyperlipidemia, stroke in 2018 with minimal right-sided weakness, Afib on apixaban (started in 2017) presented to [facility] on 1/26/25 after pt was found on floor by wife and complaining of right sided weakness and word finding difficulties. W 1/25 2230. CT head demonstrated 5.8 x 2.6 cm hemorrhage with Rightward midline shift of 1 to 2 mm. CT angio with severe stenosis and near complete occlusion of the left middle cerebral artery at the distal left M1 segment, similar in appearance to the prior exam from November 2020.  Received andexanet and Keppra 1g x1. Afterwards, transferred to [facility] on 1/26 for further evaluation. Patient was evaluated by PM&R and therapy for functional deficits, gait/ADL impairments and acute rehabilitation was recommended. Patient was cleared for discharge to [facility] IRF on 1/29/25.   74M RHD w/ PMH HTN, afib (on apixaban, started 2017), and CVA (w/ residual min right-sided weakness), who presented to [facility] 01/26/2025 after being found on floor by wife w/ right-sided weakness and word-finding difficulties. LKW 1/25 2230. CT head demonstrated 5.8 x 2.6 cm left basal ganglia hemorrhage with 1-2mm rightward midline shift. 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 Received andexanet and levetiracetam 1g x1. Transferred to [facility] 01/26 for further eval. Hold apixaban through 03/09 per neuro. Evaluated by PM&R and discharged to [facility] acute rehab 01/29. 74M RHD w/ PMH HTN, afib (on apixaban, started 2017), and CVA (w/ residual min right-sided weakness), who presented to [facility] 01/26/2025 after being found on floor by wife w/ right-sided weakness and word-finding difficulties. LKW 1/25 2230. CT head demonstrated 5.8 x 2.6 cm left basal ganglia hemorrhage with 1-2mm rightward midline shift. 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 Received andexanet and levetiracetam 1g x1.    He was transferred to [facility] 01/26 for further eval. Neurology recommended holding apixab through 02/09 per neuro. Evaluated by PM&R and discharged to [facility] acute rehab 01/29. 74M RHD w/ PMH HTN, afib (on apixaban, started 2017), and CVA (w/ residual min right-sided weakness), who presented to [facility] 01/26/2025 after being found on floor by wife w/ right-sided weakness and word-finding difficulties. Wife woke up in 0545 1/26 finding the patient on the floor and complaining of right sided weakness and word finding difficulties. LKN 1/25 2230 when the patient went to bed. He took his apixaban and medications for HLD and HTN prior to going to bed. Upon finding the patient on the floor, the patient was transferred to the [place] ED. NIHSS 10 (+1 face, +3 RUE, +3 RLE, +1 Aphasia, +2, dysarthria). mRS 0    CT head demonstrated 5.8 x 2.6 cm left basal ganglia hemorrhage with 1-2mm rightward midline shift. 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 Received andexanet and levetiracetam 1g x1. He was transferred to [facility] 01/26 for further eval. Neurology recommended holding apixab through 03/09 per neuro (hold apixaban for 5-6 weeks, then will make further decisions regarding restarting and/or a Watchman as outpatient given Afib).    Evaluated by PM&R and discharged to [facility] acute rehab 01/29.

## 2025-01-29 NOTE — CONSULT NOTE ADULT - SUBJECTIVE AND OBJECTIVE BOX
Patient is a 74y old  Male who presents with a chief complaint of Hemorrhage (27 Jan 2025 05:13)    HPI:  74-year-old R-H male with a history of hypertension hyperlipidemia, stroke in 2018 with minimal right-sided weakness, Afib on apixaban (started in 2017) presented as a transfer from [place]. Originally lives at home. Wife woke up in 0545 1/26 finding the patient on the floor and complaining of right sided weakness and word finding difficulties. LKN 1/25 2230 when the patient went to bed. He took his apixaban and medications for HLD and HTN prior to going to bed. Upon finding the patient on the floor, the patient was transferred to the [place] ED. NIHSS 10 (+1 face, +3 RUE, +3 RLE, +1 Aphasia, +2, dysarthria). mRS 0. CT head demonstrated 5.8 x 2.6 cm hemorrhage with Rightward midline shift of 1 to 2 mm. CT angio with severe stenosis and near complete occlusion of the left middle cerebral artery at the distal left M1 segment, similar in appearance to the prior exam from November 2020.  Received andexanet and keppra 1g x1. Afterwards, transferred to [facility] for further evaluation.    CODE STROKE called at 0807 1/26. NIHSS 11, though noted to be improved in R sided weakness. ICH 1. Stroke cancelled after conversation with ED and Vascular Neurology fellow. Wife at bedside provided collateral. During the interview, the patient stated that he takes aspirin (however not in his pharmacy prescribed medications).    Of note, the patient suffered a fall in 2017, and family member in medicine checked his pulse. Found to have irregular heart beat. Was then started on apixaban for atrial fibrillation. Suffered a stroke while in [place] with right sided deficits. Went to [place] for medical care and acute inpatient rehab for 3 weeks. Afterwards, went back to [place] for home rehab. No tobacco, wine every night for dinner, no illicit drugs. Not candidate for tenecteplase due to bleed. Not candidate for thrombectomy due to no new LVO. .  (26 Jan 2025 11:35)      74yM was admitted on 01-26, seen today, patient with aphasia, eating lunch.       REVIEW OF SYSTEMS  unable to obtain     VITALS  T(C): 37.2 (01-27-25 @ 12:00), Max: 37.2 (01-27-25 @ 12:00)  HR: 75 (01-27-25 @ 12:00) (61 - 84)  BP: 128/82 (01-27-25 @ 12:16) (128/82 - 163/87)  RR: 25 (01-27-25 @ 12:00) (16 - 25)  SpO2: 94% (01-27-25 @ 12:00) (94% - 99%)  Wt(kg): --    PAST MEDICAL & SURGICAL HISTORY  Stroke    Chronic atrial fibrillation    HTN (hypertension)    HLD (hyperlipidemia)    No significant past surgical history        FUNCTIONAL HISTORY  Lives with wife    CURRENT FUNCTIONAL STATUS  evaluations pending     RECENT LABS/IMAGING  CBC Full  -  ( 27 Jan 2025 06:08 )  WBC Count : 7.79 K/uL  RBC Count : 4.69 M/uL  Hemoglobin : 14.4 g/dL  Hematocrit : 43.2 %  Platelet Count - Automated : 158 K/uL  Mean Cell Volume : 92.1 fl  Mean Cell Hemoglobin : 30.7 pg  Mean Cell Hemoglobin Concentration : 33.3 g/dL  Auto Neutrophil # : x  Auto Lymphocyte # : x  Auto Monocyte # : x  Auto Eosinophil # : x  Auto Basophil # : x  Auto Neutrophil % : x  Auto Lymphocyte % : x  Auto Monocyte % : x  Auto Eosinophil % : x  Auto Basophil % : x    01-27    140  |  105  |  12  ----------------------------<  79  3.2[L]   |  21[L]  |  0.83    Ca    8.8      27 Jan 2025 06:08  Phos  3.2     01-27  Mg     2.2     01-27    TPro  6.4  /  Alb  3.6  /  TBili  0.9  /  DBili  x   /  AST  26  /  ALT  19  /  AlkPhos  73  01-27    Urinalysis Basic - ( 27 Jan 2025 06:08 )    Color: x / Appearance: x / SG: x / pH: x  Gluc: 79 mg/dL / Ketone: x  / Bili: x / Urobili: x   Blood: x / Protein: x / Nitrite: x   Leuk Esterase: x / RBC: x / WBC x   Sq Epi: x / Non Sq Epi: x / Bacteria: x    < from: CT Head No Cont (01.27.25 @ 09:29) >    IMPRESSION:    Stable large left basal ganglia parenchymal hemorrhage compared with   1/26/2025..      < end of copied text >        ALLERGIES  No Known Allergies      MEDICATIONS   amLODIPine   Tablet 5 milliGRAM(s) Oral daily  metoprolol tartrate 25 milliGRAM(s) Oral every 12 hours  potassium chloride   Powder 40 milliEquivalent(s) Oral once  rosuvastatin 40 milliGRAM(s) Oral at bedtime      ----------------------------------------------------------------------------------------  PHYSICAL EXAM  Constitutional - NAD, Comfortable, sitting in chair   Chest - Breathing comfortably on room air   Cardiovascular - S1S2   Extremities - No C/C/E, No calf tenderness   Neurologic Exam -    follows commands                 Cognitive - Awake, Alert     Communication - Fluent, expressive aphasia, nods yes to hospital, states he lives in [place]      Cranial Nerves - right facial weakness      Motor - Rt UE/LE 4/5        Sensory - decreased on right      Psychiatric - Mood stable, Affect WNL  ----------------------------------------------------------------------------------------  ASSESSMENT/PLAN  74yMale h/o HTN, CVA 2018, afib on apixaban with functional deficits after left basal ganglia hemorrhage, with aphasia, right sided weakness   CT as above, stable  DVT PPX - SCDs  Rehab -    awaiting PT/OT/SLP evaluations    continue bedside therapy while admitted to prevent secondary complications of immobility, bed mobility, transfer training, progressive ambulation, equipment evaluation, ADLs   OOB throughout the day with staff, OOB to chair with meals/3 hours daily        Recommend ACUTE inpatient rehabilitation for the functional deficits consisting of 3 hours of multidisciplinary intense therapy/day x 5 days/week x 2-4 weeks depending on progress at rehabilitation facility, 24 hour RN/daily PMR physician for comorbid medical management.      Patient will be able to participate in and benefit from intense rehabilitation therapies for 3 hours a day x 5 days/week to maximize independence.     Rehab recommendations are dependent on functional progress and participation with bedside therapy     Will continue to follow for ongoing rehab needs and recommendations.       55 minutes spent, reviewing hospital course, therapy notes, relevant imaging, exam, education about inpatient rehabilitation, documentation, and discussion with  and rehabilitation team  
Everett Browne  74M on Eliquis for Afib, PMH HTN, L parietotemporal CVA 2018, on ASA, p/t GC this AM w/ dysarthria and R sided weakness. LKW 10:30PM yesterday. . CTH w/ 17cc L BG IPH. CTA neg for vasc malformation, but shows chronic ICAD, severe L M1 stenosis, stable c/t 2020.  S/p Andexxa and keppra @ . Tx to [facility] for further care. Exam:  Wide awake, Ox2, FC, PERRL, EOMI, Rt facial, Rt side 4/5 (has baseline weakness), Lt side 5/5. Plts/ Coags wnl.    
CHIEF COMPLAINT:    HISTORY OF PRESENT ILLNESS:  74-year-old R-H male with a history of hypertension hyperlipidemia, stroke in 2018 with minimal right-sided weakness, Afib on apixaban (started in 2017) presented as a transfer from [place]. Originally lives at home. Wife woke up in 0545 1/26 finding the patient on the floor and complaining of right sided weakness and word finding difficulties. LKN 1/25 2230 when the patient went to bed. He took his apixaban and medications for HLD and HTN prior to going to bed. Upon finding the patient on the floor, the patient was transferred to the [place] ED. NIHSS 10 (+1 face, +3 RUE, +3 RLE, +1 Aphasia, +2, dysarthria). mRS 0. CT head demonstrated 5.8 x 2.6 cm hemorrhage with Rightward midline shift of 1 to 2 mm. CT angio with severe stenosis and near complete occlusion of the left middle cerebral artery at the distal left M1 segment, similar in appearance to the prior exam from November 2020.  Received andexanet and keppra 1g x1. Afterwards, transferred to [facility] for further evaluation.    PAST MEDICAL & SURGICAL HISTORY:  Stroke      Chronic atrial fibrillation      HTN (hypertension)      HLD (hyperlipidemia)      No significant past surgical history              MEDICATIONS:  amLODIPine   Tablet 10 milliGRAM(s) Oral daily  metoprolol tartrate 25 milliGRAM(s) Oral every 12 hours            rosuvastatin 40 milliGRAM(s) Oral at bedtime    potassium chloride   Powder 20 milliEquivalent(s) Oral once      FAMILY HISTORY:  No pertinent family history in first degree relatives        SOCIAL HISTORY:    [ ] Non-smoker  [ ] Smoker  [ ] Alcohol    Allergies    No Known Allergies    Intolerances    	    REVIEW OF SYSTEMS:  CONSTITUTIONAL: No fever, weight loss, or fatigue  EYES: No eye pain, visual disturbances, or discharge  ENMT:  No difficulty hearing, tinnitus, vertigo; No sinus or throat pain  NECK: No pain or stiffness  RESPIRATORY: No cough, wheezing, chills or hemoptysis; No Shortness of Breath  CARDIOVASCULAR: No chest pain, palpitations, passing out, dizziness, or leg swelling  GASTROINTESTINAL: No abdominal or epigastric pain. No nausea, vomiting, or hematemesis; No diarrhea or constipation. No melena or hematochezia.  GENITOURINARY: No dysuria, frequency, hematuria, or incontinence  NEUROLOGICAL: No headaches, memory loss, loss of strength, numbness, or tremors  SKIN: No itching, burning, rashes, or lesions   LYMPH Nodes: No enlarged glands  ENDOCRINE: No heat or cold intolerance; No hair loss  MUSCULOSKELETAL: No joint pain or swelling; No muscle, back, or extremity pain  PSYCHIATRIC: No depression, anxiety, mood swings, or difficulty sleeping  HEME/LYMPH: No easy bruising, or bleeding gums  ALLERY AND IMMUNOLOGIC: No hives or eczema	    [ ] All others negative	  [ ] Unable to obtain    PHYSICAL EXAM:  T(C): 36.7 (01-29-25 @ 04:00), Max: 37 (01-28-25 @ 14:15)  HR: 60 (01-29-25 @ 06:00) (60 - 74)  BP: 158/70 (01-29-25 @ 06:00) (100/53 - 158/70)  RR: 18 (01-29-25 @ 06:00) (12 - 23)  SpO2: 96% (01-29-25 @ 06:00) (93% - 98%)  Wt(kg): --  I&O's Summary    28 Jan 2025 07:01  -  29 Jan 2025 07:00  --------------------------------------------------------  IN: 600 mL / OUT: 520 mL / NET: 80 mL        Appearance: Normal	  HEENT:   Normal oral mucosa, PERRL, EOMI	  Lymphatic: No lymphadenopathy  Cardiovascular: Normal S1 S2, No JVD, No murmurs, No edema  Respiratory: Lungs clear to auscultation	  Psychiatry: A & O x 3, Mood & affect appropriate  Gastrointestinal:  Soft, Non-tender, + BS	  Skin: No rashes, No ecchymoses, No cyanosis	  Neurologic Exam:  Mental status - Awake, Alert, Oriented to person, place, but not to time. Speech dysfluent, but repetition and naming intact. Follows simple but not complex commands. Fund of knowledge not intact    Cranial nerves:  CN II: Visual fields are full to confrontation. Pupils are 3 mm and briskly reactive to light.   CN III, IV, VI: EOMI, no nystagmus, no ptosis  CN V: Facial sensation is intact to pinprick in all 3 divisions bilaterally.  CN VII: Right sided facial droop   CN VII: Hearing is normal to rubbing fingers  CN IX, X: Palate elevates symmetrically.   CN XI: Head turning and shoulder shrug are intact  CN XII: Tongue is midline with normal movements and no atrophy.    Motor - Normal bulk and tone throughout.   Strength testing  RUE and RLE droop             Deltoid(C5)  Biceps(C6)    Triceps(C7)     Wrist Extension    Wrist Flexion (C8)          R            4+                 4+                       4+                    4+                            4+                                     4-                           L             5                 5                        5                     5                              5                                      5                                        Hip Flexion(L2/3)     Knee Flexion (L4/5/S1)    Knee Extension (L2/3/4)   Dorsiflexion (L4/5)   Plantar Flexion (S1/2)  R              4+                                   4+                                    4+                                                   4+                                          4+                            L              5                                     5                                     5                                                     5                                              5                              Sensation - Light touch intact in L fingers and L toes. Grimaces to pinprick on R fingers and R toes     DTR's -             Biceps      Triceps     Brachioradialis      Patellar    Ankle    Toes/plantar response  R             2+             1+                  2+                       2+            1+                 Up   L              2+             1+                 2+                        2+           1+                 mute     Coordination - unable to be assessed due to not following commands     Gait and station - unable to assessed due to weakness  Extremities: Normal range of motion, No clubbing, cyanosis or edema  Vascular: Peripheral pulses palpable 2+ bilaterally    TELEMETRY: 	  SR  ECG:  	  RADIOLOGY:< from: CT Head No Cont (01.27.25 @ 09:29) >    ACC: [id number] EXAM:  CT BRAIN   ORDERED BY: HECTOR MILLARD     PROCEDURE DATE:  01/27/2025          INTERPRETATION:  CLINICAL INDICATION: Follow up hemorrhage    COMPARISON: CT head 1/26/2025    TECHNIQUE: Axial noncontrast CT images of the head were obtained.   Sagittal and coronal reformatted images were provided. Bone and soft   tissue windows were evaluated.    FINDINGS:    Left basal ganglia parenchymal hematoma measuring 4.0 x 5.7 x 2.5 cm (CC   x AP x TV) with associated vasogenic edema, unchanged in size compared to   prior exam. There is mild mass effect on the left lateral ventricle and   minimal left-to-right midline shift similar to prior exam.    No new hemorrhage. Chronic left posterior temporal infarct.    No extra-axial collection. No hydrocephalus.  Visualized paranasal sinuses are clear.  Tympanomastoid cavities are   clear.    IMPRESSION:    Stable large left basal ganglia parenchymal hemorrhage compared with   1/26/2025..    --- End of Report ---        < end of copied text >    OTHER: 	  < from: POCUS ED TTE 2D F/U, Limited w/o Cont. (01.28.25 @ 14:15) >  Procedure was performed in the Emergency Department by a credentialed   Emergency Medicine Attending Physician    EXAM:  ER TTE LIMITED      ORDER COMMENTS:      PROCEDURE DATE:  01/28/2025    FOCUSED ED ULTRASOUND REPORT          INTERPRETATION:  A focused transthoracic cardiac ultrasound examination   was performed.  No pericardial effusion was present.  No global wall motion abnormality was identified  Preserved ejection fraction    IMPRESSION:  No Pericardial Effusion.  A line predominant lung fields    --- End of Report ---          < end of copied text >  	  LABS:	 	    CARDIAC MARKERS:                                  15.2   8.02  )-----------( 146      ( 29 Jan 2025 05:54 )             45.3     01-29    141  |  107  |  18  ----------------------------<  86  3.4[L]   |  21[L]  |  0.80    Ca    8.5      29 Jan 2025 05:53    TPro  6.6  /  Alb  3.5  /  TBili  0.7  /  DBili  x   /  AST  24  /  ALT  12  /  AlkPhos  68  01-29    proBNP:   Lipid Profile:   HgA1c:   TSH:

## 2025-01-30 ENCOUNTER — INPATIENT (INPATIENT)
Facility: HOSPITAL | Age: 75
LOS: 20 days | Discharge: HOME | DRG: 66 | End: 2025-02-20
Attending: PHYSICAL MEDICINE & REHABILITATION | Admitting: PHYSICAL MEDICINE & REHABILITATION
Payer: MEDICARE

## 2025-01-30 VITALS
DIASTOLIC BLOOD PRESSURE: 83 MMHG | OXYGEN SATURATION: 94 % | HEART RATE: 63 BPM | RESPIRATION RATE: 17 BRPM | TEMPERATURE: 97 F | WEIGHT: 165.79 LBS | HEIGHT: 67 IN | SYSTOLIC BLOOD PRESSURE: 130 MMHG

## 2025-01-30 VITALS
DIASTOLIC BLOOD PRESSURE: 75 MMHG | RESPIRATION RATE: 20 BRPM | OXYGEN SATURATION: 98 % | TEMPERATURE: 98 F | SYSTOLIC BLOOD PRESSURE: 152 MMHG | HEART RATE: 62 BPM

## 2025-01-30 DIAGNOSIS — I63.9 CEREBRAL INFARCTION, UNSPECIFIED: ICD-10-CM

## 2025-01-30 LAB
ALBUMIN SERPL ELPH-MCNC: 3.6 G/DL — SIGNIFICANT CHANGE UP (ref 3.3–5)
ALP SERPL-CCNC: 75 U/L — SIGNIFICANT CHANGE UP (ref 40–120)
ALT FLD-CCNC: 13 U/L — SIGNIFICANT CHANGE UP (ref 10–45)
ANION GAP SERPL CALC-SCNC: 11 MMOL/L — SIGNIFICANT CHANGE UP (ref 5–17)
AST SERPL-CCNC: 20 U/L — SIGNIFICANT CHANGE UP (ref 10–40)
BILIRUB SERPL-MCNC: 0.8 MG/DL — SIGNIFICANT CHANGE UP (ref 0.2–1.2)
BUN SERPL-MCNC: 21 MG/DL — SIGNIFICANT CHANGE UP (ref 7–23)
CALCIUM SERPL-MCNC: 8.9 MG/DL — SIGNIFICANT CHANGE UP (ref 8.4–10.5)
CHLORIDE SERPL-SCNC: 107 MMOL/L — SIGNIFICANT CHANGE UP (ref 96–108)
CO2 SERPL-SCNC: 22 MMOL/L — SIGNIFICANT CHANGE UP (ref 22–31)
CREAT SERPL-MCNC: <0.3 MG/DL — LOW (ref 0.5–1.3)
EGFR: 123 ML/MIN/1.73M2 — SIGNIFICANT CHANGE UP
GLUCOSE SERPL-MCNC: 91 MG/DL — SIGNIFICANT CHANGE UP (ref 70–99)
HCT VFR BLD CALC: 45.7 % — SIGNIFICANT CHANGE UP (ref 39–50)
HGB BLD-MCNC: 15.4 G/DL — SIGNIFICANT CHANGE UP (ref 13–17)
MCHC RBC-ENTMCNC: 30.7 PG — SIGNIFICANT CHANGE UP (ref 27–34)
MCHC RBC-ENTMCNC: 33.7 G/DL — SIGNIFICANT CHANGE UP (ref 32–36)
MCV RBC AUTO: 91 FL — SIGNIFICANT CHANGE UP (ref 80–100)
NRBC # BLD: 0 /100 WBCS — SIGNIFICANT CHANGE UP (ref 0–0)
NRBC BLD-RTO: 0 /100 WBCS — SIGNIFICANT CHANGE UP (ref 0–0)
PLATELET # BLD AUTO: 162 K/UL — SIGNIFICANT CHANGE UP (ref 150–400)
POTASSIUM SERPL-MCNC: 3.5 MMOL/L — SIGNIFICANT CHANGE UP (ref 3.5–5.3)
POTASSIUM SERPL-SCNC: 3.5 MMOL/L — SIGNIFICANT CHANGE UP (ref 3.5–5.3)
PROT SERPL-MCNC: 6.5 G/DL — SIGNIFICANT CHANGE UP (ref 6–8.3)
RBC # BLD: 5.02 M/UL — SIGNIFICANT CHANGE UP (ref 4.2–5.8)
RBC # FLD: 13.1 % — SIGNIFICANT CHANGE UP (ref 10.3–14.5)
SARS-COV-2 RNA SPEC QL NAA+PROBE: SIGNIFICANT CHANGE UP
SODIUM SERPL-SCNC: 140 MMOL/L — SIGNIFICANT CHANGE UP (ref 135–145)
WBC # BLD: 8.04 K/UL — SIGNIFICANT CHANGE UP (ref 3.8–10.5)
WBC # FLD AUTO: 8.04 K/UL — SIGNIFICANT CHANGE UP (ref 3.8–10.5)

## 2025-01-30 PROCEDURE — 84484 ASSAY OF TROPONIN QUANT: CPT

## 2025-01-30 PROCEDURE — 83605 ASSAY OF LACTIC ACID: CPT

## 2025-01-30 PROCEDURE — 99291 CRITICAL CARE FIRST HOUR: CPT

## 2025-01-30 PROCEDURE — 92523 SPEECH SOUND LANG COMPREHEN: CPT

## 2025-01-30 PROCEDURE — 84132 ASSAY OF SERUM POTASSIUM: CPT

## 2025-01-30 PROCEDURE — 82803 BLOOD GASES ANY COMBINATION: CPT

## 2025-01-30 PROCEDURE — 84100 ASSAY OF PHOSPHORUS: CPT

## 2025-01-30 PROCEDURE — 84295 ASSAY OF SERUM SODIUM: CPT

## 2025-01-30 PROCEDURE — 85014 HEMATOCRIT: CPT

## 2025-01-30 PROCEDURE — 82962 GLUCOSE BLOOD TEST: CPT

## 2025-01-30 PROCEDURE — 86850 RBC ANTIBODY SCREEN: CPT

## 2025-01-30 PROCEDURE — 92611 MOTION FLUOROSCOPY/SWALLOW: CPT

## 2025-01-30 PROCEDURE — 93970 EXTREMITY STUDY: CPT | Mod: 26

## 2025-01-30 PROCEDURE — 83036 HEMOGLOBIN GLYCOSYLATED A1C: CPT

## 2025-01-30 PROCEDURE — 85576 BLOOD PLATELET AGGREGATION: CPT

## 2025-01-30 PROCEDURE — 96374 THER/PROPH/DIAG INJ IV PUSH: CPT

## 2025-01-30 PROCEDURE — 85027 COMPLETE CBC AUTOMATED: CPT

## 2025-01-30 PROCEDURE — 82947 ASSAY GLUCOSE BLOOD QUANT: CPT

## 2025-01-30 PROCEDURE — 93308 TTE F-UP OR LMTD: CPT

## 2025-01-30 PROCEDURE — 82435 ASSAY OF BLOOD CHLORIDE: CPT

## 2025-01-30 PROCEDURE — 99222 1ST HOSP IP/OBS MODERATE 55: CPT

## 2025-01-30 PROCEDURE — 85730 THROMBOPLASTIN TIME PARTIAL: CPT

## 2025-01-30 PROCEDURE — 86901 BLOOD TYPING SEROLOGIC RH(D): CPT

## 2025-01-30 PROCEDURE — 86900 BLOOD TYPING SEROLOGIC ABO: CPT

## 2025-01-30 PROCEDURE — 85610 PROTHROMBIN TIME: CPT

## 2025-01-30 PROCEDURE — 97162 PT EVAL MOD COMPLEX 30 MIN: CPT

## 2025-01-30 PROCEDURE — 71045 X-RAY EXAM CHEST 1 VIEW: CPT

## 2025-01-30 PROCEDURE — 85025 COMPLETE CBC W/AUTO DIFF WBC: CPT

## 2025-01-30 PROCEDURE — 97166 OT EVAL MOD COMPLEX 45 MIN: CPT

## 2025-01-30 PROCEDURE — 92610 EVALUATE SWALLOWING FUNCTION: CPT

## 2025-01-30 PROCEDURE — 97116 GAIT TRAINING THERAPY: CPT

## 2025-01-30 PROCEDURE — 70450 CT HEAD/BRAIN W/O DYE: CPT | Mod: MC

## 2025-01-30 PROCEDURE — 36415 COLL VENOUS BLD VENIPUNCTURE: CPT

## 2025-01-30 PROCEDURE — 85018 HEMOGLOBIN: CPT

## 2025-01-30 PROCEDURE — 83735 ASSAY OF MAGNESIUM: CPT

## 2025-01-30 PROCEDURE — 93005 ELECTROCARDIOGRAM TRACING: CPT

## 2025-01-30 PROCEDURE — 74230 X-RAY XM SWLNG FUNCJ C+: CPT

## 2025-01-30 PROCEDURE — 97530 THERAPEUTIC ACTIVITIES: CPT

## 2025-01-30 PROCEDURE — 80061 LIPID PANEL: CPT

## 2025-01-30 PROCEDURE — 80053 COMPREHEN METABOLIC PANEL: CPT

## 2025-01-30 PROCEDURE — 82330 ASSAY OF CALCIUM: CPT

## 2025-01-30 PROCEDURE — 99223 1ST HOSP IP/OBS HIGH 75: CPT

## 2025-01-30 RX ORDER — VALSARTAN 80 MG
320 TABLET ORAL DAILY
Refills: 0 | Status: DISCONTINUED | OUTPATIENT
Start: 2025-01-30 | End: 2025-01-30

## 2025-01-30 RX ORDER — VALSARTAN 80 MG
1 TABLET ORAL
Qty: 0 | Refills: 0 | DISCHARGE
Start: 2025-01-30

## 2025-01-30 RX ORDER — LOSARTAN POTASSIUM 100 MG/1
100 TABLET, FILM COATED ORAL DAILY
Refills: 0 | Status: DISCONTINUED | OUTPATIENT
Start: 2025-01-30 | End: 2025-01-30

## 2025-01-30 RX ADMIN — Medication 320 MILLIGRAM(S): at 21:47

## 2025-01-30 RX ADMIN — Medication 500000 UNIT(S): at 17:27

## 2025-01-30 RX ADMIN — Medication 2 TABLET(S): at 21:47

## 2025-01-30 RX ADMIN — Medication 10 MILLIGRAM(S): at 05:29

## 2025-01-30 RX ADMIN — Medication 25 MILLIGRAM(S): at 05:29

## 2025-01-30 RX ADMIN — ROSUVASTATIN CALCIUM 40 MILLIGRAM(S): 20 TABLET, FILM COATED ORAL at 21:47

## 2025-01-30 RX ADMIN — ENOXAPARIN SODIUM 40 MILLIGRAM(S): 100 INJECTION SUBCUTANEOUS at 17:27

## 2025-01-30 RX ADMIN — METOPROLOL SUCCINATE 25 MILLIGRAM(S): 50 TABLET, EXTENDED RELEASE ORAL at 17:27

## 2025-01-30 NOTE — H&P ADULT - HISTORY OF PRESENT ILLNESS
74M RHD w/ PMH HTN, afib (on apixaban, started 2017), and CVA (w/ residual min right-sided weakness), who presented to [facility] 01/26/2025 after being found on floor by wife w/ right-sided weakness and word-finding difficulties. Wife woke up in 0545 1/26 finding the patient on the floor and complaining of right sided weakness and word finding difficulties. LKN 1/25 2230 when the patient went to bed. He took his apixaban and medications for HLD and HTN prior to going to bed. Upon finding the patient on the floor, the patient was transferred to the [place] ED. NIHSS 10 (+1 face, +3 RUE, +3 RLE, +1 Aphasia, +2, dysarthria). mRS 0    CT head demonstrated 5.8 x 2.6 cm left basal ganglia hemorrhage with 1-2mm rightward midline shift. 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 Received andexanet and levetiracetam 1g x1. He was transferred to [facility] 01/26 for further eval. Neurology recommended holding apixab through 03/09 per neuro (hold apixaban for 5-6 weeks, then will make further decisions regarding restarting and/or a Watchman as outpatient given Afib).    Evaluated by PM&R and discharged to [facility] acute rehab 01/29.   74M RHD w/ PMH HTN, afib (on apixaban, started 2017), and CVA (w/ residual min right-sided weakness), who presented to [facility] 01/26/2025 after being found on floor by wife w/ right-sided weakness and word-finding difficulties. Wife woke up in 0545 1/26 finding the patient on the floor and complaining of right sided weakness and word finding difficulties. LKN 1/25 2230 when the patient went to bed. He took his apixaban and medications for HLD and HTN prior to going to bed. Upon finding the patient on the floor, the patient was transferred to the [place] ED. NIHSS 10 (+1 face, +3 RUE, +3 RLE, +1 Aphasia, +2, dysarthria). mRS 0    CT head demonstrated 5.8 x 2.6 cm left basal ganglia hemorrhage with 1-2mm rightward midline shift. 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 Received andexanet and levetiracetam 1g x1. He was transferred to [facility] 01/26 for further eval. Neurology recommended holding apixab through 03/09 per neuro (hold apixaban for 5-6 weeks, then will make further decisions regarding restarting and/or a Watchman as outpatient given Afib).    Evaluated by PM&R and discharged to [facility] acute rehab 01/29.

## 2025-01-30 NOTE — H&P ADULT - NSHPLABSRESULTS_GEN_ALL_CORE
15.2   8.02  )-----------( 146      ( 29 Jan 2025 05:54 )             45.3     01-29    141  |  107  |  18  ----------------------------<  86  3.4[L]   |  21[L]  |  0.80    Ca    8.5      29 Jan 2025 05:53    TPro  6.6  /  Alb  3.5  /  TBili  0.7  /  DBili  x   /  AST  24  /  ALT  12  /  AlkPhos  68  01-29    LIVER FUNCTIONS - ( 29 Jan 2025 05:53 )  Alb: 3.5 g/dL / Pro: 6.6 g/dL / ALK PHOS: 68 U/L / ALT: 12 U/L / AST: 24 U/L / GGT: x           Urinalysis Basic - ( 29 Jan 2025 05:53 )    Color: x / Appearance: x / SG: x / pH: x  Gluc: 86 mg/dL / Ketone: x  / Bili: x / Urobili: x   Blood: x / Protein: x / Nitrite: x   Leuk Esterase: x / RBC: x / WBC x   Sq Epi: x / Non Sq Epi: x / Bacteria: x    CTH 01/27/25: Stable large left basal ganglia parenchymal hemorrhage compared with 1/26/2025.    CTH 01/26/25: Since prior CT head 1/26/2025 performed at 6:47 AM, stable left basal ganglia intraparenchymal material, with mass effect and mild left-to-right midline shift.    CTA head/neck CTP 01/26/25: CT PERFUSION: Markedly limited exam due to existing intraparenchymal hemorrhage at the left basal ganglia, which represents the dominant focus of signal   abnormality on the current exam. Within these constraints, there is an additional area of decreased cerebral blood flow and decreased cerebral blood volume at the posterior left temporoparietal lobe, corresponding to stable chronic infarct related to known prior left MCA occlusion.    CTA NECK: No evidence of significant stenosis or occlusion.    CTA HEAD: Severe stenosis and near complete occlusion of the left middle cerebral artery at the distal left M1 segment, similar in appearance to the prior exam from November 2020. Associated chronic infarct at the posterior left temporoparietal lobe is similarly unchanged in appearance. Moderate to severe stenosis involving the bilateral cavernous and supraclinoid ICA segments.  No evidence of intracranial vascular malformation or aneurysm. No evidence of active extravasation within the aforementioned intraparenchymal hemorrhage.
15.2   8.02  )-----------( 146      ( 29 Jan 2025 05:54 )             45.3     01-29    141  |  107  |  18  ----------------------------<  86  3.4[L]   |  21[L]  |  0.80    Ca    8.5      29 Jan 2025 05:53    TPro  6.6  /  Alb  3.5  /  TBili  0.7  /  DBili  x   /  AST  24  /  ALT  12  /  AlkPhos  68  01-29    LIVER FUNCTIONS - ( 29 Jan 2025 05:53 )  Alb: 3.5 g/dL / Pro: 6.6 g/dL / ALK PHOS: 68 U/L / ALT: 12 U/L / AST: 24 U/L / GGT: x           Urinalysis Basic - ( 29 Jan 2025 05:53 )    Color: x / Appearance: x / SG: x / pH: x  Gluc: 86 mg/dL / Ketone: x  / Bili: x / Urobili: x   Blood: x / Protein: x / Nitrite: x   Leuk Esterase: x / RBC: x / WBC x   Sq Epi: x / Non Sq Epi: x / Bacteria: x    CTH 01/27/25: Stable large left basal ganglia parenchymal hemorrhage compared with 1/26/2025.    CTH 01/26/25: Since prior CT head 1/26/2025 performed at 6:47 AM, stable left basal ganglia intraparenchymal material, with mass effect and mild left-to-right midline shift.    CTA head/neck CTP 01/26/25: CT PERFUSION: Markedly limited exam due to existing intraparenchymal hemorrhage at the left basal ganglia, which represents the dominant focus of signal   abnormality on the current exam. Within these constraints, there is an additional area of decreased cerebral blood flow and decreased cerebral blood volume at the posterior left temporoparietal lobe, corresponding to stable chronic infarct related to known prior left MCA occlusion.    CTA NECK: No evidence of significant stenosis or occlusion.    CTA HEAD: Severe stenosis and near complete occlusion of the left middle cerebral artery at the distal left M1 segment, similar in appearance to the prior exam from November 2020. Associated chronic infarct at the posterior left temporoparietal lobe is similarly unchanged in appearance. Moderate to severe stenosis involving the bilateral cavernous and supraclinoid ICA segments.  No evidence of intracranial vascular malformation or aneurysm. No evidence of active extravasation within the aforementioned intraparenchymal hemorrhage.

## 2025-01-30 NOTE — PATIENT PROFILE ADULT - NSPROGENOTHERPROVIDER_GEN_A_NUR
What Is The Reason For Today's Visit?: History of Non-Melanoma Skin Cancer
How Many Skin Cancers Have You Had?: one
none

## 2025-01-30 NOTE — PATIENT PROFILE ADULT - PHONE #
[address] 62 Patient Status:  Inpatient    1970 MRN [id number]   [facility] 3SW-A Attending Marium Gordillo, [address] [facility] Day # 4 PCP No primary care provider on file. SUBJECTIVE: No acute events overnight.   He denie deformity.                         ANPKT: FXUYKMN rate and rhythm, normal S1S2                          Abdomen: soft, non-tender, non-distended, positive BS.                         Extremity: No clubbing or cyanosis.  no edema                          G 6/9  3. LEONARD:  -noncompliant with CPAP as OP, hold for now given COVID   4. Proph:  -lovenox  5.  Dispo:  -full code  -will follow, if able to maintain off O2 throughout the day and night, anticipate discharge home tomorrow    Janna Yeager MD [phone]

## 2025-01-30 NOTE — PROGRESS NOTE ADULT - PROBLEM SELECTOR PROBLEM 2
It is important that you follow up with your primary care provider or specialist if indicated for further evaluation, workup, and treatment as necessary. The exam and treatment you received in Emergency Department was for an urgent problem and NOT INTENDED AS COMPLETE CARE. It is important that you FOLLOW UP with a doctor for ongoing care. If your symptoms become WORSE or you DO NOT IMPROVE and you are unable to reach your health care provider, you should RETURN to the Emergency Department. The Emergency Department provider has provided a PRELIMINARY INTERPRETATION of all your studies. A final interpretation may be done after you are discharged. If a change in your diagnosis or treatment is needed WE WILL CONTACT YOU. It is critical that we have a CURRENT PHONE NUMBER FOR YOU.     Hypertensive emergency

## 2025-01-30 NOTE — PROGRESS NOTE ADULT - ASSESSMENT
74-year-old R-H male with a history of hypertension hyperlipidemia, stroke in 2018 with minimal right-sided weakness, Afib on apixaban (started in 2017) presented as a transfer from [facility]. Originally lives at home. Wife woke up in 0545 1/26 finding the patient on the floor and complaining of right sided weakness and word finding difficulties. LKN 1/25 2230 when the patient went to bed. He took his apixaban and medications for HLD and HTN prior to going to bed. Upon finding the patient on the floor, the patient was transferred to the [facility] ED. NIHSS 10 (+1 face, +3 RUE, +3 RLE, +1 Aphasia, +2, dysarthria). mRS 0. CT head demonstrated 5.8 x 2.6 cm hemorrhage with Rightward midline shift of 1 to 2 mm. CT angio with severe stenosis and near complete occlusion of the left middle cerebral artery at the distal left M1 segment, similar in appearance to the prior exam from November 2020.  Received andexanet and keppra 1g x1. Afterwards, transferred to [facility] for further evaluation.

## 2025-01-30 NOTE — H&P ADULT - NSHPSOCIALHISTORY_GEN_ALL_CORE
SOCIAL HISTORY  Smoking -   EtOH -   Drugs -     FUNCTIONAL HISTORY  Pt lives with his wife in a PH +2 steps to enter +first floor set up. Pt has a walk in shower.  Prior Level of Function:  Pt was independent with all mobility and ADLs. Pt has a cane, RW and 3:1 commode.    CURRENT FUNCTIONAL STATUS  - Bed Mobility: Min A, 1PA  - Transfers: Min A, 2PA  - Gait: Min A, 2PA  -Lower Body Dressing: Mod A, 1PA
Smoking - Denies  EtOH - Denies  Drugs - Denies    FUNCTIONAL HISTORY  Pt lives with his wife in a PH +2 steps to enter +first floor set up. Pt has a walk in shower.  Prior Level of Function:  Pt was independent with all mobility and ADLs. Pt has a cane, RW and 3:1 commode.    CURRENT FUNCTIONAL STATUS  - Bed Mobility: Min A, 1PA  - Transfers: Min A, 2PA  - Gait: Min A, 2PA  -Lower Body Dressing: Mod A, 1PA

## 2025-01-30 NOTE — PROGRESS NOTE ADULT - SUBJECTIVE AND OBJECTIVE BOX
Subjective: Patient seen and examined. No new events except as noted.   Remains in stroke unit    REVIEW OF SYSTEMS:    CONSTITUTIONAL: No weakness, fevers or chills  EYES/ENT: No visual changes;  No vertigo or throat pain   NECK: No pain or stiffness  RESPIRATORY: No cough, wheezing, hemoptysis; No shortness of breath  CARDIOVASCULAR: No chest pain or palpitations  GASTROINTESTINAL: No abdominal or epigastric pain. No nausea, vomiting, or hematemesis; No diarrhea or constipation. No melena or hematochezia.  GENITOURINARY: No dysuria, frequency or hematuria  NEUROLOGICAL: No numbness or weakness  SKIN: No itching, burning, rashes, or lesions   All other review of systems is negative unless indicated above.    MEDICATIONS:  MEDICATIONS  (STANDING):  amLODIPine   Tablet 10 milliGRAM(s) Oral daily  enoxaparin Injectable 40 milliGRAM(s) SubCutaneous every 24 hours  metoprolol tartrate 25 milliGRAM(s) Oral every 12 hours  rosuvastatin 40 milliGRAM(s) Oral at bedtime  valsartan 320 milliGRAM(s) Oral daily      PHYSICAL EXAM:  T(C): 36.8 (01-30-25 @ 08:00), Max: 36.8 (01-29-25 @ 16:00)  HR: 62 (01-30-25 @ 08:00) (61 - 69)  BP: 152/75 (01-30-25 @ 08:00) (116/76 - 173/114)  RR: 20 (01-30-25 @ 08:00) (18 - 29)  SpO2: 98% (01-30-25 @ 08:00) (93% - 98%)  Wt(kg): --  I&O's Summary    29 Jan 2025 07:01  -  30 Jan 2025 07:00  --------------------------------------------------------  IN: 570 mL / OUT: 0 mL / NET: 570 mL    30 Jan 2025 07:01  -  30 Jan 2025 10:26  --------------------------------------------------------  IN: 240 mL / OUT: 0 mL / NET: 240 mL          Appearance: Normal	  HEENT:   Normal oral mucosa, PERRL, EOMI	  Lymphatic: No lymphadenopathy  Cardiovascular: Normal S1 S2, No JVD, No murmurs, No edema  Respiratory: Lungs clear to auscultation	  Psychiatry: A & O x 3, Mood & affect appropriate  Gastrointestinal:  Soft, Non-tender, + BS	  Skin: No rashes, No ecchymoses, No cyanosis	  Neurologic Exam:  Mental status - Awake, Alert, Oriented to person, place, but not to time. Speech dysfluent, but repetition and naming intact. Follows simple but not complex commands. Fund of knowledge not intact    Cranial nerves:  CN II: Visual fields are full to confrontation. Pupils are 3 mm and briskly reactive to light.   CN III, IV, VI: EOMI, no nystagmus, no ptosis  CN V: Facial sensation is intact to pinprick in all 3 divisions bilaterally.  CN VII: Right sided facial droop   CN VII: Hearing is normal to rubbing fingers  CN IX, X: Palate elevates symmetrically.   CN XI: Head turning and shoulder shrug are intact  CN XII: Tongue is midline with normal movements and no atrophy.    Motor - Normal bulk and tone throughout.   Strength testing  RUE and RLE droop             Deltoid(C5)  Biceps(C6)    Triceps(C7)     Wrist Extension    Wrist Flexion (C8)          R            4+                 4+                       4+                    4+                            4+                                     4-                           L             5                 5                        5                     5                              5                                      5                                        Hip Flexion(L2/3)     Knee Flexion (L4/5/S1)    Knee Extension (L2/3/4)   Dorsiflexion (L4/5)   Plantar Flexion (S1/2)  R              4+                                   4+                                    4+                                                   4+                                          4+                            L              5                                     5                                     5                                                     5                                              5                              Sensation - Light touch intact in L fingers and L toes. Grimaces to pinprick on R fingers and R toes     DTR's -             Biceps      Triceps     Brachioradialis      Patellar    Ankle    Toes/plantar response  R             2+             1+                  2+                       2+            1+                 Up   L              2+             1+                 2+                        2+           1+                 mute     Coordination - unable to be assessed due to not following commands     Gait and station - unable to assessed due to weakness  Extremities: Normal range of motion, No clubbing, cyanosis or edema  Vascular: Peripheral pulses palpable 2+ bilaterally        LABS:    CARDIAC MARKERS:                                15.4   8.04  )-----------( 162      ( 30 Jan 2025 07:11 )             45.7     01-30    140  |  107  |  21  ----------------------------<  91  3.5   |  22  |  <0.30[L]    Ca    8.9      30 Jan 2025 07:12    TPro  6.5  /  Alb  3.6  /  TBili  0.8  /  DBili  x   /  AST  20  /  ALT  13  /  AlkPhos  75  01-30    proBNP:   Lipid Profile:   HgA1c:   TSH:             TELEMETRY: 	SR    ECG:  	  RADIOLOGY:   DIAGNOSTIC TESTING:  [ ] Echocardiogram:  [ ]  Catheterization:  [ ] Stress Test:    OTHER:

## 2025-01-30 NOTE — PROGRESS NOTE ADULT - PROBLEM SELECTOR PLAN 1
left basal ganglia parenchymal hemorrhage  Likely hypertensive in the setting of anticoagulation use with apixaban  SBP <140.

## 2025-01-30 NOTE — PATIENT PROFILE ADULT - FALL HARM RISK - HARM RISK INTERVENTIONS

## 2025-01-30 NOTE — H&P ADULT - ATTENDING COMMENTS
I have personally seen and examined the patient.  I fully participated in the care of this patient.  I have made amendments to the documentation where necessary, and agree with the history, physical exam, and plan as documented by the   Resident  Progress note amended to include my discussions with patient, resident, hospitalist, RN, SW, PT and my findings    *H+P was originally initiated for 1/29 then canceled, patient admitted today 1/30. Information was erroneously placed in 1/29 note and now reposted here. Patient was seen by me and Dr Messina today Thursday 1/30/25 and the contents of this H+P reflect that    74M RHD w/ PMH HTN, afib (on apixaban, started 2017), and CVA (w/ residual min right-sided weakness), admitted to [facility] acute rehab for left basal ganglia hemorrhage w/ left basal ganglia hemorrhage w/ right hemiparesis, apraxia, and dysphasia .Patient seen in bed; he reports some difficulty sleeping earlier in previous hospital stay, but had good night's sleep last night. He denies any headache, dizziness, CP, SOB. He has reduced BM but denies abdominal discomfort, no N/V (usually goes 1-2 x a day at home, and has reduced to every 2-3 days while hospitalized). We discussed his diagnosis, and importance of monitoring for any symptoms of bleeding such as H/A, dizziness, nausea, light sensitivity    He continues to feel weakness in right side. Also has difficulty with word finding (mimics word on tip of tongue but inability to get out). He can follow 1-2 step verbal commands. RUE mild hypotonia, shoulder 4+/5 elbow flexion 5-/5 extension 5-/5 gross grasp 4/5. left UE normal tone motor 5/5. right HF 4/5 quad 5/5 ham 5-/5 ankle DF 5-/5. lef tLE 5/5. no calf swelling or pedal edema. right finger to nose impaired/incoordination and dysmetria. right heel to shin incoordination. sensation intact LT.     Patient has dysarthria, unable to perform confrontational naming independently 0/3 however got 2/3 when given choices (field of 2). He also was able to indicate correct functional ID use in 4 scenarios. EOMI no nystagmus. +oral thrush    - patient reportedly uses CPAP at home, will reach out to family to obtain settings (was not using at other hospital) Will order supplemental O2 in meantime.  - skin intact  - recreation therapy  - S/S increased ICP/rebleed and importance monitoring discussed  -0 monitor reduced BM  - comprehensive rehab program in progress    RECENT LABS    Vital Signs Last 24 Hrs  T(C): 36.3 (30 Jan 2025 11:43), Max: 36.8 (30 Jan 2025 08:00)  T(F): 97.4 (30 Jan 2025 11:43), Max: 98.2 (30 Jan 2025 08:00)  HR: 63 (30 Jan 2025 11:43) (61 - 69)  BP: 130/83 (30 Jan 2025 11:43) (127/79 - 173/114)  BP(mean): 108 (30 Jan 2025 08:00) (98 - 135)  RR: 17 (30 Jan 2025 11:43) (17 - 29)  SpO2: 94% (30 Jan 2025 11:43) (93% - 98%)    Parameters below as of 30 Jan 2025 11:43  Patient On (Oxygen Delivery Method): room air                              15.4   8.04  )-----------( 162      ( 30 Jan 2025 07:11 )             45.7     01-30    140  |  107  |  21  ----------------------------<  91  3.5   |  22  |  <0.30[L]    Ca    8.9      30 Jan 2025 07:12    TPro  6.5  /  Alb  3.6  /  TBili  0.8  /  DBili  x   /  AST  20  /  ALT  13  /  AlkPhos  75  01-30      Urinalysis Basic - ( 30 Jan 2025 07:12 )    Color: x / Appearance: x / SG: x / pH: x  Gluc: 91 mg/dL / Ketone: x  / Bili: x / Urobili: x   Blood: x / Protein: x / Nitrite: x   Leuk Esterase: x / RBC: x / WBC x   Sq Epi: x / Non Sq Epi: x / Bacteria: x      CAPILLARY BLOOD GLUCOSE
Progress note amended to include my discussions with patient, resident, hospitalist, RN, SW, PT and my findings    74M RHD w/ PMH HTN, afib (on apixaban, started 2017), and CVA (w/ residual min right-sided weakness), [facility] for left basal ganglia hemorrhage w/ left basal ganglia hemorrhage w/ right hemiparesis, apraxia, and dysphasia.Patient seen in bed; he reports some difficulty sleeping earlier in previous hospital stay, but had good night's sleep last night. He denies any headache, dizziness, CP, SOB. He has reduced BM but denies abdominal discomfort, no N/V (usually goes 1-2 x a day at home, and has reduced to every 2-3 days while hospitalized). We discussed his diagnosis, and importance of monitoring for any symptoms of bleeding such as H/A, dizziness, nausea, light sensitivity    He continues to feel weakness in right side. Also has difficulty with word finding (mimics word on tip of tongue but inability to get out). He can follow 1-2 step verbal commands. RUE mild hypotonia, shoulder 4+/5 elbow flexion 5-/5 extension 5-/5 gross grasp 4/5. left UE normal tone motor 5/5. right HF 4/5 quad 5/5 ham 5-/5 ankle DF 5-/5. lef tLE 5/5. no calf swelling or pedal edema. right finger to nose impaired/incoordination and dysmetria. right heel to shin incoordinaton. sensation intact LT.     Patient has dysarthria, unable to perform confrontational naming independently 0/3 however got 2/3 when given choices (field of 2). He also was able to indicate correct functional ID use in 4 scenarios. EOMI no nystagmus. +oral thrush    - patient reportedly uses CPAP at home, will reach out to family to obtain settings (was not using at other hospital) Will order supplemental O2 in meantime.  - skin intact  - recreation therapy  - S/S increased ICP/rebleed and importance monitoring discussed  -0 monitor reduced BM  - comprehensive rehab program in progress    RECENT LABS    Vital Signs Last 24 Hrs  T(C): 36.3 (30 Jan 2025 11:43), Max: 36.8 (30 Jan 2025 08:00)  T(F): 97.4 (30 Jan 2025 11:43), Max: 98.2 (30 Jan 2025 08:00)  HR: 63 (30 Jan 2025 11:43) (61 - 69)  BP: 130/83 (30 Jan 2025 11:43) (127/79 - 173/114)  BP(mean): 108 (30 Jan 2025 08:00) (98 - 135)  RR: 17 (30 Jan 2025 11:43) (17 - 29)  SpO2: 94% (30 Jan 2025 11:43) (93% - 98%)    Parameters below as of 30 Jan 2025 11:43  Patient On (Oxygen Delivery Method): room air                              15.4   8.04  )-----------( 162      ( 30 Jan 2025 07:11 )             45.7     01-30    140  |  107  |  21  ----------------------------<  91  3.5   |  22  |  <0.30[L]    Ca    8.9      30 Jan 2025 07:12    TPro  6.5  /  Alb  3.6  /  TBili  0.8  /  DBili  x   /  AST  20  /  ALT  13  /  AlkPhos  75  01-30      Urinalysis Basic - ( 30 Jan 2025 07:12 )    Color: x / Appearance: x / SG: x / pH: x  Gluc: 91 mg/dL / Ketone: x  / Bili: x / Urobili: x   Blood: x / Protein: x / Nitrite: x   Leuk Esterase: x / RBC: x / WBC x   Sq Epi: x / Non Sq Epi: x / Bacteria: x      CAPILLARY BLOOD GLUCOSE

## 2025-01-30 NOTE — H&P ADULT - NSHPREVIEWOFSYSTEMS_GEN_ALL_CORE
CONSTITUTIONAL: Denies weakness, fevers or chills  EYES/ENT: Endorses some "fatigue" with prolonged distance vision. Denies blurry or double vision.  NECK: Denies pain or stiffness  RESPIRATORY: Denies cough or shortness of breath  CARDIOVASCULAR: Denies chest pain or palpitations  GASTROINTESTINAL: Denies abdominal pain, nausea, vomiting, diarrhea or constipation.   GENITOURINARY: Denies dysuria or hematuria. + intermittent episodes of incontinence at times since admission.  NEUROLOGICAL: + residual RUE weakness (chronic from prior CVA). Denies other new weakness or paresthesias/sensory changes
REVIEW OF SYSTEMS:    CONSTITUTIONAL: Denies weakness, fevers or chills  EYES/ENT: Endorses some "fatigue" with prolonged distance vision. Denies blurry or double vision.  NECK: Denies pain or stiffness  RESPIRATORY: Denies cough or shortness of breath  CARDIOVASCULAR: Denies chest pain or palpitations  GASTROINTESTINAL: Denies abdominal pain, nausea, vomiting, diarrhea or constipation.   GENITOURINARY: Denies dysuria or hematuria. + intermittent episodes of incontinence at times since admission.  NEUROLOGICAL: + residual RUE weakness (chronic from prior CVA). Denies other new weakness or paresthesias/sensory changes

## 2025-01-30 NOTE — H&P ADULT - NSHPPHYSICALEXAM_GEN_ALL_CORE
Gen - NAD, Comfortable. Follows 1-2 step verbal commands  HEENT - NCAT, Oral thrush    Pulm - CTAB, No wheeze, No rhonchi, No crackles  Cardiovascular - RRR, S1S2  Abdomen - Soft, NT/ND, +BS  Extremities - No C/C/E, No calf tenderness  Neuro-     Cognitive - AAOx3. Able to follow simple commands and answers yes/no questions appropriately.      Communication - Mild dysarthria and with + word finding difficulty at times although inconsistent and intact repetition. No receptive aphasia.     Attention: Able to spell world forward, not backward. Able to spell 3 and 4 letter words forward and backward without difficulty. Serial 7s intact ascending, but difficulty with descending from 100.     Memory: Recall 2/3 objects immediately and 1/3 5 min later.         Cranial Nerves - R facial droop. Otherwise CNs intact.      Motor -                     LEFT    UE - ShAB 5/5, EF 5/5, EE 5/5, WE 5/5,  /5                    RIGHT UE - ShAB 4/5, EF 4/5, EE 4/5, WE 5/5,  4/5                    LEFT    LE - HF 5/5, KE 5/5, DF 5/5, PF 5/5                    RIGHT LE - HF 5/5, KE 5/5, DF 5/5, PF 5/5        Sensory - Intact to LT in bl UEs and LEs     Coordination - FTN intact on L. FTN test limited by weakness on R. HTS intact bl.  Psychiatric - Mood stable, Affect WNL  Skin: Some blanchable erythema on bl buttocks. Foam placed.  Wounds: None Present

## 2025-01-30 NOTE — PATIENT PROFILE ADULT - MST SCORE
0 Niacinamide Counseling: I recommended taking niacin or niacinamide, also know as vitamin B3, twice daily. Recent evidence suggests that taking vitamin B3 (500 mg twice daily) can reduce the risk of actinic keratoses and non-melanoma skin cancers. Side effects of vitamin B3 include flushing and headache.

## 2025-01-30 NOTE — H&P ADULT - ASSESSMENT
74M RHD w/ PMH HTN, afib (on apixaban, started 2017), and CVA (w/ residual min right-sided weakness), admitted to [facility] acute rehab for left basal ganglia hemorrhage w/ 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    # non traumatic acute left BG hemorrhage with right hemibody involvement, right HP, apraxia speech, motor apraxia, incoordination, dysarthria, dysphagia  - CT Head: 5.8 x 2.6 cm intracranial hemorrhage with mass effect and 1-2mm rightward midline  shift on 1/26  - CTA h/n: stable near complete occlusion of left distal M1  - S/p andexanet + levetiracetam 1g x1  - Hold apixaban through 03/09 (5-6 weeks per tania)  - Rosuvastatin 40mg qhs  - Begin comprehensive rehab program PT OT SLP 3 hours daily 5 x week  - recreation therapy  - precautions: fall, aspiration, cardiac  - outpt neuro + NSGY f/u    # atrial fibrillation  # HTN  - hold apixaban as above  - amlodipine 10mg daily  - Lopressor 25mg BID  - Valsartan 320mg daily (switched from losartan 100 Qd on 1/30 per cardiology recs @ [facility])  - monitor vitals, hospitalist consult    # on CPAP at home per nursing  - will obtain setting from family  - supplemental O2 in meantime    # pain  - acetaminophen 650mg q6h PRN    # bowel  - scheduled: senna 2tab qhs  - PRN: Miralax BID    # bladder   - PVR on admission, ISC for PVR >400mL  - toileting schedule    #Oral Thrush  - Nystatin s/s 4xdaily x7days    # sleep  - melatonin 3mg hs PRN    # skin  - skin assessment on admission: Some blanchable erythema on bl buttocks. No wounds.    #diet   - easy to chew w/ mod thick    # DVT ppx  - will order admission doppler r/o DVT  - SCDs if duplex negative  - lovenox 40 mg daily (started 1/29)  # LABS  duplex BLE  CBC CMP 1/31  ---------------  Outpatient Follow-up:    Lynda Zimmerman  NP in [address], [address]  [place] [address]  Phone: [phone]  Fax: [phone]  Follow Up Time:     [facility]  [address], [address]  [place] [address]  Phone: [phone]  Fax: [phone]  Follow Up Time: 2 weeks     74M RHD w/ PMH HTN, afib (on apixaban, started 2017), and CVA (w/ residual min right-sided weakness), admitted to [facility] acute rehab for left basal ganglia hemorrhage w/ 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    # non traumatic acute left BG hemorrhage with right hemibody involvement, right HP, apraxia speech, motor apraxia, incoordination, dysarthria, dysphagia  - CT Head: 5.8 x 2.6 cm intracranial hemorrhage with mass effect and 1-2mm rightward midline  shift on 1/26  - CTA h/n: stable near complete occlusion of left distal M1  - S/p andexanet + levetiracetam 1g x1  - Hold apixaban through 03/09 (5-6 weeks per tania)  - Rosuvastatin 40mg qhs  - Begin comprehensive rehab program PT OT SLP 3 hours daily 5 x week  - recreation therapy  - precautions: fall, aspiration, cardiac  - outpt neuro + NSGY f/u    # atrial fibrillation  # HTN  - hold apixaban as above  - amlodipine 10mg daily  - Lopressor 25mg BID  - Valsartan 320mg daily (switched from losartan 100 Qd on 1/30 per cardiology recs @ [facility])  - monitor vitals, hospitalist consult    # on CPAP at home per nursing  - will obtain setting from family  - supplemental O2 in meantime    # pain  - acetaminophen 650mg q6h PRN    # bowel  - scheduled: senna 2tab qhs  - PRN: Miralax BID    # bladder   - PVR on admission, ISC for PVR >400mL  - toileting schedule    # Oral Thrush  - Nystatin s/s 4xdaily x7days    # sleep  - melatonin 3mg hs PRN    # skin  - skin assessment on admission: Some blanchable erythema on bl buttocks. No wounds.    #diet   - easy to chew w/ mod thick    # DVT ppx  - will order admission doppler r/o DVT  - SCDs if duplex negative  - lovenox 40 mg daily (started 1/29)    # LABS  duplex BLE  CBC CMP 1/31  ---------------  Outpatient Follow-up:    Lynda Zimmerman  NP in [address], [address]  [place] [address]  Phone: [phone]  Fax: [phone]  Follow Up Time:     [facility]  [address], [address]  [place] [address]  Phone: [phone]  Fax: [phone]  Follow Up Time: 2 weeks

## 2025-01-30 NOTE — PATIENT PROFILE ADULT - FUNCTIONAL ASSESSMENT - BASIC MOBILITY 6.
4-calculated by average/Not able to assess (calculate score using [facility] averaging method)

## 2025-01-30 NOTE — H&P ADULT - NSICDXPASTMEDICALHX_GEN_ALL_CORE_FT
PAST MEDICAL HISTORY:  Chronic atrial fibrillation     HLD (hyperlipidemia)     HTN (hypertension)     Stroke     
PAST MEDICAL HISTORY:  Chronic atrial fibrillation     HLD (hyperlipidemia)     HTN (hypertension)     Stroke

## 2025-01-31 PROCEDURE — 99223 1ST HOSP IP/OBS HIGH 75: CPT

## 2025-01-31 PROCEDURE — 99232 SBSQ HOSP IP/OBS MODERATE 35: CPT

## 2025-01-31 RX ADMIN — AMLODIPINE BESYLATE 10 MILLIGRAM(S): 10 TABLET ORAL at 06:05

## 2025-01-31 RX ADMIN — Medication 320 MILLIGRAM(S): at 06:05

## 2025-01-31 RX ADMIN — ROSUVASTATIN CALCIUM 40 MILLIGRAM(S): 20 TABLET, FILM COATED ORAL at 21:38

## 2025-01-31 RX ADMIN — Medication 40 MILLIGRAM(S): at 06:05

## 2025-01-31 RX ADMIN — ENOXAPARIN SODIUM 40 MILLIGRAM(S): 100 INJECTION SUBCUTANEOUS at 17:03

## 2025-01-31 RX ADMIN — Medication 500000 UNIT(S): at 11:42

## 2025-01-31 RX ADMIN — METOPROLOL SUCCINATE 25 MILLIGRAM(S): 50 TABLET, EXTENDED RELEASE ORAL at 06:05

## 2025-01-31 RX ADMIN — METOPROLOL SUCCINATE 25 MILLIGRAM(S): 50 TABLET, EXTENDED RELEASE ORAL at 17:05

## 2025-01-31 RX ADMIN — Medication 500000 UNIT(S): at 17:04

## 2025-01-31 RX ADMIN — Medication 500000 UNIT(S): at 06:05

## 2025-01-31 RX ADMIN — Medication 2 TABLET(S): at 21:38

## 2025-01-31 RX ADMIN — Medication 500000 UNIT(S): at 21:39

## 2025-01-31 NOTE — DIETITIAN INITIAL EVALUATION ADULT - WEIGHT USED FOR CALCULATIONS
Established Patient    Geraldo Zaragoza MD    Chief Complaint   Patient presents with   • Surgical Followup       History of Present Illness:   Soco Diallo is a 38 year old female who is here today for follow-up visit.  I first met her 4 weeks ago when she had a large abscess of the left axilla that I drained in the office.  She states that she has done remarkably well as of late.  The wound closed within about a week.  She has had no further discharge or drainage.  She has no fevers or chills.  She has no pain.    Past Medical History:   Diagnosis Date   • Diabetes mellitus (CMS/HCC)    • PCOS (polycystic ovarian syndrome)        Past Surgical History:   Procedure Laterality Date   • Appendectomy     •  section, low transverse     • Cholecystectomy     • Colposcopy,loop electrd cervix excis         Current Outpatient Medications   Medication Sig   • triamcinolone (ARISTOCORT) 0.1 % cream APPLY TOPICALLY TO THE AFFECTED AREA TWICE DAILY   • topiramate (Topamax) 25 MG tablet Take 1 tablet by mouth 2 times daily.   • phentermine 30 MG capsule Take 1 capsule by mouth every morning.   • metFORMIN (GLUCOPHAGE-XR) 500 MG 24 hr tablet Take 1 tablet by mouth 2 times daily (with meals).   • spironolactone (ALDACTONE) 50 MG tablet Take 1 tablet by mouth 2 times daily.   • Ovulation Prediction Test Kit Use as directed   • ketotifen (ZADITOR) 0.025 % ophthalmic solution 1 drop 2 times daily.   • topiramate (TOPAMAX) 25 MG tablet TAKE 1 TABLET TWICE DAILY.   • fluticasone (FLONASE) 50 MCG/ACT nasal spray USE 2 SPRAYS IN EACH NOSTRIL ONCE DAILY     No current facility-administered medications for this visit.       ALLERGIES:   Allergen Reactions   • Cat Dander Other (See Comments)     unknown   • Dust Other (See Comments)     unknown     • No Name Available Other (See Comments)     No Known Drug Allergies   • Pollen Other (See Comments)     unknown         Social History     Tobacco Use   Smoking Status  Former   • Types: Cigarettes   • Quit date:    • Years since quittin.0   Smokeless Tobacco Never       Social History     Substance and Sexual Activity   Alcohol Use No       Social History     Substance and Sexual Activity   Drug Use No       History reviewed. No pertinent family history.    Review of Systems:   See HPI. 10 ROS is otherwise negative.    Physical Exam:  Visit Vitals  /71   Pulse 77   Temp 98.1 °F (36.7 °C) (Temporal)   Ht 5' 3\" (1.6 m)   Wt 107 kg (236 lb)   LMP 2022 (Exact Date)   BMI 41.81 kg/m²     General: alert and oriented, no acute distress  Skin: warm  HEENT: normocephalic, EOMI  Cardiac: regular rate and rhythm  Pulmonary: breathing comfortably on room air, no respiratory distress  The abscess site of the left axilla is completely closed.  There is no tunneling, skin opening or underlying cyst.  Musculoskeletal: ROM grossly intact.   Psychiatric: answers questions appropriately.     Assessment:   Problem List Items Addressed This Visit    None  Visit Diagnoses     Abscess of axilla, left    -  Primary          Plan:   I discussed clinical findings with the patient.  She has a resolved abscess site of the left axilla.    There is no need for further surgical treatment.  She will return if this ends up being a recurrent problem.      Joseph Ramirez MD   current weight

## 2025-01-31 NOTE — DISCHARGE NOTE PROVIDER - NSDCMRMEDTOKEN_GEN_ALL_CORE_FT
amLODIPine 10 mg oral tablet: 1 tab(s) orally once a day  enoxaparin: 40 milligram(s) subcutaneous once a day (at bedtime)  metoprolol tartrate 25 mg oral tablet: 1 tab(s) orally every 12 hours  rosuvastatin 40 mg oral tablet: 1 tab(s) orally once a day (at bedtime)  valsartan 320 mg oral tablet: 1 tab(s) orally once a day (at bedtime)   amLODIPine 10 mg oral tablet: 1 tab(s) orally once a day  melatonin 3 mg oral tablet: 2 tab(s) orally once a day (at bedtime) As needed Insomnia  metoprolol tartrate 25 mg oral tablet: 1 tab(s) orally every 12 hours  polyethylene glycol 3350 oral powder for reconstitution: 17 gram(s) orally once a day As needed Constipation  rosuvastatin 40 mg oral tablet: 1 tab(s) orally once a day (at bedtime)  valsartan 320 mg oral tablet: 1 tab(s) orally once a day   amLODIPine 10 mg oral tablet: 1 tab(s) orally once a day  metoprolol tartrate 25 mg oral tablet: 1 tab(s) orally every 12 hours  rosuvastatin 40 mg oral tablet: 1 tab(s) orally once a day (at bedtime)  valsartan 320 mg oral tablet: 1 tab(s) orally once a day

## 2025-01-31 NOTE — DISCHARGE NOTE PROVIDER - PROVIDER TOKENS
PROVIDER:[TOKEN:[[id number]],FOLLOWUP:[1 week]],PROVIDER:[TOKEN:[[id number]],FOLLOWUP:[2 weeks]] PROVIDER:[TOKEN:[[id number]],FOLLOWUP:[1 week]],PROVIDER:[TOKEN:[[id number]],FOLLOWUP:[2 weeks]],PROVIDER:[TOKEN:[[id number]],FOLLOWUP:[2 weeks]],PROVIDER:[TOKEN:[[id number]],FOLLOWUP:[1 month]]

## 2025-01-31 NOTE — PROGRESS NOTE ADULT - ASSESSMENT
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    # non traumatic acute left BG hemorrhage with right hemibody involvement, right HP, apraxia speech, motor apraxia, incoordination, dysarthria, dysphagia  - CT Head: 5.8 x 2.6 cm intracranial hemorrhage with mass effect and 1-2mm rightward midline  shift on 1/26  - CTA h/n: stable near complete occlusion of left distal M1  - S/p andexanet + levetiracetam 1g x1  - Hold apixaban through 03/09 (5-6 weeks per tania)  - Rosuvastatin 40mg qhs  - Continue comprehensive rehab program PT OT SLP 3 hours daily 5 x week  - recreation therapy  - precautions: fall, aspiration, cardiac  - outpt neuro + NSGY f/u    # atrial fibrillation  # HTN  - hold apixaban as above  - amlodipine 10mg daily  - Lopressor 25mg BID  - Valsartan 320mg daily (switched from losartan 100 Qd on 1/30 per cardiology recs @ [facility])  - BP (127/87 - 155/90) 1/31    # on CPAP at home per nursing  - will obtain setting from family  - supplemental O2 in meantime    # pain  - acetaminophen 650mg q6h PRN    # bowel  - scheduled: senna 2tab qhs  - PRN: Miralax BID    # bladder   - PVR on admission, ISC for PVR >400mL  - toileting schedule    # Oral Thrush  - Nystatin s/s 4xdaily x7days    # sleep  - melatonin 3mg hs PRN    # skin  - skin assessment on admission: Some blanchable erythema on bl buttocks. No wounds.    #diet   - easy to chew w/ mod thick    # DVT ppx  - admission doppler r/o DVT 1/30: No evidence of deep venous thrombosis in either lower extremity.  - SCDs if duplex negative  - lovenox 40 mg daily (started 1/29)    # LABS  Patient had labs done on day of transfer 1/30, no significant abnormalities/change.  Will f/u 2/3  f/u CPAP settings  CBC CMP 2/3  ---------------  Outpatient Follow-up:    Lynda Zimmerman  NP in Family Health  [address], [address]  [place] [address]  Phone: [phone]  Fax: [phone]  Follow Up Time:     Manoj Edge  [address], [address]  [place] [address]  Phone: [phone]  Fax: [phone]  Follow Up Time: 2 weeks     74M RHD w/ PMH HTN, afib (on apixaban, started 2017), and CVA (w/ residual min right-sided weakness), admitted to [facility] acute rehab for 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    # non traumatic acute left BG hemorrhage with right hemibody involvement, right HP, apraxia speech, motor apraxia, incoordination, dysarthria, dysphagia  - CT Head: 5.8 x 2.6 cm intracranial hemorrhage with mass effect and 1-2mm rightward midline  shift on 1/26  - CTA h/n: stable near complete occlusion of left distal M1  - S/p andexanet + levetiracetam 1g x1  - Hold apixaban through 03/09 (5-6 weeks per tania)  - Rosuvastatin 40mg qhs  - Continue comprehensive rehab program PT OT SLP 3 hours daily 5 x week  - recreation therapy  - precautions: fall, aspiration, cardiac  - outpt neuro + NSGY f/u    # atrial fibrillation  # HTN  - hold apixaban as above  - amlodipine 10mg daily  - Lopressor 25mg BID  - Valsartan 320mg daily (switched from losartan 100 Qd on 1/30 per cardiology recs @ [facility])  - BP (127/87 - 155/90) 1/31    # on CPAP at home per nursing  - will obtain setting from family  - supplemental O2 in meantime    # pain  - acetaminophen 650mg q6h PRN    # bowel  - scheduled: senna 2tab qhs  - PRN: Miralax BID    # bladder   - PVR on admission, ISC for PVR >400mL  - toileting schedule    # Oral Thrush  - Nystatin s/s 4xdaily x7days    # sleep  - melatonin 3mg hs PRN    # skin  - skin assessment on admission: Some blanchable erythema on bl buttocks. No wounds.    #diet   - easy to chew w/ mod thick    # DVT ppx  - admission doppler r/o DVT 1/30: No evidence of deep venous thrombosis in either lower extremity.  - SCDs if duplex negative  - lovenox 40 mg daily (started 1/29)    # LABS  Patient had labs done on day of transfer 1/30, no significant abnormalities/change.  Will f/u 2/3  f/u CPAP settings  CBC CMP 2/3  ---------------  Outpatient Follow-up:    Lynda Zimmerman  NP in Family Health  [address], [address]  [place] [address]  Phone: [phone]  Fax: [phone]  Follow Up Time:     Manoj Edge  [address], [address]  [place] [address]  Phone: [phone]  Fax: [phone]  Follow Up Time: 2 weeks

## 2025-01-31 NOTE — PROGRESS NOTE ADULT - COMMENTS
Patient with some periods of incontinence urine, continent bowel. He is alert, denies any pain, NAD. No acute issues reported by staff from overnight

## 2025-01-31 NOTE — PROGRESS NOTE ADULT - SUBJECTIVE AND OBJECTIVE BOX
Patient is a 74y old  Male who presents with a chief complaint of Cerebral infarction     (31 Jan 2025 09:46)      HPI:   74M RHD w/ PMH HTN, afib (on apixaban, started 2017), and CVA (w/ residual min right-sided weakness), who presented to [facility] 01/26/2025 after being found on floor by wife w/ right-sided weakness and word-finding difficulties. Wife woke up in 0545 1/26 finding the patient on the floor and complaining of right sided weakness and word finding difficulties. LKN 1/25 2230 when the patient went to bed. He took his apixaban and medications for HLD and HTN prior to going to bed. Upon finding the patient on the floor, the patient was transferred to the [place] ED. NIHSS 10 (+1 face, +3 RUE, +3 RLE, +1 Aphasia, +2, dysarthria). mRS 0    CT head demonstrated 5.8 x 2.6 cm left basal ganglia hemorrhage with 1-2mm rightward midline shift. 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 Received andexanet and levetiracetam 1g x1. He was transferred to [facility] 01/26 for further eval. Neurology recommended holding apixab through 03/09 per neuro (hold apixaban for 5-6 weeks, then will make further decisions regarding restarting and/or a Watchman as outpatient given Afib).    Evaluated by PM&R and discharged to [facility] acute rehab 01/29.  (30 Jan 2025 16:58)      PAST MEDICAL & SURGICAL HISTORY:  Stroke      Chronic atrial fibrillation      HTN (hypertension)      HLD (hyperlipidemia)      No significant past surgical history          MEDICATIONS  (STANDING):  amLODIPine   Tablet 10 milliGRAM(s) Oral daily  enoxaparin Injectable 40 milliGRAM(s) SubCutaneous every 24 hours  metoprolol tartrate 25 milliGRAM(s) Oral every 12 hours  nystatin    Suspension 109356 Unit(s) Oral four times a day  pantoprazole    Tablet 40 milliGRAM(s) Oral before breakfast  rosuvastatin 40 milliGRAM(s) Oral at bedtime  senna 2 Tablet(s) Oral at bedtime  valsartan 320 milliGRAM(s) Oral daily    MEDICATIONS  (PRN):  acetaminophen     Tablet .. 650 milliGRAM(s) Oral every 6 hours PRN Moderate Pain (4 - 6)  melatonin 6 milliGRAM(s) Oral at bedtime PRN Insomnia  polyethylene glycol 3350 17 Gram(s) Oral daily PRN Constipation      Allergies    No Known Allergies    Intolerances          VITALS  74y  Vital Signs Last 24 Hrs  T(C): 36.6 (31 Jan 2025 09:50), Max: 36.7 (30 Jan 2025 20:15)  T(F): 97.8 (31 Jan 2025 09:50), Max: 98.1 (30 Jan 2025 20:15)  HR: 76 (31 Jan 2025 09:50) (61 - 76)  BP: 127/87 (31 Jan 2025 09:50) (127/87 - 155/90)  BP(mean): --  RR: 14 (31 Jan 2025 09:50) (14 - 17)  SpO2: 95% (31 Jan 2025 09:50) (94% - 95%)    Parameters below as of 31 Jan 2025 09:50  Patient On (Oxygen Delivery Method): room air      Daily Height in cm: 170.18 (30 Jan 2025 11:43)    Daily         RECENT LABS:                          15.4   8.04  )-----------( 162      ( 30 Jan 2025 07:11 )             45.7     01-30    140  |  107  |  21  ----------------------------<  91  3.5   |  22  |  <0.30[L]    Ca    8.9      30 Jan 2025 07:12    TPro  6.5  /  Alb  3.6  /  TBili  0.8  /  DBili  x   /  AST  20  /  ALT  13  /  AlkPhos  75  01-30    LIVER FUNCTIONS - ( 30 Jan 2025 07:12 )  Alb: 3.6 g/dL / Pro: 6.5 g/dL / ALK PHOS: 75 U/L / ALT: 13 U/L / AST: 20 U/L / GGT: x             Urinalysis Basic - ( 30 Jan 2025 07:12 )    Color: x / Appearance: x / SG: x / pH: x  Gluc: 91 mg/dL / Ketone: x  / Bili: x / Urobili: x   Blood: x / Protein: x / Nitrite: x   Leuk Esterase: x / RBC: x / WBC x   Sq Epi: x / Non Sq Epi: x / Bacteria: x          CAPILLARY BLOOD GLUCOSE

## 2025-01-31 NOTE — DISCHARGE NOTE PROVIDER - NSDCCAREPROVSEEN_GEN_ALL_CORE_FT
Adelfo, Amanda Messina, Jose Adelfo, Amanda Messina, Jose Barr, Florecita Adelfo, Amanda Messina, Jose Barr, Florecita De La Paz, Tio

## 2025-01-31 NOTE — DIETITIAN INITIAL EVALUATION ADULT - PERTINENT MEDS FT
MEDICATIONS  (STANDING):  amLODIPine   Tablet 10 milliGRAM(s) Oral daily  enoxaparin Injectable 40 milliGRAM(s) SubCutaneous every 24 hours  metoprolol tartrate 25 milliGRAM(s) Oral every 12 hours  nystatin    Suspension 947667 Unit(s) Oral four times a day  pantoprazole    Tablet 40 milliGRAM(s) Oral before breakfast  rosuvastatin 40 milliGRAM(s) Oral at bedtime  senna 2 Tablet(s) Oral at bedtime  valsartan 320 milliGRAM(s) Oral daily    MEDICATIONS  (PRN):  acetaminophen     Tablet .. 650 milliGRAM(s) Oral every 6 hours PRN Moderate Pain (4 - 6)  melatonin 6 milliGRAM(s) Oral at bedtime PRN Insomnia  polyethylene glycol 3350 17 Gram(s) Oral daily PRN Constipation

## 2025-01-31 NOTE — DIETITIAN INITIAL EVALUATION ADULT - ORAL INTAKE PTA/DIET HISTORY
Evaluation limited by aphasia,  asked mostly yes/no questions due to difficulty answering open ended questions. Per patient w/ adequate appetite PTA. Patient was seen with his wife present at the bedside. Per report from his wife, prepares meals at home. Does not follow a therapeutic meal patter at home though, limits beef intake. NKFA nor food intolerances reported.

## 2025-01-31 NOTE — CONSULT NOTE ADULT - SUBJECTIVE AND OBJECTIVE BOX
CC: Patient is a 74y old  Male who presents with a chief complaint of left basal ganglia hemorrhage w/ right hemiparesis, transcortical sensory aphasia, and dysphasia (31 Jan 2025 10:15)    HPI:  74M w/ PMH HTN, afib (on apixaban, started 2017), and CVA (w/ residual min right-sided weakness), who presented to [facility] 01/26/2025 after being found on floor by wife w/ right-sided weakness and word-finding difficulties. Wife woke up in 0545 1/26 finding the patient on the floor and complaining of right sided weakness and word finding difficulties. LKN 1/25 2230 when the patient went to bed. He took his apixaban and medications for HLD and HTN prior to going to bed. Upon finding the patient on the floor, the patient was transferred to the [place] ED. NIHSS 10 (+1 face, +3 RUE, +3 RLE, +1 Aphasia, +2, dysarthria). mRS 0    CT head demonstrated 5.8 x 2.6 cm left basal ganglia hemorrhage with 1-2mm rightward midline shift. 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 Received andexanet and levetiracetam. Transferred to [facility] 01/26, Neurology recommended holding apixaban through 03/09 per neuro (hold apixaban for 5-6 weeks, then will make further decisions regarding restarting and/or a Watchman as outpatient given Afib).    Admitted for acute rehab [facility] acute rehab 01/29.     PAST MEDICAL HISTORY:  Chronic atrial fibrillation   HLD (hyperlipidemia)   HTN (hypertension)   Stroke  ISIS    PAST SURGICAL HISTORY:  No significant past surgical history.     FAMILY HISTORY:  No pertinent family history in first degree relatives. No pertinent family history of: coronary disease.     Social History:  · Substance use	No  · Social History (marital status, living situation, occupation, and sexual history)	Smoking - Denies  EtOH - Denies  Drugs - Denies    FUNCTIONAL HISTORY  Pt lives with his wife in a PH +2 steps to enter +first floor set up. Pt has a walk in shower.  Prior Level of Function:  Pt was independent with all mobility and ADLs. Pt has a cane, RW and 3:1 commode.    CURRENT FUNCTIONAL STATUS  - Bed Mobility: Min A, 1PA  - Transfers: Min A, 2PA  - Gait: Min A, 2PA  -Lower Body Dressing: Mod A, 1PA     Tobacco Screening:  · Core Measure Site	Yes  · Has the patient used tobacco in the past 30 days?	No      Interval History:  Chart reviewed  Patient seen and examined  No major overnight events  No complaints this morning    Care Discussed with Consultants/Other Providers: Yes    Vital Signs Last 24 Hrs  T(F): 97.8 (31 Jan 2025 09:50), Max: 98.1 (30 Jan 2025 20:15)  HR: 76 (31 Jan 2025 09:50) (61 - 76)  BP: 127/87 (31 Jan 2025 09:50) (127/87 - 155/90)  RR: 14 (31 Jan 2025 09:50) (14 - 16)  SpO2: 95% (31 Jan 2025 09:50) (94% - 95%)  I&O's Summary    BMI (kg/m2): 26 (01-30-25 @ 11:43)  PHYSICAL EXAM:  GENERAL: NAD  HEENT: EOMI, PERRLA  Neck: Supple  NERVOUS SYSTEM:  CN II - X grossly intact; follows commands  HEART: Regular rate and rhythm; No high grade murmurs, No pitting edema  CHEST/LUNG: Clear to ascultation bilaterally; No high grade adventitious sounds; normal respiratory effort, no intercostal retractions  ABDOMEN: Soft, Nontender, Bowel sounds present, no guarding  MUSCULOSKELETAL/EXTREMITIES:  No clubbing or digital cyanosis  PSYCH: Appropriate affect, Alert    LABS:                        15.4   8.04  )-----------( 162      ( 30 Jan 2025 07:11 )             45.7       01-30    140  |  107  |  21  ----------------------------<  91  3.5   |  22  |  <0.30    Ca    8.9      30 Jan 2025 07:12    TPro  6.5  /  Alb  3.6  /  TBili  0.8  /  DBili  x   /  AST  20  /  ALT  13  /  AlkPhos  75  01-30                                       CC: Patient is a 74y old  Male who presents with a chief complaint of left basal ganglia hemorrhage w/ right hemiparesis, transcortical sensory aphasia, and dysphasia (31 Jan 2025 10:15)    HPI:  74M w/ PMH HTN, afib (on apixaban, started 2017), and CVA (w/ residual min right-sided weakness), who presented to [facility] 01/26/2025 after being found on floor by wife w/ right-sided weakness and word-finding difficulties. Wife woke up in 0545 1/26 finding the patient on the floor and complaining of right sided weakness and word finding difficulties. LKN 1/25 2230 when the patient went to bed. He took his apixaban and medications for HLD and HTN prior to going to bed. Upon finding the patient on the floor, the patient was transferred to the [place] ED. NIHSS 10 (+1 face, +3 RUE, +3 RLE, +1 Aphasia, +2, dysarthria). mRS 0    CT head demonstrated 5.8 x 2.6 cm left basal ganglia hemorrhage with 1-2mm rightward midline shift. 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 Received andexanet and levetiracetam. Transferred to [facility] 01/26, Neurology recommended holding apixaban through 03/09 per neuro (hold apixaban for 5-6 weeks, then will make further decisions regarding restarting and/or a Watchman as outpatient given Afib).    Admitted for acute rehab [facility] acute rehab 01/29.     PAST MEDICAL HISTORY:  Chronic atrial fibrillation   HLD (hyperlipidemia)   HTN (hypertension)   Stroke  ISIS    PAST SURGICAL HISTORY:  No significant past surgical history.     FAMILY HISTORY:  No pertinent family history in first degree relatives. No pertinent family history of: coronary disease.     Social History:  · Substance use	No  · Social History (marital status, living situation, occupation, and sexual history)	Smoking - Denies  EtOH - Denies  Drugs - Denies    FUNCTIONAL HISTORY  Pt lives with his wife in a PH +2 steps to enter +first floor set up. Pt has a walk in shower.  Prior Level of Function:  Pt was independent with all mobility and ADLs. Pt has a cane, RW and 3:1 commode.    CURRENT FUNCTIONAL STATUS  - Bed Mobility: Min A, 1PA  - Transfers: Min A, 2PA  - Gait: Min A, 2PA  -Lower Body Dressing: Mod A, 1PA     Tobacco Screening:  · Core Measure Site	Yes  · Has the patient used tobacco in the past 30 days?	No      Interval History:  Chart reviewed  Patient seen and examined  No major overnight events  No complaints this morning    Care Discussed with Consultants/Other Providers: Yes    Vital Signs Last 24 Hrs  T(F): 97.8 (31 Jan 2025 09:50), Max: 98.1 (30 Jan 2025 20:15)  HR: 76 (31 Jan 2025 09:50) (61 - 76)  BP: 127/87 (31 Jan 2025 09:50) (127/87 - 155/90)  RR: 14 (31 Jan 2025 09:50) (14 - 16)  SpO2: 95% (31 Jan 2025 09:50) (94% - 95%)  I&O's Summary    BMI (kg/m2): 26 (01-30-25 @ 11:43)  PHYSICAL EXAM:  GENERAL: NAD  HEENT: EOMI, PERRLA  Neck: Supple  NERVOUS SYSTEM:  CN II - X grossly intact; follows commands  HEART: Regular rate and rhythm; No high grade murmurs, No pitting edema  CHEST/LUNG: Clear to ascultation bilaterally; No high grade adventitious sounds; normal respiratory effort, no intercostal retractions  ABDOMEN: Soft, Nontender, Bowel sounds present, no guarding  NEURO: speech, understandable, some dysarthria,   MUSCULOSKELETAL/EXTREMITIES: limited  RUE mild hypotonia, lifts, hand limited extension  No clubbing or digital cyanosis  PSYCH: Appropriate affect, Alert    LABS:                        15.4   8.04  )-----------( 162      ( 30 Jan 2025 07:11 )             45.7       01-30    140  |  107  |  21  ----------------------------<  91  3.5   |  22  |  <0.30    Ca    8.9      30 Jan 2025 07:12    TPro  6.5  /  Alb  3.6  /  TBili  0.8  /  DBili  x   /  AST  20  /  ALT  13  /  AlkPhos  75  01-30

## 2025-01-31 NOTE — DIETITIAN INITIAL EVALUATION ADULT - PERTINENT LABORATORY DATA
01-30    140  |  107  |  21  ----------------------------<  91  3.5   |  22  |  <0.30[L]    Ca    8.9      30 Jan 2025 07:12    TPro  6.5  /  Alb  3.6  /  TBili  0.8  /  DBili  x   /  AST  20  /  ALT  13  /  AlkPhos  75  01-30  A1C with Estimated Average Glucose Result: 5.0 % (01-27-25 @ 06:08)

## 2025-01-31 NOTE — DISCHARGE NOTE PROVIDER - NSDCCPCAREPLAN_GEN_ALL_CORE_FT
PRINCIPAL DISCHARGE DIAGNOSIS  Diagnosis: Stroke  Assessment and Plan of Treatment: You suffered a stroke that resulted in the emergence of significant symptoms including difficulty with speech, reduced strength, and overall functional deficits. You were managed conservatively and your blood thinning medication (Eliquis) was held to prevent further bleeding in your brain. You should follow up with your outpatient neurology/neurosurgery providers to determine when to restart this medication and for further post-stroke management. During your stay at [facility], you participated in a comprehensive rehabilitation program with 3 hours of daily therapy 5xweekly during which time you made significant functional gains. You should continue all medicaitons as prescribed and follow up with your outpatient providers for further management. Please contact the number on your discharge paperwork if you have any questions or concerns regarding your care or medications.     PRINCIPAL DISCHARGE DIAGNOSIS  Diagnosis: Stroke  Assessment and Plan of Treatment: You suffered a stroke that resulted in the emergence of significant symptoms including difficulty with speech, reduced strength, and overall functional deficits. You were managed conservatively and your blood thinning medication (Eliquis) was held to prevent further bleeding in your brain. You should follow up with your outpatient neurology/neurosurgery providers to determine when to restart this medication and for further post-stroke management. During your stay at [facility], you participated in a comprehensive rehabilitation program with 3 hours of daily therapy 5xweekly during which time you made significant functional gains. You should continue all medicaitons as prescribed and follow up with your outpatient providers for further management. Of note, please discuss with neurology and your primary care team when to resume your Eliquis (blood thinner) medication at your next appointment. Please contact the number on your discharge paperwork if you have any questions or concerns regarding your care or medications.

## 2025-01-31 NOTE — DIETITIAN INITIAL EVALUATION ADULT - ADD RECOMMEND
1. Continue Easy to Chew, moderately Thickened Liquid diet, consider Heart-Healthy DASH/TLC meal pattern.   2. Recommend Ensure Plus High Protein Daily 8 oz (Provides 350 kcal, 20 grams of protein) QD to assist pt in meeting estimated protein energy needs.  3. Monitor PO intake, GI tolerance, skin integrity, labs, weight, and bowel movement regularity.   4. Honor food preferences as feasible. Assist with meals PRN and encourage PO intake.  5. Follow SLP recommendations  6. Provide ongoing diet education as needed  7. RD remains available upon request and will follow-up per protocol

## 2025-01-31 NOTE — DISCHARGE NOTE PROVIDER - CARE PROVIDER_API CALL
Lynda Zimmerman  NP in Family Health  [address], [address]  [place] [address]  Phone: [phone]  Fax: [phone]  Follow Up Time: 1 week    Manoj Edge  [address], [address]  [place] [address]  Phone: [phone]  Fax: [phone]  Follow Up Time: 2 weeks   Lynda Zimmerman  NP in Family Health  [address], [address]  [place] [address]  Phone: [phone]  Fax: [phone]  Follow Up Time: 1 week    Manoj Edge  [facility]  [address], [address]  [place] [address]  Phone: [phone]  Fax: [phone]  Follow Up Time: 2 weeks    Baltazar Hickman  Neurology  [address], [address]  [place] [address]  Phone: [phone]  Fax: [phone]  Follow Up Time: 2 weeks    Gracy Hines  Physical/Rehab Medicine  [address] [place] [address]  Phone: [phone]  Fax: [phone]  Follow Up Time: 1 month

## 2025-01-31 NOTE — DIETITIAN INITIAL EVALUATION ADULT - OTHER INFO
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 Wife woke up in 0545 1/26 finding the patient on the floor and complaining of right sided weakness and word finding difficulties. LKN 1/25 2230 when the patient went to bed. He took his apixaban and medications for HLD and HTN prior to going to bed. Upon finding the patient on the floor, the patient was transferred to the [facility] ED. NIHSS 10 (+1 face, +3 RUE, +3 RLE, +1 Aphasia, +2, dysarthria). mRS 0    CT head demonstrated 5.8 x 2.6 cm left basal ganglia hemorrhage with 1-2mm rightward midline shift. 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 Received andexanet and levetiracetam 1g x1. He was transferred to [facility] 01/26 for further eval. Neurology recommended holding apixab through 03/09 per neuro (hold apixaban for 5-6 weeks, then will make further decisions regarding restarting and/or a Watchman as outpatient given Afib).    At this time patient tolerating diet w/ adequate appetite/intake consuming % of meals. Amenable to Ensure Plus High Protein Daily 8 oz (Provides 350 kcal, 20 grams of protein) QD to assist patient in meeting estimated energy requirements. No issues w/ dentition or chewing and swallowing current diet texture. Denies N/V/C/D, last BM 1/29 per nursing flowsheets. Reports weight stable, CBW: 165lb (1/30). W/ HTN and Hx: HLD, recommend Heart-Healthy DASH/TLC meal pattern, + education provided.

## 2025-01-31 NOTE — DISCHARGE NOTE PROVIDER - NSDCFUSCHEDAPPT_GEN_ALL_CORE_FT
Baltazar Hickman  [facility]  NEUROLOGY [address]  Scheduled Appointment: 05/01/2025     Cardiomegaly and bilateral air space opacities consistent with pulmonary edema, No focal consolidations/PTX

## 2025-01-31 NOTE — DISCHARGE NOTE PROVIDER - CARE PROVIDERS DIRECT ADDRESSES
,[email or web address],DirectAddress_Unknown ,[email or web address],DirectAddress_Unknown,[email or web address]

## 2025-01-31 NOTE — CONSULT NOTE ADULT - ASSESSMENT
74M w/ PMH HTN, afib (on apixaban, started 2017), and CVA (w/ residual min right-sided weakness), who presented to [facility] 01/26/2025 after being found on floor by wife w/ right-sided weakness and word-finding difficulties. Wife woke up in 0545 1/26 finding the patient on the floor and complaining of right sided weakness and word finding difficulties. LKN 1/25 2230 when the patient went to bed. He took his apixaban and medications for HLD and HTN prior to going to bed. Upon finding the patient on the floor, the patient was transferred to the [facility] ED. NIHSS 10 (+1 face, +3 RUE, +3 RLE, +1 Aphasia, +2, dysarthria). mRS 0    CT head demonstrated 5.8 x 2.6 cm left basal ganglia hemorrhage with 1-2mm rightward midline shift. 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 Received andexanet and levetiracetam. Transferred to [facility] 01/26, Neurology recommended holding apixaban through 03/09 per neuro (hold apixaban for 5-6 weeks, then will make further decisions regarding restarting and/or a Watchman as outpatient given Afib).    1/29: Admitted for acute rehab [facility] acute rehab    Chronic atrial fibrillation apixiban on hold (tentatively till 3/9)  Stroke  Primary Hypertension  amLODIPine   Tablet 10 milliGRAM(s) Oral daily  metoprolol tartrate 25 milliGRAM(s) Oral every 12 hours  valsartan 320 milliGRAM(s) Oral daily  rosuvastatin 40 milliGRAM(s) Oral at bedtime    ISIS ideally to be on CPAP consistently    Thrush  nystatin    Suspension 853106 Unit(s) Oral four times a day    #DVTPPX:  enoxaparin Injectable 40 milliGRAM(s) SubCutaneous every 24 hours    pantoprazole    Tablet 40 milliGRAM(s) Oral before breakfast    Constipation  senna 2 Tablet(s) Oral at bedtime    MEDICATIONS  (PRN):  acetaminophen     Tablet .. 650 milliGRAM(s) Oral every 6 hours PRN Moderate Pain (4 - 6)  melatonin 6 milliGRAM(s) Oral at bedtime PRN Insomnia  polyethylene glycol 3350 17 Gram(s) Oral daily PRN Constipation

## 2025-01-31 NOTE — DISCHARGE NOTE PROVIDER - HOSPITAL COURSE
HPI:   74M RHD w/ PMH HTN, afib (on apixaban, started 2017), and CVA (w/ residual min right-sided weakness), who presented to [facility] 01/26/2025 after being found on floor by wife w/ right-sided weakness and word-finding difficulties. Wife woke up in 0545 1/26 finding the patient on the floor and complaining of right sided weakness and word finding difficulties. LKN 1/25 2230 when the patient went to bed. He took his apixaban and medications for HLD and HTN prior to going to bed. Upon finding the patient on the floor, the patient was transferred to the [place] ED. NIHSS 10 (+1 face, +3 RUE, +3 RLE, +1 Aphasia, +2, dysarthria). mRS 0    CT head demonstrated 5.8 x 2.6 cm left basal ganglia hemorrhage with 1-2mm rightward midline shift. 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 Received andexanet and levetiracetam 1g x1. He was transferred to [facility] 01/26 for further eval. Neurology recommended holding apixab through 03/09 per neuro (hold apixaban for 5-6 weeks, then will make further decisions regarding restarting and/or a Watchman as outpatient given Afib).    Evaluated by PM&R and discharged to [facility] acute rehab 01/29.  (30 Jan 2025 16:58)      Rehab course significant for ___.    All other medical co-morbidities were stable. Patient tolerated course of inpatient PT/OT/SLP rehab with significant improvements and met rehab goals prior to discharge. Discharge instructions were discussed with patient and family. All questions answered and all concerns addressed.     Patient was medically cleared for discharge to ___.    Functional Status:   HPI:   74M RHD w/ PMH HTN, afib (on apixaban, started 2017), and CVA (w/ residual min right-sided weakness), who presented to [facility] 01/26/2025 after being found on floor by wife w/ right-sided weakness and word-finding difficulties. Wife woke up in 0545 1/26 finding the patient on the floor and complaining of right sided weakness and word finding difficulties. LKN 1/25 2230 when the patient went to bed. He took his apixaban and medications for HLD and HTN prior to going to bed. Upon finding the patient on the floor, the patient was transferred to the [place] ED. NIHSS 10 (+1 face, +3 RUE, +3 RLE, +1 Aphasia, +2, dysarthria). mRS 0    CT head demonstrated 5.8 x 2.6 cm left basal ganglia hemorrhage with 1-2mm rightward midline shift. 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 Received andexanet and levetiracetam 1g x1. He was transferred to [facility] 01/26 for further eval. Neurology recommended holding apixab through 03/09 per neuro (hold apixaban for 5-6 weeks, then will make further decisions regarding restarting and/or a Watchman as outpatient given Afib).    Evaluated by PM&R and discharged to [facility] acute rehab 01/29.  (30 Jan 2025 16:58)      Rehab course significant for ___.    All other medical co-morbidities were stable. Patient tolerated course of inpatient PT/OT/SLP rehab with significant improvements and met rehab goals prior to discharge. Discharge instructions were discussed with patient and family. All questions answered and all concerns addressed.     Patient was medically cleared for discharge to ___.    Functional Status:   HPI:   74M RHD w/ PMH HTN, afib (on apixaban, started 2017), and CVA (w/ residual min right-sided weakness), who presented to [facility] 01/26/2025 after being found on floor by wife w/ right-sided weakness and word-finding difficulties. Wife woke up in 0545 1/26 finding the patient on the floor and complaining of right sided weakness and word finding difficulties. LKN 1/25 2230 when the patient went to bed. He took his apixaban and medications for HLD and HTN prior to going to bed. Upon finding the patient on the floor, the patient was transferred to the [place] ED. NIHSS 10 (+1 face, +3 RUE, +3 RLE, +1 Aphasia, +2, dysarthria). mRS 0    CT head demonstrated 5.8 x 2.6 cm left basal ganglia hemorrhage with 1-2mm rightward midline shift. 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 Received andexanet and levetiracetam 1g x1. He was transferred to [facility] 01/26 for further eval. Neurology recommended holding apixab through 03/09 per neuro (hold apixaban for 5-6 weeks, then will make further decisions regarding restarting and/or a Watchman as outpatient given Afib).    Evaluated by PM&R and discharged to [facility] acute rehab 01/29.  (30 Jan 2025 16:58)      Rehab course significant for mild hypernatremia and Quintin likely 2/2 volume depletion that improved/stabilized with increased PO intake.  Patient also had mild LFT elevation and was monitored with serial CMPs with stablization. Discussed above with hospitalist who followed along during admission and agreed with monitoring.    All other medical co-morbidities were stable. Patient tolerated course of inpatient PT/OT/SLP rehab with significant improvements and met rehab goals prior to discharge. Discharge instructions were discussed with patient and family. All questions answered and all concerns addressed.     Patient was medically cleared for discharge to home on 2/20/25.    Functional Status:  Bed/Chair/Wheelchair (Mobility)  Current Status: 2/13: Pt able to perform all transfers with CGA x 1    Bed Mobility (Mobility)  Current Status: 2/13/25: Pt able to perform all bed mobility with close  supervision    Stairs (Mobility)  Current Status: 2/13: Negotiates 12 steps with 1 HR CGA x 1    Walk (Mobility)  Current Status: 2/13: Ambulates 150' without AD with CGA x 1 with posterior  hands on hip assistance     HPI:   74M RHD w/ PMH HTN, afib (on apixaban, started 2017), and CVA (w/ residual min right-sided weakness), who presented to [facility] 01/26/2025 after being found on floor by wife w/ right-sided weakness and word-finding difficulties. Wife woke up in 0545 1/26 finding the patient on the floor and complaining of right sided weakness and word finding difficulties. LKN 1/25 2230 when the patient went to bed. He took his apixaban and medications for HLD and HTN prior to going to bed. Upon finding the patient on the floor, the patient was transferred to the [place] ED. NIHSS 10 (+1 face, +3 RUE, +3 RLE, +1 Aphasia, +2, dysarthria). mRS 0    CT head demonstrated 5.8 x 2.6 cm left basal ganglia hemorrhage with 1-2mm rightward midline shift. 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 Received andexanet and levetiracetam 1g x1. He was transferred to [facility] 01/26 for further eval. Neurology recommended holding apixab through 03/09 per neuro (hold apixaban for 5-6 weeks, then will make further decisions regarding restarting and/or a Watchman as outpatient given Afib).    Evaluated by PM&R and discharged to [facility] acute rehab 01/29.  (30 Jan 2025 16:58)      Rehab course significant for mild hypernatremia and Quintin likely 2/2 volume depletion that improved/stabilized with increased PO intake.  Patient also had mild LFT elevation and was monitored with serial CMPs with stabilization. Discussed above with hospitalist who followed along during admission and agreed with monitoring.  All other medical co-morbidities were stable. Patient tolerated course of inpatient PT/OT/SLP rehab with significant improvements and met rehab goals prior to discharge. Discharge instructions were discussed with patient and family. All questions answered and all concerns addressed.   Patient was medically cleared for discharge to home on 2/20/25.    Functional Status:  Bed/Chair/Wheelchair (Mobility)  Current Status: 2/13: Pt able to perform all transfers with CGA x 1    Bed Mobility (Mobility)  Current Status: 2/13/25: Pt able to perform all bed mobility with close  supervision    Stairs (Mobility)  Current Status: 2/13: Negotiates 12 steps with 1 HR CGA x 1    Walk (Mobility)  Current Status: 2/13: Ambulates 150' without AD with CGA x 1 with posterior  hands on hip assistance     HPI:   74M RHD w/ PMH HTN, afib (on apixaban, started 2017), and CVA (w/ residual min right-sided weakness), who presented to [facility] 01/26/2025 after being found on floor by wife w/ right-sided weakness and word-finding difficulties. Wife woke up in 0545 1/26 finding the patient on the floor and complaining of right sided weakness and word finding difficulties. LKN 1/25 2230 when the patient went to bed. He took his apixaban and medications for HLD and HTN prior to going to bed. Upon finding the patient on the floor, the patient was transferred to the [place] ED. NIHSS 10 (+1 face, +3 RUE, +3 RLE, +1 Aphasia, +2, dysarthria). mRS 0    CT head demonstrated 5.8 x 2.6 cm left basal ganglia hemorrhage with 1-2mm rightward midline shift. 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 Received andexanet and levetiracetam 1g x1. He was transferred to [facility] 01/26 for further eval. Neurology recommended holding apixab through 03/09 per neuro (hold apixaban for 5-6 weeks, then will make further decisions regarding restarting and/or a Watchman as outpatient given Afib).    Evaluated by PM&R and discharged to [facility] acute rehab 01/29.  (30 Jan 2025 16:58)      Rehab course significant for mild hypernatremia and Quintin likely 2/2 volume depletion that improved/stabilized with increased PO intake.  Patient also had mild LFT elevation and was monitored with serial CMPs with stabilization. Discussed above with hospitalist who followed along during admission and agreed with monitoring and resolved on repeat CMP prior to discharge. All other medical co-morbidities were stable. Patient tolerated course of inpatient PT/OT/SLP rehab with significant improvements and met rehab goals prior to discharge. Per neurology recommendations, continue holding Eliquis total 4-5 weeks and discuss resuming outpatient afterdischarge. Discharge instructions were discussed with patient and family. All questions answered and all concerns addressed.   Patient was medically cleared for discharge to home on 2/20/25 with home PT/OT/SLP and close follow up.    Functional Status:  Bed/Chair/Wheelchair (Mobility)  Current Status: 2/13: Pt able to perform all transfers with CGA x 1    Bed Mobility (Mobility)  Current Status: 2/13/25: Pt able to perform all bed mobility with close  supervision    Stairs (Mobility)  Current Status: 2/13: Negotiates 12 steps with 1 HR CGA x 1    Walk (Mobility)  Current Status: 2/13: Ambulates 150' without AD with CGA x 1 with posterior  hands on hip assistance     HPI:   74M RHD w/ PMH HTN, afib (on apixaban, started 2017), and CVA (w/ residual min right-sided weakness), who presented to [facility] 01/26/2025 after being found on floor by wife w/ right-sided weakness and word-finding difficulties. Wife woke up in 0545 1/26 finding the patient on the floor and complaining of right sided weakness and word finding difficulties. LKN 1/25 2230 when the patient went to bed. He took his apixaban and medications for HLD and HTN prior to going to bed. Upon finding the patient on the floor, the patient was transferred to the [place] ED. NIHSS 10 (+1 face, +3 RUE, +3 RLE, +1 Aphasia, +2, dysarthria). mRS 0    CT head demonstrated 5.8 x 2.6 cm left basal ganglia hemorrhage with 1-2mm rightward midline shift. 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 Received andexanet and levetiracetam 1g x1. He was transferred to [facility] 01/26 for further eval. Neurology recommended holding apixab through 03/09 per neuro (hold apixaban for 5-6 weeks, then will make further decisions regarding restarting and/or a Watchman as outpatient given Afib).    Evaluated by PM&R and discharged to [facility] acute rehab 01/29.  (30 Jan 2025 16:58)      Rehab course significant for mild hypernatremia and Quintin likely 2/2 volume depletion that improved/stabilized with increased PO intake.  Patient also had mild LFT elevation and was monitored with serial CMPs with stabilization. Discussed above with hospitalist who followed along during admission and agreed with monitoring and resolved on repeat CMP prior to discharge. All other medical co-morbidities were stable. Patient tolerated course of inpatient PT/OT/SLP rehab with significant improvements and met rehab goals prior to discharge. Per neurology recommendations, continue holding Eliquis total 4-5 weeks and discuss resuming outpatient afterdischarge. Discharge instructions were discussed with patient and family. All questions answered and all concerns addressed.   Patient was medically cleared for discharge to home on 2/20/25 with outpatient PT/OT/SLP and close follow up.    Functional Status:  Bed/Chair/Wheelchair (Mobility)  Current Status: 2/13: Pt able to perform all transfers with CGA x 1    Bed Mobility (Mobility)  Current Status: 2/13/25: Pt able to perform all bed mobility with close  supervision    Stairs (Mobility)  Current Status: 2/13: Negotiates 12 steps with 1 HR CGA x 1    Walk (Mobility)  Current Status: 2/13: Ambulates 150' without AD with CGA x 1 with posterior  hands on hip assistance     HPI:   74M RHD w/ PMH HTN, afib (on apixaban, started 2017), and CVA (w/ residual min right-sided weakness), who presented to [facility] 01/26/2025 after being found on floor by wife w/ right-sided weakness and word-finding difficulties. Wife woke up in 0545 1/26 finding the patient on the floor and complaining of right sided weakness and word finding difficulties. LKN 1/25 2230 when the patient went to bed. He took his apixaban and medications for HLD and HTN prior to going to bed. Upon finding the patient on the floor, the patient was transferred to the [place] ED. NIHSS 10 (+1 face, +3 RUE, +3 RLE, +1 Aphasia, +2, dysarthria). mRS 0    CT head demonstrated 5.8 x 2.6 cm left basal ganglia hemorrhage with 1-2mm rightward midline shift. 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 Received andexanet and levetiracetam 1g x1. He was transferred to [facility] 01/26 for further eval. Neurology recommended holding apixab through 03/09 per neuro (hold apixaban for 5-6 weeks, then will make further decisions regarding restarting and/or a Watchman as outpatient given Afib).    Evaluated by PM&R and discharged to [facility] acute rehab 01/29.  (30 Jan 2025 16:58)      Rehab course significant for mild hypernatremia and Quintin likely 2/2 volume depletion that improved/stabilized with increased PO intake. Patient also had mild LFT elevation and was monitored with serial CMPs with stabilization. Discussed above with hospitalist who followed along during admission and agreed with monitoring and resolved on repeat CMP prior to discharge. All other medical co-morbidities were stable. Patient tolerated course of inpatient PT/OT/SLP rehab with significant improvements and met rehab goals prior to discharge. Per neurology recommendations, continue holding Eliquis total 4-5 weeks and discuss resuming outpatient afterdischarge. Discharge instructions were discussed with patient and family. All questions answered and all concerns addressed.     Patient was medically cleared for discharge to home on 2/20/25 with outpatient PT/OT/SLP and close follow up. Caregiver training completed    Functional Status:  Bed/Chair/Wheelchair (Mobility)  Current Status: 2/13: Pt able to perform all transfers with CGA x 1    Bed Mobility (Mobility)  Current Status: 2/13/25: Pt able to perform all bed mobility with close  supervision    Stairs (Mobility)  Current Status: 2/13: Negotiates 12 steps with 1 HR CGA x 1    Walk (Mobility)  Current Status: 2/13: Ambulates 150' without AD with CGA x 1 with posterior  hands on hip assistance

## 2025-02-01 PROCEDURE — 99232 SBSQ HOSP IP/OBS MODERATE 35: CPT

## 2025-02-01 RX ADMIN — ENOXAPARIN SODIUM 40 MILLIGRAM(S): 100 INJECTION SUBCUTANEOUS at 18:07

## 2025-02-01 RX ADMIN — AMLODIPINE BESYLATE 10 MILLIGRAM(S): 10 TABLET ORAL at 05:50

## 2025-02-01 RX ADMIN — Medication 40 MILLIGRAM(S): at 05:50

## 2025-02-01 RX ADMIN — Medication 500000 UNIT(S): at 18:07

## 2025-02-01 RX ADMIN — ROSUVASTATIN CALCIUM 40 MILLIGRAM(S): 20 TABLET, FILM COATED ORAL at 22:24

## 2025-02-01 RX ADMIN — Medication 500000 UNIT(S): at 05:50

## 2025-02-01 RX ADMIN — Medication 320 MILLIGRAM(S): at 05:51

## 2025-02-01 RX ADMIN — Medication 500000 UNIT(S): at 11:29

## 2025-02-01 RX ADMIN — Medication 2 TABLET(S): at 21:47

## 2025-02-01 RX ADMIN — METOPROLOL SUCCINATE 25 MILLIGRAM(S): 50 TABLET, EXTENDED RELEASE ORAL at 18:09

## 2025-02-01 RX ADMIN — Medication 500000 UNIT(S): at 23:06

## 2025-02-01 RX ADMIN — METOPROLOL SUCCINATE 25 MILLIGRAM(S): 50 TABLET, EXTENDED RELEASE ORAL at 05:50

## 2025-02-01 NOTE — PROGRESS NOTE ADULT - ASSESSMENT
75 yo M admitted to acute Rehabilitation with Left basal ganglia ICH    Pt. Stable  Cont. comprehensive inpatient PT, OT and Speech      # atrial fibrillation  # HTN  - hold apixaban as above  - amlodipine 10mg daily  - Lopressor 25mg BID  - Valsartan 320mg daily (switched from losartan 100 Qd on 1/30 per cardiology recs @ [facility])  -BP controlled    # on CPAP at home per nursing  - will obtain setting from family  - supplemental O2 in meantime    # pain  - acetaminophen 650mg q6h PRN    # bowel  - scheduled: senna 2tab qhs  - PRN: Miralax BID    # bladder   - Monitor PVRs  - toileting schedule    # Oral Thrush  - Nystatin s/s 4xdaily x7days    # sleep  - melatonin 3mg hs PRN      #diet   - easy to chew w/ mod thick    # DVT ppx  -LE doppler 1/30-- neg for DVT bilat. LEs  - lovenox 40 mg daily (started 1/29)      No acute issues,   Cont. current plan of care and medications as per primary team

## 2025-02-01 NOTE — PROGRESS NOTE ADULT - ASSESSMENT
74M w/ PMH HTN, afib (on apixaban, started 2017), and CVA (w/ residual min right-sided weakness), who presented to [facility] 01/26/2025 after being found on floor by wife w/ right-sided weakness and word-finding difficulties. Wife woke up in 0545 1/26 finding the patient on the floor and complaining of right sided weakness and word finding difficulties. LKN 1/25 2230 when the patient went to bed. He took his apixaban and medications for HLD and HTN prior to going to bed. Upon finding the patient on the floor, the patient was transferred to the [place] ED. NIHSS 10 (+1 face, +3 RUE, +3 RLE, +1 Aphasia, +2, dysarthria). mRS 0    CT head demonstrated 5.8 x 2.6 cm left basal ganglia hemorrhage with 1-2mm rightward midline shift. 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 Received andexanet and levetiracetam. Transferred to [facility] 01/26, Neurology recommended holding apixaban through 03/09 per neuro (hold apixaban for 5-6 weeks, then will make further decisions regarding restarting and/or a Watchman as outpatient given Afib).    1/29: Admitted for acute rehab [facility] acute rehab    #Chronic atrial fibrillation  -apixiban on hold (tentatively till 3/9)  - on Metorpolol for HR control    #Stroke  #Primary Hypertension  - amLODIPine   10 milliGRAM(s) Oral daily  -metoprolol tartrate 25 milliGRAM(s) Oral every 12 hours  -valsartan 320 milliGRAM(s) Oral daily  -rosuvastatin 40 milliGRAM(s) Oral at bedtime    #ISIS i  - CPAP     #Thrush  - nystatin    Suspension 493984 Unit(s) Oral four times a day    #DVTPPX:  enoxaparin Injectable 40 milliGRAM(s) SubCutaneous every 24 hours    #Constipation  senna 2 Tablet(s) Oral at bedtime    MEDICATIONS  (PRN):  acetaminophen     Tablet .. 650 milliGRAM(s) Oral every 6 hours PRN Moderate Pain (4 - 6)  melatonin 6 milliGRAM(s) Oral at bedtime PRN Insomnia  polyethylene glycol 3350 17 Gram(s) Oral daily PRN Constipation

## 2025-02-01 NOTE — PROGRESS NOTE ADULT - SUBJECTIVE AND OBJECTIVE BOX
Subjective: Seen this AM in PT.  No new complaints or overnight issues    VITALS  T(C): 36.6 (01-31-25 @ 19:40), Max: 36.6 (01-31-25 @ 19:40)  HR: 66 (02-01-25 @ 05:50) (66 - 73)  BP: 145/87 (02-01-25 @ 05:50) (142/86 - 145/87)  RR: 16 (01-31-25 @ 19:40) (16 - 16)  SpO2: 93% (01-31-25 @ 19:40) (93% - 93%)  Wt(kg): --    REVIEW OF SYSTEMS  Pertinent in last 24 h: Neurological deficits      Physical Exam:  Constitutional - NAD, Comfortable  HEENT - NCAT, EOMI  Chest - breathing comfortably on RA  Cardiovascular - Warm well perfused  Abdomen - , Soft, NTND  Extremities - No edema, No calf tenderness   Neurologic Exam -    Stable                Cognitive - Awake, Alert, AAO x3     Cranial Nerves - left facial weakness, + dysphagia     Motor - right sided weakness-- LE 5/5, UE shoulder 5/5,  4/5.   Psychiatric - Mood stable, Affect WNL  -    RECENT LABS/IMAGING      EXAM:  US DPLX LWR EXT VEINS COMPL BI   ORDERED BY: ALYCIA ADORNO     PROCEDURE DATE:  01/30/2025          INTERPRETATION:  CLINICAL INFORMATION: Intracranial hemorrhage. Not on   deep vein thrombosis prophylaxis.    COMPARISON: None available.    TECHNIQUE: Duplex sonography of the BILATERAL LOWER extremity veins with   color and spectral Doppler, with and without compression.    FINDINGS:    RIGHT:  Normal compressibility of the RIGHT common femoral, femoral and popliteal   veins.  Doppler examination shows normal spontaneous and phasic flow.  No RIGHT calf vein thrombosis is detected.    LEFT:  Normal compressibility of the LEFT common femoral, femoral and popliteal   veins.  Doppler examination shows normal spontaneous and phasic flow.  No LEFT calf vein thrombosis is detected.    IMPRESSION:  No evidence of deep venous thrombosis in either lower extremity.            MEDICATIONS   acetaminophen     Tablet .. 650 milliGRAM(s) every 6 hours PRN  amLODIPine   Tablet 10 milliGRAM(s) daily  enoxaparin Injectable 40 milliGRAM(s) every 24 hours  melatonin 6 milliGRAM(s) at bedtime PRN  metoprolol tartrate 25 milliGRAM(s) every 12 hours  nystatin    Suspension 805516 Unit(s) four times a day  pantoprazole    Tablet 40 milliGRAM(s) before breakfast  polyethylene glycol 3350 17 Gram(s) daily PRN  rosuvastatin 40 milliGRAM(s) at bedtime  senna 2 Tablet(s) at bedtime  valsartan 320 milliGRAM(s) daily      --------------------------------------------------------------------

## 2025-02-01 NOTE — PROGRESS NOTE ADULT - SUBJECTIVE AND OBJECTIVE BOX
CC: Patient is a 74y old  Male who presents with a chief complaint of left basal ganglia hemorrhage w/ right hemiparesis, transcortical sensory aphasia, and dysphasia (31 Jan 2025 14:53)      Interval History:  Patient seen and examined at bedside.  No overnight events  No complaints this morning    ALLERGIES:  No Known Allergies    MEDICATIONS  (STANDING):  amLODIPine   Tablet 10 milliGRAM(s) Oral daily  enoxaparin Injectable 40 milliGRAM(s) SubCutaneous every 24 hours  metoprolol tartrate 25 milliGRAM(s) Oral every 12 hours  nystatin    Suspension 385415 Unit(s) Oral four times a day  pantoprazole    Tablet 40 milliGRAM(s) Oral before breakfast  rosuvastatin 40 milliGRAM(s) Oral at bedtime  senna 2 Tablet(s) Oral at bedtime  valsartan 320 milliGRAM(s) Oral daily    MEDICATIONS  (PRN):  acetaminophen     Tablet .. 650 milliGRAM(s) Oral every 6 hours PRN Moderate Pain (4 - 6)  melatonin 6 milliGRAM(s) Oral at bedtime PRN Insomnia  polyethylene glycol 3350 17 Gram(s) Oral daily PRN Constipation    Vital Signs Last 24 Hrs  T(F): 97.8 (31 Jan 2025 19:40), Max: 97.8 (31 Jan 2025 19:40)  HR: 66 (01 Feb 2025 05:50) (66 - 73)  BP: 145/87 (01 Feb 2025 05:50) (142/86 - 145/87)  RR: 16 (31 Jan 2025 19:40) (16 - 16)  SpO2: 93% (31 Jan 2025 19:40) (93% - 93%)  I&O's Summary    31 Jan 2025 07:01  -  01 Feb 2025 07:00  --------------------------------------------------------  IN: 0 mL / OUT: 2 mL / NET: -2 mL      BMI (kg/m2): 26 (01-30-25 @ 11:43)    PHYSICAL EXAM:  HEENT: EOMI, PERRLA  Neck: Supple  NERVOUS SYSTEM: follows commands, dysarthria, R facial droop, mild R UE weakness  HEART: Regular rate and rhythm; No murmurs, No pitting edema  CHEST/LUNG: Clear to ascultation bilaterally;   ABDOMEN: Soft, Nontender, Bowel sounds present, no guarding  MUSCULOSKELETAL/EXTREMITIES: no calf tenderness, no c/c/e  PSYCH: Appropriate affect, AAO x 3    LABS:                        15.4   8.04  )-----------( 162      ( 30 Jan 2025 07:11 )             45.7       01-30    140  |  107  |  21  ----------------------------<  91  3.5   |  22  |  <0.30    Ca    8.9      30 Jan 2025 07:12    TPro  6.5  /  Alb  3.6  /  TBili  0.8  /  DBili  x   /  AST  20  /  ALT  13  /  AlkPhos  75  01-30 01-27 Chol 142 mg/dL LDL -- HDL 51 mg/dL Trig 124 mg/dL                  Urinalysis Basic - ( 30 Jan 2025 07:12 )    Color: x / Appearance: x / SG: x / pH: x  Gluc: 91 mg/dL / Ketone: x  / Bili: x / Urobili: x   Blood: x / Protein: x / Nitrite: x   Leuk Esterase: x / RBC: x / WBC x   Sq Epi: x / Non Sq Epi: x / Bacteria: x        COVID-19 PCR: NotDetec (01-30-25 @ 15:00)      Care Discussed with Consultants/Other Providers: Yes. Rehab provider

## 2025-02-02 PROCEDURE — 99232 SBSQ HOSP IP/OBS MODERATE 35: CPT

## 2025-02-02 RX ADMIN — Medication 500000 UNIT(S): at 17:52

## 2025-02-02 RX ADMIN — METOPROLOL SUCCINATE 25 MILLIGRAM(S): 50 TABLET, EXTENDED RELEASE ORAL at 05:53

## 2025-02-02 RX ADMIN — Medication 2 TABLET(S): at 21:21

## 2025-02-02 RX ADMIN — AMLODIPINE BESYLATE 10 MILLIGRAM(S): 10 TABLET ORAL at 05:54

## 2025-02-02 RX ADMIN — ROSUVASTATIN CALCIUM 40 MILLIGRAM(S): 20 TABLET, FILM COATED ORAL at 21:21

## 2025-02-02 RX ADMIN — Medication 500000 UNIT(S): at 05:51

## 2025-02-02 RX ADMIN — Medication 40 MILLIGRAM(S): at 05:51

## 2025-02-02 RX ADMIN — Medication 500000 UNIT(S): at 13:48

## 2025-02-02 RX ADMIN — Medication 320 MILLIGRAM(S): at 05:54

## 2025-02-02 RX ADMIN — METOPROLOL SUCCINATE 25 MILLIGRAM(S): 50 TABLET, EXTENDED RELEASE ORAL at 17:52

## 2025-02-02 RX ADMIN — Medication 500000 UNIT(S): at 23:07

## 2025-02-02 RX ADMIN — ENOXAPARIN SODIUM 40 MILLIGRAM(S): 100 INJECTION SUBCUTANEOUS at 17:51

## 2025-02-02 NOTE — PROGRESS NOTE ADULT - SUBJECTIVE AND OBJECTIVE BOX
Subjective:  Seen this AM.  No new complaints or overnight issues    VITALS  T(C): 36.3 (02-01-25 @ 21:52), Max: 36.7 (02-01-25 @ 18:00)  HR: 77 (02-02-25 @ 05:50) (65 - 77)  BP: 149/88 (02-02-25 @ 05:50) (117/75 - 149/88)  RR: 15 (02-01-25 @ 21:52) (15 - 17)  SpO2: 94% (02-01-25 @ 21:52) (94% - 94%)  Wt(kg): --    REVIEW OF SYSTEMS  Pertinent in last 24 h: Neurological deficits      Physical Exam:  Constitutional - NAD, Comfortable  HEENT - NCAT, EOMI  Chest - breathing comfortably on RA  Cardiovascular - Warm well perfused  Abdomen - , Soft, NTND  Extremities - No edema, No calf tenderness   Neurologic Exam -    Stable                Cognitive - Awake, Alert, AAO x3     Cranial Nerves - left facial weakness, + dysphagia     Motor - right sided weakness-- LE 5/5, UE shoulder 5/5,  4/5.   Psychiatric - Mood stable, Affect WNL  -    RECENT LABS/IMAGING                  MEDICATIONS   acetaminophen     Tablet .. 650 milliGRAM(s) every 6 hours PRN  amLODIPine   Tablet 10 milliGRAM(s) daily  enoxaparin Injectable 40 milliGRAM(s) every 24 hours  melatonin 6 milliGRAM(s) at bedtime PRN  metoprolol tartrate 25 milliGRAM(s) every 12 hours  nystatin    Suspension 362896 Unit(s) four times a day  pantoprazole    Tablet 40 milliGRAM(s) before breakfast  polyethylene glycol 3350 17 Gram(s) daily PRN  rosuvastatin 40 milliGRAM(s) at bedtime  senna 2 Tablet(s) at bedtime  valsartan 320 milliGRAM(s) daily      --------------------------------------------------------------------

## 2025-02-02 NOTE — PROGRESS NOTE ADULT - ASSESSMENT
75 yo M admitted to acute Rehabilitation with Left basal ganglia ICH    Pt. Stable  Cont. comprehensive inpatient PT, OT and Speech      # atrial fibrillation  # HTN  - hold apixaban as above  - amlodipine 10mg daily  - Lopressor 25mg BID  - Valsartan 320mg daily (switched from losartan 100 Qd on 1/30 per cardiology recs @ [facility])  -BP controlled    # on CPAP at home per nursing  - will obtain setting from family  - supplemental O2 in meantime    # pain  - acetaminophen 650mg q6h PRN    # bowel  - scheduled: senna 2tab qhs  - PRN: Miralax BID    # bladder   - Monitor PVRs  - toileting schedule    # Oral Thrush  - Nystatin s/s 4xdaily x7days    # sleep  - melatonin 3mg hs PRN      #diet   - easy to chew w/ mod thick    # DVT ppx  -LE doppler 1/30-- neg for DVT bilat. LEs  - lovenox 40 mg daily (started 1/29)      No acute issues,   Cont. current plan of care and medications as per primary team

## 2025-02-03 LAB
ALBUMIN SERPL ELPH-MCNC: 2.7 G/DL — LOW (ref 3.3–5)
ALP SERPL-CCNC: 85 U/L — SIGNIFICANT CHANGE UP (ref 40–120)
ALT FLD-CCNC: 29 U/L — SIGNIFICANT CHANGE UP (ref 10–45)
ANION GAP SERPL CALC-SCNC: 7 MMOL/L — SIGNIFICANT CHANGE UP (ref 5–17)
AST SERPL-CCNC: 17 U/L — SIGNIFICANT CHANGE UP (ref 10–40)
BASOPHILS # BLD AUTO: 0.05 K/UL — SIGNIFICANT CHANGE UP (ref 0–0.2)
BASOPHILS NFR BLD AUTO: 0.7 % — SIGNIFICANT CHANGE UP (ref 0–2)
BILIRUB SERPL-MCNC: 0.5 MG/DL — SIGNIFICANT CHANGE UP (ref 0.2–1.2)
BUN SERPL-MCNC: 34 MG/DL — HIGH (ref 7–23)
CALCIUM SERPL-MCNC: 9.1 MG/DL — SIGNIFICANT CHANGE UP (ref 8.4–10.5)
CHLORIDE SERPL-SCNC: 110 MMOL/L — HIGH (ref 96–108)
CO2 SERPL-SCNC: 31 MMOL/L — SIGNIFICANT CHANGE UP (ref 22–31)
CREAT SERPL-MCNC: 1 MG/DL — SIGNIFICANT CHANGE UP (ref 0.5–1.3)
EGFR: 78 ML/MIN/1.73M2 — SIGNIFICANT CHANGE UP
EOSINOPHIL # BLD AUTO: 0.32 K/UL — SIGNIFICANT CHANGE UP (ref 0–0.5)
EOSINOPHIL NFR BLD AUTO: 4.2 % — SIGNIFICANT CHANGE UP (ref 0–6)
GLUCOSE SERPL-MCNC: 96 MG/DL — SIGNIFICANT CHANGE UP (ref 70–99)
HCT VFR BLD CALC: 43.6 % — SIGNIFICANT CHANGE UP (ref 39–50)
HGB BLD-MCNC: 14 G/DL — SIGNIFICANT CHANGE UP (ref 13–17)
IMM GRANULOCYTES NFR BLD AUTO: 0.1 % — SIGNIFICANT CHANGE UP (ref 0–0.9)
LYMPHOCYTES # BLD AUTO: 1.89 K/UL — SIGNIFICANT CHANGE UP (ref 1–3.3)
LYMPHOCYTES # BLD AUTO: 24.7 % — SIGNIFICANT CHANGE UP (ref 13–44)
MCHC RBC-ENTMCNC: 30.8 PG — SIGNIFICANT CHANGE UP (ref 27–34)
MCHC RBC-ENTMCNC: 32.1 G/DL — SIGNIFICANT CHANGE UP (ref 32–36)
MCV RBC AUTO: 96 FL — SIGNIFICANT CHANGE UP (ref 80–100)
MONOCYTES # BLD AUTO: 0.67 K/UL — SIGNIFICANT CHANGE UP (ref 0–0.9)
MONOCYTES NFR BLD AUTO: 8.8 % — SIGNIFICANT CHANGE UP (ref 2–14)
NEUTROPHILS # BLD AUTO: 4.7 K/UL — SIGNIFICANT CHANGE UP (ref 1.8–7.4)
NEUTROPHILS NFR BLD AUTO: 61.5 % — SIGNIFICANT CHANGE UP (ref 43–77)
NRBC # BLD: 0 /100 WBCS — SIGNIFICANT CHANGE UP (ref 0–0)
NRBC BLD-RTO: 0 /100 WBCS — SIGNIFICANT CHANGE UP (ref 0–0)
PLATELET # BLD AUTO: 173 K/UL — SIGNIFICANT CHANGE UP (ref 150–400)
POTASSIUM SERPL-MCNC: 3.5 MMOL/L — SIGNIFICANT CHANGE UP (ref 3.5–5.3)
POTASSIUM SERPL-SCNC: 3.5 MMOL/L — SIGNIFICANT CHANGE UP (ref 3.5–5.3)
PROT SERPL-MCNC: 6.6 G/DL — SIGNIFICANT CHANGE UP (ref 6–8.3)
RBC # BLD: 4.54 M/UL — SIGNIFICANT CHANGE UP (ref 4.2–5.8)
RBC # FLD: 13.2 % — SIGNIFICANT CHANGE UP (ref 10.3–14.5)
SODIUM SERPL-SCNC: 148 MMOL/L — HIGH (ref 135–145)
WBC # BLD: 7.64 K/UL — SIGNIFICANT CHANGE UP (ref 3.8–10.5)
WBC # FLD AUTO: 7.64 K/UL — SIGNIFICANT CHANGE UP (ref 3.8–10.5)

## 2025-02-03 PROCEDURE — 99232 SBSQ HOSP IP/OBS MODERATE 35: CPT

## 2025-02-03 PROCEDURE — 74230 X-RAY XM SWLNG FUNCJ C+: CPT | Mod: 26

## 2025-02-03 RX ADMIN — Medication 500000 UNIT(S): at 05:16

## 2025-02-03 RX ADMIN — ROSUVASTATIN CALCIUM 40 MILLIGRAM(S): 20 TABLET, FILM COATED ORAL at 21:51

## 2025-02-03 RX ADMIN — AMLODIPINE BESYLATE 10 MILLIGRAM(S): 10 TABLET ORAL at 05:16

## 2025-02-03 RX ADMIN — METOPROLOL SUCCINATE 25 MILLIGRAM(S): 50 TABLET, EXTENDED RELEASE ORAL at 17:48

## 2025-02-03 RX ADMIN — Medication 2 TABLET(S): at 21:51

## 2025-02-03 RX ADMIN — METOPROLOL SUCCINATE 25 MILLIGRAM(S): 50 TABLET, EXTENDED RELEASE ORAL at 05:16

## 2025-02-03 RX ADMIN — Medication 500000 UNIT(S): at 17:48

## 2025-02-03 RX ADMIN — Medication 40 MILLIGRAM(S): at 05:16

## 2025-02-03 RX ADMIN — Medication 320 MILLIGRAM(S): at 05:16

## 2025-02-03 RX ADMIN — ENOXAPARIN SODIUM 40 MILLIGRAM(S): 100 INJECTION SUBCUTANEOUS at 17:48

## 2025-02-03 RX ADMIN — Medication 500000 UNIT(S): at 12:25

## 2025-02-03 NOTE — PROGRESS NOTE ADULT - ASSESSMENT
74M w/ PMH HTN, afib (on apixaban, started 2017), and CVA (w/ residual min right-sided weakness), who presented to [facility] 01/26/2025 after being found on floor by wife w/ right-sided weakness and word-finding difficulties. Wife woke up in 0545 1/26 finding the patient on the floor and complaining of right sided weakness and word finding difficulties. LKN 1/25 2230 when the patient went to bed. He took his apixaban and medications for HLD and HTN prior to going to bed. Upon finding the patient on the floor, the patient was transferred to the [place] ED. NIHSS 10 (+1 face, +3 RUE, +3 RLE, +1 Aphasia, +2, dysarthria). mRS 0    CT head demonstrated 5.8 x 2.6 cm left basal ganglia hemorrhage with 1-2mm rightward midline shift. 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 Received andexanet and levetiracetam. Transferred to [facility] 01/26, Neurology recommended holding apixaban through 03/09 per neuro (hold apixaban for 5-6 weeks, then will make further decisions regarding restarting and/or a Watchman as outpatient given Afib).    1/29: Admitted for acute rehab [facility] acute rehab    #Chronic atrial fibrillation  -apixiban on hold (tentatively till 3/9)  - on Metorpolol for HR control    # Hypernatremia  - mild.   - recommend to increase PO fluid intake  - repeat Na  on Wednesday    #Stroke  #Primary Hypertension  - amLODIPine   10 milliGRAM(s) Oral daily  -metoprolol tartrate 25 milliGRAM(s) Oral every 12 hours  -valsartan 320 milliGRAM(s) Oral daily  -rosuvastatin 40 milliGRAM(s) Oral at bedtime    #ISIS   - CPAP     #Thrush  - nystatin    Suspension 511034 Unit(s) Oral four times a day    #DVTPPX:  enoxaparin Injectable 40 milliGRAM(s) SubCutaneous every 24 hours    #Constipation  senna 2 Tablet(s) Oral at bedtime    MEDICATIONS  (PRN):  acetaminophen     Tablet .. 650 milliGRAM(s) Oral every 6 hours PRN Moderate Pain (4 - 6)  melatonin 6 milliGRAM(s) Oral at bedtime PRN Insomnia  polyethylene glycol 3350 17 Gram(s) Oral daily PRN Constipation

## 2025-02-03 NOTE — PROGRESS NOTE ADULT - SUBJECTIVE AND OBJECTIVE BOX
CC: Patient is a 74y old  Male who presents with a chief complaint of left basal ganglia hemorrhage w/ right hemiparesis, transcortical sensory aphasia, and dysphasia (03 Feb 2025 08:18)      Interval History:  Patient seen and examined at bedside.  No overnight events  No complaints this morning    ALLERGIES:  No Known Allergies    MEDICATIONS  (STANDING):  amLODIPine   Tablet 10 milliGRAM(s) Oral daily  enoxaparin Injectable 40 milliGRAM(s) SubCutaneous every 24 hours  metoprolol tartrate 25 milliGRAM(s) Oral every 12 hours  nystatin    Suspension 734883 Unit(s) Oral four times a day  pantoprazole    Tablet 40 milliGRAM(s) Oral before breakfast  rosuvastatin 40 milliGRAM(s) Oral at bedtime  senna 2 Tablet(s) Oral at bedtime  valsartan 320 milliGRAM(s) Oral daily    MEDICATIONS  (PRN):  acetaminophen     Tablet .. 650 milliGRAM(s) Oral every 6 hours PRN Moderate Pain (4 - 6)  melatonin 6 milliGRAM(s) Oral at bedtime PRN Insomnia  polyethylene glycol 3350 17 Gram(s) Oral daily PRN Constipation    Vital Signs Last 24 Hrs  T(F): 97.5 (03 Feb 2025 09:20), Max: 97.7 (02 Feb 2025 17:49)  HR: 64 (03 Feb 2025 09:20) (63 - 71)  BP: 110/73 (03 Feb 2025 09:20) (110/73 - 150/94)  RR: 16 (03 Feb 2025 09:20) (15 - 16)  SpO2: 93% (03 Feb 2025 09:20) (93% - 94%)  I&O's Summary    BMI (kg/m2): 26 (01-30-25 @ 11:43)    PHYSICAL EXAM:  HEENT: EOMI, PERRLA  Neck: Supple  NERVOUS SYSTEM: follows commands, dysarthria, R facial droop, mild R UE weakness  HEART: Regular rate and rhythm; No murmurs, No pitting edema  CHEST/LUNG: Clear to ascultation bilaterally;   ABDOMEN: Soft, Nontender, Bowel sounds present, no guarding  MUSCULOSKELETAL/EXTREMITIES: no calf tenderness, no c/c/e  PSYCH: Appropriate affect, AAO x 3  LABS:                        14.0   7.64  )-----------( 173      ( 03 Feb 2025 06:27 )             43.6       02-03    148  |  110  |  34  ----------------------------<  96  3.5   |  31  |  1.00    Ca    9.1      03 Feb 2025 06:27    TPro  6.6  /  Alb  2.7  /  TBili  0.5  /  DBili  x   /  AST  17  /  ALT  29  /  AlkPhos  85  02-03 01-27 Chol 142 mg/dL LDL -- HDL 51 mg/dL Trig 124 mg/dL                  Urinalysis Basic - ( 03 Feb 2025 06:27 )    Color: x / Appearance: x / SG: x / pH: x  Gluc: 96 mg/dL / Ketone: x  / Bili: x / Urobili: x   Blood: x / Protein: x / Nitrite: x   Leuk Esterase: x / RBC: x / WBC x   Sq Epi: x / Non Sq Epi: x / Bacteria: x        COVID-19 PCR: [place] (01-30-25 @ 15:00)      Care Discussed with Consultants/Other Providers: Yes. Rehab provider

## 2025-02-03 NOTE — PROGRESS NOTE ADULT - ASSESSMENT
75 yo M admitted to acute Rehabilitation with Left basal ganglia ICH    Pt. Stable  Cont. comprehensive inpatient PT, OT and Speech      # atrial fibrillation  # HTN  - hold apixaban as above  - amlodipine 10mg daily  - Lopressor 25mg BID  - Valsartan 320mg daily (switched from losartan 100 Qd on 1/30 per cardiology recs @ [facility])  - BP (145/86 - 150/94) 1/3  - HR (63 - 71) 1/3    # on CPAP at home per nursing  - will obtain setting from family  - supplemental O2 in meantime    # pain  - acetaminophen 650mg q6h PRN    # bowel  - scheduled: senna 2tab qhs  - PRN: Miralax BID    # bladder   - Monitor PVRs  - toileting schedule    # Oral Thrush  - Nystatin s/s 4xdaily x7days    # sleep  - melatonin 3mg hs PRN      #diet   - easy to chew w/ mod thick    # DVT ppx  -LE doppler 1/30-- neg for DVT bilat. LEs  - lovenox 40 mg daily (started 1/29)    #LABS  -CBC CMP 2/6        ---------------  Outpatient Follow-up:    Lynda Zimmerman  NP in [facility]  [address] [place] [address], [address]  [place] [address]  Phone: [phone]  Fax: [phone]  Follow Up Time:     Manoj Edge  Cardiology  [address] [facility], [address]  [place] [address]  Phone: [phone]  Fax: [phone]  Follow Up Time: 2 weeks           74M RHD w/ PMH HTN, afib (on apixaban, started 2017), and CVA (w/ residual min right-sided weakness), [facility] left basal ganglia hemorrhage w/ 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    # non traumatic acute left BG hemorrhage with right hemibody involvement, right HP, apraxia speech, motor apraxia, incoordination, dysarthria, dysphagia  - CT Head: 5.8 x 2.6 cm intracranial hemorrhage with mass effect and 1-2mm rightward midline  shift on 1/26  - CTA h/n: stable near complete occlusion of left distal M1  - S/p andexanet + levetiracetam 1g x1  - Hold apixaban through 03/09 (5-6 weeks per [place])  - Rosuvastatin 40mg qhs  - Begin comprehensive rehab program PT OT SLP 3 hours daily 5 x week  - recreation therapy  - precautions: fall, aspiration, cardiac  - outpt neuro + NSGY f/u      # atrial fibrillation  # HTN  - hold apixaban as above  - amlodipine 10mg daily  - Lopressor 25mg BID  - Valsartan 320mg daily (switched from losartan 100 Qd on 1/30 per cardiology recs @ [facility])  - BP (145/86 - 150/94) Feb 3  - HR (63 - 71) Feb 3    #Hypernatremia  - Na 148 Feb 3  - BMP 2/4  - Encourage increased PO intake    # on CPAP at home per nursing  - will obtain setting from family  - supplemental O2 in meantime    # pain  - acetaminophen 650mg q6h PRN    # bowel  - scheduled: senna 2tab qhs  - PRN: Miralax BID    # bladder   - Monitor PVRs  - toileting schedule    # Oral Thrush  - Nystatin s/s 4xdaily x7days    # sleep  - melatonin 3mg hs PRN    #diet   - easy to chew w/ mod thick    # DVT ppx  -LE doppler 1/30-- neg for DVT bilat. LEs  - lovenox 40 mg daily (started 1/29)    #LABS  -BMP 2/4  -CBC CMP 2/6      #IDT 2/3 (Initial Team)      Spouse Nicolledexter Browne - [phone]    ---------------  Outpatient Follow-up:    Lynda Zimmerman  NP in Family Health  [address] [facility], [address]  [place] [address]  Phone: [phone]  Fax: [phone]  Follow Up Time:     Manoj Edge  [address], [address]  [place] [address]  Phone: [phone]  Fax: [phone]  Follow Up Time: 2 weeks           74M RHD w/ PMH HTN, afib (on apixaban, started 2017), and CVA (w/ residual min right-sided weakness), admitted to [facility] acute rehab for left basal ganglia hemorrhage w/ 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    # non traumatic acute left BG hemorrhage with right hemibody involvement, right HP, apraxia speech, motor apraxia, incoordination, dysarthria, dysphagia  - CT Head: 5.8 x 2.6 cm intracranial hemorrhage with mass effect and 1-2mm rightward midline  shift on 1/26  - CTA h/n: stable near complete occlusion of left distal M1  - S/p andexanet + levetiracetam 1g x1  - Hold apixaban through 03/09 (5-6 weeks per neruo)  - Rosuvastatin 40mg qhs  - Begin comprehensive rehab program PT OT SLP 3 hours daily 5 x week  - recreation therapy  - precautions: fall, aspiration, cardiac  - outpt neuro + NSGY f/u    # atrial fibrillation  # HTN  - hold apixaban as above  - amlodipine 10mg daily  - Lopressor 25mg BID  - Valsartan 320mg daily (switched from losartan 100 Qd on 1/30 per cardiology recs @ [facility])  - BP (145/86 - 150/94) Feb 3  - HR (63 - 71) Feb 3    # Hypernatremia  - Na 148 Feb 3.   - Reviewed with patient. Encourage increased PO intake  - BMP 2/4    # on CPAP at home per nursing  - family brought in machine over weekend  - dc supplemental O2     # pain  - acetaminophen 650mg q6h PRN    # bowel  - scheduled: senna 2tab qhs  - PRN: Miralax BID    # bladder   - Monitor PVRs  - toileting schedule    # Oral Thrush  - Nystatin s/s 4xdaily x7days    # sleep  - melatonin 3mg hs PRN    #diet   - easy to chew w/ mod thick    # DVT ppx  - LE doppler 1/30-- neg for DVT bilat. LEs  - lovenox 40 mg daily (started 1/29)    # Case discussed in IDT rounds 2/3 (initial)  -    - barriers:  - goals:  - target:    #LABS  -BMP 2/4  -CBC CMP 2/6      #IDT 2/3 (Initial Team)      Spouse Nicolle Pavan - [phone]    ---------------  Outpatient Follow-up:    Lynda Zimmerman  NP in Family Health  [address] [place] [address], [address]  [place] [address]  Phone: [phone]  Fax: [phone]  Follow Up Time:     Manoj Edge  [facility]  [address] [facility], [address]  [place] [address]  Phone: [phone]  Fax: [phone]  Follow Up Time: 2 weeks           74M RHD w/ PMH HTN, afib (on apixaban, started 2017), and CVA (w/ residual min right-sided weakness), admitted to [facility] acute rehab for left basal ganglia hemorrhage w/ 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    # non traumatic acute left BG hemorrhage with right hemibody involvement, right HP, apraxia speech, motor apraxia, incoordination, dysarthria, dysphagia  - CT Head: 5.8 x 2.6 cm intracranial hemorrhage with mass effect and 1-2mm rightward midline  shift on 1/26  - CTA h/n: stable near complete occlusion of left distal M1  - S/p andexanet + levetiracetam 1g x1  - Hold apixaban through 03/09 (5-6 weeks per neruo)  - Rosuvastatin 40mg qhs  - Begin comprehensive rehab program PT OT SLP 3 hours daily 5 x week  - recreation therapy  - precautions: fall, aspiration, cardiac  - outpt neuro + NSGY f/u    # atrial fibrillation  # HTN  - hold apixaban as above  - amlodipine 10mg daily  - Lopressor 25mg BID  - Valsartan 320mg daily (switched from losartan 100 Qd on 1/30 per cardiology recs @ [facility])  - BP (145/86 - 150/94) Feb 3  - HR (63 - 71) Feb 3    # Hypernatremia  - Na 148 Feb 3.   - Reviewed with patient. Encourage increased PO intake  - BMP 2/4    # on CPAP at home per nursing  - family brought in machine over weekend  - dc supplemental O2     # pain  - acetaminophen 650mg q6h PRN    # bowel  - scheduled: senna 2tab qhs  - PRN: Miralax BID    # bladder   - Monitor PVRs  - toileting schedule    # Oral Thrush  - Nystatin s/s 4xdaily x7days    # sleep  - melatonin 3mg hs PRN    #diet   - easy to chew w/ mod thick  - MBS 2/3    # DVT ppx  - LE doppler 1/30-- neg for DVT bilat. LEs  - lovenox 40 mg daily (started 1/29)    # Case discussed in IDT rounds 2/3 (initial)  - continent. Lives in  with spouse, 2 WOODY, 1 flight inside. Spouse can provide supervision and limited physical assist stairs, easy to chew with mod thickened liquids; mixed transcortical aphasia with elements of both fluent and non fluent aphasia, paraphasias, jargon, intermittent awareness, supervision eating and grooming, min assist toileting, mod assist UB and max LB dressing; min-mod assist commode transfer. min-mod assist bed mobility and transfers, ambulates 60 feet with NBQC min-mod assist, 4 steps with 1 HR min-mod assist  - barriers: right UP, stairs, communication decreased balance and coordination, insight  - goals: "enhance coordination"  supervision ADLs and transfers, communicate wants and needs effectively and consistently with min assist; ambulate to bathroom with appropriate AD and toilet with supervision; supervision  - target: 2/20 home with caregiver assist and home care referral PT and OT SLP    # LABS  BMP 2/4  MBS  -CBC CMP 2/6    Spouse Nicolle Browne - [phone]    ---------------  Outpatient Follow-up:    Lynda Zimmerman  NP in Family Health  [address] [place] [address], [address]  [place] [address]  Phone: [phone]  Fax: [phone]  Follow Up Time:     Manoj Edge  [facility]  [address] [facility], [address]  [place] [address]  Phone: [phone]  Fax: [phone]  Follow Up Time: 2 weeks           74M RHD w/ PMH HTN, afib (on apixaban, started 2017), and CVA (w/ residual min right-sided weakness), admitted to [facility] acute rehab for left basal ganglia hemorrhage w/ 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    # non traumatic acute left BG hemorrhage with right hemibody involvement, right HP, apraxia speech, motor apraxia, incoordination, dysarthria, dysphagia  - CT Head: 5.8 x 2.6 cm intracranial hemorrhage with mass effect and 1-2mm rightward midline  shift on 1/26  - CTA h/n: stable near complete occlusion of left distal M1  - S/p andexanet + levetiracetam 1g x1  - Hold apixaban through 03/09 (5-6 weeks per neruo)  - Rosuvastatin 40mg qhs  - Begin comprehensive rehab program PT OT SLP 3 hours daily 5 x week  - recreation therapy  - precautions: fall, aspiration, cardiac  - outpt neuro + NSGY f/u    # atrial fibrillation  # HTN  - hold apixaban as above  - amlodipine 10mg daily  - Lopressor 25mg BID  - Valsartan 320mg daily (switched from losartan 100 Qd on 1/30 per cardiology recs @ [facility])  - BP (145/86 - 150/94) Feb 3  - HR (63 - 71) Feb 3    # Hypernatremia  - Na 148 Feb 3.   - Reviewed with patient. Encourage increased PO intake  - [facility] 2/4    # on CPAP at home per nursing  - Using home CPAP (family brought in personal machine)  - dc supplemental O2     # pain  - acetaminophen 650mg q6h PRN    # bowel  - scheduled: senna 2tab qhs  - PRN: Miralax BID    # bladder   - Monitor PVRs  - toileting schedule    # Oral Thrush  - Nystatin s/s 4xdaily x7days    # sleep  - melatonin 3mg hs PRN    #diet   - easy to chew w/ mildly thick (upgraded from mod thick 2/3)  - Last MBS 2/3    # DVT ppx  - LE doppler 1/30-- neg for DVT bilat. LEs  - lovenox 40 mg daily (started 1/29)    # Case discussed in IDT rounds 2/3 (initial)  - [place]. Lives in [place] spouse, 2 WOODY, 1 flight inside. Spouse can provide supervision and limited physical assist stairs, easy to chew with mod thickened liquids; mixed transcortical aphasia with elements of both fluent and non fluent aphasia, paraphasias, jargon, intermittent awareness, supervision eating and grooming, min assist toileting, mod assist UB and max LB dressing; min-mod assist commode transfer. min-mod assist bed mobility and transfers, ambulates 60 feet with NBQC min-mod assist, 4 steps with 1 HR min-mod assist  - barriers: right UP, stairs, communication decreased balance and coordination, insight  - goals: "enhance coordination"  supervision ADLs and transfers, communicate wants and needs effectively and consistently with min assist; ambulate to bathroom with appropriate AD and toilet with supervision; supervision  - target: 2/20 home with caregiver assist and home care referral PT and OT SLP    # LABS  BMP 2/4  MBS  -CBC CMP 2/6    Spouse Nicolle Browne - [phone]    ---------------  Outpatient Follow-up:    Lynda Zimmerman  NP in Family Health  [address] [place] [address], [address]  [place] [address]  Phone: [phone]  Fax: [phone]  Follow Up Time:     Manoj Edge  [facility]  [address], [address]  [place] [address]  Phone: [phone]  Fax: [phone]  Follow Up Time: 2 weeks           74M RHD w/ PMH HTN, afib (on apixaban, started 2017), and CVA (w/ residual min right-sided weakness), admitted to [facility] acute rehab for left basal ganglia hemorrhage w/ 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    # non traumatic acute left BG hemorrhage with right hemibody involvement, right HP, apraxia speech, motor apraxia, incoordination, dysarthria, dysphagia  - CT Head: 5.8 x 2.6 cm intracranial hemorrhage with mass effect and 1-2mm rightward midline  shift on 1/26  - CTA h/n: stable near complete occlusion of left distal M1  - S/p andexanet + levetiracetam 1g x1  - Hold apixaban through 03/09 (5-6 weeks per tania)  - Rosuvastatin 40mg qhs  - Continue comprehensive rehab program PT OT SLP 3 hours daily 5 x week  - recreation therapy. Vital stim added to program 2/3 for dysphagia  - precautions: fall, aspiration, cardiac  - outpt neuro + NSGY f/u    # atrial fibrillation  # HTN  - hold apixaban as above  - amlodipine 10mg daily  - Lopressor 25mg BID  - Valsartan 320mg daily (switched from losartan 100 Qd on 1/30 per cardiology recs @ [facility])  - BP (145/86 - 150/94) Feb 3  - HR (63 - 71) Feb 3    # Hypernatremia/KEAGAN  - Na 148 Feb 3. BUn/Cr 34/1   - Reviewed with patient. Encourage increased PO intake  - BMP 2/4    # on CPAP at home per nursing  - Using home CPAP (family brought in personal machine)  - dc supplemental O2     # pain  - acetaminophen 650mg q6h PRN    # bowel  - scheduled: senna 2tab qhs  - PRN: Miralax BID    # bladder   - Monitor PVRs  - toileting schedule    # Oral Thrush  - Nystatin s/s 4xdaily x7days    # sleep  - melatonin 3mg hs PRN    #diet   - easy to chew w/ mildly thick (upgraded from mod thick 2/3)  - Last MBS 2/3    # DVT ppx  - LE doppler 1/30-- neg for DVT bilat. LEs  - lovenox 40 mg daily (started 1/29)    # Case discussed in IDT rounds 2/3 (initial)  - continent. Lives in  with spouse, 2 WOODY, 1 flight inside. Spouse can provide supervision and limited physical assist stairs, easy to chew with mod thickened liquids; mixed transcortical aphasia with elements of both fluent and non fluent aphasia, paraphasias, jargon, intermittent awareness, supervision eating and grooming, min assist toileting, mod assist UB and max LB dressing; min-mod assist commode transfer. min-mod assist bed mobility and transfers, ambulates 60 feet with NBQC min-mod assist, 4 steps with 1 HR min-mod assist  - barriers: right UP, stairs, communication decreased balance and coordination, insight  - goals: "enhance coordination"  supervision ADLs and transfers, communicate wants and needs effectively and consistently with min assist; ambulate to bathroom with appropriate AD and toilet with supervision; supervision  - target: 2/20 home with caregiver assist and home care referral PT and OT SLP    # LABS  BMP 2/4  MBS  -CBC CMP 2/6    Spouse Nicolle Barbosat - [phone]    ---------------  Outpatient Follow-up:    Lynda Zimmerman  NP in Family Health  [address] [facility], [address]  [place] [address]  Phone: [phone]  Fax: [phone]  Follow Up Time:     Manoj Edge  Cardiology  [address], [address]  [place] [address]  Phone: [phone]  Fax: [phone]  Follow Up Time: 2 weeks

## 2025-02-03 NOTE — PROGRESS NOTE ADULT - ATTENDING COMMENTS
Progress note amended to include my discussions with patient, resident, hospitalist, RN, SW, PT, OT SLP and my findings. Case also discussed in IDT rounds, input by me    Patient's family brought in CPAP over weekend, dc O2 via [place] (had been refusing latter). 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 Was seen in PT, ambulating with CG/min assist and without assistive device, narrow based gait, reduced stride length, reduced dynamic balance but improving, upright posture.    We reviewed blood work including elevated NA and evidence of some dehydration. He is not symptomatic. Encouraged to increase po fluids, will repeat BMP in a<M/ He expresses understanding Remainder of labs are stable/without significant abnormality    Continue current program. Had MBS, diet upgraded to mildly thick, vital stim also added to program    RECENT LABS    Vital Signs Last 24 Hrs  T(C): 36.4 (03 Feb 2025 09:20), Max: 36.5 (02 Feb 2025 17:49)  T(F): 97.5 (03 Feb 2025 09:20), Max: 97.7 (02 Feb 2025 17:49)  HR: 64 (03 Feb 2025 09:20) (63 - 71)  BP: 110/73 (03 Feb 2025 09:20) (110/73 - 150/94)  BP(mean): --  RR: 16 (03 Feb 2025 09:20) (15 - 16)  SpO2: 93% (03 Feb 2025 09:20) (93% - 94%)    Parameters below as of 03 Feb 2025 09:20  Patient On (Oxygen Delivery Method): room air                              14.0   7.64  )-----------( 173      ( 03 Feb 2025 06:27 )             43.6     02-03    148[H]  |  110[H]  |  34[H]  ----------------------------<  96  3.5   |  31  |  1.00    Ca    9.1      03 Feb 2025 06:27    TPro  6.6  /  Alb  2.7[L]  /  TBili  0.5  /  DBili  x   /  AST  17  /  ALT  29  /  AlkPhos  85  02-03      Urinalysis Basic - ( 03 Feb 2025 06:27 )    Color: x / Appearance: x / SG: x / pH: x  Gluc: 96 mg/dL / Ketone: x  / Bili: x / Urobili: x   Blood: x / Protein: x / Nitrite: x   Leuk Esterase: x / RBC: x / WBC x   Sq Epi: x / Non Sq Epi: x / Bacteria: x      CAPILLARY BLOOD GLUCOSE

## 2025-02-03 NOTE — PROGRESS NOTE ADULT - SUBJECTIVE AND OBJECTIVE BOX
HPI:   74M RHD w/ PMH HTN, afib (on apixaban, started 2017), and CVA (w/ residual min right-sided weakness), who presented to [facility] 01/26/2025 after being found on floor by wife w/ right-sided weakness and word-finding difficulties. Wife woke up in 0545 1/26 finding the patient on the floor and complaining of right sided weakness and word finding difficulties. LKN 1/25 2230 when the patient went to bed. He took his apixaban and medications for HLD and HTN prior to going to bed. Upon finding the patient on the floor, the patient was transferred to the [place] ED. NIHSS 10 (+1 face, +3 RUE, +3 RLE, +1 Aphasia, +2, dysarthria). mRS 0    CT head demonstrated 5.8 x 2.6 cm left basal ganglia hemorrhage with 1-2mm rightward midline shift. 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 Received andexanet and levetiracetam 1g x1. He was transferred to [facility] 01/26 for further eval. Neurology recommended holding apixab through 03/09 per neuro (hold apixaban for 5-6 weeks, then will make further decisions regarding restarting and/or a Watchman as outpatient given Afib).    Evaluated by PM&R and discharged to [facility] acute rehab 01/29.  (30 Jan 2025 16:58)        Subjective/ROS:  *********      PAST MEDICAL & SURGICAL HISTORY:  Stroke      Chronic atrial fibrillation      HTN (hypertension)      HLD (hyperlipidemia)      No significant past surgical history          MEDICATIONS  (STANDING):  amLODIPine   Tablet 10 milliGRAM(s) Oral daily  enoxaparin Injectable 40 milliGRAM(s) SubCutaneous every 24 hours  metoprolol tartrate 25 milliGRAM(s) Oral every 12 hours  nystatin    Suspension 754570 Unit(s) Oral four times a day  pantoprazole    Tablet 40 milliGRAM(s) Oral before breakfast  rosuvastatin 40 milliGRAM(s) Oral at bedtime  senna 2 Tablet(s) Oral at bedtime  valsartan 320 milliGRAM(s) Oral daily    MEDICATIONS  (PRN):  acetaminophen     Tablet .. 650 milliGRAM(s) Oral every 6 hours PRN Moderate Pain (4 - 6)  melatonin 6 milliGRAM(s) Oral at bedtime PRN Insomnia  polyethylene glycol 3350 17 Gram(s) Oral daily PRN Constipation      Allergies    No Known Allergies    Intolerances                ICU Vital Signs Last 24 Hrs  T(C): 36.4 (02 Feb 2025 21:36), Max: 36.5 (02 Feb 2025 17:49)  T(F): 97.6 (02 Feb 2025 21:36), Max: 97.7 (02 Feb 2025 17:49)  HR: 63 (03 Feb 2025 05:15) (63 - 71)  BP: 145/86 (03 Feb 2025 05:15) (145/86 - 150/94)  RR: 15 (02 Feb 2025 21:36) (15 - 15)  SpO2: 94% (02 Feb 2025 21:36) (93% - 94%)    O2 Parameters below as of 02 Feb 2025 21:36  Patient On (Oxygen Delivery Method): BiPAP/CPAP          LABS *********        PE *****************  General -   HEENT -   Chest -   Cardiovascular -   Abdomen -   Extremities -  Neurologic Exam -             Cognitive -      Cranial Nerves -     Motor -   Psychiatric -  -    RECENT LABS/IMAGING                  MEDICATIONS   acetaminophen     Tablet .. 650 milliGRAM(s) every 6 hours PRN  amLODIPine   Tablet 10 milliGRAM(s) daily  enoxaparin Injectable 40 milliGRAM(s) every 24 hours  melatonin 6 milliGRAM(s) at bedtime PRN  metoprolol tartrate 25 milliGRAM(s) every 12 hours  nystatin    Suspension 446234 Unit(s) four times a day  pantoprazole    Tablet 40 milliGRAM(s) before breakfast  polyethylene glycol 3350 17 Gram(s) daily PRN  rosuvastatin 40 milliGRAM(s) at bedtime  senna 2 Tablet(s) at bedtime  valsartan 320 milliGRAM(s) daily      --------------------------------------------------------------------     HPI:   74M RHD w/ PMH HTN, afib (on apixaban, started 2017), and CVA (w/ residual min right-sided weakness), who presented to [facility] 01/26/2025 after being found on floor by wife w/ right-sided weakness and word-finding difficulties. Wife woke up in 0545 1/26 finding the patient on the floor and complaining of right sided weakness and word finding difficulties. LKN 1/25 2230 when the patient went to bed. He took his apixaban and medications for HLD and HTN prior to going to bed. Upon finding the patient on the floor, the patient was transferred to the [place] ED. NIHSS 10 (+1 face, +3 RUE, +3 RLE, +1 Aphasia, +2, dysarthria). mRS 0    CT head demonstrated 5.8 x 2.6 cm left basal ganglia hemorrhage with 1-2mm rightward midline shift. 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 Received andexanet and levetiracetam 1g x1. He was transferred to [facility] 01/26 for further eval. Neurology recommended holding apixab through 03/09 per neuro (hold apixaban for 5-6 weeks, then will make further decisions regarding restarting and/or a Watchman as outpatient given Afib).    Reason for Admission: left basal ganglia hemorrhage w/ right hemiparesis, transcortical sensory aphasia, and dysphasia    Evaluated by PM&R and discharged to [facility] acute rehab 01/29.  (30 Jan 2025 16:58)        Subjective/ROS:  *********      PAST MEDICAL & SURGICAL HISTORY:  Stroke      Chronic atrial fibrillation      HTN (hypertension)      HLD (hyperlipidemia)      No significant past surgical history          MEDICATIONS  (STANDING):  amLODIPine   Tablet 10 milliGRAM(s) Oral daily  enoxaparin Injectable 40 milliGRAM(s) SubCutaneous every 24 hours  metoprolol tartrate 25 milliGRAM(s) Oral every 12 hours  nystatin    Suspension 229782 Unit(s) Oral four times a day  pantoprazole    Tablet 40 milliGRAM(s) Oral before breakfast  rosuvastatin 40 milliGRAM(s) Oral at bedtime  senna 2 Tablet(s) Oral at bedtime  valsartan 320 milliGRAM(s) Oral daily    MEDICATIONS  (PRN):  acetaminophen     Tablet .. 650 milliGRAM(s) Oral every 6 hours PRN Moderate Pain (4 - 6)  melatonin 6 milliGRAM(s) Oral at bedtime PRN Insomnia  polyethylene glycol 3350 17 Gram(s) Oral daily PRN Constipation      Allergies    No Known Allergies    Intolerances                ICU Vital Signs Last 24 Hrs  T(C): 36.4 (02 Feb 2025 21:36), Max: 36.5 (02 Feb 2025 17:49)  T(F): 97.6 (02 Feb 2025 21:36), Max: 97.7 (02 Feb 2025 17:49)  HR: 63 (03 Feb 2025 05:15) (63 - 71)  BP: 145/86 (03 Feb 2025 05:15) (145/86 - 150/94)  RR: 15 (02 Feb 2025 21:36) (15 - 15)  SpO2: 94% (02 Feb 2025 21:36) (93% - 94%)    O2 Parameters below as of 02 Feb 2025 21:36  Patient On (Oxygen Delivery Method): BiPAP/CPAP        RECENT LABS/IMAGING                            14.0   7.64  )-----------( 173      ( 03 Feb 2025 06:27 )             43.6     02-03    148[H]  |  110[H]  |  34[H]  ----------------------------<  96  3.5   |  31  |  1.00    Ca    9.1      03 Feb 2025 06:27    TPro  6.6  /  Alb  2.7[L]  /  TBili  0.5  /  DBili  x   /  AST  17  /  ALT  29  /  AlkPhos  85  02-03      Urinalysis Basic - ( 03 Feb 2025 06:27 )    Color: x / Appearance: x / SG: x / pH: x  Gluc: 96 mg/dL / Ketone: x  / Bili: x / Urobili: x   Blood: x / Protein: x / Nitrite: x   Leuk Esterase: x / RBC: x / WBC x   Sq Epi: x / Non Sq Epi: x / Bacteria: x      CAPILLARY BLOOD GLUCOSE                        PE *****************  General -   HEENT -   Chest -   Cardiovascular -   Abdomen -   Extremities -  Neurologic Exam -             Cognitive -      Cranial Nerves -     Motor -   Psychiatric -  -                      MEDICATIONS   acetaminophen     Tablet .. 650 milliGRAM(s) every 6 hours PRN  amLODIPine   Tablet 10 milliGRAM(s) daily  enoxaparin Injectable 40 milliGRAM(s) every 24 hours  melatonin 6 milliGRAM(s) at bedtime PRN  metoprolol tartrate 25 milliGRAM(s) every 12 hours  nystatin    Suspension 902141 Unit(s) four times a day  pantoprazole    Tablet 40 milliGRAM(s) before breakfast  polyethylene glycol 3350 17 Gram(s) daily PRN  rosuvastatin 40 milliGRAM(s) at bedtime  senna 2 Tablet(s) at bedtime  valsartan 320 milliGRAM(s) daily      --------------------------------------------------------------------     HPI:   74M RHD w/ PMH HTN, afib (on apixaban, started 2017), and CVA (w/ residual min right-sided weakness), who presented to [facility] 01/26/2025 after being found on floor by wife w/ right-sided weakness and word-finding difficulties. Wife woke up in 0545 1/26 finding the patient on the floor and complaining of right sided weakness and word finding difficulties. LKN 1/25 2230 when the patient went to bed. He took his apixaban and medications for HLD and HTN prior to going to bed. Upon finding the patient on the floor, the patient was transferred to the [place] ED. NIHSS 10 (+1 face, +3 RUE, +3 RLE, +1 Aphasia, +2, dysarthria). mRS 0    CT head demonstrated 5.8 x 2.6 cm left basal ganglia hemorrhage with 1-2mm rightward midline shift. 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 Received andexanet and levetiracetam 1g x1. He was transferred to [facility] 01/26 for further eval. Neurology recommended holding apixab through 03/09 per neuro (hold apixaban for 5-6 weeks, then will make further decisions regarding restarting and/or a Watchman as outpatient given Afib).    Reason for Admission: left basal ganglia hemorrhage w/ right hemiparesis, transcortical sensory aphasia, and dysphasia    Evaluated by PM&R and discharged to [facility] acute rehab 01/29.  (30 Jan 2025 16:58)        Subjective/ROS: Patient seen and evaluated at bedside. No overnight events. Patient denies any new concerns or complaints. Denies any headache, visual changes, or changes in strength/sensation. Is scheduled for repeat MBS today and IDT rounds. No concerns at this time.      PAST MEDICAL & SURGICAL HISTORY:  Stroke      Chronic atrial fibrillation      HTN (hypertension)      HLD (hyperlipidemia)      No significant past surgical history          MEDICATIONS  (STANDING):  amLODIPine   Tablet 10 milliGRAM(s) Oral daily  enoxaparin Injectable 40 milliGRAM(s) SubCutaneous every 24 hours  metoprolol tartrate 25 milliGRAM(s) Oral every 12 hours  nystatin    Suspension 948531 Unit(s) Oral four times a day  pantoprazole    Tablet 40 milliGRAM(s) Oral before breakfast  rosuvastatin 40 milliGRAM(s) Oral at bedtime  senna 2 Tablet(s) Oral at bedtime  valsartan 320 milliGRAM(s) Oral daily    MEDICATIONS  (PRN):  acetaminophen     Tablet .. 650 milliGRAM(s) Oral every 6 hours PRN Moderate Pain (4 - 6)  melatonin 6 milliGRAM(s) Oral at bedtime PRN Insomnia  polyethylene glycol 3350 17 Gram(s) Oral daily PRN Constipation      Allergies    No Known Allergies    Intolerances                ICU Vital Signs Last 24 Hrs  T(C): 36.4 (02 Feb 2025 21:36), Max: 36.5 (02 Feb 2025 17:49)  T(F): 97.6 (02 Feb 2025 21:36), Max: 97.7 (02 Feb 2025 17:49)  HR: 63 (03 Feb 2025 05:15) (63 - 71)  BP: 145/86 (03 Feb 2025 05:15) (145/86 - 150/94)  RR: 15 (02 Feb 2025 21:36) (15 - 15)  SpO2: 94% (02 Feb 2025 21:36) (93% - 94%)    O2 Parameters below as of 02 Feb 2025 21:36  Patient On (Oxygen Delivery Method): BiPAP/CPAP        RECENT LABS/IMAGING                            14.0   7.64  )-----------( 173      ( 03 Feb 2025 06:27 )             43.6     02-03    148[H]  |  110[H]  |  34[H]  ----------------------------<  96  3.5   |  31  |  1.00    Ca    9.1      03 Feb 2025 06:27    TPro  6.6  /  Alb  2.7[L]  /  TBili  0.5  /  DBili  x   /  AST  17  /  ALT  29  /  AlkPhos  85  02-03      Urinalysis Basic - ( 03 Feb 2025 06:27 )    Color: x / Appearance: x / SG: x / pH: x  Gluc: 96 mg/dL / Ketone: x  / Bili: x / Urobili: x   Blood: x / Protein: x / Nitrite: x   Leuk Esterase: x / RBC: x / WBC x   Sq Epi: x / Non Sq Epi: x / Bacteria: x

## 2025-02-04 LAB
ANION GAP SERPL CALC-SCNC: 11 MMOL/L — SIGNIFICANT CHANGE UP (ref 5–17)
BUN SERPL-MCNC: 37 MG/DL — HIGH (ref 7–23)
CALCIUM SERPL-MCNC: 9.1 MG/DL — SIGNIFICANT CHANGE UP (ref 8.4–10.5)
CHLORIDE SERPL-SCNC: 110 MMOL/L — HIGH (ref 96–108)
CO2 SERPL-SCNC: 28 MMOL/L — SIGNIFICANT CHANGE UP (ref 22–31)
CREAT SERPL-MCNC: 0.89 MG/DL — SIGNIFICANT CHANGE UP (ref 0.5–1.3)
EGFR: 89 ML/MIN/1.73M2 — SIGNIFICANT CHANGE UP
GLUCOSE SERPL-MCNC: 84 MG/DL — SIGNIFICANT CHANGE UP (ref 70–99)
POTASSIUM SERPL-MCNC: 3.5 MMOL/L — SIGNIFICANT CHANGE UP (ref 3.5–5.3)
POTASSIUM SERPL-SCNC: 3.5 MMOL/L — SIGNIFICANT CHANGE UP (ref 3.5–5.3)
SODIUM SERPL-SCNC: 149 MMOL/L — HIGH (ref 135–145)

## 2025-02-04 PROCEDURE — 99232 SBSQ HOSP IP/OBS MODERATE 35: CPT

## 2025-02-04 PROCEDURE — 99233 SBSQ HOSP IP/OBS HIGH 50: CPT

## 2025-02-04 RX ADMIN — Medication 40 MILLIGRAM(S): at 05:03

## 2025-02-04 RX ADMIN — ROSUVASTATIN CALCIUM 40 MILLIGRAM(S): 20 TABLET, FILM COATED ORAL at 21:14

## 2025-02-04 RX ADMIN — METOPROLOL SUCCINATE 25 MILLIGRAM(S): 50 TABLET, EXTENDED RELEASE ORAL at 18:21

## 2025-02-04 RX ADMIN — Medication 500000 UNIT(S): at 13:01

## 2025-02-04 RX ADMIN — AMLODIPINE BESYLATE 10 MILLIGRAM(S): 10 TABLET ORAL at 05:02

## 2025-02-04 RX ADMIN — METOPROLOL SUCCINATE 25 MILLIGRAM(S): 50 TABLET, EXTENDED RELEASE ORAL at 05:02

## 2025-02-04 RX ADMIN — Medication 500000 UNIT(S): at 05:02

## 2025-02-04 RX ADMIN — Medication 500000 UNIT(S): at 18:21

## 2025-02-04 RX ADMIN — ENOXAPARIN SODIUM 40 MILLIGRAM(S): 100 INJECTION SUBCUTANEOUS at 18:21

## 2025-02-04 RX ADMIN — Medication 2 TABLET(S): at 21:14

## 2025-02-04 RX ADMIN — Medication 320 MILLIGRAM(S): at 05:03

## 2025-02-04 NOTE — PROGRESS NOTE ADULT - COMMENTS
Patient seen earlier this morning, and then again with family. Family meeting between myself, wife, daughter, and Dr Barr also took place for 20 min this morning in which we discussed his diagnosis, prognosis, team recs and functional goals established on dc from [facility] (see below)    Patient will be moving rooms due to noise level, no H/A. Mood overall stable. 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

## 2025-02-04 NOTE — PROGRESS NOTE ADULT - SUBJECTIVE AND OBJECTIVE BOX
CC: Patient is a 74y old  Male who presents with a chief complaint of left basal ganglia hemorrhage w/ right hemiparesis, transcortical sensory aphasia, and dysphasia (04 Feb 2025 12:10)      Interval History:  Patient seen and examined at bedside.  No overnight events  No complaints this morning  Denies CP, SOB, abd pain, N/V    ALLERGIES:  No Known Allergies    MEDICATIONS  (STANDING):  amLODIPine   Tablet 10 milliGRAM(s) Oral daily  enoxaparin Injectable 40 milliGRAM(s) SubCutaneous every 24 hours  metoprolol tartrate 25 milliGRAM(s) Oral every 12 hours  nystatin    Suspension 171888 Unit(s) Oral four times a day  pantoprazole    Tablet 40 milliGRAM(s) Oral before breakfast  rosuvastatin 40 milliGRAM(s) Oral at bedtime  senna 2 Tablet(s) Oral at bedtime  valsartan 320 milliGRAM(s) Oral daily    MEDICATIONS  (PRN):  acetaminophen     Tablet .. 650 milliGRAM(s) Oral every 6 hours PRN Moderate Pain (4 - 6)  melatonin 6 milliGRAM(s) Oral at bedtime PRN Insomnia  polyethylene glycol 3350 17 Gram(s) Oral daily PRN Constipation    Vital Signs Last 24 Hrs  T(F): 97.6 (04 Feb 2025 09:21), Max: 98 (03 Feb 2025 20:02)  HR: 61 (04 Feb 2025 09:21) (61 - 72)  BP: 135/81 (04 Feb 2025 09:21) (122/79 - 135/81)  RR: 14 (04 Feb 2025 09:21) (14 - 14)  SpO2: 99% (04 Feb 2025 09:21) (94% - 99%)  I&O's Summary    03 Feb 2025 07:01  -  04 Feb 2025 07:00  --------------------------------------------------------  IN: 480 mL / OUT: 0 mL / NET: 480 mL          PHYSICAL EXAM:    HEENT: EOMI, PERRLA  Neck: Supple  NERVOUS SYSTEM: follows commands, dysarthria, R facial droop, mild R UE weakness  HEART: Regular rate and rhythm; No murmurs, No pitting edema  CHEST/LUNG: Clear to ascultation bilaterally;   ABDOMEN: Soft, Nontender, Bowel sounds present, no guarding  MUSCULOSKELETAL/EXTREMITIES: no calf tenderness, no c/c/e  PSYCH: Appropriate affect, AAO x LABS:                        14.0   7.64  )-----------( 173      ( 03 Feb 2025 06:27 )             43.6       02-04    149  |  110  |  37  ----------------------------<  84  3.5   |  28  |  0.89    Ca    9.1      04 Feb 2025 06:06    TPro  6.6  /  Alb  2.7  /  TBili  0.5  /  DBili  x   /  AST  17  /  ALT  29  /  AlkPhos  85  02-03 01-27 Chol 142 mg/dL LDL -- HDL 51 mg/dL Trig 124 mg/dL                  Urinalysis Basic - ( 04 Feb 2025 06:06 )    Color: x / Appearance: x / SG: x / pH: x  Gluc: 84 mg/dL / Ketone: x  / Bili: x / Urobili: x   Blood: x / Protein: x / Nitrite: x   Leuk Esterase: x / RBC: x / WBC x   Sq Epi: x / Non Sq Epi: x / Bacteria: x        COVID-19 PCR: NotDetec (01-30-25 @ 15:00)      Care Discussed with Consultants/Other Providers: Yes. Rehab provider

## 2025-02-04 NOTE — PROGRESS NOTE ADULT - SUBJECTIVE AND OBJECTIVE BOX
Patient is a 74y old  Male who presents with a chief complaint of left basal ganglia hemorrhage w/ right hemiparesis, transcortical sensory aphasia, and dysphasia (03 Feb 2025 15:41)      HPI:   74M RHD w/ PMH HTN, afib (on apixaban, started 2017), and CVA (w/ residual min right-sided weakness), who presented to [facility] 01/26/2025 after being found on floor by wife w/ right-sided weakness and word-finding difficulties. Wife woke up in 0545 1/26 finding the patient on the floor and complaining of right sided weakness and word finding difficulties. LKN 1/25 2230 when the patient went to bed. He took his apixaban and medications for HLD and HTN prior to going to bed. Upon finding the patient on the floor, the patient was transferred to the [place] ED. NIHSS 10 (+1 face, +3 RUE, +3 RLE, +1 Aphasia, +2, dysarthria). mRS 0    CT head demonstrated 5.8 x 2.6 cm left basal ganglia hemorrhage with 1-2mm rightward midline shift. 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 Received andexanet and levetiracetam 1g x1. He was transferred to [facility] 01/26 for further eval. Neurology recommended holding apixab through 03/09 per neuro (hold apixaban for 5-6 weeks, then will make further decisions regarding restarting and/or a Watchman as outpatient given Afib).    Evaluated by PM&R and discharged to [facility] acute rehab 01/29.  (30 Jan 2025 16:58)      PAST MEDICAL & SURGICAL HISTORY:  Stroke      Chronic atrial fibrillation      HTN (hypertension)      HLD (hyperlipidemia)      No significant past surgical history          MEDICATIONS  (STANDING):  amLODIPine   Tablet 10 milliGRAM(s) Oral daily  enoxaparin Injectable 40 milliGRAM(s) SubCutaneous every 24 hours  metoprolol tartrate 25 milliGRAM(s) Oral every 12 hours  nystatin    Suspension 845178 Unit(s) Oral four times a day  pantoprazole    Tablet 40 milliGRAM(s) Oral before breakfast  rosuvastatin 40 milliGRAM(s) Oral at bedtime  senna 2 Tablet(s) Oral at bedtime  valsartan 320 milliGRAM(s) Oral daily    MEDICATIONS  (PRN):  acetaminophen     Tablet .. 650 milliGRAM(s) Oral every 6 hours PRN Moderate Pain (4 - 6)  melatonin 6 milliGRAM(s) Oral at bedtime PRN Insomnia  polyethylene glycol 3350 17 Gram(s) Oral daily PRN Constipation      Allergies    No Known Allergies    Intolerances          VITALS  74y  Vital Signs Last 24 Hrs  T(C): 36.4 (04 Feb 2025 09:21), Max: 36.7 (03 Feb 2025 20:02)  T(F): 97.6 (04 Feb 2025 09:21), Max: 98 (03 Feb 2025 20:02)  HR: 61 (04 Feb 2025 09:21) (61 - 72)  BP: 135/81 (04 Feb 2025 09:21) (122/79 - 135/81)  BP(mean): --  RR: 14 (04 Feb 2025 09:21) (14 - 14)  SpO2: 99% (04 Feb 2025 09:21) (94% - 99%)    Parameters below as of 04 Feb 2025 09:21  Patient On (Oxygen Delivery Method): room air      Daily     Daily         RECENT LABS:                          14.0   7.64  )-----------( 173      ( 03 Feb 2025 06:27 )             43.6     02-04    149[H]  |  110[H]  |  37[H]  ----------------------------<  84  3.5   |  28  |  0.89    Ca    9.1      04 Feb 2025 06:06    TPro  6.6  /  Alb  2.7[L]  /  TBili  0.5  /  DBili  x   /  AST  17  /  ALT  29  /  AlkPhos  85  02-03    LIVER FUNCTIONS - ( 03 Feb 2025 06:27 )  Alb: 2.7 g/dL / Pro: 6.6 g/dL / ALK PHOS: 85 U/L / ALT: 29 U/L / AST: 17 U/L / GGT: x             Urinalysis Basic - ( 04 Feb 2025 06:06 )    Color: x / Appearance: x / SG: x / pH: x  Gluc: 84 mg/dL / Ketone: x  / Bili: x / Urobili: x   Blood: x / Protein: x / Nitrite: x   Leuk Esterase: x / RBC: x / WBC x   Sq Epi: x / Non Sq Epi: x / Bacteria: x          CAPILLARY BLOOD GLUCOSE

## 2025-02-04 NOTE — PROGRESS NOTE ADULT - ASSESSMENT
74M w/ PMH HTN, afib (on apixaban, started 2017), and CVA (w/ residual min right-sided weakness), who presented to [facility] 01/26/2025 after being found on floor by wife w/ right-sided weakness and word-finding difficulties. Wife woke up in 0545 1/26 finding the patient on the floor and complaining of right sided weakness and word finding difficulties. LKN 1/25 2230 when the patient went to bed. He took his apixaban and medications for HLD and HTN prior to going to bed. Upon finding the patient on the floor, the patient was transferred to the [place] ED. NIHSS 10 (+1 face, +3 RUE, +3 RLE, +1 Aphasia, +2, dysarthria). mRS 0    CT head demonstrated 5.8 x 2.6 cm left basal ganglia hemorrhage with 1-2mm rightward midline shift. 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 Received andexanet and levetiracetam. Transferred to [facility] 01/26, Neurology recommended holding apixaban through 03/09 per neuro (hold apixaban for 5-6 weeks, then will make further decisions regarding restarting and/or a Watchman as outpatient given Afib).    1/29: Admitted for acute rehab [facility] acute rehab    #Chronic atrial fibrillation  -apixiban on hold (tentatively till 3/9)  - on Metorpolol for HR control    # Hypernatremia  - mild.   - recommend to increase PO fluid intake  - repeat Na tomorrow    #Stroke  #Primary Hypertension  - amLODIPine   10 milliGRAM(s) Oral daily  -metoprolol tartrate 25 milliGRAM(s) Oral every 12 hours  -valsartan 320 milliGRAM(s) Oral daily  -rosuvastatin 40 milliGRAM(s) Oral at bedtime    #ISIS   - CPAP     #Thrush  - nystatin    Suspension 894626 Unit(s) Oral four times a day    #DVTPPX:  enoxaparin Injectable 40 milliGRAM(s) SubCutaneous every 24 hours    #Constipation  senna 2 Tablet(s) Oral at bedtime

## 2025-02-04 NOTE — PROGRESS NOTE ADULT - ASSESSMENT
74M RHD w/ PMH HTN, afib (on apixaban, started 2017), and CVA (w/ residual min right-sided weakness), admitted to 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    # non traumatic acute left BG hemorrhage with right hemibody involvement, right HP, apraxia speech, motor apraxia, incoordination, dysarthria, dysphagia  - CT Head: 5.8 x 2.6 cm intracranial hemorrhage with mass effect and 1-2mm rightward midline  shift on 1/26  - CTA h/n: stable near complete occlusion of left distal M1  - S/p andexanet + levetiracetam 1g x1  - Hold apixaban through 03/09 (5-6 weeks per neuro). We reviewed medications, risks/benefits, and need for clearance by neurology before resumption  - Rosuvastatin 40mg qhs  - Continue comprehensive rehab program PT OT SLP 3 hours daily 5 x week  - recreation therapy. Vital stim added to program 2/3 for dysphagia  - precautions: fall, aspiration, cardiac  - outpt neuro + NSGY f/u    # atrial fibrillation  # HTN  - hold apixaban as above  - amlodipine 10mg daily  - Lopressor 25mg BID  - Valsartan 320mg daily (switched from losartan 100 Qd on 1/30 per cardiology recs @ [facility])  - /79 - 135/81) HR 61-72 rate controlled 2/4    # Hypernatremia/KEAGAN  - Reviewed with patient. Encourage increased PO intake  - Na 148 2/3--> 149 2/4  - BUn/Cr 34/1 --> 37/0.89 2/4  - BMP, Sosm, and Uosm 2/5    # on CPAP at home per nursing  - Using home CPAP (family brought in personal machine). Patient compliant in community per family  - dc supplemental O2     # pain  - acetaminophen 650mg q6h PRN    # bowel  - scheduled: senna 2tab qhs  - PRN: Miralax BID    # bladder   - voiding well, bladder scan dc  - toileting schedule    # Oral Thrush  - Nystatin s/s 4xdaily x7days    # sleep  - melatonin 3mg hs PRN    #diet   - easy to chew w/ mildly thick (upgraded from mod thick 2/3)  - Last MBS 2/3    # DVT ppx  - LE doppler 1/30-- neg for DVT bilat. LEs  - lovenox 40 mg daily (started 1/29)    # Case discussed in IDT rounds 2/3 (initial)  - continent. Lives in  with spouse, 2 WOODY, 1 flight inside. Spouse can provide supervision and limited physical assist stairs, easy to chew with mod thickened liquids; mixed transcortical aphasia with elements of both fluent and non fluent aphasia, paraphasias, jargon, intermittent awareness, supervision eating and grooming, min assist toileting, mod assist UB and max LB dressing; min-mod assist commode transfer. min-mod assist bed mobility and transfers, ambulates 60 feet with NBQC min-mod assist, 4 steps with 1 HR min-mod assist  - barriers: right UP, stairs, communication decreased balance and coordination, insight  - goals: "enhance coordination"  supervision ADLs and transfers, communicate wants and needs effectively and consistently with min assist; ambulate to bathroom with appropriate AD and toilet with supervision; supervision  - target: 2/20 home with caregiver assist and home care referral PT and OT SLP  - We met with wife and daughter later this morning 2/4 to discuss his diagnosis, relation to current noted deficits and functional status, medication, potential exacerbating factors, goals set for discharge as well as prognosis. Risks /benefits of certain medications such as AC vs antiplatelets/resumption in future and risk of rebleed discussed as well, although will need to f/u neuro and cardiology and patient's feelings risk/benefit of med. 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, on dc also discussed    # LABS  BMP, Sosm, and Uosm 2/5  CBC CMP 2/6    Corrected contact information:  Spouse Nicolle Browne - [phone]  Daughter Gracy: [phone]  ---------------  Outpatient Follow-up:    Lynda Zimmerman  NP in [address], [address]  [place] [address]  Phone: [phone]  Fax: [phone]  Follow Up Time:     Manoj Edge  Cardiology  [facility], [address]  [place] [address]  Phone: [phone]  Fax: [phone]  Follow Up Time: 2 weeks      Time spent for evaluation, discussion with patient and then family , management 55 min

## 2025-02-05 LAB
ANION GAP SERPL CALC-SCNC: 6 MMOL/L — SIGNIFICANT CHANGE UP (ref 5–17)
BUN SERPL-MCNC: 32 MG/DL — HIGH (ref 7–23)
CALCIUM SERPL-MCNC: 8.7 MG/DL — SIGNIFICANT CHANGE UP (ref 8.4–10.5)
CHLORIDE SERPL-SCNC: 108 MMOL/L — SIGNIFICANT CHANGE UP (ref 96–108)
CO2 SERPL-SCNC: 30 MMOL/L — SIGNIFICANT CHANGE UP (ref 22–31)
CREAT SERPL-MCNC: 0.9 MG/DL — SIGNIFICANT CHANGE UP (ref 0.5–1.3)
EGFR: 88 ML/MIN/1.73M2 — SIGNIFICANT CHANGE UP
GLUCOSE SERPL-MCNC: 87 MG/DL — SIGNIFICANT CHANGE UP (ref 70–99)
OSMOLALITY 24H UR: 743 MOSM/KG — SIGNIFICANT CHANGE UP (ref 50–1200)
OSMOLALITY SERPL: 304 MOSMOL/KG — HIGH (ref 280–301)
POTASSIUM SERPL-MCNC: 3.5 MMOL/L — SIGNIFICANT CHANGE UP (ref 3.5–5.3)
POTASSIUM SERPL-SCNC: 3.5 MMOL/L — SIGNIFICANT CHANGE UP (ref 3.5–5.3)
SODIUM SERPL-SCNC: 144 MMOL/L — SIGNIFICANT CHANGE UP (ref 135–145)

## 2025-02-05 PROCEDURE — 99232 SBSQ HOSP IP/OBS MODERATE 35: CPT

## 2025-02-05 RX ADMIN — Medication 500000 UNIT(S): at 05:41

## 2025-02-05 RX ADMIN — METOPROLOL SUCCINATE 25 MILLIGRAM(S): 50 TABLET, EXTENDED RELEASE ORAL at 05:42

## 2025-02-05 RX ADMIN — METOPROLOL SUCCINATE 25 MILLIGRAM(S): 50 TABLET, EXTENDED RELEASE ORAL at 17:41

## 2025-02-05 RX ADMIN — ROSUVASTATIN CALCIUM 40 MILLIGRAM(S): 20 TABLET, FILM COATED ORAL at 21:20

## 2025-02-05 RX ADMIN — ENOXAPARIN SODIUM 40 MILLIGRAM(S): 100 INJECTION SUBCUTANEOUS at 17:40

## 2025-02-05 RX ADMIN — Medication 2 TABLET(S): at 21:20

## 2025-02-05 RX ADMIN — AMLODIPINE BESYLATE 10 MILLIGRAM(S): 10 TABLET ORAL at 05:42

## 2025-02-05 RX ADMIN — Medication 500000 UNIT(S): at 12:42

## 2025-02-05 RX ADMIN — Medication 320 MILLIGRAM(S): at 05:41

## 2025-02-05 RX ADMIN — Medication 500000 UNIT(S): at 17:41

## 2025-02-05 RX ADMIN — Medication 40 MILLIGRAM(S): at 05:42

## 2025-02-05 NOTE — PROGRESS NOTE ADULT - ASSESSMENT
74M w/ PMH HTN, afib (on apixaban, started 2017), and CVA (w/ residual min right-sided weakness), who presented to [facility] 01/26/2025 after being found on floor by wife w/ right-sided weakness and word-finding difficulties. Wife woke up in 0545 1/26 finding the patient on the floor and complaining of right sided weakness and word finding difficulties. LKN 1/25 2230 when the patient went to bed. He took his apixaban and medications for HLD and HTN prior to going to bed. Upon finding the patient on the floor, the patient was transferred to the [facility] ED. NIHSS 10 (+1 face, +3 RUE, +3 RLE, +1 Aphasia, +2, dysarthria). mRS 0. CT head demonstrated 5.8 x 2.6 cm left basal ganglia hemorrhage with 1-2mm rightward midline shift. 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 Received andexanet and levetiracetam. Transferred to [facility] 01/26, Neurology recommended holding apixaban through 03/09 per neuro (hold apixaban for 5-6 weeks, then will make further decisions regarding restarting and/or a Watchman as outpatient given Afib).  1/29: Admitted to [facility]  for acute rehab     #Chronic atrial fibrillation  -apixiban on hold (tentatively till 3/9)  - on Metorpolol for HR control    # Hypernatremia  - mild. improved  - recommend to increase PO fluid intake  - monitor    #Stroke  #Primary Hypertension  - amLODIPine   10 milliGRAM(s) Oral daily  -metoprolol tartrate 25 milliGRAM(s) Oral every 12 hours  -valsartan 320 milliGRAM(s) Oral daily  -rosuvastatin 40 milliGRAM(s) Oral at bedtime    #ISIS   - CPAP     #Thrush  - nystatin    Suspension 063636 Unit(s) Oral four times a day  - Oral hyegene    #Constipation  senna 2 Tablet(s) Oral at bedtime  Miralax PRN  encouraged PO    # Dysphagia  - SLP is following    #DVT PPX:  - lovenox.   - LE doppler 1/30-- neg for DVT bilat. LE    d/w rehab team at [facility]

## 2025-02-05 NOTE — PROGRESS NOTE ADULT - SUBJECTIVE AND OBJECTIVE BOX
Patient is a 74y old  Male who presents with a chief complaint of left basal ganglia hemorrhage w/ right hemiparesis, transcortical sensory aphasia, and dysphasia (04 Feb 2025 13:21)      HPI:   74M RHD w/ PMH HTN, afib (on apixaban, started 2017), and CVA (w/ residual min right-sided weakness), who presented to [facility] 01/26/2025 after being found on floor by wife w/ right-sided weakness and word-finding difficulties. Wife woke up in 0545 1/26 finding the patient on the floor and complaining of right sided weakness and word finding difficulties. LKN 1/25 2230 when the patient went to bed. He took his apixaban and medications for HLD and HTN prior to going to bed. Upon finding the patient on the floor, the patient was transferred to the [facility] ED. NIHSS 10 (+1 face, +3 RUE, +3 RLE, +1 Aphasia, +2, dysarthria). mRS 0    CT head demonstrated 5.8 x 2.6 cm left basal ganglia hemorrhage with 1-2mm rightward midline shift. 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 Received andexanet and levetiracetam 1g x1. He was transferred to [facility] 01/26 for further eval. Neurology recommended holding apixab through 03/09 per neuro (hold apixaban for 5-6 weeks, then will make further decisions regarding restarting and/or a Watchman as outpatient given Afib).    Evaluated by PM&R and discharged to [facility] acute rehab 01/29.  (30 Jan 2025 16:58)      PAST MEDICAL & SURGICAL HISTORY:  Stroke      Chronic atrial fibrillation      HTN (hypertension)      HLD (hyperlipidemia)      No significant past surgical history          MEDICATIONS  (STANDING):  amLODIPine   Tablet 10 milliGRAM(s) Oral daily  enoxaparin Injectable 40 milliGRAM(s) SubCutaneous every 24 hours  metoprolol tartrate 25 milliGRAM(s) Oral every 12 hours  nystatin    Suspension 235686 Unit(s) Oral four times a day  pantoprazole    Tablet 40 milliGRAM(s) Oral before breakfast  rosuvastatin 40 milliGRAM(s) Oral at bedtime  senna 2 Tablet(s) Oral at bedtime  valsartan 320 milliGRAM(s) Oral daily    MEDICATIONS  (PRN):  acetaminophen     Tablet .. 650 milliGRAM(s) Oral every 6 hours PRN Moderate Pain (4 - 6)  melatonin 6 milliGRAM(s) Oral at bedtime PRN Insomnia  polyethylene glycol 3350 17 Gram(s) Oral daily PRN Constipation      Allergies    No Known Allergies    Intolerances          VITALS  74y  Vital Signs Last 24 Hrs  T(C): 36.2 (05 Feb 2025 07:46), Max: 36.7 (04 Feb 2025 20:43)  T(F): 97.2 (05 Feb 2025 07:46), Max: 98.1 (04 Feb 2025 20:43)  HR: 64 (05 Feb 2025 07:46) (60 - 68)  BP: 142/86 (05 Feb 2025 07:46) (126/76 - 142/86)  BP(mean): --  RR: 15 (05 Feb 2025 07:46) (14 - 15)  SpO2: 96% (05 Feb 2025 07:46) (94% - 99%)    Parameters below as of 05 Feb 2025 07:46  Patient On (Oxygen Delivery Method): room air      Daily     Daily         RECENT LABS:      02-05    144  |  108  |  32[H]  ----------------------------<  87  3.5   |  30  |  0.90    Ca    8.7      05 Feb 2025 06:00          Urinalysis Basic - ( 05 Feb 2025 06:00 )    Color: x / Appearance: x / SG: x / pH: x  Gluc: 87 mg/dL / Ketone: x  / Bili: x / Urobili: x   Blood: x / Protein: x / Nitrite: x   Leuk Esterase: x / RBC: x / WBC x   Sq Epi: x / Non Sq Epi: x / Bacteria: x          CAPILLARY BLOOD GLUCOSE

## 2025-02-05 NOTE — CHART NOTE - NSCHARTNOTEFT_GEN_A_CORE
Nutrition Follow Up Note  Hospital Course   (Per Electronic Medical Record)    RD consult: Nutrition Education with family    Source:  Patient [X]  Family Member [X] wife and daughter  Nursing Staff [X]   Medical Record [X]      Diet: Diet, Easy to Chew:   DASH/TLC {Sodium & Cholesterol Restricted}  Mildly Thick Liquids (MILDTHICKLIQS)  Supplement Feeding Modality:  Oral  Ensure Plus High Protein Cans or Servings Per Day:  1       Frequency:  Daily (02-03-25 @ 12:10) [Active]    At this time patient tolerating diet w/ adequate appetite/intake consuming % of meals. No issues w/ dentition or chewing and swallowing current diet texture. No reported N/V/C/D, last BM 2/3 Per nursing flowsheets. Provided Heart Healthy Nutrition Therapy education and handout to patient and family. Reviewed dietary modifications including; reading from food labels, restricting and identifying sodium content in foods, cooking at home, sources of healthy fat, and fluid recommendations. Education was well received by patient and family as demonstrated by teach back.     Current Weight: 165.7lb on 1/30      Pertinent Medications: MEDICATIONS  (STANDING):  amLODIPine   Tablet 10 milliGRAM(s) Oral daily  enoxaparin Injectable 40 milliGRAM(s) SubCutaneous every 24 hours  metoprolol tartrate 25 milliGRAM(s) Oral every 12 hours  nystatin    Suspension [id number] Unit(s) Oral four times a day  pantoprazole    Tablet 40 milliGRAM(s) Oral before breakfast  rosuvastatin 40 milliGRAM(s) Oral at bedtime  senna 2 Tablet(s) Oral at bedtime  valsartan 320 milliGRAM(s) Oral daily    MEDICATIONS  (PRN):  acetaminophen     Tablet .. 650 milliGRAM(s) Oral every 6 hours PRN Moderate Pain (4 - 6)  melatonin 6 milliGRAM(s) Oral at bedtime PRN Insomnia  polyethylene glycol 3350 17 Gram(s) Oral daily PRN Constipation      Pertinent Labs:  02-05 Na144 mmol/L Glu 87 mg/dL K+ 3.5 mmol/L Cr  0.90 mg/dL BUN 32 mg/dL[H] 02-03 Alb 2.7 g/dL[L] 01-27 Chol 142 mg/dL LDL --    HDL 51 mg/dL Trig 124 mg/dL    Skin: No pressure injury per nursing flowsheets    Edema: No edema noted per nursing flowsheet    Last Bowel Movement: on 2/4 Per nursing flowsheets     Estimated Needs:   [X] No Change Since Previous Assessment    Previous Nutrition Diagnosis:   Swallowing Difficulty  related to dysphagia s/p CVA  as evidenced by need for mechanically altered diet texture per SLP recommendations      Nutrition Diagnosis is [X] Ongoing - addressed w/ Easy to Chew meal texture w/ Mildly Thickened Liquids    New Nutrition Diagnosis: [X] Not Applicable    Interventions:   1. Recommend continuing with current plan of care    Monitoring & Evaluation:   [X] Weights   [X] PO Intake   [X] Skin Integrity   [X] Follow Up (Per Protocol)  [X] Tolerance to Diet Prescription   [X] Other: Labs     Registered Dietitian/Nutritionist Remains Available.  Veronica Greenfield RD    Phone# [phone]

## 2025-02-05 NOTE — PROGRESS NOTE ADULT - ASSESSMENT
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    # non traumatic acute left BG hemorrhage with right hemibody involvement, right HP, apraxia speech, motor apraxia, incoordination, dysarthria, dysphagia  - CT Head: 5.8 x 2.6 cm intracranial hemorrhage with mass effect and 1-2mm rightward midline  shift on 1/26  - CTA h/n: stable near complete occlusion of left distal M1  - S/p andexanet + levetiracetam 1g x1  - Hold apixaban through 03/09 (5-6 weeks per neuro). We reviewed with family medications, risks/benefits, and need for clearance by neurology before resumption  - Rosuvastatin 40mg qhs  - Continue comprehensive rehab program PT OT SLP 3 hours daily 5 x week  - recreation therapy. Vital stim added to program 2/3 for dysphagia  - precautions: fall, aspiration, cardiac  - outpt neuro + NSGY f/u    # atrial fibrillation  # HTN  - hold apixaban as above  - amlodipine 10mg daily  - Lopressor 25mg BID  - Valsartan 320mg daily (switched from losartan 100 Qd on 1/30 per cardiology recs @ [facility])  - /76 - 142/86) HR 60-68 2/5    # Hypernatremia/KEAGAN  - Reviewed with patient. Encourage increased PO intake  - Na 148 2/3--> 149 2/4--> 144 2/5 improved  - BUn/Cr 34/1 --> 37/0.89 2/4--> 32/0.9 2/5  - urine osm 749 2/4  - Sosm pending    # on CPAP at home per nursing  - Using home CPAP (family brought in personal machine). Patient compliant in community per family  - dc supplemental O2     # pain  - acetaminophen 650mg q6h PRN    # bowel  - scheduled: senna 2tab qhs  - PRN: Miralax BID    # bladder   - voiding well, bladder scan dc  - toileting schedule    # Oral Thrush  - Nystatin s/s 4xdaily x7days    # sleep  - melatonin 3mg hs PRN    #diet   - easy to chew w/ mildly thick (upgraded from mod thick 2/3)  - Last MBS 2/3    # DVT ppx  - LE doppler 1/30-- neg for DVT bilat. LEs  - lovenox 40 mg daily (started 1/29)    # Case discussed in IDT rounds 2/3 (initial)  - continent. Lives in  with spouse, 2 WOODY, 1 flight inside. Spouse can provide supervision and limited physical assist stairs, easy to chew with mod thickened liquids; mixed transcortical aphasia with elements of both fluent and non fluent aphasia, paraphasias, jargon, intermittent awareness, supervision eating and grooming, min assist toileting, mod assist UB and max LB dressing; min-mod assist commode transfer. min-mod assist bed mobility and transfers, ambulates 60 feet with NBQC min-mod assist, 4 steps with 1 HR min-mod assist  - barriers: right UP, stairs, communication decreased balance and coordination, insight  - goals: "enhance coordination"  supervision ADLs and transfers, communicate wants and needs effectively and consistently with min assist; ambulate to bathroom with appropriate AD and toilet with supervision; supervision  - target: 2/20 home with caregiver assist and home care referral PT and OT SLP  - Medical meeting with wife and daughter 2/4    # LABS  Sosm  CBC CMP 2/6    Corrected contact information:  Spouse Nicolle Browne - [phone]  Daughter Gracy: [phone]  ---------------  Outpatient Follow-up:    Lynda Zimmerman  NP in Family Health  [address] 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    # non traumatic acute left BG hemorrhage with right hemibody involvement, right HP, apraxia speech, motor apraxia, incoordination, dysarthria, dysphagia  - CT Head: 5.8 x 2.6 cm intracranial hemorrhage with mass effect and 1-2mm rightward midline  shift on 1/26  - CTA h/n: stable near complete occlusion of left distal M1  - S/p andexanet + levetiracetam 1g x1  - Hold apixaban through 03/09 (5-6 weeks per neuro). We reviewed with family medications, risks/benefits, and need for clearance by neurology before resumption  - Rosuvastatin 40mg qhs  - Continue comprehensive rehab program PT OT SLP 3 hours daily 5 x week  - recreation therapy. Vital stim added to program 2/3 for dysphagia  - precautions: fall, aspiration, cardiac  - outpt neuro + NSGY f/u    # atrial fibrillation  # HTN  - hold apixaban as above  - amlodipine 10mg daily  - Lopressor 25mg BID  - Valsartan 320mg daily (switched from losartan 100 Qd on 1/30 per cardiology recs @ [facility])  - /76 - 142/86) HR 60-68 2/5. Reviewed cardiac meds with family    # Hypernatremia/KEAGAN  - Reviewed with patient. Encourage increased PO intake  - Na 148 2/3--> 149 2/4--> 144 2/5 improved  - BUn/Cr 34/1 --> 37/0.89 2/4--> 32/0.9 2/5  - urine osm 749 2/4  - Sosm pending    # on CPAP at home per nursing  - Using home CPAP (family brought in personal machine). Patient compliant in community per family  - dc supplemental O2     # pain  - acetaminophen 650mg q6h PRN    # bowel  - scheduled: senna 2tab qhs  - PRN: Miralax BID    # bladder   - voiding well, bladder scan dc  - toileting schedule    # Oral Thrush  - Nystatin s/s 4xdaily x7days    # sleep  - melatonin 3mg hs PRN    #diet   - easy to chew w/ mildly thick (upgraded from mod thick 2/3)  - Last MBS 2/3    # DVT ppx  - LE doppler 1/30-- neg for DVT bilat. LEs  - lovenox 40 mg daily (started 1/29)    # Case discussed in IDT rounds 2/3 (initial)  - continent. Lives in  with spouse, 2 WOODY, 1 flight inside. Spouse can provide supervision and limited physical assist stairs, easy to chew with mod thickened liquids; mixed transcortical aphasia with elements of both fluent and non fluent aphasia, paraphasias, jargon, intermittent awareness, supervision eating and grooming, min assist toileting, mod assist UB and max LB dressing; min-mod assist commode transfer. min-mod assist bed mobility and transfers, ambulates 60 feet with NBQC min-mod assist, 4 steps with 1 HR min-mod assist  - barriers: right UP, stairs, communication decreased balance and coordination, insight  - goals: "enhance coordination"  supervision ADLs and transfers, communicate wants and needs effectively and consistently with min assist; ambulate to bathroom with appropriate AD and toilet with supervision; supervision  - target: 2/20 home with caregiver assist and home care referral PT and OT SLP  - Medical meeting with wife and daughter 2/4    # LABS  Sosm  CBC CMP 2/6    Corrected contact information:  Spouse Nicolle Browne - [phone]  Daughter Gracy: [phone]  ---------------  Outpatient Follow-up:    Lynda Zimmerman  NP in Family Health  [address] [facility] [address], [address]  [place] [address]  Phone: [phone]  Fax: [phone]  Follow Up Time:     Manoj Edge  Cardiology  [address] [facility], [address]  [place] [address]  Phone: [phone]  Fax: [phone]  Follow Up Time: 2 weeks

## 2025-02-05 NOTE — PROGRESS NOTE ADULT - COMMENTS
Patient seen with family  (wife and daughter) at bedside. He was moved into quieter room although he reports some difficulties with roommate loudness/visitors yesterday, but will continue to monitor. Otherwise, doing well, no new complaints. Has not had BM in 2 days, but prefers no additional bowel meds at this time "will let you know"

## 2025-02-05 NOTE — PROGRESS NOTE ADULT - SUBJECTIVE AND OBJECTIVE BOX
Medicine Progress Note    Patient is a 74y old  Male who presents with a chief complaint of left basal ganglia hemorrhage w/ right hemiparesis, transcortical sensory aphasia, and dysphasia (05 Feb 2025 08:43)      SUBJECTIVE / OVERNIGHT EVENTS:  seen and examined  Chart reviewed  No overnight events  Limb weakness improving with therapy  BM+  No pain  No complaints      ROS:  denied fever/chills/CP/SOB/cough/palpitation/dizziness/abdominal pian/nausea/vomiting/diarrhoea/constipation/dysuria/leg or calf pain/headaches.all other ROS neg    MEDICATIONS  (STANDING):  amLODIPine   Tablet 10 milliGRAM(s) Oral daily  enoxaparin Injectable 40 milliGRAM(s) SubCutaneous every 24 hours  metoprolol tartrate 25 milliGRAM(s) Oral every 12 hours  nystatin    Suspension 428363 Unit(s) Oral four times a day  pantoprazole    Tablet 40 milliGRAM(s) Oral before breakfast  rosuvastatin 40 milliGRAM(s) Oral at bedtime  senna 2 Tablet(s) Oral at bedtime  valsartan 320 milliGRAM(s) Oral daily    MEDICATIONS  (PRN):  acetaminophen     Tablet .. 650 milliGRAM(s) Oral every 6 hours PRN Moderate Pain (4 - 6)  melatonin 6 milliGRAM(s) Oral at bedtime PRN Insomnia  polyethylene glycol 3350 17 Gram(s) Oral daily PRN Constipation    CAPILLARY BLOOD GLUCOSE        I&O's Summary    04 Feb 2025 07:01  -  05 Feb 2025 07:00  --------------------------------------------------------  IN: 480 mL / OUT: 500 mL / NET: -20 mL        PHYSICAL EXAM:  Vital Signs Last 24 Hrs  T(C): 36.2 (05 Feb 2025 07:46), Max: 36.7 (04 Feb 2025 20:43)  T(F): 97.2 (05 Feb 2025 07:46), Max: 98.1 (04 Feb 2025 20:43)  HR: 64 (05 Feb 2025 07:46) (60 - 68)  BP: 142/86 (05 Feb 2025 07:46) (126/76 - 142/86)  BP(mean): --  RR: 15 (05 Feb 2025 07:46) (14 - 15)  SpO2: 96% (05 Feb 2025 07:46) (94% - 96%)    Parameters below as of 05 Feb 2025 07:46  Patient On (Oxygen Delivery Method): room air    GENERAL: Not in distress. Alert    HEENT: clear conjuctiva, MMM. no pallor or icterus  CARDIOVASCULAR: RRR S1, S2. No murmur/rubs/gallop  LUNGS: BLAE+, no rales, no wheezing, no rhonchi.    ABDOMEN: ND. Soft,  NT, no guarding / rebound / rigidity. BS normoactive  BACK: No spine tenderness.  EXTREMITIES: no edema. no leg or calf TP.  SKIN: warm and dry  PSYCHIATRIC: Calm.  No agitation.  CNS: AAO. moves limbs, follows commands. mild RUE weakness. dysarthria, right facial droop    LABS:    02-05    144  |  108  |  32[H]  ----------------------------<  87  3.5   |  30  |  0.90    Ca    8.7      05 Feb 2025 06:00            Urinalysis Basic - ( 05 Feb 2025 06:00 )    Color: x / Appearance: x / SG: x / pH: x  Gluc: 87 mg/dL / Ketone: x  / Bili: x / Urobili: x   Blood: x / Protein: x / Nitrite: x   Leuk Esterase: x / RBC: x / WBC x   Sq Epi: x / Non Sq Epi: x / Bacteria: x        COVID-19 PCR: NotDetec (30 Jan 2025 15:00)      RADIOLOGY & ADDITIONAL TESTS:  Imaging from Last 24 Hours:    Electrocardiogram/QTc Interval:    COORDINATION OF CARE:  Care Discussed with Consultants/Other Providers:

## 2025-02-06 LAB
ALBUMIN SERPL ELPH-MCNC: 2.5 G/DL — LOW (ref 3.3–5)
ALP SERPL-CCNC: 87 U/L — SIGNIFICANT CHANGE UP (ref 40–120)
ALT FLD-CCNC: 30 U/L — SIGNIFICANT CHANGE UP (ref 10–45)
ANION GAP SERPL CALC-SCNC: 6 MMOL/L — SIGNIFICANT CHANGE UP (ref 5–17)
AST SERPL-CCNC: 26 U/L — SIGNIFICANT CHANGE UP (ref 10–40)
BASOPHILS # BLD AUTO: 0.07 K/UL — SIGNIFICANT CHANGE UP (ref 0–0.2)
BASOPHILS NFR BLD AUTO: 0.9 % — SIGNIFICANT CHANGE UP (ref 0–2)
BILIRUB SERPL-MCNC: 0.5 MG/DL — SIGNIFICANT CHANGE UP (ref 0.2–1.2)
BUN SERPL-MCNC: 29 MG/DL — HIGH (ref 7–23)
CALCIUM SERPL-MCNC: 8.5 MG/DL — SIGNIFICANT CHANGE UP (ref 8.4–10.5)
CHLORIDE SERPL-SCNC: 108 MMOL/L — SIGNIFICANT CHANGE UP (ref 96–108)
CO2 SERPL-SCNC: 30 MMOL/L — SIGNIFICANT CHANGE UP (ref 22–31)
CREAT SERPL-MCNC: 0.87 MG/DL — SIGNIFICANT CHANGE UP (ref 0.5–1.3)
EGFR: 89 ML/MIN/1.73M2 — SIGNIFICANT CHANGE UP
EOSINOPHIL # BLD AUTO: 0.32 K/UL — SIGNIFICANT CHANGE UP (ref 0–0.5)
EOSINOPHIL NFR BLD AUTO: 4.2 % — SIGNIFICANT CHANGE UP (ref 0–6)
GLUCOSE SERPL-MCNC: 83 MG/DL — SIGNIFICANT CHANGE UP (ref 70–99)
HCT VFR BLD CALC: 40.8 % — SIGNIFICANT CHANGE UP (ref 39–50)
HGB BLD-MCNC: 13.8 G/DL — SIGNIFICANT CHANGE UP (ref 13–17)
IMM GRANULOCYTES NFR BLD AUTO: 0.4 % — SIGNIFICANT CHANGE UP (ref 0–0.9)
LYMPHOCYTES # BLD AUTO: 2.05 K/UL — SIGNIFICANT CHANGE UP (ref 1–3.3)
LYMPHOCYTES # BLD AUTO: 26.6 % — SIGNIFICANT CHANGE UP (ref 13–44)
MCHC RBC-ENTMCNC: 31.3 PG — SIGNIFICANT CHANGE UP (ref 27–34)
MCHC RBC-ENTMCNC: 33.8 G/DL — SIGNIFICANT CHANGE UP (ref 32–36)
MCV RBC AUTO: 92.5 FL — SIGNIFICANT CHANGE UP (ref 80–100)
MONOCYTES # BLD AUTO: 0.68 K/UL — SIGNIFICANT CHANGE UP (ref 0–0.9)
MONOCYTES NFR BLD AUTO: 8.8 % — SIGNIFICANT CHANGE UP (ref 2–14)
NEUTROPHILS # BLD AUTO: 4.55 K/UL — SIGNIFICANT CHANGE UP (ref 1.8–7.4)
NEUTROPHILS NFR BLD AUTO: 59.1 % — SIGNIFICANT CHANGE UP (ref 43–77)
NRBC # BLD: 0 /100 WBCS — SIGNIFICANT CHANGE UP (ref 0–0)
NRBC BLD-RTO: 0 /100 WBCS — SIGNIFICANT CHANGE UP (ref 0–0)
PLATELET # BLD AUTO: 173 K/UL — SIGNIFICANT CHANGE UP (ref 150–400)
POTASSIUM SERPL-MCNC: 3.6 MMOL/L — SIGNIFICANT CHANGE UP (ref 3.5–5.3)
POTASSIUM SERPL-SCNC: 3.6 MMOL/L — SIGNIFICANT CHANGE UP (ref 3.5–5.3)
PROT SERPL-MCNC: 6.2 G/DL — SIGNIFICANT CHANGE UP (ref 6–8.3)
RBC # BLD: 4.41 M/UL — SIGNIFICANT CHANGE UP (ref 4.2–5.8)
RBC # FLD: 12.9 % — SIGNIFICANT CHANGE UP (ref 10.3–14.5)
SODIUM SERPL-SCNC: 144 MMOL/L — SIGNIFICANT CHANGE UP (ref 135–145)
WBC # BLD: 7.7 K/UL — SIGNIFICANT CHANGE UP (ref 3.8–10.5)
WBC # FLD AUTO: 7.7 K/UL — SIGNIFICANT CHANGE UP (ref 3.8–10.5)

## 2025-02-06 PROCEDURE — 99232 SBSQ HOSP IP/OBS MODERATE 35: CPT

## 2025-02-06 PROCEDURE — 99233 SBSQ HOSP IP/OBS HIGH 50: CPT

## 2025-02-06 RX ADMIN — Medication 500000 UNIT(S): at 05:40

## 2025-02-06 RX ADMIN — METOPROLOL SUCCINATE 25 MILLIGRAM(S): 50 TABLET, EXTENDED RELEASE ORAL at 05:41

## 2025-02-06 RX ADMIN — ROSUVASTATIN CALCIUM 40 MILLIGRAM(S): 20 TABLET, FILM COATED ORAL at 21:01

## 2025-02-06 RX ADMIN — ENOXAPARIN SODIUM 40 MILLIGRAM(S): 100 INJECTION SUBCUTANEOUS at 18:16

## 2025-02-06 RX ADMIN — Medication 40 MILLIGRAM(S): at 05:40

## 2025-02-06 RX ADMIN — AMLODIPINE BESYLATE 10 MILLIGRAM(S): 10 TABLET ORAL at 05:41

## 2025-02-06 RX ADMIN — Medication 500000 UNIT(S): at 11:08

## 2025-02-06 RX ADMIN — METOPROLOL SUCCINATE 25 MILLIGRAM(S): 50 TABLET, EXTENDED RELEASE ORAL at 18:17

## 2025-02-06 RX ADMIN — Medication 320 MILLIGRAM(S): at 05:41

## 2025-02-06 NOTE — PROGRESS NOTE ADULT - ASSESSMENT
74M w/ PMH HTN, afib (on apixaban, started 2017), and CVA (w/ residual min right-sided weakness), who presented to [facility] 01/26/2025 after being found on floor by wife w/ right-sided weakness and word-finding difficulties. Wife woke up in 0545 1/26 finding the patient on the floor and complaining of right sided weakness and word finding difficulties. LKN 1/25 2230 when the patient went to bed. He took his apixaban and medications for HLD and HTN prior to going to bed. Upon finding the patient on the floor, the patient was transferred to the [place] ED. NIHSS 10 (+1 face, +3 RUE, +3 RLE, +1 Aphasia, +2, dysarthria). mRS 0. CT head demonstrated 5.8 x 2.6 cm left basal ganglia hemorrhage with 1-2mm rightward midline shift. 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 Received andexanet and levetiracetam. Transferred to [facility] 01/26, Neurology recommended holding apixaban through 03/09 per neuro (hold apixaban for 5-6 weeks, then will make further decisions regarding restarting and/or a Watchman as outpatient given Afib).  1/29: Admitted to [facility]  for acute rehab     #Chronic atrial fibrillation  stable.  -Eliquis on hold (tentatively till 3/9)  - c/w Metoprolol tartrate for HR control    # Hypernatremia  - resolved  - encourage PO fluid intake  - monitor    #CVA  #Primary Hypertension  #HLD  - amlodipine  10 milliGRAM(s) Oral daily  -metoprolol tartrate 25 milliGRAM(s) Oral every 12 hours  -valsartan 320 milliGRAM(s) Oral daily  -rosuvastatin 40 milliGRAM(s) Oral at bedtime    #ISIS   - CPAP     #Thrush  - nystatin Suspension 023939 Unit(s) Oral four times a day- completed course today  - Oral hygiene    #Constipation  senna 2 Tablet(s) Oral at bedtime  Miralax PRN  encouraged PO    # Dysphagia  - SLP is following    #DVT PPX:  - lovenox.   - LE doppler 1/30-- neg for DVT bilat. LE    d/w rehab team at [facility]

## 2025-02-06 NOTE — PROGRESS NOTE ADULT - ASSESSMENT
74M RHD w/ PMH HTN, afib (on apixaban, started 2017), and CVA (w/ residual min right-sided weakness), admitted to [facility] acute rehab for left basal ganglia hemorrhage w/ 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    # non traumatic acute left BG hemorrhage with right hemibody involvement, right HP, apraxia speech, motor apraxia, incoordination, dysarthria, dysphagia  - CT Head: 5.8 x 2.6 cm intracranial hemorrhage with mass effect and 1-2mm rightward midline  shift on 1/26  - CTA h/n: stable near complete occlusion of left distal M1  - S/p andexanet + levetiracetam 1g x1  - Hold apixaban through 03/09 (5-6 weeks per neuro). We reviewed with family medications, risks/benefits, and need for clearance by neurology before resumption  - Rosuvastatin 40mg qhs  - Continue comprehensive rehab program PT OT SLP 3 hours daily 5 x week  - recreation therapy. Vital stim added to program 2/3 for dysphagia  - precautions: fall, aspiration, cardiac    # atrial fibrillation  # HTN  - hold apixaban as above  - amlodipine 10mg daily  - Lopressor 25mg BID  - Valsartan 320mg daily (switched from losartan 100 Qd on 1/30 per cardiology recs @ [facility])  - HR 60-66 /69 - 142/83) 2/6    # Hypernatremia/KEAGAN  - Reviewed with patient. Encourage increased PO intake  - Na 148 2/3--> 149 2/4--> 144 2/5 --> 144 2/6  - BUn/Cr 34/1 --> 37/0.89 2/4--> 32/0.9 2/5--> 29/0.87 2/6 improving  - urine osm 749 2/4. Sosm 304 2/5    # on CPAP at home per nursing  - Using home CPAP (family brought in personal machine). Patient compliant in community per family  - dc supplemental O2     # pain  - acetaminophen 650mg q6h PRN    # bowel  - scheduled: senna 2tab qhs  - PRN: Miralax BID    # bladder   - voiding well, bladder scan dc  - toileting schedule    # Oral Thrush  - Nystatin s/s 4xdaily x7days    # sleep  - melatonin 3mg hs PRN    #diet   - easy to chew w/ mildly thick (upgraded from mod thick 2/3)  - Last MBS 2/3    # DVT ppx  - LE doppler 1/30-- neg for DVT bilat. LEs  - lovenox 40 mg daily (started 1/29)    # Case discussed in IDT rounds 2/3 (initial)  - continent. Lives in  with spouse, 2 WOODY, 1 flight inside. Spouse can provide supervision and limited physical assist stairs, easy to chew with mod thickened liquids; mixed transcortical aphasia with elements of both fluent and non fluent aphasia, paraphasias, jargon, intermittent awareness, supervision eating and grooming, min assist toileting, mod assist UB and max LB dressing; min-mod assist commode transfer. min-mod assist bed mobility and transfers, ambulates 60 feet with NBQC min-mod assist, 4 steps with 1 HR min-mod assist  - barriers: right UP, stairs, communication decreased balance and coordination, insight  - goals: "enhance coordination"  supervision ADLs and transfers, communicate wants and needs effectively and consistently with min assist; ambulate to bathroom with appropriate AD and toilet with supervision; supervision  - target: 2/20 home with caregiver assist and home care referral PT and OT SLP  - Medical meeting with wife and daughter 2/4    # LABS  CBC CMP 2/10    Corrected contact information:  Spouse Nicolle Browne - [phone]  Daughter Gracy: [phone]  ---------------  Outpatient Follow-up:    Lynda Zimmerman  NP in Family Health  [address], [address]  [place] [address]  Phone: [phone]  Fax: [phone]  Follow Up Time:     Manoj Edge  Cardiology  [address], [address]  [place] [address]  Phone: [phone]  Fax: [phone]  Follow Up Time: 2 weeks           74M RHD w/ PMH HTN, afib (on apixaban, started 2017), and CVA (w/ residual min right-sided weakness), admitted to [facility] acute rehab for left basal ganglia hemorrhage w/ 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    # non traumatic acute left BG hemorrhage with right hemibody involvement, right HP, apraxia speech, motor apraxia, incoordination, dysarthria, dysphagia  - CT Head: 5.8 x 2.6 cm intracranial hemorrhage with mass effect and 1-2mm rightward midline  shift on 1/26  - CTA h/n: stable near complete occlusion of left distal M1  - S/p andexanet + levetiracetam 1g x1  - Hold apixaban through 03/09 (5-6 weeks per neuro). We reviewed with family medications, risks/benefits, and need for clearance by neurology before resumption  - Rosuvastatin 40mg qhs  - Continue comprehensive rehab program PT OT SLP 3 hours daily 5 x week  - recreation therapy. Vital stim added to program 2/3 for dysphagia  - precautions: fall, aspiration, cardiac    # atrial fibrillation  # HTN  - hold apixaban as above  - amlodipine 10mg daily  - Lopressor 25mg BID  - Valsartan 320mg daily (switched from losartan 100 Qd on 1/30 per cardiology recs @ [facility])  - HR 60-66 /69 - 142/83) 2/6    # Hypernatremia/KEAGAN  - Reviewed with patient. Encourage increased PO intake  - Na 148 2/3--> 149 2/4--> 144 2/5 --> 144 2/6  - BUn/Cr 34/1 --> 37/0.89 2/4--> 32/0.9 2/5--> 29/0.87 2/6 improving  - urine osm 749 2/4. Sosm 304 2/5    # on CPAP at home per nursing  - Using home CPAP (family brought in personal machine). Patient compliant in community per family  - dc supplemental O2     # pain  - acetaminophen 650mg q6h PRN    # bowel  - scheduled: senna 2tab qhs  - PRN: Miralax BID    # bladder   - voiding well, bladder scan dc  - toileting schedule    # Oral Thrush  - Nystatin s/s 4xdaily x7days    # sleep  - melatonin 3mg hs PRN    # diet   - Last MBS 2/3  - easy to chew w/ mildly thick (upgraded from mod thick 2/3)    # DVT ppx  - LE doppler 1/30: neg for DVT bilat. LEs  - lovenox 40 mg daily (started 1/29)    # Case discussed in IDT rounds 2/3 (initial)  - continent. Lives in  with spouse, 2 WOODY, 1 flight inside. Spouse can provide supervision and limited physical assist stairs, easy to chew with mod thickened liquids; mixed transcortical aphasia with elements of both fluent and non fluent aphasia, paraphasias, jargon, intermittent awareness, supervision eating and grooming, min assist toileting, mod assist UB and max LB dressing; min-mod assist commode transfer. min-mod assist bed mobility and transfers, ambulates 60 feet with NBQC min-mod assist, 4 steps with 1 HR min-mod assist  - barriers: right UP, stairs, communication decreased balance and coordination, insight  - goals: "enhance coordination"  supervision ADLs and transfers, communicate wants and needs effectively and consistently with min assist; ambulate to bathroom with appropriate AD and toilet with supervision; supervision  - target: 2/20 home with caregiver assist and home care referral PT and OT SLP  - Medical meeting with wife and daughter 2/4    # LABS  CBC CMP 2/10    Corrected contact information:  Spouse Nicolle Browne - [phone]  Daughter Gracy: [phone]  ---------------  Outpatient Follow-up:    Lynda Zimmerman  NP in Family Health  [address], [address]  [place] [address]  Phone: [phone]  Fax: [phone]  Follow Up Time:     Manoj Edge  Cardiology  [address], [address]  [place] [address]  Phone: [phone]  Fax: [phone]  Follow Up Time: 2 weeks

## 2025-02-06 NOTE — PROGRESS NOTE ADULT - ATTENDING COMMENTS
Progress note amended to include my discussions with patient, resident, hospitalist, RN, SW, PT and my findings    Patient seen in PT. He continues to have some difficulty with noise level in room, this time due to roommate and visitors although today he says, "just one time" and he'd like to "see how it goes" He continues to have periods of paraphasic errors, jargon, with interspersed bursts of correct words, errors exacerbated at times by his frustration. Overall mood is good however. and he declines medications to help with sleep    He was seen holding water cup /thickened liquid. No cough or SOB. His improvement in hydration status based on labs discussed. BP stable, no dizziness or lightheadedness. Lungs remain clear, HR controlled.    Labs reviewed, stable, Sosm high just above normal range but Na improved and now WNL, c/w dehydration.  Continue program      RECENT LABS    Vital Signs Last 24 Hrs  T(C): 36.2 (06 Feb 2025 07:40), Max: 36.8 (05 Feb 2025 19:20)  T(F): 97.2 (06 Feb 2025 07:40), Max: 98.2 (05 Feb 2025 19:20)  HR: 60 (06 Feb 2025 07:40) (60 - 66)  BP: 132/78 (06 Feb 2025 07:40) (117/69 - 142/83)  BP(mean): --  RR: 16 (06 Feb 2025 07:40) (16 - 16)  SpO2: 95% (06 Feb 2025 07:40) (95% - 99%)    Parameters below as of 06 Feb 2025 07:40  Patient On (Oxygen Delivery Method): room air                              13.8   7.70  )-----------( 173      ( 06 Feb 2025 05:31 )             40.8     02-06    144  |  108  |  29[H]  ----------------------------<  83  3.6   |  30  |  0.87    Ca    8.5      06 Feb 2025 05:31    TPro  6.2  /  Alb  2.5[L]  /  TBili  0.5  /  DBili  x   /  AST  26  /  ALT  30  /  AlkPhos  87  02-06      Urinalysis Basic - ( 06 Feb 2025 05:31 )    Color: x / Appearance: x / SG: x / pH: x  Gluc: 83 mg/dL / Ketone: x  / Bili: x / Urobili: x   Blood: x / Protein: x / Nitrite: x   Leuk Esterase: x / RBC: x / WBC x   Sq Epi: x / Non Sq Epi: x / Bacteria: x      CAPILLARY BLOOD GLUCOSE

## 2025-02-06 NOTE — PROGRESS NOTE ADULT - SUBJECTIVE AND OBJECTIVE BOX
CC: Patient is a 74y old  Male who presents with a chief complaint of left basal ganglia hemorrhage w/ right hemiparesis, transcortical sensory aphasia, and dysphasia (06 Feb 2025 09:01)      Interval History:  Patient seen and examined at bedside.  No overnight events  No complaints this morning    ALLERGIES:  No Known Allergies    MEDICATIONS  (STANDING):  amLODIPine   Tablet 10 milliGRAM(s) Oral daily  enoxaparin Injectable 40 milliGRAM(s) SubCutaneous every 24 hours  metoprolol tartrate 25 milliGRAM(s) Oral every 12 hours  pantoprazole    Tablet 40 milliGRAM(s) Oral before breakfast  rosuvastatin 40 milliGRAM(s) Oral at bedtime  senna 2 Tablet(s) Oral at bedtime  valsartan 320 milliGRAM(s) Oral daily    MEDICATIONS  (PRN):  acetaminophen     Tablet .. 650 milliGRAM(s) Oral every 6 hours PRN Moderate Pain (4 - 6)  melatonin 6 milliGRAM(s) Oral at bedtime PRN Insomnia  polyethylene glycol 3350 17 Gram(s) Oral daily PRN Constipation    Vital Signs Last 24 Hrs  T(F): 97.2 (06 Feb 2025 07:40), Max: 98.2 (05 Feb 2025 19:20)  HR: 60 (06 Feb 2025 07:40) (60 - 66)  BP: 132/78 (06 Feb 2025 07:40) (117/69 - 142/83)  RR: 16 (06 Feb 2025 07:40) (16 - 16)  SpO2: 95% (06 Feb 2025 07:40) (95% - 99%)  I&O's Summary    05 Feb 2025 07:01  -  06 Feb 2025 07:00  --------------------------------------------------------  IN: 0 mL / OUT: 600 mL / NET: -600 mL          PHYSICAL EXAM:  GENERAL: NAD  NERVOUS SYSTEM:  CN II - XII intact; Sensation intact; follows commands  CHEST/LUNG: Clear to percussion bilaterally; No rales, rhonchi, wheezing, or rubs; normal respiratory effort, no intercostal retractions  HEART: Regular rate and rhythm; No murmurs, rubs, or gallops; No pitting edema  ABDOMEN: Soft, Nontender, Nondistended; Bowel sounds present; No HSM or masses  MUSCULOSKELETAL/EXTREMITIES:  2+ Peripheral Pulses, No clubbing or digital cyanosis; FROM of extremeties (pain, crepitation or contracture)  PSYCH: Appropriate affect, Alert & Oriented x 3, Good Memory; Good insight    LABS:                        13.8   7.70  )-----------( 173      ( 06 Feb 2025 05:31 )             40.8       02-06    144  |  108  |  29  ----------------------------<  83  3.6   |  30  |  0.87    Ca    8.5      06 Feb 2025 05:31    TPro  6.2  /  Alb  2.5  /  TBili  0.5  /  DBili  x   /  AST  26  /  ALT  30  /  AlkPhos  87  02-06 01-27 Chol 142 mg/dL LDL -- HDL 51 mg/dL Trig 124 mg/dL                  Urinalysis Basic - ( 06 Feb 2025 05:31 )    Color: x / Appearance: x / SG: x / pH: x  Gluc: 83 mg/dL / Ketone: x  / Bili: x / Urobili: x   Blood: x / Protein: x / Nitrite: x   Leuk Esterase: x / RBC: x / WBC x   Sq Epi: x / Non Sq Epi: x / Bacteria: x        COVID-19 PCR: NotDetec (01-30-25 @ 15:00)      Care Discussed with Consultants/Other Providers: Yes

## 2025-02-06 NOTE — PROGRESS NOTE ADULT - SUBJECTIVE AND OBJECTIVE BOX
Patient is a 74y old  Male who presents with a chief complaint of left basal ganglia hemorrhage w/ right hemiparesis, transcortical sensory aphasia, and dysphasia (05 Feb 2025 12:22)      HPI:   74M RHD w/ PMH HTN, afib (on apixaban, started 2017), and CVA (w/ residual min right-sided weakness), who presented to [facility] 01/26/2025 after being found on floor by wife w/ right-sided weakness and word-finding difficulties. Wife woke up in 0545 1/26 finding the patient on the floor and complaining of right sided weakness and word finding difficulties. LKN 1/25 2230 when the patient went to bed. He took his apixaban and medications for HLD and HTN prior to going to bed. Upon finding the patient on the floor, the patient was transferred to the [place] ED. NIHSS 10 (+1 face, +3 RUE, +3 RLE, +1 Aphasia, +2, dysarthria). mRS 0    CT head demonstrated 5.8 x 2.6 cm left basal ganglia hemorrhage with 1-2mm rightward midline shift. 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 Received andexanet and levetiracetam 1g x1. He was transferred to [facility] 01/26 for further eval. Neurology recommended holding apixab through 03/09 per neuro (hold apixaban for 5-6 weeks, then will make further decisions regarding restarting and/or a Watchman as outpatient given Afib).    Evaluated by PM&R and discharged to [facility] acute rehab 01/29.  (30 Jan 2025 16:58)      PAST MEDICAL & SURGICAL HISTORY:  Stroke      Chronic atrial fibrillation      HTN (hypertension)      HLD (hyperlipidemia)      No significant past surgical history          MEDICATIONS  (STANDING):  amLODIPine   Tablet 10 milliGRAM(s) Oral daily  enoxaparin Injectable 40 milliGRAM(s) SubCutaneous every 24 hours  metoprolol tartrate 25 milliGRAM(s) Oral every 12 hours  nystatin    Suspension 718391 Unit(s) Oral four times a day  pantoprazole    Tablet 40 milliGRAM(s) Oral before breakfast  rosuvastatin 40 milliGRAM(s) Oral at bedtime  senna 2 Tablet(s) Oral at bedtime  valsartan 320 milliGRAM(s) Oral daily    MEDICATIONS  (PRN):  acetaminophen     Tablet .. 650 milliGRAM(s) Oral every 6 hours PRN Moderate Pain (4 - 6)  melatonin 6 milliGRAM(s) Oral at bedtime PRN Insomnia  polyethylene glycol 3350 17 Gram(s) Oral daily PRN Constipation      Allergies    No Known Allergies    Intolerances          VITALS  74y  Vital Signs Last 24 Hrs  T(C): 36.2 (06 Feb 2025 07:40), Max: 36.8 (05 Feb 2025 19:20)  T(F): 97.2 (06 Feb 2025 07:40), Max: 98.2 (05 Feb 2025 19:20)  HR: 60 (06 Feb 2025 07:40) (60 - 66)  BP: 132/78 (06 Feb 2025 07:40) (117/69 - 142/83)  BP(mean): --  RR: 16 (06 Feb 2025 07:40) (16 - 16)  SpO2: 95% (06 Feb 2025 07:40) (95% - 99%)    Parameters below as of 06 Feb 2025 07:40  Patient On (Oxygen Delivery Method): room air      Daily     Daily         RECENT LABS:                          13.8   7.70  )-----------( 173      ( 06 Feb 2025 05:31 )             40.8     02-06    144  |  108  |  29[H]  ----------------------------<  83  3.6   |  30  |  0.87    Ca    8.5      06 Feb 2025 05:31    TPro  6.2  /  Alb  2.5[L]  /  TBili  0.5  /  DBili  x   /  AST  26  /  ALT  30  /  AlkPhos  87  02-06    LIVER FUNCTIONS - ( 06 Feb 2025 05:31 )  Alb: 2.5 g/dL / Pro: 6.2 g/dL / ALK PHOS: 87 U/L / ALT: 30 U/L / AST: 26 U/L / GGT: x             Urinalysis Basic - ( 06 Feb 2025 05:31 )    Color: x / Appearance: x / SG: x / pH: x  Gluc: 83 mg/dL / Ketone: x  / Bili: x / Urobili: x   Blood: x / Protein: x / Nitrite: x   Leuk Esterase: x / RBC: x / WBC x   Sq Epi: x / Non Sq Epi: x / Bacteria: x          CAPILLARY BLOOD GLUCOSE                   Patient is a 74y old  Male who presents with a chief complaint of left basal ganglia hemorrhage w/ right hemiparesis, transcortical sensory aphasia, and dysphasia (05 Feb 2025 12:22)      HPI:   74M RHD w/ PMH HTN, afib (on apixaban, started 2017), and CVA (w/ residual min right-sided weakness), who presented to [facility] 01/26/2025 after being found on floor by wife w/ right-sided weakness and word-finding difficulties. Wife woke up in 0545 1/26 finding the patient on the floor and complaining of right sided weakness and word finding difficulties. LKN 1/25 2230 when the patient went to bed. He took his apixaban and medications for HLD and HTN prior to going to bed. Upon finding the patient on the floor, the patient was transferred to the [place] ED. NIHSS 10 (+1 face, +3 RUE, +3 RLE, +1 Aphasia, +2, dysarthria). mRS 0    CT head demonstrated 5.8 x 2.6 cm left basal ganglia hemorrhage with 1-2mm rightward midline shift. 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 Received andexanet and levetiracetam 1g x1. He was transferred to [facility] 01/26 for further eval. Neurology recommended holding apixab through 03/09 per neuro (hold apixaban for 5-6 weeks, then will make further decisions regarding restarting and/or a Watchman as outpatient given Afib).    Evaluated by PM&R and discharged to [facility] acute rehab 01/29.  (30 Jan 2025 16:58)      ROS/Subjective: No overnight events. Reports interrupted sleep due to noise from roommate. Unclear if patient had other concerns regarding new room/roommate but able to express some concern this AM with new room, unable to elaborate further due to aphasia but was able to consistently confirm issue was not urgent and stated "not a big deal." After multiple attempts at clarifying conecrns with different approaches, agreed to readdress concerns at later time to which patient expressed agreement. Other than above, patient states he is "good" and denies any acute concerns or questions this AM.    PAST MEDICAL & SURGICAL HISTORY:  Stroke      Chronic atrial fibrillation      HTN (hypertension)      HLD (hyperlipidemia)      No significant past surgical history          MEDICATIONS  (STANDING):  amLODIPine   Tablet 10 milliGRAM(s) Oral daily  enoxaparin Injectable 40 milliGRAM(s) SubCutaneous every 24 hours  metoprolol tartrate 25 milliGRAM(s) Oral every 12 hours  nystatin    Suspension 276318 Unit(s) Oral four times a day  pantoprazole    Tablet 40 milliGRAM(s) Oral before breakfast  rosuvastatin 40 milliGRAM(s) Oral at bedtime  senna 2 Tablet(s) Oral at bedtime  valsartan 320 milliGRAM(s) Oral daily    MEDICATIONS  (PRN):  acetaminophen     Tablet .. 650 milliGRAM(s) Oral every 6 hours PRN Moderate Pain (4 - 6)  melatonin 6 milliGRAM(s) Oral at bedtime PRN Insomnia  polyethylene glycol 3350 17 Gram(s) Oral daily PRN Constipation      Allergies    No Known Allergies    Intolerances          VITALS  74y  Vital Signs Last 24 Hrs  T(C): 36.2 (06 Feb 2025 07:40), Max: 36.8 (05 Feb 2025 19:20)  T(F): 97.2 (06 Feb 2025 07:40), Max: 98.2 (05 Feb 2025 19:20)  HR: 60 (06 Feb 2025 07:40) (60 - 66)  BP: 132/78 (06 Feb 2025 07:40) (117/69 - 142/83)  BP(mean): --  RR: 16 (06 Feb 2025 07:40) (16 - 16)  SpO2: 95% (06 Feb 2025 07:40) (95% - 99%)    Parameters below as of 06 Feb 2025 07:40  Patient On (Oxygen Delivery Method): room air      Daily     Daily         RECENT LABS:                          13.8   7.70  )-----------( 173      ( 06 Feb 2025 05:31 )             40.8     02-06    144  |  108  |  29[H]  ----------------------------<  83  3.6   |  30  |  0.87    Ca    8.5      06 Feb 2025 05:31    TPro  6.2  /  Alb  2.5[L]  /  TBili  0.5  /  DBili  x   /  AST  26  /  ALT  30  /  AlkPhos  87  02-06    LIVER FUNCTIONS - ( 06 Feb 2025 05:31 )  Alb: 2.5 g/dL / Pro: 6.2 g/dL / ALK PHOS: 87 U/L / ALT: 30 U/L / AST: 26 U/L / GGT: x             Urinalysis Basic - ( 06 Feb 2025 05:31 )    Color: x / Appearance: x / SG: x / pH: x  Gluc: 83 mg/dL / Ketone: x  / Bili: x / Urobili: x   Blood: x / Protein: x / Nitrite: x   Leuk Esterase: x / RBC: x / WBC x   Sq Epi: x / Non Sq Epi: x / Bacteria: x          CAPILLARY BLOOD GLUCOSE

## 2025-02-07 PROCEDURE — 99232 SBSQ HOSP IP/OBS MODERATE 35: CPT

## 2025-02-07 RX ADMIN — Medication 320 MILLIGRAM(S): at 06:33

## 2025-02-07 RX ADMIN — METOPROLOL SUCCINATE 25 MILLIGRAM(S): 50 TABLET, EXTENDED RELEASE ORAL at 06:32

## 2025-02-07 RX ADMIN — AMLODIPINE BESYLATE 10 MILLIGRAM(S): 10 TABLET ORAL at 06:32

## 2025-02-07 RX ADMIN — METOPROLOL SUCCINATE 25 MILLIGRAM(S): 50 TABLET, EXTENDED RELEASE ORAL at 17:15

## 2025-02-07 RX ADMIN — Medication 40 MILLIGRAM(S): at 06:32

## 2025-02-07 RX ADMIN — ROSUVASTATIN CALCIUM 40 MILLIGRAM(S): 20 TABLET, FILM COATED ORAL at 21:03

## 2025-02-07 RX ADMIN — ENOXAPARIN SODIUM 40 MILLIGRAM(S): 100 INJECTION SUBCUTANEOUS at 17:15

## 2025-02-07 NOTE — PROGRESS NOTE ADULT - ASSESSMENT
75 y/o M with PMH HTN, chronic Afib (on apixaban, started 2017), and CVA (w/ residual min right-sided weakness), who presented to [facility] 01/26/2025 after being found on floor by wife w/ right-sided weakness and word-finding difficulties. Wife woke up in 0545 1/26 finding the patient on the floor and complaining of right sided weakness and word finding difficulties. LKN 1/25 2230 when the patient went to bed. He took his apixaban and medications for HLD and HTN prior to going to bed. Upon finding the patient on the floor, the patient was transferred to the [place] ED. NIHSS 10 (+1 face, +3 RUE, +3 RLE, +1 Aphasia, +2, dysarthria). mRS 0. CT head demonstrated 5.8 x 2.6 cm left basal ganglia hemorrhage with 1-2mm rightward midline shift. 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 Received andexanet and levetiracetam. Transferred to [facility] 01/26, Neurology recommended holding apixaban through 03/09 per neuro (hold apixaban for 5-6 weeks, then will make further decisions regarding restarting and/or a Watchman as outpatient given Afib). Patient medically optimized and admitted to [facility] acute rehab 1/29.      #Chronic atrial fibrillation - stable  -Eliquis on hold (tentatively till 3/9)  -Continue Metoprolol tartrate for HR control    # Hypernatremia  - resolved  - encourage PO fluid intake  - monitor    #CVA  #Primary Hypertension  #HLD  - amlodipine  10 milliGRAM(s) Oral daily  -metoprolol tartrate 25 milliGRAM(s) Oral every 12 hours  -valsartan 320 milliGRAM(s) Oral daily  -rosuvastatin 40 milliGRAM(s) Oral at bedtime    #ISIS   - CPAP     #Thrush  - nystatin Suspension 291207 Unit(s) Oral four times a day- completed course today  - Oral hygiene    #Constipation  senna 2 Tablet(s) Oral at bedtime  Miralax PRN  encouraged PO    # Dysphagia  - SLP is following    #DVT PPX:  - lovenox.   - LE doppler 1/30-- neg for DVT bilat. LE     75 y/o M with PMH HTN, chronic Afib (on apixaban, started 2017), and CVA (w/ residual min right-sided weakness), who presented to [facility] 01/26/2025 after being found on floor by wife w/ right-sided weakness and word-finding difficulties. Wife woke up in 0545 1/26 finding the patient on the floor and complaining of right sided weakness and word finding difficulties. LKN 1/25 2230 when the patient went to bed. He took his apixaban and medications for HLD and HTN prior to going to bed. Upon finding the patient on the floor, the patient was transferred to the [place] ED. NIHSS 10 (+1 face, +3 RUE, +3 RLE, +1 Aphasia, +2, dysarthria). mRS 0. CT head demonstrated 5.8 x 2.6 cm left basal ganglia hemorrhage with 1-2mm rightward midline shift. 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 Received andexanet and levetiracetam. Transferred to [facility] 01/26, Neurology recommended holding apixaban through 03/09 per neuro (hold apixaban for 5-6 weeks, then will make further decisions regarding restarting and/or a Watchman as outpatient given Afib). Patient medically optimized and admitted to [facility] acute rehab 1/29.      #CVA  #Primary Hypertension  -Continue norvasc 10mg daily  -Continue metoprolol tartrate 25mg q12h  -Continue valsartan 320mg daily  -Continue comprehensive rehab program - PT/OT/SLP per rehab team  -Pain management, bowel regimen per rehab     #Chronic atrial fibrillation - stable  -Eliquis on hold (tentatively till 3/9)  -Continue Metoprolol tartrate for HR control    #HLD  -Continue rosuvastatin 40mg qhs    #ISIS   -Continue CPAP     #Thrush - resolved  -Completed nystatin course 2/6  -Oral hygiene    #Dysphagia  -SLP is following    #DVT ppx  -Continue lovenox   -LE doppler 1/30-- neg for DVT bilat. LE

## 2025-02-07 NOTE — PROGRESS NOTE ADULT - ASSESSMENT
74M RHD w/ PMH HTN, afib (on apixaban, started 2017), and CVA (w/ residual min right-sided weakness), admitted to [facility] acute rehab for left basal ganglia hemorrhage w/ 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    # non traumatic acute left BG hemorrhage with right hemibody involvement, right HP, apraxia speech, motor apraxia, incoordination, dysarthria, dysphagia  - CT Head: 5.8 x 2.6 cm intracranial hemorrhage with mass effect and 1-2mm rightward midline  shift on 1/26  - CTA h/n: stable near complete occlusion of left distal M1  - Rosuvastatin 40mg qhs  - Continue comprehensive rehab program PT OT SLP 3 hours daily 5 x week. Recreation therapy  - precautions: fall, aspiration, cardiac    # atrial fibrillation  # HTN  - *Hold apixaban through 03/09 (5-6 weeks per neuro). We reviewed with family medications, risks/benefits, and need for clearance by neurology before resumption  - amlodipine 10mg daily  - Lopressor 25mg BID  - Valsartan 320mg daily (switched from losartan 100 Qd on 1/30 per cardiology recs @ [facility])  - HR 65-68 /65 - 144/89) 2/7    # Hypernatremia/KEAGAN  - Reviewed with patient. Encourage increased PO intake  - Na 148 2/3-> 144 2/6  - BUn/Cr 34/1 --> 29/0.87 2/6 improving  - urine osm 749 2/4. Sosm 304 2/5  - BMP 2/10    # on CPAP at home per nursing  - Using home CPAP (family brought in personal machine). Patient compliant in community per family    # pain  - acetaminophen 650mg q6h PRN    # bowel  - scheduled: senna 2tab qhs  - PRN: Miralax BID  - last BM 2/6    # Oral Thrush  - Nystatin s/s 4xdaily x7days    # sleep  - melatonin 3mg hs PRN    # diet   - Last MBS 2/3  - easy to chew w/ mildly thick (upgraded from mod thick 2/3)    # DVT ppx  - LE doppler 1/30: neg for DVT bilat. LEs  - lovenox 40 mg daily (started 1/29)    # Case discussed in IDT rounds 2/3 (initial)  - continent. Lives in  with spouse, 2 WOODY, 1 flight inside. Spouse can provide supervision and limited physical assist stairs, easy to chew with mod thickened liquids; mixed transcortical aphasia with elements of both fluent and non fluent aphasia, paraphasias, jargon, intermittent awareness, supervision eating and grooming, min assist toileting, mod assist UB and max LB dressing; min-mod assist commode transfer. min-mod assist bed mobility and transfers, ambulates 60 feet with NBQC min-mod assist, 4 steps with 1 HR min-mod assist  - barriers: right UP, stairs, communication decreased balance and coordination, insight  - goals: "enhance coordination"  supervision ADLs and transfers, communicate wants and needs effectively and consistently with min assist; ambulate to bathroom with appropriate AD and toilet with supervision; supervision  - target: 2/20 home with caregiver assist and home care referral PT and OT SLP  - Medical meeting with wife and daughter 2/4    # LABS  CBC CMP 2/10    Corrected contact information:  Spouse Nicolle Browne - [phone]  Daughter Gracy: [phone]  ---------------  Outpatient Follow-up:    Lynda Zimmerman  NP in Family Health  [address] [place] [address], [address]  [place] [address]  Phone: [phone]  Fax: [phone]  Follow Up Time:     Manoj Edge  [facility]  [address], [address]  [place] [address]  Phone: [phone]  Fax: [phone]  Follow Up Time: 2 weeks

## 2025-02-07 NOTE — PROGRESS NOTE ADULT - SUBJECTIVE AND OBJECTIVE BOX
Patient is a 74y old  Male who presents with a chief complaint of left basal ganglia hemorrhage w/ right hemiparesis, transcortical sensory aphasia, and dysphasia (06 Feb 2025 13:38)      HPI:   74M RHD w/ PMH HTN, afib (on apixaban, started 2017), and CVA (w/ residual min right-sided weakness), who presented to [facility] 01/26/2025 after being found on floor by wife w/ right-sided weakness and word-finding difficulties. Wife woke up in 0545 1/26 finding the patient on the floor and complaining of right sided weakness and word finding difficulties. LKN 1/25 2230 when the patient went to bed. He took his apixaban and medications for HLD and HTN prior to going to bed. Upon finding the patient on the floor, the patient was transferred to the [place] ED. NIHSS 10 (+1 face, +3 RUE, +3 RLE, +1 Aphasia, +2, dysarthria). mRS 0    CT head demonstrated 5.8 x 2.6 cm left basal ganglia hemorrhage with 1-2mm rightward midline shift. 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 Received andexanet and levetiracetam 1g x1. He was transferred to [facility] 01/26 for further eval. Neurology recommended holding apixab through 03/09 per neuro (hold apixaban for 5-6 weeks, then will make further decisions regarding restarting and/or a Watchman as outpatient given Afib).    Evaluated by PM&R and discharged to [facility] acute rehab 01/29.  (30 Jan 2025 16:58)      PAST MEDICAL & SURGICAL HISTORY:  Stroke      Chronic atrial fibrillation      HTN (hypertension)      HLD (hyperlipidemia)      No significant past surgical history          MEDICATIONS  (STANDING):  amLODIPine   Tablet 10 milliGRAM(s) Oral daily  enoxaparin Injectable 40 milliGRAM(s) SubCutaneous every 24 hours  metoprolol tartrate 25 milliGRAM(s) Oral every 12 hours  pantoprazole    Tablet 40 milliGRAM(s) Oral before breakfast  rosuvastatin 40 milliGRAM(s) Oral at bedtime  senna 2 Tablet(s) Oral at bedtime  valsartan 320 milliGRAM(s) Oral daily    MEDICATIONS  (PRN):  acetaminophen     Tablet .. 650 milliGRAM(s) Oral every 6 hours PRN Moderate Pain (4 - 6)  melatonin 6 milliGRAM(s) Oral at bedtime PRN Insomnia  polyethylene glycol 3350 17 Gram(s) Oral daily PRN Constipation      Allergies    No Known Allergies    Intolerances          VITALS  74y  Vital Signs Last 24 Hrs  T(C): 36.6 (06 Feb 2025 19:14), Max: 36.6 (06 Feb 2025 19:14)  T(F): 97.9 (06 Feb 2025 19:14), Max: 97.9 (06 Feb 2025 19:14)  HR: 66 (07 Feb 2025 06:36) (65 - 68)  BP: 144/89 (07 Feb 2025 06:36) (124/65 - 144/89)  BP(mean): --  RR: 14 (06 Feb 2025 19:14) (14 - 14)  SpO2: 94% (06 Feb 2025 19:14) (94% - 94%)    Parameters below as of 06 Feb 2025 19:14  Patient On (Oxygen Delivery Method): room air      Daily     Daily         RECENT LABS:                          13.8   7.70  )-----------( 173      ( 06 Feb 2025 05:31 )             40.8     02-06    144  |  108  |  29[H]  ----------------------------<  83  3.6   |  30  |  0.87    Ca    8.5      06 Feb 2025 05:31    TPro  6.2  /  Alb  2.5[L]  /  TBili  0.5  /  DBili  x   /  AST  26  /  ALT  30  /  AlkPhos  87  02-06    LIVER FUNCTIONS - ( 06 Feb 2025 05:31 )  Alb: 2.5 g/dL / Pro: 6.2 g/dL / ALK PHOS: 87 U/L / ALT: 30 U/L / AST: 26 U/L / GGT: x             Urinalysis Basic - ( 06 Feb 2025 05:31 )    Color: x / Appearance: x / SG: x / pH: x  Gluc: 83 mg/dL / Ketone: x  / Bili: x / Urobili: x   Blood: x / Protein: x / Nitrite: x   Leuk Esterase: x / RBC: x / WBC x   Sq Epi: x / Non Sq Epi: x / Bacteria: x          CAPILLARY BLOOD GLUCOSE

## 2025-02-07 NOTE — PROGRESS NOTE ADULT - SUBJECTIVE AND OBJECTIVE BOX
Patient is a 74y old  Male who presents with a chief complaint of left basal ganglia hemorrhage w/ right hemiparesis, transcortical sensory aphasia, and dysphasia (07 Feb 2025 07:47)      Patient seen and examined at bedside. NAD. denies acute medical complaints.     ALLERGIES:  No Known Allergies    MEDICATIONS  (STANDING):  amLODIPine   Tablet 10 milliGRAM(s) Oral daily  enoxaparin Injectable 40 milliGRAM(s) SubCutaneous every 24 hours  metoprolol tartrate 25 milliGRAM(s) Oral every 12 hours  pantoprazole    Tablet 40 milliGRAM(s) Oral before breakfast  rosuvastatin 40 milliGRAM(s) Oral at bedtime  senna 2 Tablet(s) Oral at bedtime  valsartan 320 milliGRAM(s) Oral daily    MEDICATIONS  (PRN):  acetaminophen     Tablet .. 650 milliGRAM(s) Oral every 6 hours PRN Moderate Pain (4 - 6)  melatonin 6 milliGRAM(s) Oral at bedtime PRN Insomnia  polyethylene glycol 3350 17 Gram(s) Oral daily PRN Constipation    Vital Signs Last 24 Hrs  T(F): 97.9 (06 Feb 2025 19:14), Max: 97.9 (06 Feb 2025 19:14)  HR: 66 (07 Feb 2025 06:36) (65 - 68)  BP: 144/89 (07 Feb 2025 06:36) (124/65 - 144/89)  RR: 14 (06 Feb 2025 19:14) (14 - 14)  SpO2: 94% (06 Feb 2025 19:14) (94% - 94%)  I&O's Summary        PHYSICAL EXAM:  General: NAD, A/O x 3  ENT: MMM, no scleral icterus  Neck: Supple, No JVD  Lungs: Clear to auscultation bilaterally, no wheezes, rales, rhonchi  Cardio: RRR, S1/S2  Abdomen: Soft, Nontender, Nondistended; Bowel sounds present  Extremities: No calf tenderness, No pitting edema    LABS:                        13.8   7.70  )-----------( 173      ( 06 Feb 2025 05:31 )             40.8       02-06    144  |  108  |  29  ----------------------------<  83  3.6   |  30  |  0.87    Ca    8.5      06 Feb 2025 05:31    TPro  6.2  /  Alb  2.5  /  TBili  0.5  /  DBili  x   /  AST  26  /  ALT  30  /  AlkPhos  87  02-06 01-27 Chol 142 mg/dL LDL -- HDL 51 mg/dL Trig 124 mg/dL                  Urinalysis Basic - ( 06 Feb 2025 05:31 )    Color: x / Appearance: x / SG: x / pH: x  Gluc: 83 mg/dL / Ketone: x  / Bili: x / Urobili: x   Blood: x / Protein: x / Nitrite: x   Leuk Esterase: x / RBC: x / WBC x   Sq Epi: x / Non Sq Epi: x / Bacteria: x        COVID-19 PCR: NotDetec (01-30-25 @ 15:00)      RADIOLOGY & ADDITIONAL TESTS:    Care Discussed with Consultants/Other Providers: yes, rehab

## 2025-02-07 NOTE — PROGRESS NOTE ADULT - COMMENTS
Patient had room switch. He reports sleeping well with reduction in roommate noise. He also denies any abdominal discomfort, states he had BM yesterday (confirmed on nursing flowsheet). Denies headache, urinary symptoms or change in appetite    CPAP mask in place, comfortable

## 2025-02-08 PROCEDURE — 99232 SBSQ HOSP IP/OBS MODERATE 35: CPT

## 2025-02-08 RX ADMIN — ROSUVASTATIN CALCIUM 40 MILLIGRAM(S): 20 TABLET, FILM COATED ORAL at 20:19

## 2025-02-08 RX ADMIN — AMLODIPINE BESYLATE 10 MILLIGRAM(S): 10 TABLET ORAL at 05:19

## 2025-02-08 RX ADMIN — METOPROLOL SUCCINATE 25 MILLIGRAM(S): 50 TABLET, EXTENDED RELEASE ORAL at 05:19

## 2025-02-08 RX ADMIN — ENOXAPARIN SODIUM 40 MILLIGRAM(S): 100 INJECTION SUBCUTANEOUS at 17:08

## 2025-02-08 RX ADMIN — Medication 320 MILLIGRAM(S): at 05:20

## 2025-02-08 RX ADMIN — Medication 40 MILLIGRAM(S): at 05:20

## 2025-02-08 RX ADMIN — METOPROLOL SUCCINATE 25 MILLIGRAM(S): 50 TABLET, EXTENDED RELEASE ORAL at 17:10

## 2025-02-08 NOTE — PROGRESS NOTE ADULT - ASSESSMENT
74M RHD w/ PMH HTN, afib (on apixaban, started 2017), and CVA (w/ residual min right-sided weakness), admitted to [facility] acute rehab for left basal ganglia hemorrhage w/ left basal ganglia hemorrhage     # non traumatic acute left BG hemorrhage with right hemibody involvement, right HP, apraxia speech, motor apraxia, incoordination, dysarthria, dysphagia  - CT Head: 5.8 x 2.6 cm intracranial hemorrhage with mass effect and 1-2mm rightward midline  shift on 1/26  - CTA h/n: stable near complete occlusion of left distal M1  - Rosuvastatin 40mg qhs  - Continue comprehensive rehab program PT OT SLP 3 hours daily 5 x week. Recreation therapy  - precautions: fall, aspiration, cardiac    # atrial fibrillation  # HTN  - *Hold apixaban through 03/09 (5-6 weeks per neuro). We reviewed with family medications, risks/benefits, and need for clearance by neurology before resumption  - amlodipine 10mg daily  - Lopressor 25mg BID  - Valsartan 320mg daily (switched from losartan 100 Qd on 1/30 per cardiology recs @ [facility])  - HR 60-64 BP (135/70 - 146/80) 2/8    # Hypernatremia/KEAGAN  - Reviewed with patient. Encourage increased PO intake  - Na 148 2/3-> 144 2/6  - BUn/Cr 34/1 --> 29/0.87 2/6 improving  - urine osm 749 2/4. Sosm 304 2/5  - BMP 2/10    # on CPAP at home per nursing  - Using home CPAP (family brought in personal machine). Patient compliant in community per family    # pain  - acetaminophen 650mg q6h PRN    # bowel  - scheduled: senna 2tab qhs  - PRN: Miralax BID    # Oral Thrush  - Nystatin s/s 4xdaily x7days    # sleep  - melatonin 3mg hs PRN    # diet   - Last MBS 2/3  - easy to chew w/ mildly thick (upgraded from mod thick 2/3)    # DVT ppx  - LE doppler 1/30: neg for DVT bilat. LEs  - lovenox 40 mg daily (started 1/29)    # Case discussed in IDT rounds 2/3 (initial)  - continent. Lives in  with spouse, 2 WOODY, 1 flight inside. Spouse can provide supervision and limited physical assist stairs, easy to chew with mod thickened liquids; mixed transcortical aphasia with elements of both fluent and non fluent aphasia, paraphasias, jargon, intermittent awareness, supervision eating and grooming, min assist toileting, mod assist UB and max LB dressing; min-mod assist commode transfer. min-mod assist bed mobility and transfers, ambulates 60 feet with NBQC min-mod assist, 4 steps with 1 HR min-mod assist  - barriers: right UP, stairs, communication decreased balance and coordination, insight  - goals: "enhance coordination"  supervision ADLs and transfers, communicate wants and needs effectively and consistently with min assist; ambulate to bathroom with appropriate AD and toilet with supervision; supervision  - target: 2/20 home with caregiver assist and home care referral PT and OT SLP  - Medical meeting with wife and daughter 2/4    # LABS  CBC CMP 2/10    Corrected contact information:  Spouse Nicolle Browne - [phone]  Daughter Gracy: [phone]  ---------------  Outpatient Follow-up:    Lynda Zimmerman  NP in Family Health  [address] [place] [address], [address]  [place] [address]  Phone: [phone]  Fax: [phone]  Follow Up Time:     Manoj Edge  [facility]  [address], [address]  [place] [address]  Phone: [phone]  Fax: [phone]  Follow Up Time: 2 weeks

## 2025-02-08 NOTE — PROGRESS NOTE ADULT - SUBJECTIVE AND OBJECTIVE BOX
Patient is a 74y old  Male who presents with a chief complaint of left basal ganglia hemorrhage w/ right hemiparesis, transcortical sensory aphasia, and dysphasia (08 Feb 2025 09:13)      Patient seen and examined at bedside. no acute complaints.    ALLERGIES:  No Known Allergies    MEDICATIONS  (STANDING):  amLODIPine   Tablet 10 milliGRAM(s) Oral daily  enoxaparin Injectable 40 milliGRAM(s) SubCutaneous every 24 hours  metoprolol tartrate 25 milliGRAM(s) Oral every 12 hours  pantoprazole    Tablet 40 milliGRAM(s) Oral before breakfast  rosuvastatin 40 milliGRAM(s) Oral at bedtime  senna 2 Tablet(s) Oral at bedtime  valsartan 320 milliGRAM(s) Oral daily    MEDICATIONS  (PRN):  acetaminophen     Tablet .. 650 milliGRAM(s) Oral every 6 hours PRN Moderate Pain (4 - 6)  melatonin 6 milliGRAM(s) Oral at bedtime PRN Insomnia  polyethylene glycol 3350 17 Gram(s) Oral daily PRN Constipation    Vital Signs Last 24 Hrs  T(F): 97.6 (08 Feb 2025 08:01), Max: 97.9 (07 Feb 2025 20:04)  HR: 60 (08 Feb 2025 08:01) (60 - 64)  BP: 146/80 (08 Feb 2025 08:01) (135/70 - 146/80)  RR: 16 (08 Feb 2025 08:01) (14 - 16)  SpO2: 91% (08 Feb 2025 08:01) (91% - 96%)  I&O's Summary      PHYSICAL EXAM:  General: NAD, A/O x 3, +aphasia  ENT: MMM, no scleral icterus  Neck: Supple, No JVD  Lungs: Clear to auscultation bilaterally, no wheezes, rales, rhonchi  Cardio: RRR, S1/S2  Abdomen: Soft, Nontender, Nondistended; Bowel sounds present  Extremities: No calf tenderness, No pitting edema    LABS:                        13.8   7.70  )-----------( 173      ( 06 Feb 2025 05:31 )             40.8       02-06    144  |  108  |  29  ----------------------------<  83  3.6   |  30  |  0.87    Ca    8.5      06 Feb 2025 05:31    TPro  6.2  /  Alb  2.5  /  TBili  0.5  /  DBili  x   /  AST  26  /  ALT  30  /  AlkPhos  87  02-06 01-27 Chol 142 mg/dL LDL -- HDL 51 mg/dL Trig 124 mg/dL                  Urinalysis Basic - ( 06 Feb 2025 05:31 )    Color: x / Appearance: x / SG: x / pH: x  Gluc: 83 mg/dL / Ketone: x  / Bili: x / Urobili: x   Blood: x / Protein: x / Nitrite: x   Leuk Esterase: x / RBC: x / WBC x   Sq Epi: x / Non Sq Epi: x / Bacteria: x        COVID-19 PCR: NotDetec (01-30-25 @ 15:00)      RADIOLOGY & ADDITIONAL TESTS:    Care Discussed with Consultants/Other Providers: yes, rehab

## 2025-02-08 NOTE — PROGRESS NOTE ADULT - SUBJECTIVE AND OBJECTIVE BOX
Patient is a 74y old  Male who presents with a chief complaint of left basal ganglia hemorrhage w/ right hemiparesis, transcortical sensory aphasia, and dysphasia (07 Feb 2025 09:33)      HPI:   74M RHD w/ PMH HTN, afib (on apixaban, started 2017), and CVA (w/ residual min right-sided weakness), who presented to [facility] 01/26/2025 after being found on floor by wife w/ right-sided weakness and word-finding difficulties. Wife woke up in 0545 1/26 finding the patient on the floor and complaining of right sided weakness and word finding difficulties. LKN 1/25 2230 when the patient went to bed. He took his apixaban and medications for HLD and HTN prior to going to bed. Upon finding the patient on the floor, the patient was transferred to the [place] ED. NIHSS 10 (+1 face, +3 RUE, +3 RLE, +1 Aphasia, +2, dysarthria). mRS 0    CT head demonstrated 5.8 x 2.6 cm left basal ganglia hemorrhage with 1-2mm rightward midline shift. 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 Received andexanet and levetiracetam 1g x1. He was transferred to [facility] 01/26 for further eval. Neurology recommended holding apixab through 03/09 per neuro (hold apixaban for 5-6 weeks, then will make further decisions regarding restarting and/or a Watchman as outpatient given Afib).    Evaluated by PM&R and discharged to [facility] acute rehab 01/29.  (30 Jan 2025 16:58)      PAST MEDICAL & SURGICAL HISTORY:  Stroke      Chronic atrial fibrillation      HTN (hypertension)      HLD (hyperlipidemia)      No significant past surgical history          MEDICATIONS  (STANDING):  amLODIPine   Tablet 10 milliGRAM(s) Oral daily  enoxaparin Injectable 40 milliGRAM(s) SubCutaneous every 24 hours  metoprolol tartrate 25 milliGRAM(s) Oral every 12 hours  pantoprazole    Tablet 40 milliGRAM(s) Oral before breakfast  rosuvastatin 40 milliGRAM(s) Oral at bedtime  senna 2 Tablet(s) Oral at bedtime  valsartan 320 milliGRAM(s) Oral daily    MEDICATIONS  (PRN):  acetaminophen     Tablet .. 650 milliGRAM(s) Oral every 6 hours PRN Moderate Pain (4 - 6)  melatonin 6 milliGRAM(s) Oral at bedtime PRN Insomnia  polyethylene glycol 3350 17 Gram(s) Oral daily PRN Constipation      Allergies    No Known Allergies    Intolerances          VITALS  74y  Vital Signs Last 24 Hrs  T(C): 36.4 (08 Feb 2025 08:01), Max: 36.6 (07 Feb 2025 20:04)  T(F): 97.6 (08 Feb 2025 08:01), Max: 97.9 (07 Feb 2025 20:04)  HR: 60 (08 Feb 2025 08:01) (60 - 64)  BP: 146/80 (08 Feb 2025 08:01) (135/70 - 146/80)  BP(mean): --  RR: 16 (08 Feb 2025 08:01) (14 - 16)  SpO2: 91% (08 Feb 2025 08:01) (91% - 96%)    Parameters below as of 08 Feb 2025 08:01  Patient On (Oxygen Delivery Method): room air      Daily     Daily         RECENT LABS:                      CAPILLARY BLOOD GLUCOSE

## 2025-02-08 NOTE — PROGRESS NOTE ADULT - ASSESSMENT
75 y/o M with PMH HTN, chronic Afib (on apixaban, started 2017), and CVA (w/ residual min right-sided weakness), who presented to [facility] 01/26/2025 after being found on floor by wife w/ right-sided weakness and word-finding difficulties. Wife woke up in 0545 1/26 finding the patient on the floor and complaining of right sided weakness and word finding difficulties. LKN 1/25 2230 when the patient went to bed. He took his apixaban and medications for HLD and HTN prior to going to bed. Upon finding the patient on the floor, the patient was transferred to the [place] ED. NIHSS 10 (+1 face, +3 RUE, +3 RLE, +1 Aphasia, +2, dysarthria). mRS 0. CT head demonstrated 5.8 x 2.6 cm left basal ganglia hemorrhage with 1-2mm rightward midline shift. 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 Received andexanet and levetiracetam. Transferred to [facility] 01/26, Neurology recommended holding apixaban through 03/09 per neuro (hold apixaban for 5-6 weeks, then will make further decisions regarding restarting and/or a Watchman as outpatient given Afib). Patient medically optimized and admitted to [facility] acute rehab 1/29.      #CVA  #Primary Hypertension  -Continue norvasc 10mg daily  -Continue metoprolol tartrate 25mg q12h  -Continue valsartan 320mg daily  -Continue comprehensive rehab program - PT/OT/SLP per rehab team  -Pain management, bowel regimen per rehab     #Chronic atrial fibrillation - stable  -Eliquis on hold (tentatively till 3/9)  -Continue Metoprolol tartrate for HR control    #HLD  -Continue rosuvastatin 40mg qhs    #ISIS   -Continue CPAP     #Thrush - resolved  -Completed nystatin course 2/6  -Oral hygiene    #Dysphagia  -SLP is following    #DVT ppx  -Continue lovenox   -LE doppler 1/30-- neg for DVT bilat. LE

## 2025-02-08 NOTE — PROGRESS NOTE ADULT - COMMENTS
Patient reports some interrupted sleep however he has a difficult time expressing why. (was seen early in morning and had more word finding difficulties with open ended questions due to early hour/woken from sleep). He denies headache, pain or noise that was affecting sleep quality. Overall mood seems fair although frustrated with inability to further express the issue. No worsening dysarthria Patient reports some interrupted sleep however he has a difficult time expressing why. (was seen early in morning and had more word finding difficulties with open ended questions due to early hour/woken from sleep). He denies headache, pain or noise that was affecting sleep quality. Overall mood seems fair although frustrated with inability to further express the issue. No worsening dysarthria    Last BM 2/6

## 2025-02-09 PROCEDURE — 99232 SBSQ HOSP IP/OBS MODERATE 35: CPT

## 2025-02-09 RX ADMIN — METOPROLOL SUCCINATE 25 MILLIGRAM(S): 50 TABLET, EXTENDED RELEASE ORAL at 06:29

## 2025-02-09 RX ADMIN — ROSUVASTATIN CALCIUM 40 MILLIGRAM(S): 20 TABLET, FILM COATED ORAL at 20:54

## 2025-02-09 RX ADMIN — Medication 320 MILLIGRAM(S): at 06:29

## 2025-02-09 RX ADMIN — METOPROLOL SUCCINATE 25 MILLIGRAM(S): 50 TABLET, EXTENDED RELEASE ORAL at 17:45

## 2025-02-09 RX ADMIN — ENOXAPARIN SODIUM 40 MILLIGRAM(S): 100 INJECTION SUBCUTANEOUS at 17:45

## 2025-02-09 RX ADMIN — AMLODIPINE BESYLATE 10 MILLIGRAM(S): 10 TABLET ORAL at 06:29

## 2025-02-09 RX ADMIN — Medication 40 MILLIGRAM(S): at 06:29

## 2025-02-09 NOTE — PROGRESS NOTE ADULT - SUBJECTIVE AND OBJECTIVE BOX
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 01/26/2025 after being found on floor by wife w/ right-sided weakness and word-finding difficulties. Wife woke up in 0545 1/26 finding the patient on the floor and complaining of right sided weakness and word finding difficulties. LKN 1/25 2230 when the patient went to bed. He took his apixaban and medications for HLD and HTN prior to going to bed. Upon finding the patient on the floor, the patient was transferred to the [place] ED. NIHSS 10 (+1 face, +3 RUE, +3 RLE, +1 Aphasia, +2, dysarthria). mRS 0    CT head demonstrated 5.8 x 2.6 cm left basal ganglia hemorrhage with 1-2mm rightward midline shift. 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 Received andexanet and levetiracetam 1g x1. He was transferred to [facility] 01/26 for further eval. Neurology recommended holding apixab through 03/09 per neuro (hold apixaban for 5-6 weeks, then will make further decisions regarding restarting and/or a Watchman as outpatient given Afib).    Evaluated by PM&R and discharged to [facility] acute rehab 01/29.  (30 Jan 2025 16:58)      PAST MEDICAL & SURGICAL HISTORY:  Stroke      Chronic atrial fibrillation      HTN (hypertension)      HLD (hyperlipidemia)      No significant past surgical history          MEDICATIONS  (STANDING):  amLODIPine   Tablet 10 milliGRAM(s) Oral daily  enoxaparin Injectable 40 milliGRAM(s) SubCutaneous every 24 hours  metoprolol tartrate 25 milliGRAM(s) Oral every 12 hours  pantoprazole    Tablet 40 milliGRAM(s) Oral before breakfast  rosuvastatin 40 milliGRAM(s) Oral at bedtime  senna 2 Tablet(s) Oral at bedtime  valsartan 320 milliGRAM(s) Oral daily    MEDICATIONS  (PRN):  acetaminophen     Tablet .. 650 milliGRAM(s) Oral every 6 hours PRN Moderate Pain (4 - 6)  melatonin 6 milliGRAM(s) Oral at bedtime PRN Insomnia  polyethylene glycol 3350 17 Gram(s) Oral daily PRN Constipation      Allergies    No Known Allergies    Intolerances          VITALS  74y  Vital Signs Last 24 Hrs  T(C): 36.6 (08 Feb 2025 20:20), Max: 36.6 (08 Feb 2025 20:20)  T(F): 97.8 (08 Feb 2025 20:20), Max: 97.8 (08 Feb 2025 20:20)  HR: 64 (09 Feb 2025 06:32) (63 - 66)  BP: 156/82 (09 Feb 2025 06:32) (127/74 - 156/82)  BP(mean): --  RR: 14 (09 Feb 2025 06:32) (14 - 15)  SpO2: 97% (09 Feb 2025 06:32) (94% - 97%)    Parameters below as of 09 Feb 2025 06:32  Patient On (Oxygen Delivery Method): room air      Daily     Daily         RECENT LABS:                      CAPILLARY BLOOD GLUCOSE

## 2025-02-09 NOTE — PROGRESS NOTE ADULT - SUBJECTIVE AND OBJECTIVE BOX
Patient is a 74y old  Male who presents with a chief complaint of left basal ganglia hemorrhage w/ right hemiparesis, transcortical sensory aphasia, and dysphasia (08 Feb 2025 10:19)      Patient seen and examined at bedside. no acute medical complaints.     ALLERGIES:  No Known Allergies    MEDICATIONS  (STANDING):  amLODIPine   Tablet 10 milliGRAM(s) Oral daily  enoxaparin Injectable 40 milliGRAM(s) SubCutaneous every 24 hours  metoprolol tartrate 25 milliGRAM(s) Oral every 12 hours  pantoprazole    Tablet 40 milliGRAM(s) Oral before breakfast  rosuvastatin 40 milliGRAM(s) Oral at bedtime  senna 2 Tablet(s) Oral at bedtime  valsartan 320 milliGRAM(s) Oral daily    MEDICATIONS  (PRN):  acetaminophen     Tablet .. 650 milliGRAM(s) Oral every 6 hours PRN Moderate Pain (4 - 6)  melatonin 6 milliGRAM(s) Oral at bedtime PRN Insomnia  polyethylene glycol 3350 17 Gram(s) Oral daily PRN Constipation    Vital Signs Last 24 Hrs  T(F): 97.8 (08 Feb 2025 20:20), Max: 97.8 (08 Feb 2025 20:20)  HR: 64 (09 Feb 2025 06:32) (60 - 66)  BP: 156/82 (09 Feb 2025 06:32) (127/74 - 156/82)  RR: 14 (09 Feb 2025 06:32) (14 - 16)  SpO2: 97% (09 Feb 2025 06:32) (91% - 97%)  I&O's Summary      PHYSICAL EXAM:  General: NAD, A/O x 3, +aphasia  ENT: MMM, no scleral icterus  Neck: Supple, No JVD  Lungs: Clear to auscultation bilaterally, no wheezes, rales, rhonchi  Cardio: RRR, S1/S2  Abdomen: Soft, Nontender, Nondistended; Bowel sounds present  Extremities: No calf tenderness, No pitting edema      LABS:              01-27 Chol 142 mg/dL LDL -- HDL 51 mg/dL Trig 124 mg/dL                      COVID-19 PCR: NotDetec (01-30-25 @ 15:00)      RADIOLOGY & ADDITIONAL TESTS:    Care Discussed with Consultants/Other Providers: yes, rehab

## 2025-02-09 NOTE — PROGRESS NOTE ADULT - ASSESSMENT
74M RHD w/ PMH HTN, afib (on apixaban, started 2017), and CVA (w/ residual min right-sided weakness), admitted to [facility] acute rehab for left basal ganglia hemorrhage w/ left basal ganglia hemorrhage     # non traumatic acute left BG hemorrhage with right hemibody involvement, right HP, apraxia speech, motor apraxia, incoordination, dysarthria, dysphagia  - CT Head: 5.8 x 2.6 cm intracranial hemorrhage with mass effect and 1-2mm rightward midline  shift on 1/26  - CTA h/n: stable near complete occlusion of left distal M1  - Rosuvastatin 40mg qhs  - Continue comprehensive rehab program PT OT SLP 3 hours daily 5 x week. Recreation therapy  - precautions: fall, aspiration, cardiac    # atrial fibrillation  # HTN  - *Hold apixaban through 03/09 (5-6 weeks per neuro). We reviewed with family medications, risks/benefits, and need for clearance by neurology before resumption  - amlodipine 10mg daily  - Lopressor 25mg BID  - Valsartan 320mg daily (switched from losartan 100 Qd on 1/30 per cardiology recs @ [facility])  - HR 63-66 BP  (127/74 - 156/82) 2/9    # Hypernatremia/KEAGAN  - Reviewed with patient. Encourage increased PO intake  - Na 148 2/3-> 144 2/6  - BUn/Cr 34/1 --> 29/0.87 2/6 improving  - urine osm 749 2/4. Sosm 304 2/5  - BMP 2/10    # on CPAP at home per nursing  - Using home CPAP (family brought in personal machine). Patient compliant in community per family    # pain  - acetaminophen 650mg q6h PRN    # bowel  - scheduled: senna 2tab qhs  - PRN: Miralax BID    # Oral Thrush  - Nystatin s/s 4xdaily x7days    # sleep  - melatonin 3mg hs PRN    # diet   - Last MBS 2/3  - easy to chew w/ mildly thick (upgraded from mod thick 2/3)    # DVT ppx  - LE doppler 1/30: neg for DVT bilat. LEs  - lovenox 40 mg daily (started 1/29)    # Case discussed in IDT rounds 2/3 (initial)  - continent. Lives in  with spouse, 2 WOODY, 1 flight inside. Spouse can provide supervision and limited physical assist stairs, easy to chew with mod thickened liquids; mixed transcortical aphasia with elements of both fluent and non fluent aphasia, paraphasias, jargon, intermittent awareness, supervision eating and grooming, min assist toileting, mod assist UB and max LB dressing; min-mod assist commode transfer. min-mod assist bed mobility and transfers, ambulates 60 feet with NBQC min-mod assist, 4 steps with 1 HR min-mod assist  - barriers: right UP, stairs, communication decreased balance and coordination, insight  - goals: "enhance coordination"  supervision ADLs and transfers, communicate wants and needs effectively and consistently with min assist; ambulate to bathroom with appropriate AD and toilet with supervision; supervision  - target: 2/20 home with caregiver assist and home care referral PT and OT SLP  - Medical meeting with wife and daughter 2/4    # LABS  CBC CMP 2/10    Corrected contact information:  Spouse Nicolle Browne - [phone]  Daughter Gracy: [phone]  ---------------  Outpatient Follow-up:    Lynda Zimmerman  NP in Family Health  [address] [place] [address], [address]  [place] [address]  Phone: [phone]  Fax: [phone]  Follow Up Time:     Manoj Edge  [facility]  [address], [address]  [place] [address]  Phone: [phone]  Fax: [phone]  Follow Up Time: 2 weeks

## 2025-02-09 NOTE — PROGRESS NOTE ADULT - ASSESSMENT
73 y/o M with PMH HTN, chronic Afib (on apixaban, started 2017), and CVA (w/ residual min right-sided weakness), who presented to [facility] 01/26/2025 after being found on floor by wife w/ right-sided weakness and word-finding difficulties. Wife woke up in 0545 1/26 finding the patient on the floor and complaining of right sided weakness and word finding difficulties. LKN 1/25 2230 when the patient went to bed. He took his apixaban and medications for HLD and HTN prior to going to bed. Upon finding the patient on the floor, the patient was transferred to the [place] ED. NIHSS 10 (+1 face, +3 RUE, +3 RLE, +1 Aphasia, +2, dysarthria). mRS 0. CT head demonstrated 5.8 x 2.6 cm left basal ganglia hemorrhage with 1-2mm rightward midline shift. 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 Received andexanet and levetiracetam. Transferred to [facility] 01/26, Neurology recommended holding apixaban through 03/09 per neuro (hold apixaban for 5-6 weeks, then will make further decisions regarding restarting and/or a Watchman as outpatient given Afib). Patient medically optimized and admitted to [facility] acute rehab 1/29.      #CVA  #Primary Hypertension  -Continue norvasc 10mg daily  -Continue metoprolol tartrate 25mg q12h  -Continue valsartan 320mg daily  -Continue comprehensive rehab program - PT/OT/SLP per rehab team  -Pain management, bowel regimen per rehab     #Chronic atrial fibrillation - stable  -Eliquis on hold (tentatively till 3/9)  -Continue Metoprolol tartrate for HR control    #HLD  -Continue rosuvastatin 40mg qhs    #ISIS   -Continue CPAP     #Thrush - resolved  -Completed nystatin course 2/6  -Oral hygiene    #Dysphagia  -SLP is following    #DVT ppx  -Continue lovenox   -LE doppler 1/30-- neg for DVT bilat. LE

## 2025-02-09 NOTE — PROGRESS NOTE ADULT - COMMENTS
Patient had improved sleep yesterday, seen with CPAP in place. Good seal, no H/A, no B/B complaints (had BM x 2 yesterday per patient report)

## 2025-02-10 LAB
ALBUMIN SERPL ELPH-MCNC: 2.5 G/DL — LOW (ref 3.3–5)
ALP SERPL-CCNC: 111 U/L — SIGNIFICANT CHANGE UP (ref 40–120)
ALT FLD-CCNC: 55 U/L — HIGH (ref 10–45)
ANION GAP SERPL CALC-SCNC: 10 MMOL/L — SIGNIFICANT CHANGE UP (ref 5–17)
AST SERPL-CCNC: 37 U/L — SIGNIFICANT CHANGE UP (ref 10–40)
BASOPHILS # BLD AUTO: 0.05 K/UL — SIGNIFICANT CHANGE UP (ref 0–0.2)
BASOPHILS NFR BLD AUTO: 0.7 % — SIGNIFICANT CHANGE UP (ref 0–2)
BILIRUB SERPL-MCNC: 0.5 MG/DL — SIGNIFICANT CHANGE UP (ref 0.2–1.2)
BUN SERPL-MCNC: 24 MG/DL — HIGH (ref 7–23)
CALCIUM SERPL-MCNC: 8.3 MG/DL — LOW (ref 8.4–10.5)
CHLORIDE SERPL-SCNC: 108 MMOL/L — SIGNIFICANT CHANGE UP (ref 96–108)
CO2 SERPL-SCNC: 26 MMOL/L — SIGNIFICANT CHANGE UP (ref 22–31)
CREAT SERPL-MCNC: 0.84 MG/DL — SIGNIFICANT CHANGE UP (ref 0.5–1.3)
EGFR: 91 ML/MIN/1.73M2 — SIGNIFICANT CHANGE UP
EOSINOPHIL # BLD AUTO: 0.27 K/UL — SIGNIFICANT CHANGE UP (ref 0–0.5)
EOSINOPHIL NFR BLD AUTO: 3.7 % — SIGNIFICANT CHANGE UP (ref 0–6)
GLUCOSE SERPL-MCNC: 80 MG/DL — SIGNIFICANT CHANGE UP (ref 70–99)
HCT VFR BLD CALC: 40.1 % — SIGNIFICANT CHANGE UP (ref 39–50)
HGB BLD-MCNC: 13.6 G/DL — SIGNIFICANT CHANGE UP (ref 13–17)
IMM GRANULOCYTES NFR BLD AUTO: 0.3 % — SIGNIFICANT CHANGE UP (ref 0–0.9)
LYMPHOCYTES # BLD AUTO: 1.96 K/UL — SIGNIFICANT CHANGE UP (ref 1–3.3)
LYMPHOCYTES # BLD AUTO: 26.5 % — SIGNIFICANT CHANGE UP (ref 13–44)
MCHC RBC-ENTMCNC: 31.4 PG — SIGNIFICANT CHANGE UP (ref 27–34)
MCHC RBC-ENTMCNC: 33.9 G/DL — SIGNIFICANT CHANGE UP (ref 32–36)
MCV RBC AUTO: 92.6 FL — SIGNIFICANT CHANGE UP (ref 80–100)
MONOCYTES # BLD AUTO: 0.53 K/UL — SIGNIFICANT CHANGE UP (ref 0–0.9)
MONOCYTES NFR BLD AUTO: 7.2 % — SIGNIFICANT CHANGE UP (ref 2–14)
NEUTROPHILS # BLD AUTO: 4.56 K/UL — SIGNIFICANT CHANGE UP (ref 1.8–7.4)
NEUTROPHILS NFR BLD AUTO: 61.6 % — SIGNIFICANT CHANGE UP (ref 43–77)
NRBC # BLD: 0 /100 WBCS — SIGNIFICANT CHANGE UP (ref 0–0)
NRBC BLD-RTO: 0 /100 WBCS — SIGNIFICANT CHANGE UP (ref 0–0)
PLATELET # BLD AUTO: 179 K/UL — SIGNIFICANT CHANGE UP (ref 150–400)
POTASSIUM SERPL-MCNC: 3.9 MMOL/L — SIGNIFICANT CHANGE UP (ref 3.5–5.3)
POTASSIUM SERPL-SCNC: 3.9 MMOL/L — SIGNIFICANT CHANGE UP (ref 3.5–5.3)
PROT SERPL-MCNC: 6.2 G/DL — SIGNIFICANT CHANGE UP (ref 6–8.3)
RBC # BLD: 4.33 M/UL — SIGNIFICANT CHANGE UP (ref 4.2–5.8)
RBC # FLD: 12.8 % — SIGNIFICANT CHANGE UP (ref 10.3–14.5)
SODIUM SERPL-SCNC: 144 MMOL/L — SIGNIFICANT CHANGE UP (ref 135–145)
WBC # BLD: 7.39 K/UL — SIGNIFICANT CHANGE UP (ref 3.8–10.5)
WBC # FLD AUTO: 7.39 K/UL — SIGNIFICANT CHANGE UP (ref 3.8–10.5)

## 2025-02-10 PROCEDURE — 99232 SBSQ HOSP IP/OBS MODERATE 35: CPT

## 2025-02-10 RX ADMIN — METOPROLOL SUCCINATE 25 MILLIGRAM(S): 50 TABLET, EXTENDED RELEASE ORAL at 05:38

## 2025-02-10 RX ADMIN — ROSUVASTATIN CALCIUM 40 MILLIGRAM(S): 20 TABLET, FILM COATED ORAL at 21:07

## 2025-02-10 RX ADMIN — METOPROLOL SUCCINATE 25 MILLIGRAM(S): 50 TABLET, EXTENDED RELEASE ORAL at 17:10

## 2025-02-10 RX ADMIN — ENOXAPARIN SODIUM 40 MILLIGRAM(S): 100 INJECTION SUBCUTANEOUS at 17:10

## 2025-02-10 RX ADMIN — Medication 40 MILLIGRAM(S): at 05:39

## 2025-02-10 RX ADMIN — AMLODIPINE BESYLATE 10 MILLIGRAM(S): 10 TABLET ORAL at 05:38

## 2025-02-10 RX ADMIN — Medication 320 MILLIGRAM(S): at 05:38

## 2025-02-10 RX ADMIN — Medication 2 TABLET(S): at 21:07

## 2025-02-10 NOTE — PROGRESS NOTE ADULT - SUBJECTIVE AND OBJECTIVE BOX
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 Wife woke up in 0545 1/26 finding the patient on the floor and complaining of right sided weakness and word finding difficulties. LKN 1/25 2230 when the patient went to bed. He took his apixaban and medications for HLD and HTN prior to going to bed. Upon finding the patient on the floor, the patient was transferred to the [place] ED. NIHSS 10 (+1 face, +3 RUE, +3 RLE, +1 Aphasia, +2, dysarthria). mRS 0    CT head demonstrated 5.8 x 2.6 cm left basal ganglia hemorrhage with 1-2mm rightward midline shift. 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 Received andexanet and levetiracetam 1g x1. He was transferred to [facility] 01/26 for further eval. Neurology recommended holding apixab through 03/09 per neuro (hold apixaban for 5-6 weeks, then will make further decisions regarding restarting and/or a Watchman as outpatient given Afib).    Evaluated by PM&R and discharged to [facility] acute rehab 01/29.  (30 Jan 2025 16:58)      PAST MEDICAL & SURGICAL HISTORY:  Stroke      Chronic atrial fibrillation      HTN (hypertension)      HLD (hyperlipidemia)      No significant past surgical history          MEDICATIONS  (STANDING):  amLODIPine   Tablet 10 milliGRAM(s) Oral daily  enoxaparin Injectable 40 milliGRAM(s) SubCutaneous every 24 hours  metoprolol tartrate 25 milliGRAM(s) Oral every 12 hours  pantoprazole    Tablet 40 milliGRAM(s) Oral before breakfast  rosuvastatin 40 milliGRAM(s) Oral at bedtime  senna 2 Tablet(s) Oral at bedtime  valsartan 320 milliGRAM(s) Oral daily    MEDICATIONS  (PRN):  acetaminophen     Tablet .. 650 milliGRAM(s) Oral every 6 hours PRN Moderate Pain (4 - 6)  melatonin 6 milliGRAM(s) Oral at bedtime PRN Insomnia  polyethylene glycol 3350 17 Gram(s) Oral daily PRN Constipation      Allergies    No Known Allergies    Intolerances          VITALS  74y  Vital Signs Last 24 Hrs  T(C): 36.4 (10 Feb 2025 08:07), Max: 36.7 (09 Feb 2025 20:55)  T(F): 97.5 (10 Feb 2025 08:07), Max: 98 (09 Feb 2025 20:55)  HR: 60 (10 Feb 2025 08:07) (60 - 66)  BP: 133/75 (10 Feb 2025 08:07) (124/74 - 156/72)  BP(mean): --  RR: 16 (10 Feb 2025 08:07) (14 - 17)  SpO2: 93% (10 Feb 2025 08:07) (93% - 100%)    Parameters below as of 10 Feb 2025 08:07  Patient On (Oxygen Delivery Method): room air      Daily     Daily         RECENT LABS:                          13.6   7.39  )-----------( 179      ( 10 Feb 2025 05:49 )             40.1     02-10    144  |  108  |  24[H]  ----------------------------<  80  3.9   |  26  |  0.84    Ca    8.3[L]      10 Feb 2025 05:49    TPro  6.2  /  Alb  2.5[L]  /  TBili  0.5  /  DBili  x   /  AST  37  /  ALT  55[H]  /  AlkPhos  111  02-10    LIVER FUNCTIONS - ( 10 Feb 2025 05:49 )  Alb: 2.5 g/dL / Pro: 6.2 g/dL / ALK PHOS: 111 U/L / ALT: 55 U/L / AST: 37 U/L / GGT: x             Urinalysis Basic - ( 10 Feb 2025 05:49 )    Color: x / Appearance: x / SG: x / pH: x  Gluc: 80 mg/dL / Ketone: x  / Bili: x / Urobili: x   Blood: x / Protein: x / Nitrite: x   Leuk Esterase: x / RBC: x / WBC x   Sq Epi: x / Non Sq Epi: x / Bacteria: x          CAPILLARY BLOOD GLUCOSE

## 2025-02-10 NOTE — CHART NOTE - NSCHARTNOTEFT_GEN_A_CORE
Dr Barr called Nicolle Pavan () and gave her medical and functional updates about her  [place] and discussed his discharge which is planned for Feb 20 to home.  She understands caregiver training will occur closer to the date of discharge.  She may prefer outpatient therapies to home therapies due to his prior experience with outpatient therapies at [facility] after his prior stroke.  All questions answered at this time.

## 2025-02-10 NOTE — PROGRESS NOTE ADULT - COMMENTS
Patient seen in OT. He reports some difficulty sleeping/falling asleep last night, it appears from repeated questioning that there was environmental noise until about 11 PM but this is not something that was sustained/daily and he declines any medications for sleep. He continues to have both receptive < expressive language deficits, with some dysarthria and low volume, although simple yes/no seems improved Afebrile

## 2025-02-10 NOTE — PROGRESS NOTE ADULT - SUBJECTIVE AND OBJECTIVE BOX
Patient is a 74y old  Male who presents with a chief complaint of left basal ganglia hemorrhage w/ right hemiparesis, transcortical sensory aphasia, and dysphasia (10 Feb 2025 09:40)    Patient seen and examined at bedside. No overnight events reported.     ALLERGIES:  No Known Allergies    MEDICATIONS  (STANDING):  amLODIPine   Tablet 10 milliGRAM(s) Oral daily  enoxaparin Injectable 40 milliGRAM(s) SubCutaneous every 24 hours  metoprolol tartrate 25 milliGRAM(s) Oral every 12 hours  pantoprazole    Tablet 40 milliGRAM(s) Oral before breakfast  rosuvastatin 40 milliGRAM(s) Oral at bedtime  senna 2 Tablet(s) Oral at bedtime  valsartan 320 milliGRAM(s) Oral daily    MEDICATIONS  (PRN):  acetaminophen     Tablet .. 650 milliGRAM(s) Oral every 6 hours PRN Moderate Pain (4 - 6)  melatonin 6 milliGRAM(s) Oral at bedtime PRN Insomnia  polyethylene glycol 3350 17 Gram(s) Oral daily PRN Constipation    Vital Signs Last 24 Hrs  T(F): 97.5 (10 Feb 2025 08:07), Max: 98 (09 Feb 2025 20:55)  HR: 60 (10 Feb 2025 08:07) (60 - 66)  BP: 133/75 (10 Feb 2025 08:07) (124/74 - 156/72)  RR: 16 (10 Feb 2025 08:07) (14 - 17)  SpO2: 93% (10 Feb 2025 08:07) (93% - 100%)  I&O's Summary    PHYSICAL EXAM:  General: NAD, A/O x 2  ENT: No gross hearing impairment, dry mucous membranes, no thrush  Neck: Supple, No JVD  Lungs: Clear to auscultation bilaterally, good air entry, non-labored breathing  Cardio: RRR, S1/S2, No murmur  Abdomen: Soft, Nontender, Nondistended; Bowel sounds present  Extremities: No calf tenderness, No cyanosis, No pitting edema  Psych: Appropriate mood and affect  Neuro: Aphasia, dysarthria    LABS:                        13.6   7.39  )-----------( 179      ( 10 Feb 2025 05:49 )             40.1     02-10    144  |  108  |  24  ----------------------------<  80  3.9   |  26  |  0.84    Ca    8.3      10 Feb 2025 05:49    TPro  6.2  /  Alb  2.5  /  TBili  0.5  /  DBili  x   /  AST  37  /  ALT  55  /  AlkPhos  111  02-10      01-27 Chol 142 mg/dL LDL -- HDL 51 mg/dL Trig 124 mg/dL        Urinalysis Basic - ( 10 Feb 2025 05:49 )    Color: x / Appearance: x / SG: x / pH: x  Gluc: 80 mg/dL / Ketone: x  / Bili: x / Urobili: x   Blood: x / Protein: x / Nitrite: x   Leuk Esterase: x / RBC: x / WBC x   Sq Epi: x / Non Sq Epi: x / Bacteria: x        COVID-19 PCR: [place]ie (01-30-25 @ 15:00)

## 2025-02-10 NOTE — PROGRESS NOTE ADULT - ASSESSMENT
74M with HTN, AF, hx CVA, now in acute rehab after ICH    #Non traumatic ICH  #Aphasia due to above  -PT/OT/SLP  -aggressive BP control  -hold Eliquis (see below)    #History of CVA  -c/w statin  -Eliquis on hold (see below)    #PAF  -c/w Lopressor  -Hold Eliquis due to recent ICH  -outpatient neurology clearance to see when to resume    #ISIS  -c/w CPAP    #Essential HTN  -c/w Valsartan  -c/w Lopressor  -c/w Norvasc    #DVT ppx: Lovenox 74M with HTN, AF, hx CVA, now in acute rehab after ICH    #Non traumatic ICH  #Aphasia due to above  -PT/OT/SLP  -aggressive BP control  -hold Eliquis (see below)    #History of CVA  -c/w statin  -Eliquis on hold (see below)    #PAF  -c/w Lopressor  -Hold Eliquis due to recent ICH  -outpatient neurology clearance to see when to resume    #ISIS  -c/w CPAP    #Essential HTN  -c/w Valsartan  -c/w Lopressor  -c/w Norvasc  -low threshold to start HCTZ    #DVT ppx: Lovenox

## 2025-02-10 NOTE — PROGRESS NOTE ADULT - ASSESSMENT
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 Recreation therapy  - precautions: fall, aspiration, cardiac    # atrial fibrillation  # HTN  - *Hold apixaban through 03/09 (5-6 weeks per neuro). We reviewed with family medications, risks/benefits, and need for clearance by neurology before resumption  - amlodipine 10mg daily  - Lopressor 25mg BID  - Valsartan 320mg daily (switched from losartan 100 Qd on 1/30 per cardiology recs @ [facility])  - Controlled HR 60-66 BP (124/74 - 156/72) 2/10    # Hypernatremia/KEAGAN  - urine osm 749 2/4. Sosm 304 2/5  - Encourage increased PO intake  - Na 148 2/3-> 144 2/6--> 144 2/10  - BUn/Cr 34/1 --> 29/0.87 2/6--> 24/0.84 2/10  improving    # on CPAP at home per nursing  - Using home CPAP (family brought in personal machine). Patient compliant in community per family    # pain  - acetaminophen 650mg q6h PRN    # bowel  - scheduled: senna 2tab qhs  - PRN: Miralax BID    # Oral Thrush  - Nystatin s/s 4xdaily x7days    # sleep  - melatonin 3mg hs PRN    # diet   - Last MBS 2/3  - easy to chew w/ mildly thick (upgraded from mod thick 2/3)    # DVT ppx  - LE doppler 1/30: neg for DVT bilat. LEs  - lovenox 40 mg daily (started 1/29)    # Case discussed in IDT rounds 2/10 (follow up)  -       - barriers: right UP, stairs, communication decreased balance and coordination, insight  - goals: "enhance coordination"  supervision ADLs and transfers, communicate wants and needs effectively and consistently with min assist; ambulate to bathroom with appropriate AD and toilet with supervision; supervision  - target: 2/20 home with caregiver assist and home care referral PT and OT SLP  - Medical meeting with wife and daughter 2/4    # LABS  CBC CMP 2/13    Corrected contact information:  Spouse Nicolle Browne - [phone]  Daughter Gracy: [phone]  ---------------  Outpatient Follow-up:    Lynda Zimmerman  NP in Family Health  [address] [place] [address], [facility] [address]  [place] [address]  Phone: [phone]  Fax: [phone]  Follow Up Time:     Manoj Edge  [address] [facility], [address]  [place] [address]  Phone: [phone]  Fax: [phone]  Follow Up Time: 2 weeks           74M RHD w/ PMH HTN, afib (on apixaban, started 2017), and CVA (w/ residual min right-sided weakness), admitted to [facility] acute rehab for 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 Recreation therapy  - precautions: fall, aspiration, cardiac    # atrial fibrillation  # HTN  - *Hold apixaban through 03/09 (5-6 weeks per neuro). We reviewed with family medications, risks/benefits, and need for clearance by neurology before resumption  - amlodipine 10mg daily  - Lopressor 25mg BID  - Valsartan 320mg daily (switched from losartan 100 Qd on 1/30 per cardiology recs @ [facility])  - Controlled HR 60-66 BP (124/74 - 156/72) 2/10    # Hypernatremia/KEAGAN  - urine osm 749 2/4. Sosm 304 2/5  - Encourage increased PO intake  - Na 148 2/3-> 144 2/6--> 144 2/10  - BUn/Cr 34/1 --> 29/0.87 2/6--> 24/0.84 2/10  improving    # on CPAP at home per nursing  - Using home CPAP (family brought in personal machine). Patient compliant in community per family    # pain  - acetaminophen 650mg q6h PRN    # bowel  - scheduled: senna 2tab qhs  - PRN: Miralax BID    # Oral Thrush  - Nystatin s/s 4xdaily x7days    # sleep  - melatonin 3mg hs PRN    # diet   - Last MBS 2/3  - easy to chew w/ mildly thick (upgraded from mod thick 2/3)  - to repeat MBS 2/12 9 AM    # DVT ppx  - LE doppler 1/30: neg for DVT bilat. LEs  - lovenox 40 mg daily (started 1/29)    # Case discussed in IDT rounds 2/10 (follow up)  - continent, skin intact, easy to chew with mildly thick liquids; improving speech and language skills akua receptive language; follows short sentences and write short phrases; improved oral reading skills; CG for ADLs and CG functional transfers without AD; can use RUE as gross functional assist but difficulty FMD; CG/min assist bed mobility and transfers, ambulates 125 feet with CG/min assist without AD. stairs with CG/min assist and 1 HR, Participates in music therapy to improve speech production, therapeutic choir  - barriers: right UP, stairs, communication decreased balance and coordination, insight  - goals: "enhance coordination"  supervision ADLs and transfers, communicate wants and needs effectively and consistently with min assist (progressing); ambulate to bathroom with appropriate AD and toilet with supervision (progressing); supervision  - target: 2/20 home with caregiver assist and home care referral PT and OT SLP  - caregiver training  - Medical meeting with wife and daughter 2/4    # LABS  CBC CMP 2/13  MBS 2/12    Corrected contact information:  Spouse Nicolle Browne - [phone]  Daughter Gracy: [phone]  ---------------  Outpatient Follow-up:    Lynda Zimmerman  NP in Family Health  [address] [place] [address], [facility] [address]  [place] [address]  Phone: [phone]  Fax: [phone]  Follow Up Time:     Manoj Edge  [facility]  [address] [facility], [address]  [place] [address]  Phone: [phone]  Fax: [phone]  Follow Up Time: 2 weeks           74M RHD w/ PMH HTN, afib (on apixaban, started 2017), and CVA (w/ residual min right-sided weakness), admitted to 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 Recreation therapy  - precautions: fall, aspiration, cardiac    # atrial fibrillation  # HTN  - *Hold apixaban through 03/09 (5-6 weeks per neuro). We reviewed with family medications, risks/benefits, and need for clearance by neurology before resumption  - amlodipine 10mg daily  - Lopressor 25mg BID  - Valsartan 320mg daily (switched from losartan 100 Qd on 1/30 per cardiology recs @ [facility])  - Controlled HR 60-66 BP (124/74 - 156/72) 2/10    # Hypernatremia/KEAGAN  - urine osm 749 2/4. Sosm 304 2/5  - Encourage increased PO intake  - Na 148 2/3-> 144 2/6--> 144 2/10  - BUn/Cr 34/1 --> 29/0.87 2/6--> 24/0.84 2/10  improving    # ISIS  # home CPAP  - Using home CPAP (family brought in personal machine)  - Patient compliant in community per family    # pain  - acetaminophen 650mg q6h PRN    # bowel  - scheduled: senna 2tab qhs  - PRN: Miralax BID    # Oral Thrush  - Nystatin s/s 4xdaily x7days    # sleep  - melatonin 3mg hs PRN    # diet   - Last MBS 2/3  - easy to chew w/ mildly thick (upgraded from mod thick 2/3)  - to repeat MBS 2/12 9 AM    # DVT ppx  - LE doppler 1/30: neg for DVT bilat. LEs  - lovenox 40 mg daily (started 1/29)    # Case discussed in IDT rounds 2/10 (follow up)  - continent, skin intact, easy to chew with mildly thick liquids; improving speech and language skills akua receptive language; follows short sentences and write short phrases; improved oral reading skills; CG for ADLs and CG functional transfers without AD; can use RUE as gross functional assist but difficulty FMD; CG/min assist bed mobility and transfers, ambulates 125 feet with CG/min assist without AD. stairs with CG/min assist and 1 HR, Participates in music therapy to improve speech production, therapeutic choir  - barriers: right UP, stairs, communication decreased balance and coordination, insight  - goals: "enhance coordination"  supervision ADLs and transfers, communicate wants and needs effectively and consistently with min assist (progressing); ambulate to bathroom with appropriate AD and toilet with supervision (progressing); supervision  - target: 2/20 home with caregiver assist and home care referral PT and OT SLP  - caregiver training  - Medical meeting with wife and daughter 2/4    # LABS  CBC CMP 2/13  MBS 2/12    Corrected contact information:  Spouse Nicolle Browne - [phone]  Daughter Gracy: [phone]  ---------------  Outpatient Follow-up:    Lynda Zimmerman  NP in Family Health  [address] [place] [address], [address]  [place] [address]  Phone: [phone]  Fax: [phone]  Follow Up Time:     Manoj Edge  [facility]  [address] [facility], [address]  [place] [address]  Phone: [phone]  Fax: [phone]  Follow Up Time: 2 weeks           74M RHD w/ PMH HTN, afib (on apixaban, started 2017), and CVA (w/ residual min right-sided weakness), admitted to [facility] acute rehab for 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 Recreation therapy  - Will perform K taping of upper back muscles to assist with posture in ambulation activities  - precautions: fall, aspiration, cardiac    # atrial fibrillation  # HTN  - *Hold apixaban through 03/09 (5-6 weeks per neuro). We reviewed with family medications, risks/benefits, and need for clearance by neurology before resumption  - amlodipine 10mg daily  - Lopressor 25mg BID  - Valsartan 320mg daily (switched from losartan 100 Qd on 1/30 per cardiology recs @ [facility])  - Controlled HR 60-66 BP (124/74 - 156/72) 2/10    # Hypernatremia/KEAGAN  - urine osm 749 2/4. Sosm 304 2/5  - Encourage increased PO intake  - Na 148 2/3-> 144 2/6--> 144 2/10  - BUn/Cr 34/1 --> 29/0.87 2/6--> 24/0.84 2/10  improving    # ISIS  # home CPAP  - Using home CPAP (family brought in personal machine)  - Patient compliant in community per family    # pain  - acetaminophen 650mg q6h PRN    # bowel  - scheduled: senna 2tab qhs  - PRN: Miralax BID    # Oral Thrush  - Nystatin s/s 4xdaily x7days    # sleep  - melatonin 3mg hs PRN    # diet   - Last MBS 2/3  - easy to chew w/ mildly thick (upgraded from mod thick 2/3)  - to repeat MBS 2/12 9 AM    # DVT ppx  - LE doppler 1/30: neg for DVT bilat. LEs  - lovenox 40 mg daily (started 1/29)    # Case discussed in IDT rounds 2/10 (follow up)  - continent, skin intact, easy to chew with mildly thick liquids; improving speech and language skills akua receptive language; follows short sentences and write short phrases; improved oral reading skills; CG for ADLs and CG functional transfers without AD; can use RUE as gross functional assist but difficulty FMD; CG/min assist bed mobility and transfers, ambulates 125 feet with CG/min assist without AD. stairs with CG/min assist and 1 HR, Participates in music therapy to improve speech production, therapeutic choir  - barriers: right UP, stairs, communication decreased balance and coordination, insight  - goals: "enhance coordination"  supervision ADLs and transfers, communicate wants and needs effectively and consistently with min assist (progressing); ambulate to bathroom with appropriate AD and toilet with supervision (progressing); supervision  - target: 2/20 home with caregiver assist and home care referral PT and OT SLP  - caregiver training  - Medical meeting with wife and daughter 2/4    # LABS  CBC CMP 2/13  MBS 2/12    Corrected contact information:  Spouse Nicolle Browne - [phone]  Daughter Gracy: [phone]  ---------------  Outpatient Follow-up:    Lynda Zimmerman  NP in Family Health  [address] [place] [address], [address]  [place] [address]  Phone: [phone]  Fax: [phone]  Follow Up Time:     Manoj Edge  Cardiology  [address] [facility], [address]  [place] [address]  Phone: [phone]  Fax: [phone]  Follow Up Time: 2 weeks

## 2025-02-11 DIAGNOSIS — I61.0 NONTRAUMATIC INTRACEREBRAL HEMORRHAGE IN HEMISPHERE, SUBCORTICAL: ICD-10-CM

## 2025-02-11 PROCEDURE — 99232 SBSQ HOSP IP/OBS MODERATE 35: CPT

## 2025-02-11 RX ADMIN — METOPROLOL SUCCINATE 25 MILLIGRAM(S): 50 TABLET, EXTENDED RELEASE ORAL at 06:07

## 2025-02-11 RX ADMIN — Medication 40 MILLIGRAM(S): at 06:07

## 2025-02-11 RX ADMIN — ENOXAPARIN SODIUM 40 MILLIGRAM(S): 100 INJECTION SUBCUTANEOUS at 17:12

## 2025-02-11 RX ADMIN — AMLODIPINE BESYLATE 10 MILLIGRAM(S): 10 TABLET ORAL at 06:07

## 2025-02-11 RX ADMIN — Medication 320 MILLIGRAM(S): at 06:07

## 2025-02-11 RX ADMIN — METOPROLOL SUCCINATE 25 MILLIGRAM(S): 50 TABLET, EXTENDED RELEASE ORAL at 17:12

## 2025-02-11 RX ADMIN — ROSUVASTATIN CALCIUM 40 MILLIGRAM(S): 20 TABLET, FILM COATED ORAL at 21:27

## 2025-02-11 NOTE — PROGRESS NOTE ADULT - SUBJECTIVE AND OBJECTIVE BOX
Patient is a 74y old  Male who presents with a chief complaint of left basal ganglia hemorrhage w/ right hemiparesis, transcortical sensory aphasia, and dysphasia (11 Feb 2025 10:57)      Patient seen and examined at bedside.     ALLERGIES:  No Known Allergies    MEDICATIONS  (STANDING):  amLODIPine   Tablet 10 milliGRAM(s) Oral daily  enoxaparin Injectable 40 milliGRAM(s) SubCutaneous every 24 hours  metoprolol tartrate 25 milliGRAM(s) Oral every 12 hours  pantoprazole    Tablet 40 milliGRAM(s) Oral before breakfast  rosuvastatin 40 milliGRAM(s) Oral at bedtime  senna 2 Tablet(s) Oral at bedtime  valsartan 320 milliGRAM(s) Oral daily    MEDICATIONS  (PRN):  acetaminophen     Tablet .. 650 milliGRAM(s) Oral every 6 hours PRN Moderate Pain (4 - 6)  melatonin 6 milliGRAM(s) Oral at bedtime PRN Insomnia  polyethylene glycol 3350 17 Gram(s) Oral daily PRN Constipation    Vital Signs Last 24 Hrs  T(F): 97.3 (11 Feb 2025 08:02), Max: 97.8 (10 Feb 2025 19:56)  HR: 61 (11 Feb 2025 08:02) (61 - 67)  BP: 133/77 (11 Feb 2025 08:02) (121/68 - 136/82)  RR: 15 (11 Feb 2025 08:02) (15 - 16)  SpO2: 93% (11 Feb 2025 08:02) (93% - 94%)  I&O's Summary    PHYSICAL EXAM:  General: NAD  ENT: No gross hearing impairment, dry mucous membranes, no thrush  Neck: Supple, No JVD  Lungs: Clear to auscultation bilaterally, good air entry, non-labored breathing  Cardio: RRR, S1/S2, No murmur  Abdomen: Soft, Nontender, Nondistended; Bowel sounds present  Extremities: No calf tenderness, No cyanosis, No pitting edema  Psych: Appropriate mood and affect  Neuro: Aphasia, dysarthria        LABS:                        13.6   7.39  )-----------( 179      ( 10 Feb 2025 05:49 )             40.1     02-10    144  |  108  |  24  ----------------------------<  80  3.9   |  26  |  0.84    Ca    8.3      10 Feb 2025 05:49    TPro  6.2  /  Alb  2.5  /  TBili  0.5  /  DBili  x   /  AST  37  /  ALT  55  /  AlkPhos  111  02-10                    01-27 Chol 142 mg/dL LDL -- HDL 51 mg/dL Trig 124 mg/dL                  Urinalysis Basic - ( 10 Feb 2025 05:49 )    Color: x / Appearance: x / SG: x / pH: x  Gluc: 80 mg/dL / Ketone: x  / Bili: x / Urobili: x   Blood: x / Protein: x / Nitrite: x   Leuk Esterase: x / RBC: x / WBC x   Sq Epi: x / Non Sq Epi: x / Bacteria: x        COVID-19 PCR: [place] (01-30-25 @ 15:00)

## 2025-02-11 NOTE — CHART NOTE - NSCHARTNOTEFT_GEN_A_CORE
Nutrition Follow Up Note  Source: Medical Record [X] Patient [x] Family [x] pt's wife provided info         Diet: Easy to chew, DASH/TLC, mildly thick liquids, Ensure Plus High Protein (provides 350 kcal, 20 g protein/serving) daily  Pt tolerating diet with fair-good PO intake, eating % of meals Per nursing flowsheets. Pt observed during meal rounds with fair PO intake. Pt was trying to verbalize food preferences but unable to at this present moment due to aphasia. Wife provided some info, reports that pt generally has had good PO intake. No digestive issues reported.    Enteral/Parenteral Nutrition: N/A    Current Weight: 165.7 lbs (1-30)    Pertinent Medications: MEDICATIONS  (STANDING):  amLODIPine   Tablet 10 milliGRAM(s) Oral daily  enoxaparin Injectable 40 milliGRAM(s) SubCutaneous every 24 hours  metoprolol tartrate 25 milliGRAM(s) Oral every 12 hours  pantoprazole    Tablet 40 milliGRAM(s) Oral before breakfast  rosuvastatin 40 milliGRAM(s) Oral at bedtime  senna 2 Tablet(s) Oral at bedtime  valsartan 320 milliGRAM(s) Oral daily    MEDICATIONS  (PRN):  acetaminophen     Tablet .. 650 milliGRAM(s) Oral every 6 hours PRN Moderate Pain (4 - 6)  melatonin 6 milliGRAM(s) Oral at bedtime PRN Insomnia  polyethylene glycol 3350 17 Gram(s) Oral daily PRN Constipation      Pertinent Labs:  02-10 Na144 mmol/L Glu 80 mg/dL K+ 3.9 mmol/L Cr  0.84 mg/dL BUN 24 mg/dL[H] 02-10 Alb 2.5 g/dL[L] 01-27 Chol 142 mg/dL LDL --    HDL 51 mg/dL Trig 124 mg/dL        Skin: Skin intact per nursing flow sheets     Edema: No edema per nursing flow sheets     Last BM: on 2-10 Per nursing flowsheets     Estimated Needs:   [X] No Change since Previous Assessment  [ ] Recalculated:     Previous Nutrition Diagnosis:   Swallowing difficulty    Nutrition Diagnosis is [X] Ongoing  - on easy to chew, mildly thick liquids, SLP following       New Nutrition Diagnosis: [X] Not Applicable  [ ] Inadequate Protein Energy Intake   [ ] Inadequate Oral Intake   [ ] Excessive Energy Intake   [ ] Increased Nutrient Needs   [ ] Obesity   [ ] Altered GI Function   [ ] Unintended Weight Loss   [ ] Food & Nutrition Related Knowledge Deficit  [ ] Limited Adherence to nutrition related recommendations   [ ] Malnutrition      Interventions:   1. Recommend continuing with current plan of care  2. Encourage PO intake  3. Obtain and honor food preferences as able  4. Follow SLP recommendations    Monitoring & Evaluation:   [X] Weights   [X] PO Intake   [X] Follow Up (Per Protocol)  [X] Tolerance to Diet Prescription   [X] Other: Labs     RD Remains Available.  Priscila Malone RD

## 2025-02-11 NOTE — PROGRESS NOTE ADULT - ATTENDING COMMENTS
Progress note amended to include my discussions with patient, fellow, hospitalist, RN, SW, PT and my findings    Patient stable overnight, last BM yesterday 2/10. He denies any headaches or dizziness. He was seen later in the morning and was more alert, denies difficulty sleeping overnight. no complaints of constipation. He has improved word finding compared to earlier in day, although he still has semantic and paraphasic errors along with bursts of output. +fair awareness, with frustration.     Vitals including HR are stable. lungs are clear, good effort, cor: rate controlled, abd soft. No calf swelling bilaterally, no TTP. Motor strength improved to BUE and LE 5/5, although he continues to have impaired coordination right and some difficulty with complex command following    Continue current program. Plan to repeat head CT early next week for f/u bleed. Neurologically stable    RECENT LABS    Vital Signs Last 24 Hrs  T(C): 36.3 (11 Feb 2025 08:02), Max: 36.6 (10 Feb 2025 19:56)  T(F): 97.3 (11 Feb 2025 08:02), Max: 97.8 (10 Feb 2025 19:56)  HR: 61 (11 Feb 2025 08:02) (61 - 67)  BP: 133/77 (11 Feb 2025 08:02) (121/68 - 136/82)  BP(mean): --  RR: 15 (11 Feb 2025 08:02) (15 - 16)  SpO2: 93% (11 Feb 2025 08:02) (93% - 94%)    Parameters below as of 10 Feb 2025 19:56  Patient On (Oxygen Delivery Method): room air                              13.6   7.39  )-----------( 179      ( 10 Feb 2025 05:49 )             40.1     02-10    144  |  108  |  24[H]  ----------------------------<  80  3.9   |  26  |  0.84    Ca    8.3[L]      10 Feb 2025 05:49    TPro  6.2  /  Alb  2.5[L]  /  TBili  0.5  /  DBili  x   /  AST  37  /  ALT  55[H]  /  AlkPhos  111  02-10      Urinalysis Basic - ( 10 Feb 2025 05:49 )    Color: x / Appearance: x / SG: x / pH: x  Gluc: 80 mg/dL / Ketone: x  / Bili: x / Urobili: x   Blood: x / Protein: x / Nitrite: x   Leuk Esterase: x / RBC: x / WBC x   Sq Epi: x / Non Sq Epi: x / Bacteria: x      CAPILLARY BLOOD GLUCOSE Progress note amended to include my discussions with patient, fellow, hospitalist, RN, SW, PT and my findings    Patient stable overnight, last BM yesterday 2/10. He denies any headaches or dizziness. He was seen later in the morning and was more alert, denies difficulty sleeping overnight. no complaints of constipation. He has improved word finding compared to earlier in day, although he still has semantic and paraphasic errors along with bursts of output. +fair awareness, with frustration.     Vitals including HR are stable. lungs are clear, good effort, cor: rate controlled, abd soft. No calf swelling bilaterally, no TTP. Motor strength improved to BUE and LE 5/5, although he continues to have impaired coordination right and some difficulty with complex command following    Continue current program. Plan to repeat head CT early next week for f/u bleed. Neurological exam stable    RECENT LABS    Vital Signs Last 24 Hrs  T(C): 36.3 (11 Feb 2025 08:02), Max: 36.6 (10 Feb 2025 19:56)  T(F): 97.3 (11 Feb 2025 08:02), Max: 97.8 (10 Feb 2025 19:56)  HR: 61 (11 Feb 2025 08:02) (61 - 67)  BP: 133/77 (11 Feb 2025 08:02) (121/68 - 136/82)  BP(mean): --  RR: 15 (11 Feb 2025 08:02) (15 - 16)  SpO2: 93% (11 Feb 2025 08:02) (93% - 94%)    Parameters below as of 10 Feb 2025 19:56  Patient On (Oxygen Delivery Method): room air                              13.6   7.39  )-----------( 179      ( 10 Feb 2025 05:49 )             40.1     02-10    144  |  108  |  24[H]  ----------------------------<  80  3.9   |  26  |  0.84    Ca    8.3[L]      10 Feb 2025 05:49    TPro  6.2  /  Alb  2.5[L]  /  TBili  0.5  /  DBili  x   /  AST  37  /  ALT  55[H]  /  AlkPhos  111  02-10      Urinalysis Basic - ( 10 Feb 2025 05:49 )    Color: x / Appearance: x / SG: x / pH: x  Gluc: 80 mg/dL / Ketone: x  / Bili: x / Urobili: x   Blood: x / Protein: x / Nitrite: x   Leuk Esterase: x / RBC: x / WBC x   Sq Epi: x / Non Sq Epi: x / Bacteria: x      CAPILLARY BLOOD GLUCOSE

## 2025-02-11 NOTE — PROGRESS NOTE ADULT - SUBJECTIVE AND OBJECTIVE BOX
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 01/26/2025 after being found on floor by wife w/ right-sided weakness and word-finding difficulties. Wife woke up in 0545 1/26 finding the patient on the floor and complaining of right sided weakness and word finding difficulties. LKN 1/25 2230 when the patient went to bed. He took his apixaban and medications for HLD and HTN prior to going to bed. Upon finding the patient on the floor, the patient was transferred to the [place] ED. NIHSS 10 (+1 face, +3 RUE, +3 RLE, +1 Aphasia, +2, dysarthria). mRS 0    CT head demonstrated 5.8 x 2.6 cm left basal ganglia hemorrhage with 1-2mm rightward midline shift. 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 Received andexanet and levetiracetam 1g x1. He was transferred to [facility] 01/26 for further eval. Neurology recommended holding apixab through 03/09 per neuro (hold apixaban for 5-6 weeks, then will make further decisions regarding restarting and/or a Watchman as outpatient given Afib).    Evaluated by PM&R and discharged to [facility] acute rehab 01/29.  (30 Jan 2025 16:58)        Subjective/ROS: Mr Browne is seen reclined in bed, smiling.  He is in a pleasant mood and states that he "slept well" but shared some concerns about the morning being very busy and being awakened early.  He denied having any pain or headache today, and states that he feels he is improving in therapy. He states that he is eating well, and having BM's, urinating well.  No further concerns today.          PAST MEDICAL & SURGICAL HISTORY:  Stroke      Chronic atrial fibrillation      HTN (hypertension)      HLD (hyperlipidemia)      No significant past surgical history          MEDICATIONS  (STANDING):  amLODIPine   Tablet 10 milliGRAM(s) Oral daily  enoxaparin Injectable 40 milliGRAM(s) SubCutaneous every 24 hours  metoprolol tartrate 25 milliGRAM(s) Oral every 12 hours  pantoprazole    Tablet 40 milliGRAM(s) Oral before breakfast  rosuvastatin 40 milliGRAM(s) Oral at bedtime  senna 2 Tablet(s) Oral at bedtime  valsartan 320 milliGRAM(s) Oral daily    MEDICATIONS  (PRN):  acetaminophen     Tablet .. 650 milliGRAM(s) Oral every 6 hours PRN Moderate Pain (4 - 6)  melatonin 6 milliGRAM(s) Oral at bedtime PRN Insomnia  polyethylene glycol 3350 17 Gram(s) Oral daily PRN Constipation      Allergies    No Known Allergies    Intolerances            ICU Vital Signs Last 24 Hrs  T(C): 36.3 (11 Feb 2025 08:02), Max: 36.6 (10 Feb 2025 19:56)  T(F): 97.3 (11 Feb 2025 08:02), Max: 97.8 (10 Feb 2025 19:56)  HR: 61 (11 Feb 2025 08:02) (61 - 67)  BP: 133/77 (11 Feb 2025 08:02) (121/68 - 136/82)  RR: 15 (11 Feb 2025 08:02) (15 - 16)  SpO2: 93% (11 Feb 2025 08:02) (93% - 94%)    O2 Parameters below as of 10 Feb 2025 19:56  Patient On (Oxygen Delivery Method): room air              RECENT LABS:                          13.6   7.39  )-----------( 179      ( 10 Feb 2025 05:49 )             40.1     02-10    144  |  108  |  24[H]  ----------------------------<  80  3.9   |  26  |  0.84    Ca    8.3[L]      10 Feb 2025 05:49    TPro  6.2  /  Alb  2.5[L]  /  TBili  0.5  /  DBili  x   /  AST  37  /  ALT  55[H]  /  AlkPhos  111  02-10    LIVER FUNCTIONS - ( 10 Feb 2025 05:49 )  Alb: 2.5 g/dL / Pro: 6.2 g/dL / ALK PHOS: 111 U/L / ALT: 55 U/L / AST: 37 U/L / GGT: x             Urinalysis Basic - ( 10 Feb 2025 05:49 )    Color: x / Appearance: x / SG: x / pH: x  Gluc: 80 mg/dL / Ketone: x  / Bili: x / Urobili: x   Blood: x / Protein: x / Nitrite: x   Leuk Esterase: x / RBC: x / WBC x   Sq Epi: x / Non Sq Epi: x / Bacteria: x          CAPILLARY BLOOD GLUCOSE          Physical Exam:   General: NAD, Resting comfortably in bed  HEENT: NCAT  CV: Extremities warm and well perfused  Resp: Even chest rise, CTAB  ABD: Non-tender, BSx4  Neuro: AOx3 Right sided facial droop; Speech fluent but with some word substitutions, paraphasias  Psych: Calm, mild frustration with communication expressively

## 2025-02-11 NOTE — PROGRESS NOTE ADULT - ASSESSMENT
74M RHD w/ PMH HTN, afib (on apixaban, started 2017), and CVA (w/ residual min right-sided weakness), admitted to [facility] acute rehab for 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 Recreation therapy  - Will perform K taping of upper back muscles to assist with posture in ambulation activities  - precautions: fall, aspiration, cardiac    # atrial fibrillation  # HTN  - *Hold apixaban through 03/09 (5-6 weeks per neuro). We reviewed with family medications, risks/benefits, and need for clearance by neurology before resumption  - amlodipine 10mg daily  - Lopressor 25mg BID  - Valsartan 320mg daily (switched from losartan 100 Qd on 1/30 per cardiology recs @ [facility])  - Controlled HR (61 - 67) BP (121/68 - 136/82) 2/11    # Hypernatremia/KEAGAN  - urine osm 749 2/4. Sosm 304 2/5  - Encourage increased PO intake  - Na 148 2/3-> 144 2/6--> 144 2/10  - BUn/Cr 34/1 --> 29/0.87 2/6--> 24/0.84 2/10  improving  - CMP 2/13    # ISIS  # home CPAP  - Using home CPAP (family brought in personal machine)  - Patient compliant in community per family    # pain  - acetaminophen 650mg q6h PRN    # bowel  - scheduled: senna 2tab qhs  - PRN: Miralax BID    # Oral Thrush  - Nystatin s/s 4xdaily x7days    # sleep  - melatonin 3mg hs PRN    # diet   - Last MBS 2/3  - easy to chew w/ mildly thick (upgraded from mod thick 2/3)  - to repeat MBS 2/12 9 AM    # DVT ppx  - LE doppler 1/30: neg for DVT bilat. LEs  - lovenox 40 mg daily (started 1/29)    # Case discussed in IDT rounds 2/10 (follow up)  - continent, skin intact, easy to chew with mildly thick liquids; improving speech and language skills akua receptive language; follows short sentences and write short phrases; improved oral reading skills; CG for ADLs and CG functional transfers without AD; can use RUE as gross functional assist but difficulty FMD; CG/min assist bed mobility and transfers, ambulates 125 feet with CG/min assist without AD. stairs with CG/min assist and 1 HR, Participates in music therapy to improve speech production, therapeutic choir  - barriers: right UP, stairs, communication decreased balance and coordination, insight  - goals: "enhance coordination"  supervision ADLs and transfers, communicate wants and needs effectively and consistently with min assist (progressing); ambulate to bathroom with appropriate AD and toilet with supervision (progressing); supervision  - target: 2/20 home with caregiver assist and home care referral PT and OT SLP  - caregiver training  - Medical meeting with wife and daughter 2/4    # LABS  CBC CMP 2/13  MBS 2/12    Corrected contact information:  Spouse Nicolle Browne - [phone]  Daughter Gracy: [phone]  ---------------  Outpatient Follow-up:    Lynda Zimmerman  NP in Family Health  [address] [place] [address], [address]  [place] [address]  Phone: [phone]  Fax: [phone]  Follow Up Time:     Manoj Edge  Cardiology  [address] [facility], [address]  [place] [address]  Phone: [phone]  Fax: [phone]  Follow Up Time: 2 weeks           74M RHD w/ PMH HTN, afib (on apixaban, started 2017), and CVA (w/ residual min right-sided weakness), admitted to [facility] acute rehab for 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 Recreation therapy  - Will perform K taping of upper back muscles to assist with posture in ambulation activities  - precautions: fall, aspiration, cardiac    # atrial fibrillation  # HTN  - *Hold apixaban through 03/09 (5-6 weeks per neuro). We reviewed with family medications, risks/benefits, and need for clearance by neurology before resumption  - amlodipine 10mg daily  - Lopressor 25mg BID  - Valsartan 320mg daily (switched from losartan 100 Qd on 1/30 per cardiology recs @ [facility])  - Controlled HR (61 - 67) BP (121/68 - 136/82) 2/11    # Hypernatremia/KEAGAN  - urine osm 749 2/4. Sosm 304 2/5  - Encourage increased PO intake  - Na 148 2/3-> 144 2/6--> 144 2/10  - BUn/Cr 34/1 --> 29/0.87 2/6--> 24/0.84 2/10  improving  - CMP 2/13    # ISIS  # home CPAP  - Using home CPAP (family brought in personal machine)  - Patient compliant in community per family    # pain  - acetaminophen 650mg q6h PRN    # bowel  - scheduled: senna 2tab qhs  - PRN: Miralax BID    # Oral Thrush  - Nystatin s/s 4xdaily x7days    # sleep  - melatonin 3mg hs PRN    # diet   - Last MBS 2/3  - easy to chew w/ mildly thick (upgraded from mod thick 2/3)  - to repeat MBS tomorrow 2/12 9 AM    # DVT ppx  - LE doppler 1/30: neg for DVT bilat. LEs  - lovenox 40 mg daily (started 1/29)    # Case discussed in IDT rounds 2/10 (follow up)  - continent, skin intact, easy to chew with mildly thick liquids; improving speech and language skills akua receptive language; follows short sentences and write short phrases; improved oral reading skills; CG for ADLs and CG functional transfers without AD; can use RUE as gross functional assist but difficulty FMD; CG/min assist bed mobility and transfers, ambulates 125 feet with CG/min assist without AD. stairs with CG/min assist and 1 HR, Participates in music therapy to improve speech production, therapeutic choir  - barriers: right UP, stairs, communication decreased balance and coordination, insight  - goals: "enhance coordination"  supervision ADLs and transfers, communicate wants and needs effectively and consistently with min assist (progressing); ambulate to bathroom with appropriate AD and toilet with supervision (progressing); supervision  - target: 2/20 home with caregiver assist and home care referral PT and OT SLP  - caregiver training  - Medical meeting with wife and daughter 2/4    # LABS  CBC CMP 2/13  MBS 2/12    Corrected contact information:  Spouse Nicolle Browne - [phone]  Daughter Gracy: [phone]  ---------------  Outpatient Follow-up:    Lynda Zimmerman  NP in Family Health  [address] [place] [address], [facility] [address]  [place] [address]  Phone: [phone]  Fax: [phone]  Follow Up Time:     Manoj Edge  Cardiology  [address] [facility], [address]  [place] [address]  Phone: [phone]  Fax: [phone]  Follow Up Time: 2 weeks

## 2025-02-11 NOTE — PROGRESS NOTE ADULT - ASSESSMENT
74M with HTN, AF, hx CVA, now in acute rehab after ICH    #Non traumatic ICH  #Aphasia due to above  -PT/OT/SLP  -aggressive BP control  -Eliquis stopped    #History of CVA  -c/w statin  -Eliquis on hold    #PAF  -c/w Lopressor  -Hold Eliquis due to recent ICH  -outpatient neurology clearance to see when to resume    #ISIS  -c/w CPAP    #Essential HTN  -c/w Valsartan  -c/w Lopressor  -c/w Norvasc  -BP currently acceptable    #DVT ppx: Lovenox

## 2025-02-12 PROCEDURE — 99232 SBSQ HOSP IP/OBS MODERATE 35: CPT

## 2025-02-12 PROCEDURE — 74230 X-RAY XM SWLNG FUNCJ C+: CPT | Mod: 26

## 2025-02-12 RX ADMIN — ROSUVASTATIN CALCIUM 40 MILLIGRAM(S): 20 TABLET, FILM COATED ORAL at 20:01

## 2025-02-12 RX ADMIN — ENOXAPARIN SODIUM 40 MILLIGRAM(S): 100 INJECTION SUBCUTANEOUS at 17:36

## 2025-02-12 RX ADMIN — METOPROLOL SUCCINATE 25 MILLIGRAM(S): 50 TABLET, EXTENDED RELEASE ORAL at 05:36

## 2025-02-12 RX ADMIN — Medication 320 MILLIGRAM(S): at 05:36

## 2025-02-12 RX ADMIN — AMLODIPINE BESYLATE 10 MILLIGRAM(S): 10 TABLET ORAL at 05:36

## 2025-02-12 RX ADMIN — METOPROLOL SUCCINATE 25 MILLIGRAM(S): 50 TABLET, EXTENDED RELEASE ORAL at 17:38

## 2025-02-12 RX ADMIN — Medication 40 MILLIGRAM(S): at 05:36

## 2025-02-12 NOTE — PROGRESS NOTE ADULT - ASSESSMENT
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 Recreation therapy  - Will perform K taping of upper back muscles to assist with posture in ambulation activities  - precautions: fall, aspiration, cardiac    # atrial fibrillation  # HTN  - *Hold apixaban through 03/09 (5-6 weeks per neuro). We reviewed with family medications, risks/benefits, and need for clearance by neurology before resumption  - amlodipine 10mg daily  - Lopressor 25mg BID  - Valsartan 320mg daily  - HR 61-66) BP (122/79 - 133/81) 2/12    # Hypernatremia/KEAGAN  - urine osm 749 2/4. Sosm 304 2/5  - Encourage increased PO intake  - Na 148 2/3-> 144 2/6--> 144 2/10  - BUn/Cr 34/1 --> 29/0.87 2/6--> 24/0.84 2/10  improving  - CMP 2/13    # ISIS  # home CPAP  - Using home CPAP (family brought in personal machine)  - Patient compliant in community per family    # pain  - acetaminophen 650mg q6h PRN    # bowel  - scheduled: senna 2tab qhs  - PRN: Miralax BID    # Oral Thrush  - Nystatin s/s 4xdaily x7days    # sleep  - melatonin 3mg hs PRN    # diet   - Last MBS 2/3  - easy to chew w/ mildly thick (upgraded from mod thick 2/3)  - MBS today 2/12 9 AM    # DVT ppx  - LE doppler 1/30: neg for DVT bilat. LEs  - lovenox 40 mg daily (started 1/29)    # Case discussed in IDT rounds 2/10 (follow up)  - continent, skin intact, easy to chew with mildly thick liquids; improving speech and language skills akua receptive language; follows short sentences and write short phrases; improved oral reading skills; CG for ADLs and CG functional transfers without AD; can use RUE as gross functional assist but difficulty FMD; CG/min assist bed mobility and transfers, ambulates 125 feet with CG/min assist without AD. stairs with CG/min assist and 1 HR, Participates in music therapy to improve speech production, therapeutic choir  - barriers: right UP, stairs, communication decreased balance and coordination, insight  - goals: "enhance coordination"  supervision ADLs and transfers, communicate wants and needs effectively and consistently with min assist (progressing); ambulate to bathroom with appropriate AD and toilet with supervision (progressing); supervision  - target: 2/20 home with caregiver assist and home care referral PT and OT SLP  - caregiver training  - Medical meeting with wife and daughter 2/4    # LABS  CBC CMP 2/13      Corrected contact information:  Spouse Nicolle Browne - [phone]  Daughter Gracy: [phone]  ---------------  Outpatient Follow-up:    Lynda Zimmerman  NP in Family Health  [address] [place] [address], [address]  [place] [address]  Phone: [phone]  Fax: [phone]  Follow Up Time:     Manoj Edge  [facility]  [address] [facility], [address]  [place] [address]  Phone: [phone]  Fax: [phone]  Follow Up Time: 2 weeks           74M RHD w/ PMH HTN, afib (on apixaban, started 2017), and CVA (w/ residual min right-sided weakness), admitted to [facility] acute rehab for 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 Recreation therapy  - Will perform K taping of upper back muscles to assist with posture in ambulation activities  - precautions: fall, aspiration, cardiac    # atrial fibrillation  # HTN  - *Hold apixaban through 03/09 (5-6 weeks per neuro). We reviewed with family medications, risks/benefits, and need for clearance by neurology before resumption  - amlodipine 10mg daily  - Lopressor 25mg BID  - Valsartan 320mg daily  - HR 61-66) BP (122/79 - 133/81) 2/12    # Hypernatremia/KEAGAN  - urine osm 749 2/4. Sosm 304 2/5  - Encourage increased PO intake  - Na 148 2/3-> 144 2/6--> 144 2/10  - BUn/Cr 34/1 --> 29/0.87 2/6--> 24/0.84 2/10  improving  - CMP 2/13    # ISIS  # home CPAP  - Using home CPAP (family brought in personal machine)  - Patient compliant in community per family    # pain  - acetaminophen 650mg q6h PRN    # bowel  - scheduled: senna 2tab qhs  - PRN: Miralax BID    # Oral Thrush  - Nystatin s/s 4xdaily x7days    # sleep  - melatonin 3mg hs PRN  - still has interrupted, reduced quality sleep however defers additional sleeping meds at this time. Denies significant daytime drowsiness    # diet   - Last MBS 2/3  - MBS today 2/12 9 AM--> upgrade to regular solid and thin liquids with single sips DASH/TLC    # DVT ppx  - LE doppler 1/30: neg for DVT bilat. LEs  - lovenox 40 mg daily (started 1/29)    # Case discussed in IDT rounds 2/10 (follow up)  - continent, skin intact, easy to chew with mildly thick liquids; improving speech and language skills akua receptive language; follows short sentences and write short phrases; improved oral reading skills; CG for ADLs and CG functional transfers without AD; can use RUE as gross functional assist but difficulty FMD; CG/min assist bed mobility and transfers, ambulates 125 feet with CG/min assist without AD. stairs with CG/min assist and 1 HR, Participates in music therapy to improve speech production, therapeutic choir  - barriers: right UP, stairs, communication decreased balance and coordination, insight  - goals: "enhance coordination"  supervision ADLs and transfers, communicate wants and needs effectively and consistently with min assist (progressing); ambulate to bathroom with appropriate AD and toilet with supervision (progressing); supervision  - target: 2/20 home with caregiver assist and home care referral PT and OT SLP  - caregiver training  - Medical meeting with wife and daughter 2/4    # LABS  CBC CMP 2/13      Corrected contact information:  Spouse Nicolle Browne - [phone]  Daughter Gracy: [phone]  ---------------  Outpatient Follow-up:    Lynda Zimmerman  NP in Family Health  [address] [place] [address], [address]  [place] [address]  Phone: [phone]  Fax: [phone]  Follow Up Time:     Manoj Edge  Cardiology  [address] [facility], [address]  [place] [address]  Phone: [phone]  Fax: [phone]  Follow Up Time: 2 weeks           74M RHD w/ PMH HTN, afib (on apixaban, started 2017), and CVA (w/ residual min right-sided weakness), admitted to [facility] acute rehab for 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 Discussed with patient 2/12  - Rosuvastatin 40mg qhs  - Continue comprehensive rehab program PT OT SLP 3 hours daily 5 x week. Recreation therapy  - Will perform K taping of upper back muscles to assist with posture in ambulation activities  - precautions: fall, aspiration, cardiac    # atrial fibrillation  # HTN  - *Hold apixaban through 03/09 (5-6 weeks per neuro). We reviewed with family medications, risks/benefits, and need for clearance by neurology before resumption  - amlodipine 10mg daily  - Lopressor 25mg BID  - Valsartan 320mg daily  - HR 61-66) BP (122/79 - 133/81) 2/12    # Hypernatremia/KEAGAN  - urine osm 749 2/4. Sosm 304 2/5  - Encourage increased PO intake  - Na 148 2/3-> 144 2/6--> 144 2/10  - BUn/Cr 34/1 --> 29/0.87 2/6--> 24/0.84 2/10  improving  - CMP 2/13    # ISIS  # home CPAP  - Using home CPAP (family brought in personal machine)  - Patient compliant in community per family    # pain  - acetaminophen 650mg q6h PRN    # bowel  - scheduled: senna 2tab qhs  - PRN: Miralax BID    # Oral Thrush  - Nystatin s/s 4xdaily x7days    # sleep  - melatonin 3mg hs PRN  - still has interrupted, reduced quality sleep however defers additional sleeping meds at this time. Denies significant daytime drowsiness    # diet   - Last MBS 2/3  - MBS today 2/12 9 AM--> upgrade to regular solid and thin liquids with single sips DASH/TLC. Reviewed with SLP and patient    # DVT ppx  - LE doppler 1/30: neg for DVT bilat. LEs  - lovenox 40 mg daily (started 1/29)    # Case discussed in IDT rounds 2/10 (follow up)  - continent, skin intact, easy to chew with mildly thick liquids; improving speech and language skills akua receptive language; follows short sentences and write short phrases; improved oral reading skills; CG for ADLs and CG functional transfers without AD; can use RUE as gross functional assist but difficulty FMD; CG/min assist bed mobility and transfers, ambulates 125 feet with CG/min assist without AD. stairs with CG/min assist and 1 HR, Participates in music therapy to improve speech production, therapeutic choir  - barriers: right UP, stairs, communication decreased balance and coordination, insight  - goals: "enhance coordination"  supervision ADLs and transfers, communicate wants and needs effectively and consistently with min assist (progressing); ambulate to bathroom with appropriate AD and toilet with supervision (progressing); supervision  - target: 2/20 home with caregiver assist and home care referral PT and OT SLP  - caregiver training  - Medical meeting with wife and daughter 2/4    # LABS  CBC CMP 2/13  repeat Head CT 2/18      Corrected contact information:  Spouse Nicolle Browne - [phone]  Daughter Gracy: [phone]  ---------------  Outpatient Follow-up:    Lynda Zimmerman  NP in [facility]  [address] [place] [address], [address]  [place] [address]  Phone: [phone]  Fax: [phone]  Follow Up Time:     Manoj Edge  Cardiology  [address] [facility], [address]  [place] [address]  Phone: [phone]  Fax: [phone]  Follow Up Time: 2 weeks           74M RHD w/ PMH HTN, afib (on apixaban, started 2017), and CVA (w/ residual min right-sided weakness), admitted to [facility] acute rehab for 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 Discussed with patient 2/12  - Rosuvastatin 40mg qhs  - Continue comprehensive rehab program PT OT SLP 3 hours daily 5 x week. Recreation therapy.  Adjust intensity to 90 min SLP 30 PT 60 OT starting 2/13  - Will perform K taping of upper back muscles to assist with posture in ambulation activities  - precautions: fall, aspiration, cardiac    # atrial fibrillation  # HTN  - *Hold apixaban through 03/09 (5-6 weeks per neuro). We reviewed with family medications, risks/benefits, and need for clearance by neurology before resumption  - amlodipine 10mg daily  - Lopressor 25mg BID  - Valsartan 320mg daily  - HR 61-66) BP (122/79 - 133/81) 2/12    # Hypernatremia/KEAGAN  - urine osm 749 2/4. Sosm 304 2/5  - Encourage increased PO intake  - Na 148 2/3-> 144 2/6--> 144 2/10  - BUn/Cr 34/1 --> 29/0.87 2/6--> 24/0.84 2/10  improving  - CMP 2/13    # ISIS  # home CPAP  - Using home CPAP (family brought in personal machine)  - Patient compliant in community per family    # pain  - acetaminophen 650mg q6h PRN    # bowel  - scheduled: senna 2tab qhs  - PRN: Miralax BID    # Oral Thrush  - Nystatin s/s 4xdaily x7days    # sleep  - melatonin 3mg hs PRN  - still has interrupted, reduced quality sleep however defers additional sleeping meds at this time. Denies significant daytime drowsiness    # diet   - Last MBS 2/3  - MBS today 2/12 9 AM--> upgrade to regular solid and thin liquids with single sips DASH/TLC. Reviewed with SLP and patient    # DVT ppx  - LE doppler 1/30: neg for DVT bilat. LEs  - lovenox 40 mg daily (started 1/29)    # Case discussed in IDT rounds 2/10 (follow up)  - continent, skin intact, easy to chew with mildly thick liquids; improving speech and language skills akua receptive language; follows short sentences and write short phrases; improved oral reading skills; CG for ADLs and CG functional transfers without AD; can use RUE as gross functional assist but difficulty FMD; CG/min assist bed mobility and transfers, ambulates 125 feet with CG/min assist without AD. stairs with CG/min assist and 1 HR, Participates in music therapy to improve speech production, therapeutic choir  - barriers: right UP, stairs, communication decreased balance and coordination, insight  - goals: "enhance coordination"  supervision ADLs and transfers, communicate wants and needs effectively and consistently with min assist (progressing); ambulate to bathroom with appropriate AD and toilet with supervision (progressing); supervision  - target: 2/20 home with caregiver assist and home care referral PT and OT SLP  - caregiver training  - Medical meeting with wife and daughter 2/4    # LABS  CBC CMP 2/13  repeat Head CT 2/18      Corrected contact information:  Spouse Nicolle Browne - [phone]  Daughter Gracy: [phone]  ---------------  Outpatient Follow-up:    Lynda Zimmerman  NP in Family Health  [address] [place] [address], [address]  [place] [address]  Phone: [phone]  Fax: [phone]  Follow Up Time:     Manoj Edge  Cardiology  [address] [facility], [address]  [place] [address]  Phone: [phone]  Fax: [phone]  Follow Up Time: 2 weeks           74M RHD w/ PMH HTN, afib (on apixaban, started 2017), and CVA (w/ residual min right-sided weakness), admitted to [facility] acute rehab w/ left basal ganglia hemorrhage     # non traumatic acute left BG hemorrhage with right hemibody involvement, right HP, apraxia speech, motor apraxia, incoordination, dysarthria, dysphagia  - CT Head: 1/26 5.8 x 2.6 cm intracranial hemorrhage with mass effect and 1-2mm rightward midline  shift  - CTA h/n: stable near complete occlusion of left distal M1  - plan to repeat Head CT prior to dc f/u mass effect and shift (last 1/27). Discussed with patient 2/12  - Rosuvastatin 40mg qhs  - Continue comprehensive rehab program PT OT SLP 3 hours daily 5 x week. Recreation therapy.  Adjust intensity to 90 min SLP 30 PT 60 OT starting 2/13  - Will perform K taping of upper back muscles to assist with posture in ambulation activities  - precautions: fall, aspiration, cardiac    # atrial fibrillation  # HTN  - *Hold apixaban through 03/09 (5-6 weeks per neuro). We reviewed with family medications, risks/benefits, and need for clearance by neurology before resumption  - amlodipine 10mg daily  - Lopressor 25mg BID  - Valsartan 320mg daily  - HR 61-66) BP (122/79 - 133/81) 2/12    # Hypernatremia/KEAGAN  - urine osm 749 2/4. Sosm 304 2/5  - Encourage increased PO intake  - Na 148 2/3-> 144 2/6--> 144 2/10  - BUn/Cr 34/1 --> 29/0.87 2/6--> 24/0.84 2/10  improving  - CMP 2/13    # ISIS  # home CPAP  - Using home CPAP (family brought in personal machine)  - Patient compliant in community per family    # pain  - acetaminophen 650mg q6h PRN    # bowel  - scheduled: senna 2tab qhs  - PRN: Miralax BID    # Oral Thrush  - Nystatin s/s 4xdaily x7days    # sleep  - melatonin 3mg hs PRN  - still has interrupted, reduced quality sleep however defers additional sleeping meds at this time. Denies significant daytime drowsiness    # diet   - Last MBS 2/3  - MBS today 2/12 9 AM--> upgrade to regular solid and thin liquids with single sips DASH/TLC. Reviewed with SLP and patient    # DVT ppx  - LE doppler 1/30: neg for DVT bilat. LEs  - lovenox 40 mg daily (started 1/29)    # Case discussed in IDT rounds 2/10 (follow up)  - continent, skin intact, easy to chew with mildly thick liquids; improving speech and language skills akua receptive language; follows short sentences and write short phrases; improved oral reading skills; CG for ADLs and CG functional transfers without AD; can use RUE as gross functional assist but difficulty FMD; CG/min assist bed mobility and transfers, ambulates 125 feet with CG/min assist without AD. stairs with CG/min assist and 1 HR, Participates in music therapy to improve speech production, therapeutic choir  - barriers: right UP, stairs, communication decreased balance and coordination, insight  - goals: "enhance coordination"  supervision ADLs and transfers, communicate wants and needs effectively and consistently with min assist (progressing); ambulate to bathroom with appropriate AD and toilet with supervision (progressing); supervision  - target: 2/20 home with caregiver assist and home care referral PT and OT SLP  - caregiver training  - Medical meeting with wife and daughter 2/4    # LABS  CBC CMP 2/13  repeat Head CT 2/18      Corrected contact information:  Spouse Nicolle Browne - [phone]  Daughter Gracy: [phone]  ---------------  Outpatient Follow-up:    Lynda Zimmerman  NP in Family Health  [address] [place] [address], [address]  [place] [address]  Phone: [phone]  Fax: [phone]  Follow Up Time:     Manoj Edge  Cardiology  [address], [address]  [place] [address]  Phone: [phone]  Fax: [phone]  Follow Up Time: 2 weeks

## 2025-02-12 NOTE — PROGRESS NOTE ADULT - SUBJECTIVE AND OBJECTIVE BOX
Patient is a 74y old  Male who presents with a chief complaint of left basal ganglia hemorrhage w/ right hemiparesis, transcortical sensory aphasia, and dysphasia (11 Feb 2025 11:31)      HPI:   74M RHD w/ PMH HTN, afib (on apixaban, started 2017), and CVA (w/ residual min right-sided weakness), who presented to [facility] 01/26/2025 after being found on floor by wife w/ right-sided weakness and word-finding difficulties. Wife woke up in 0545 1/26 finding the patient on the floor and complaining of right sided weakness and word finding difficulties. LKN 1/25 2230 when the patient went to bed. He took his apixaban and medications for HLD and HTN prior to going to bed. Upon finding the patient on the floor, the patient was transferred to the [place] ED. NIHSS 10 (+1 face, +3 RUE, +3 RLE, +1 Aphasia, +2, dysarthria). mRS 0    CT head demonstrated 5.8 x 2.6 cm left basal ganglia hemorrhage with 1-2mm rightward midline shift. 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 Received andexanet and levetiracetam 1g x1. He was transferred to [facility] 01/26 for further eval. Neurology recommended holding apixab through 03/09 per neuro (hold apixaban for 5-6 weeks, then will make further decisions regarding restarting and/or a Watchman as outpatient given Afib).    Evaluated by PM&R and discharged to [facility] acute rehab 01/29.  (30 Jan 2025 16:58)      PAST MEDICAL & SURGICAL HISTORY:  Stroke      Chronic atrial fibrillation      HTN (hypertension)      HLD (hyperlipidemia)      No significant past surgical history          MEDICATIONS  (STANDING):  amLODIPine   Tablet 10 milliGRAM(s) Oral daily  enoxaparin Injectable 40 milliGRAM(s) SubCutaneous every 24 hours  metoprolol tartrate 25 milliGRAM(s) Oral every 12 hours  pantoprazole    Tablet 40 milliGRAM(s) Oral before breakfast  rosuvastatin 40 milliGRAM(s) Oral at bedtime  senna 2 Tablet(s) Oral at bedtime  valsartan 320 milliGRAM(s) Oral daily    MEDICATIONS  (PRN):  acetaminophen     Tablet .. 650 milliGRAM(s) Oral every 6 hours PRN Moderate Pain (4 - 6)  melatonin 6 milliGRAM(s) Oral at bedtime PRN Insomnia  polyethylene glycol 3350 17 Gram(s) Oral daily PRN Constipation      Allergies    No Known Allergies    Intolerances          VITALS  74y  Vital Signs Last 24 Hrs  T(C): 36.3 (12 Feb 2025 07:16), Max: 36.8 (11 Feb 2025 19:55)  T(F): 97.3 (12 Feb 2025 07:16), Max: 98.3 (11 Feb 2025 19:55)  HR: 61 (12 Feb 2025 07:16) (61 - 66)  BP: 130/84 (12 Feb 2025 07:16) (122/79 - 133/81)  BP(mean): --  RR: 15 (12 Feb 2025 07:16) (15 - 15)  SpO2: 93% (12 Feb 2025 07:16) (93% - 94%)    Parameters below as of 12 Feb 2025 07:16  Patient On (Oxygen Delivery Method): room air      Daily     Daily         RECENT LABS:                      CAPILLARY BLOOD GLUCOSE

## 2025-02-12 NOTE — PROGRESS NOTE ADULT - COMMENTS
Patient completed MBS, was upgraded to regular solid and thin liquids via tsp. Patient is aware, jokes that he thinks the speech therapist was more excited than he was. Continues to have difficulty with sleep, interrupted sleep, but denies feeling overly fatigued during the day. He declines any additional medications for sleep at this time    Plan for repeat Head CT prior to dc for f/u bleed discussed. No H/A and clinically improving

## 2025-02-13 LAB
ALBUMIN SERPL ELPH-MCNC: 2.6 G/DL — LOW (ref 3.3–5)
ALP SERPL-CCNC: 126 U/L — HIGH (ref 40–120)
ALT FLD-CCNC: 55 U/L — HIGH (ref 10–45)
ANION GAP SERPL CALC-SCNC: 8 MMOL/L — SIGNIFICANT CHANGE UP (ref 5–17)
AST SERPL-CCNC: 37 U/L — SIGNIFICANT CHANGE UP (ref 10–40)
BASOPHILS # BLD AUTO: 0.05 K/UL — SIGNIFICANT CHANGE UP (ref 0–0.2)
BASOPHILS NFR BLD AUTO: 0.8 % — SIGNIFICANT CHANGE UP (ref 0–2)
BILIRUB SERPL-MCNC: 0.5 MG/DL — SIGNIFICANT CHANGE UP (ref 0.2–1.2)
BUN SERPL-MCNC: 25 MG/DL — HIGH (ref 7–23)
CALCIUM SERPL-MCNC: 8.6 MG/DL — SIGNIFICANT CHANGE UP (ref 8.4–10.5)
CHLORIDE SERPL-SCNC: 109 MMOL/L — HIGH (ref 96–108)
CO2 SERPL-SCNC: 28 MMOL/L — SIGNIFICANT CHANGE UP (ref 22–31)
CREAT SERPL-MCNC: 0.92 MG/DL — SIGNIFICANT CHANGE UP (ref 0.5–1.3)
EGFR: 86 ML/MIN/1.73M2 — SIGNIFICANT CHANGE UP
EOSINOPHIL # BLD AUTO: 0.25 K/UL — SIGNIFICANT CHANGE UP (ref 0–0.5)
EOSINOPHIL NFR BLD AUTO: 3.8 % — SIGNIFICANT CHANGE UP (ref 0–6)
GLUCOSE SERPL-MCNC: 78 MG/DL — SIGNIFICANT CHANGE UP (ref 70–99)
HCT VFR BLD CALC: 39.9 % — SIGNIFICANT CHANGE UP (ref 39–50)
HGB BLD-MCNC: 13.2 G/DL — SIGNIFICANT CHANGE UP (ref 13–17)
IMM GRANULOCYTES NFR BLD AUTO: 0.3 % — SIGNIFICANT CHANGE UP (ref 0–0.9)
LYMPHOCYTES # BLD AUTO: 1.74 K/UL — SIGNIFICANT CHANGE UP (ref 1–3.3)
LYMPHOCYTES # BLD AUTO: 26.2 % — SIGNIFICANT CHANGE UP (ref 13–44)
MCHC RBC-ENTMCNC: 30.9 PG — SIGNIFICANT CHANGE UP (ref 27–34)
MCHC RBC-ENTMCNC: 33.1 G/DL — SIGNIFICANT CHANGE UP (ref 32–36)
MCV RBC AUTO: 93.4 FL — SIGNIFICANT CHANGE UP (ref 80–100)
MONOCYTES # BLD AUTO: 0.54 K/UL — SIGNIFICANT CHANGE UP (ref 0–0.9)
MONOCYTES NFR BLD AUTO: 8.1 % — SIGNIFICANT CHANGE UP (ref 2–14)
NEUTROPHILS # BLD AUTO: 4.03 K/UL — SIGNIFICANT CHANGE UP (ref 1.8–7.4)
NEUTROPHILS NFR BLD AUTO: 60.8 % — SIGNIFICANT CHANGE UP (ref 43–77)
NRBC BLD AUTO-RTO: 0 /100 WBCS — SIGNIFICANT CHANGE UP (ref 0–0)
PLATELET # BLD AUTO: 174 K/UL — SIGNIFICANT CHANGE UP (ref 150–400)
POTASSIUM SERPL-MCNC: 3.8 MMOL/L — SIGNIFICANT CHANGE UP (ref 3.5–5.3)
POTASSIUM SERPL-SCNC: 3.8 MMOL/L — SIGNIFICANT CHANGE UP (ref 3.5–5.3)
PROT SERPL-MCNC: 6.3 G/DL — SIGNIFICANT CHANGE UP (ref 6–8.3)
RBC # BLD: 4.27 M/UL — SIGNIFICANT CHANGE UP (ref 4.2–5.8)
RBC # FLD: 13.1 % — SIGNIFICANT CHANGE UP (ref 10.3–14.5)
SODIUM SERPL-SCNC: 145 MMOL/L — SIGNIFICANT CHANGE UP (ref 135–145)
WBC # BLD: 6.63 K/UL — SIGNIFICANT CHANGE UP (ref 3.8–10.5)
WBC # FLD AUTO: 6.63 K/UL — SIGNIFICANT CHANGE UP (ref 3.8–10.5)

## 2025-02-13 PROCEDURE — 99232 SBSQ HOSP IP/OBS MODERATE 35: CPT

## 2025-02-13 RX ADMIN — Medication 320 MILLIGRAM(S): at 05:21

## 2025-02-13 RX ADMIN — AMLODIPINE BESYLATE 10 MILLIGRAM(S): 10 TABLET ORAL at 05:21

## 2025-02-13 RX ADMIN — Medication 40 MILLIGRAM(S): at 05:21

## 2025-02-13 RX ADMIN — ROSUVASTATIN CALCIUM 40 MILLIGRAM(S): 20 TABLET, FILM COATED ORAL at 20:57

## 2025-02-13 RX ADMIN — METOPROLOL SUCCINATE 25 MILLIGRAM(S): 50 TABLET, EXTENDED RELEASE ORAL at 17:49

## 2025-02-13 RX ADMIN — ENOXAPARIN SODIUM 40 MILLIGRAM(S): 100 INJECTION SUBCUTANEOUS at 17:49

## 2025-02-13 RX ADMIN — METOPROLOL SUCCINATE 25 MILLIGRAM(S): 50 TABLET, EXTENDED RELEASE ORAL at 05:21

## 2025-02-13 NOTE — PROGRESS NOTE ADULT - SUBJECTIVE AND OBJECTIVE BOX
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 Wife woke up in 0545 1/26 finding the patient on the floor and complaining of right sided weakness and word finding difficulties. LKN 1/25 2230 when the patient went to bed. He took his apixaban and medications for HLD and HTN prior to going to bed. Upon finding the patient on the floor, the patient was transferred to the [place] ED. NIHSS 10 (+1 face, +3 RUE, +3 RLE, +1 Aphasia, +2, dysarthria). mRS 0    CT head demonstrated 5.8 x 2.6 cm left basal ganglia hemorrhage with 1-2mm rightward midline shift. 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 Received andexanet and levetiracetam 1g x1. He was transferred to [facility] 01/26 for further eval. Neurology recommended holding apixab through 03/09 per neuro (hold apixaban for 5-6 weeks, then will make further decisions regarding restarting and/or a Watchman as outpatient given Afib).    Evaluated by PM&R and discharged to [facility] acute rehab 01/29.  (30 Jan 2025 16:58)      Subjective/ROS:    PAST MEDICAL & SURGICAL HISTORY:  Stroke      Chronic atrial fibrillation      HTN (hypertension)      HLD (hyperlipidemia)      No significant past surgical history          MEDICATIONS  (STANDING):  amLODIPine   Tablet 10 milliGRAM(s) Oral daily  enoxaparin Injectable 40 milliGRAM(s) SubCutaneous every 24 hours  metoprolol tartrate 25 milliGRAM(s) Oral every 12 hours  pantoprazole    Tablet 40 milliGRAM(s) Oral before breakfast  rosuvastatin 40 milliGRAM(s) Oral at bedtime  senna 2 Tablet(s) Oral at bedtime  valsartan 320 milliGRAM(s) Oral daily    MEDICATIONS  (PRN):  acetaminophen     Tablet .. 650 milliGRAM(s) Oral every 6 hours PRN Moderate Pain (4 - 6)  melatonin 6 milliGRAM(s) Oral at bedtime PRN Insomnia  polyethylene glycol 3350 17 Gram(s) Oral daily PRN Constipation      Allergies    No Known Allergies    Intolerances          VITALS  Vital Signs Last 24 Hrs  T(C): 36.2 (13 Feb 2025 07:09), Max: 36.7 (12 Feb 2025 20:03)  T(F): 97.2 (13 Feb 2025 07:09), Max: 98 (12 Feb 2025 20:03)  HR: 60 (13 Feb 2025 07:09) (60 - 84)  BP: 116/74 (13 Feb 2025 07:09) (116/74 - 144/80)  BP(mean): --  RR: 15 (13 Feb 2025 07:09) (14 - 16)  SpO2: 96% (13 Feb 2025 07:09) (94% - 97%)    Parameters below as of 13 Feb 2025 05:20  Patient On (Oxygen Delivery Method): room air        Daily     Daily         RECENT LABS:                            13.2   6.63  )-----------( 174      ( 13 Feb 2025 05:45 )             39.9     02-13    145  |  109[H]  |  25[H]  ----------------------------<  78  3.8   |  28  |  0.92    Ca    8.6      13 Feb 2025 05:45    TPro  6.3  /  Alb  2.6[L]  /  TBili  0.5  /  DBili  x   /  AST  37  /  ALT  55[H]  /  AlkPhos  126[H]  02-13      Urinalysis Basic - ( 13 Feb 2025 05:45 )    Color: x / Appearance: x / SG: x / pH: x  Gluc: 78 mg/dL / Ketone: x  / Bili: x / Urobili: x   Blood: x / Protein: x / Nitrite: x   Leuk Esterase: x / RBC: x / WBC x   Sq Epi: x / Non Sq Epi: x / Bacteria: x      CAPILLARY BLOOD GLUCOSE                                 Patient is a 74y old  Male who presents with a chief complaint of 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 Wife woke up in 0545 1/26 finding the patient on the floor and complaining of right sided weakness and word finding difficulties. LKN 1/25 2230 when the patient went to bed. He took his apixaban and medications for HLD and HTN prior to going to bed. Upon finding the patient on the floor, the patient was transferred to the [place] ED. NIHSS 10 (+1 face, +3 RUE, +3 RLE, +1 Aphasia, +2, dysarthria). mRS 0    CT head demonstrated 5.8 x 2.6 cm left basal ganglia hemorrhage with 1-2mm rightward midline shift. 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 Received andexanet and levetiracetam 1g x1. He was transferred to [facility] 01/26 for further eval. Neurology recommended holding apixab through 03/09 per neuro (hold apixaban for 5-6 weeks, then will make further decisions regarding restarting and/or a Watchman as outpatient given Afib).    Evaluated by PM&R and discharged to [facility] acute rehab 01/29.  (30 Jan 2025 16:58)      Subjective/ROS: Patient seen and evaluated at bedside while having breakfast. Reports sleeping well. Denies any HA, visual changes, or other new/worsening symptoms. Last BM 2/11, denies subjective constipation. No concerns at this time.    PAST MEDICAL & SURGICAL HISTORY:  Stroke      Chronic atrial fibrillation      HTN (hypertension)      HLD (hyperlipidemia)      No significant past surgical history          MEDICATIONS  (STANDING):  amLODIPine   Tablet 10 milliGRAM(s) Oral daily  enoxaparin Injectable 40 milliGRAM(s) SubCutaneous every 24 hours  metoprolol tartrate 25 milliGRAM(s) Oral every 12 hours  pantoprazole    Tablet 40 milliGRAM(s) Oral before breakfast  rosuvastatin 40 milliGRAM(s) Oral at bedtime  senna 2 Tablet(s) Oral at bedtime  valsartan 320 milliGRAM(s) Oral daily    MEDICATIONS  (PRN):  acetaminophen     Tablet .. 650 milliGRAM(s) Oral every 6 hours PRN Moderate Pain (4 - 6)  melatonin 6 milliGRAM(s) Oral at bedtime PRN Insomnia  polyethylene glycol 3350 17 Gram(s) Oral daily PRN Constipation      Allergies    No Known Allergies    Intolerances          VITALS  Vital Signs Last 24 Hrs  T(C): 36.2 (13 Feb 2025 07:09), Max: 36.7 (12 Feb 2025 20:03)  T(F): 97.2 (13 Feb 2025 07:09), Max: 98 (12 Feb 2025 20:03)  HR: 60 (13 Feb 2025 07:09) (60 - 84)  BP: 116/74 (13 Feb 2025 07:09) (116/74 - 144/80)  BP(mean): --  RR: 15 (13 Feb 2025 07:09) (14 - 16)  SpO2: 96% (13 Feb 2025 07:09) (94% - 97%)    Parameters below as of 13 Feb 2025 05:20  Patient On (Oxygen Delivery Method): room air        Daily     Daily         RECENT LABS:                            13.2   6.63  )-----------( 174      ( 13 Feb 2025 05:45 )             39.9     02-13    145  |  109[H]  |  25[H]  ----------------------------<  78  3.8   |  28  |  0.92    Ca    8.6      13 Feb 2025 05:45    TPro  6.3  /  Alb  2.6[L]  /  TBili  0.5  /  DBili  x   /  AST  37  /  ALT  55[H]  /  AlkPhos  126[H]  02-13      Urinalysis Basic - ( 13 Feb 2025 05:45 )    Color: x / Appearance: x / SG: x / pH: x  Gluc: 78 mg/dL / Ketone: x  / Bili: x / Urobili: x   Blood: x / Protein: x / Nitrite: x   Leuk Esterase: x / RBC: x / WBC x   Sq Epi: x / Non Sq Epi: x / Bacteria: x      CAPILLARY BLOOD GLUCOSE                                 Patient is a 74y old  Male who presents with a chief complaint of left basal ganglia hemorrhage w/ right hemiparesis, transcortical sensory aphasia, and dysphasia (11 Feb 2025 11:31)      HPI:   74M RHD w/ PMH HTN, afib (on apixaban, started 2017), and CVA (w/ residual min right-sided weakness), who presented to [facility] 01/26/2025 after being found on floor by wife w/ right-sided weakness and word-finding difficulties. Wife woke up in 0545 1/26 finding the patient on the floor and complaining of right sided weakness and word finding difficulties. LKN 1/25 2230 when the patient went to bed. He took his apixaban and medications for HLD and HTN prior to going to bed. Upon finding the patient on the floor, the patient was transferred to the [place] ED. NIHSS 10 (+1 face, +3 RUE, +3 RLE, +1 Aphasia, +2, dysarthria). mRS 0    CT head demonstrated 5.8 x 2.6 cm left basal ganglia hemorrhage with 1-2mm rightward midline shift. 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 Received andexanet and levetiracetam 1g x1. He was transferred to [facility] 01/26 for further eval. Neurology recommended holding apixab through 03/09 per neuro (hold apixaban for 5-6 weeks, then will make further decisions regarding restarting and/or a Watchman as outpatient given Afib).    Evaluated by PM&R and discharged to [facility] acute rehab 01/29.  (30 Jan 2025 16:58)      Subjective/ROS: Patient seen and evaluated at bedside while having breakfast. Reports sleeping well. Denies any HA, visual changes, or other new/worsening symptoms. Last BM 2/11, denies subjective constipation. No concerns at this time.    PAST MEDICAL & SURGICAL HISTORY:  Stroke      Chronic atrial fibrillation      HTN (hypertension)      HLD (hyperlipidemia)      No significant past surgical history          MEDICATIONS  (STANDING):  amLODIPine   Tablet 10 milliGRAM(s) Oral daily  enoxaparin Injectable 40 milliGRAM(s) SubCutaneous every 24 hours  metoprolol tartrate 25 milliGRAM(s) Oral every 12 hours  pantoprazole    Tablet 40 milliGRAM(s) Oral before breakfast  rosuvastatin 40 milliGRAM(s) Oral at bedtime  senna 2 Tablet(s) Oral at bedtime  valsartan 320 milliGRAM(s) Oral daily    MEDICATIONS  (PRN):  acetaminophen     Tablet .. 650 milliGRAM(s) Oral every 6 hours PRN Moderate Pain (4 - 6)  melatonin 6 milliGRAM(s) Oral at bedtime PRN Insomnia  polyethylene glycol 3350 17 Gram(s) Oral daily PRN Constipation      Allergies    No Known Allergies    Intolerances          VITALS  Vital Signs Last 24 Hrs  T(C): 36.2 (13 Feb 2025 07:09), Max: 36.7 (12 Feb 2025 20:03)  T(F): 97.2 (13 Feb 2025 07:09), Max: 98 (12 Feb 2025 20:03)  HR: 60 (13 Feb 2025 07:09) (60 - 84)  BP: 116/74 (13 Feb 2025 07:09) (116/74 - 144/80)  BP(mean): --  RR: 15 (13 Feb 2025 07:09) (14 - 16)  SpO2: 96% (13 Feb 2025 07:09) (94% - 97%)    Parameters below as of 13 Feb 2025 05:20  Patient On (Oxygen Delivery Method): room air        Daily     Daily         RECENT LABS:                            13.2   6.63  )-----------( 174      ( 13 Feb 2025 05:45 )             39.9     02-13    145  |  109[H]  |  25[H]  ----------------------------<  78  3.8   |  28  |  0.92    Ca    8.6      13 Feb 2025 05:45    TPro  6.3  /  Alb  2.6[L]  /  TBili  0.5  /  DBili  x   /  AST  37  /  ALT  55[H]  /  AlkPhos  126[H]  02-13      Urinalysis Basic - ( 13 Feb 2025 05:45 )    Color: x / Appearance: x / SG: x / pH: x  Gluc: 78 mg/dL / Ketone: x  / Bili: x / Urobili: x   Blood: x / Protein: x / Nitrite: x   Leuk Esterase: x / RBC: x / WBC x   Sq Epi: x / Non Sq Epi: x / Bacteria: x      CAPILLARY BLOOD GLUCOSE

## 2025-02-13 NOTE — PROGRESS NOTE ADULT - ASSESSMENT
74M RHD w/ PMH HTN, afib (on apixaban, started 2017), and CVA (w/ residual min right-sided weakness), admitted to [facility] acute rehab w/ left basal ganglia hemorrhage     # non traumatic acute left BG hemorrhage with right hemibody involvement, right HP, apraxia speech, motor apraxia, incoordination, dysarthria, dysphagia  - CT Head: 1/26 5.8 x 2.6 cm intracranial hemorrhage with mass effect and 1-2mm rightward midline  shift  - CTA h/n: stable near complete occlusion of left distal M1  - plan to repeat Head CT prior to dc f/u mass effect and shift (last 1/27). Discussed with patient 2/12  - Rosuvastatin 40mg qhs  - Continue comprehensive rehab program PT OT SLP 3 hours daily 5 x week. Recreation therapy.  Adjusted intensity to 90 min SLP 30 PT 60 OT starting 2/13  - Will perform K taping of upper back muscles to assist with posture in ambulation activities  - precautions: fall, aspiration, cardiac    # atrial fibrillation  # HTN  - *Hold apixaban through 03/09 (5-6 weeks per neuro). We reviewed with family medications, risks/benefits, and need for clearance by neurology before resumption  - amlodipine 10mg daily  - Lopressor 25mg BID  - Valsartan 320mg daily  - BP:  (116/74 - 144/80) 2/13    # Hypernatremia/KEAGAN  - urine osm 749 2/4. Sosm 304 2/5  - Encourage increased PO intake  - Na 148 2/3-> 144 2/6--> 144 2/10  - BUn/Cr 34/1 --> 29/0.87 2/6--> 24/0.84 2/10 --> 25/0.92 2/13 Stable  - CMP 2/18    # ISIS  # home CPAP  - Using home CPAP (family brought in personal machine)  - Patient compliant in community per family    # pain  - acetaminophen 650mg q6h PRN    # bowel  - scheduled: senna 2tab qhs  - PRN: Miralax BID    # Oral Thrush  - Nystatin s/s 4xdaily x7days    # sleep  - melatonin 3mg hs PRN  - still has interrupted, reduced quality sleep however defers additional sleeping meds at this time. Denies significant daytime drowsiness    # diet   - Last MBS 2/3  - MBS 2/12 9 AM  --> upgrade to regular solid and thin liquids with single sips DASH/TLC. Reviewed with SLP and patient    # DVT ppx  - LE doppler 1/30: neg for DVT bilat. LEs  - lovenox 40 mg daily (started 1/29)    # Case discussed in IDT rounds 2/10 (follow up)  - continent, skin intact, easy to chew with mildly thick liquids; improving speech and language skills akua receptive language; follows short sentences and write short phrases; improved oral reading skills; CG for ADLs and CG functional transfers without AD; can use RUE as gross functional assist but difficulty FMD; CG/min assist bed mobility and transfers, ambulates 125 feet with CG/min assist without AD. stairs with CG/min assist and 1 HR, Participates in music therapy to improve speech production, therapeutic choir  - barriers: right UP, stairs, communication decreased balance and coordination, insight  - goals: "enhance coordination"  supervision ADLs and transfers, communicate wants and needs effectively and consistently with min assist (progressing); ambulate to bathroom with appropriate AD and toilet with supervision (progressing); supervision  - target: 2/20 home with caregiver assist and home care referral PT and OT SLP  - caregiver training  - Medical meeting with wife and daughter 2/4    # LABS  CBC CMP 2/18  repeat Head CT 2/18      Corrected contact information:  Spouse Nicolle Browne - [phone]  Daughter Gracy: [phone]  ---------------  Outpatient Follow-up:    Lynda Zimmerman  NP in Family Health  [address], [address]  [place] [address]  Phone: [phone]  Fax: [phone]  Follow Up Time:     Manoj Edge  Cardiology  [address], [address]  [place] [address]  Phone: [phone]  Fax: [phone]  Follow Up Time: 2 weeks           74M RHD w/ PMH HTN, afib (on apixaban, started 2017), and CVA (w/ residual min right-sided weakness), admitted to [facility] acute rehab w/ left basal ganglia hemorrhage     # non traumatic acute left BG hemorrhage with right hemibody involvement, right HP, apraxia speech, motor apraxia, incoordination, dysarthria, dysphagia  - CT Head: 1/26 5.8 x 2.6 cm intracranial hemorrhage with mass effect and 1-2mm rightward midline  shift  - CTA h/n: stable near complete occlusion of left distal M1  - plan to repeat Head CT prior to dc f/u mass effect and shift (last 1/27). Discussed with patient 2/12  - Rosuvastatin 40mg qhs  - Continue comprehensive rehab program PT OT SLP 3 hours daily 5 x week. Recreation therapy.  Adjusted intensity to 90 min SLP 30 PT 60 OT starting 2/13  - Will perform K taping of upper back muscles to assist with posture in ambulation activities  - precautions: fall, aspiration, cardiac    # atrial fibrillation  # HTN  - *Hold apixaban through 03/09 (5-6 weeks per neuro). We reviewed with family medications, risks/benefits, and need for clearance by neurology before resumption  - amlodipine 10mg daily  - Lopressor 25mg BID  - Valsartan 320mg daily  - BP:  (116/74 - 144/80) 2/13    # Hypernatremia/KEAGAN  - urine osm 749 2/4. Sosm 304 2/5  - Encourage increased PO intake  - Na 148 2/3-> 144 2/6--> 144 2/10  - BUn/Cr 34/1 --> 29/0.87 2/6--> 24/0.84 2/10 --> 25/0.92 2/13 Stable  - CMP 2/18    # ISIS  # home CPAP  - Using home CPAP (family brought in personal machine)  - Patient compliant in community per family    # transaminitis 2/13    AST  37  /  ALT  55[H]  /  AlkPhos  126[H]  02-13  - no recent med changes. Has been on crestor (home med)  - Reviewed with hospitalist. Mild elevation, continue statin for now and repeat next week    # pain  - acetaminophen 650mg q6h PRN    # bowel  - scheduled: senna 2tab qhs  - PRN: Miralax BID    # Oral Thrush  - Nystatin s/s 4xdaily x7days    # sleep  - melatonin 3mg hs PRN  - still has interrupted, reduced quality sleep however defers additional sleeping meds at this time. Denies significant daytime drowsiness    # diet   - MBS 2/12 9 AM--> upgrade to regular solid and thin liquids with single sips DASH/TLC. Reviewed with SLP and patient    # DVT ppx  - LE doppler 1/30: neg for DVT bilat. LEs  - lovenox 40 mg daily (started 1/29)    # Case discussed in IDT rounds 2/10 (follow up)  - continent, skin intact, easy to chew with mildly thick liquids; improving speech and language skills akua receptive language; follows short sentences and write short phrases; improved oral reading skills; CG for ADLs and CG functional transfers without AD; can use RUE as gross functional assist but difficulty FMD; CG/min assist bed mobility and transfers, ambulates 125 feet with CG/min assist without AD. stairs with CG/min assist and 1 HR, Participates in music therapy to improve speech production, therapeutic choir  - barriers: right UP, stairs, communication decreased balance and coordination, insight  - goals: "enhance coordination"  supervision ADLs and transfers, communicate wants and needs effectively and consistently with min assist (progressing); ambulate to bathroom with appropriate AD and toilet with supervision (progressing); supervision  - target: 2/20 home with caregiver assist and home care referral PT and OT SLP  - caregiver training  - Medical meeting with wife and daughter 2/4    # LABS  CBC CMP 2/18  repeat Head CT 2/18        Corrected contact information:  Spouse Nicolle Browne - [phone]  Daughter Gracy: [phone]  ---------------  Outpatient Follow-up:    Lynda Zimmerman  NP in Family Health  [address], [address]  [place] [address]  Phone: [phone]  Fax: [phone]  Follow Up Time:     Manoj Edge  Cardiology  [address], [address]  [place] [address]  Phone: [phone]  Fax: [phone]  Follow Up Time: 2 weeks

## 2025-02-13 NOTE — PROGRESS NOTE ADULT - SUBJECTIVE AND OBJECTIVE BOX
Patient is a 74y old  Male who presents with a chief complaint of left basal ganglia hemorrhage w/ right hemiparesis, transcortical sensory aphasia, and dysphasia (13 Feb 2025 09:48)      Patient seen and examined at bedside. No overnight events reported.     ALLERGIES:  No Known Allergies    MEDICATIONS  (STANDING):  amLODIPine   Tablet 10 milliGRAM(s) Oral daily  enoxaparin Injectable 40 milliGRAM(s) SubCutaneous every 24 hours  metoprolol tartrate 25 milliGRAM(s) Oral every 12 hours  pantoprazole    Tablet 40 milliGRAM(s) Oral before breakfast  rosuvastatin 40 milliGRAM(s) Oral at bedtime  senna 2 Tablet(s) Oral at bedtime  valsartan 320 milliGRAM(s) Oral daily    MEDICATIONS  (PRN):  acetaminophen     Tablet .. 650 milliGRAM(s) Oral every 6 hours PRN Moderate Pain (4 - 6)  melatonin 6 milliGRAM(s) Oral at bedtime PRN Insomnia  polyethylene glycol 3350 17 Gram(s) Oral daily PRN Constipation    Vital Signs Last 24 Hrs  T(F): 97.2 (13 Feb 2025 07:09), Max: 98 (12 Feb 2025 20:03)  HR: 60 (13 Feb 2025 07:09) (60 - 84)  BP: 116/74 (13 Feb 2025 07:09) (116/74 - 144/80)  RR: 15 (13 Feb 2025 07:09) (14 - 16)  SpO2: 96% (13 Feb 2025 07:09) (94% - 97%)  I&O's Summary    12 Feb 2025 07:01  -  13 Feb 2025 07:00  --------------------------------------------------------  IN: 0 mL / OUT: 253 mL / NET: -253 mL      PHYSICAL EXAM:  General: NAD  ENT: No gross hearing impairment, dry mucous membranes, no thrush  Neck: Supple, No JVD  Lungs: Clear to auscultation bilaterally, good air entry, non-labored breathing  Cardio: RRR, S1/S2, No murmur  Abdomen: Soft, Nontender, Nondistended; Bowel sounds present  Extremities: No calf tenderness, No cyanosis, No pitting edema  Psych: Appropriate mood and affect  Neuro: dysarthria, A/O x 3        LABS:                        13.2   6.63  )-----------( 174      ( 13 Feb 2025 05:45 )             39.9     02-13    145  |  109  |  25  ----------------------------<  78  3.8   |  28  |  0.92    Ca    8.6      13 Feb 2025 05:45    TPro  6.3  /  Alb  2.6  /  TBili  0.5  /  DBili  x   /  AST  37  /  ALT  55  /  AlkPhos  126  02-13 01-27 Chol 142 mg/dL LDL -- HDL 51 mg/dL Trig 124 mg/dL                  Urinalysis Basic - ( 13 Feb 2025 05:45 )    Color: x / Appearance: x / SG: x / pH: x  Gluc: 78 mg/dL / Ketone: x  / Bili: x / Urobili: x   Blood: x / Protein: x / Nitrite: x   Leuk Esterase: x / RBC: x / WBC x   Sq Epi: x / Non Sq Epi: x / Bacteria: x        COVID-19 PCR: NotDetec (01-30-25 @ 15:00)    RADIOLOGY & ADDITIONAL TESTS:    Care Discussed with Consultants/Other Providers:    Patient is a 74y old  Male who presents with a chief complaint of left basal ganglia hemorrhage w/ right hemiparesis, transcortical sensory aphasia, and dysphasia (13 Feb 2025 09:48)      Patient seen and examined at bedside. No overnight events reported.     ALLERGIES:  No Known Allergies    MEDICATIONS  (STANDING):  amLODIPine   Tablet 10 milliGRAM(s) Oral daily  enoxaparin Injectable 40 milliGRAM(s) SubCutaneous every 24 hours  metoprolol tartrate 25 milliGRAM(s) Oral every 12 hours  pantoprazole    Tablet 40 milliGRAM(s) Oral before breakfast  rosuvastatin 40 milliGRAM(s) Oral at bedtime  senna 2 Tablet(s) Oral at bedtime  valsartan 320 milliGRAM(s) Oral daily    MEDICATIONS  (PRN):  acetaminophen     Tablet .. 650 milliGRAM(s) Oral every 6 hours PRN Moderate Pain (4 - 6)  melatonin 6 milliGRAM(s) Oral at bedtime PRN Insomnia  polyethylene glycol 3350 17 Gram(s) Oral daily PRN Constipation    Vital Signs Last 24 Hrs  T(F): 97.2 (13 Feb 2025 07:09), Max: 98 (12 Feb 2025 20:03)  HR: 60 (13 Feb 2025 07:09) (60 - 84)  BP: 116/74 (13 Feb 2025 07:09) (116/74 - 144/80)  RR: 15 (13 Feb 2025 07:09) (14 - 16)  SpO2: 96% (13 Feb 2025 07:09) (94% - 97%)  I&O's Summary    12 Feb 2025 07:01  -  13 Feb 2025 07:00  --------------------------------------------------------  IN: 0 mL / OUT: 253 mL / NET: -253 mL      PHYSICAL EXAM:  General: NAD  ENT: No gross hearing impairment, dry mucous membranes, no thrush  Neck: Supple, No JVD  Lungs: Clear to auscultation bilaterally, good air entry, non-labored breathing  Cardio: RRR, S1/S2, No murmur  Abdomen: Soft, Nontender, Nondistended; Bowel sounds present  Extremities: No calf tenderness, No cyanosis, No pitting edema  Psych: Appropriate mood and affect  Neuro: dysarthria, mild aphasia, right facial droop, A/O x 3        LABS:                        13.2   6.63  )-----------( 174      ( 13 Feb 2025 05:45 )             39.9     02-13    145  |  109  |  25  ----------------------------<  78  3.8   |  28  |  0.92    Ca    8.6      13 Feb 2025 05:45    TPro  6.3  /  Alb  2.6  /  TBili  0.5  /  DBili  x   /  AST  37  /  ALT  55  /  AlkPhos  126  02-13 01-27 Chol 142 mg/dL LDL -- HDL 51 mg/dL Trig 124 mg/dL                  Urinalysis Basic - ( 13 Feb 2025 05:45 )    Color: x / Appearance: x / SG: x / pH: x  Gluc: 78 mg/dL / Ketone: x  / Bili: x / Urobili: x   Blood: x / Protein: x / Nitrite: x   Leuk Esterase: x / RBC: x / WBC x   Sq Epi: x / Non Sq Epi: x / Bacteria: x        COVID-19 PCR: NotDetec (01-30-25 @ 15:00)    RADIOLOGY & ADDITIONAL TESTS:    Care Discussed with Consultants/Other Providers:

## 2025-02-13 NOTE — PROGRESS NOTE ADULT - ATTENDING COMMENTS
Progress note amended to include my discussions with patient, resident, hospitalist, RN, SW, PT and my findings    Patient tolerating regular consistency diet, no cough, Ate nearly entire meal tray for lunch. He reports his sleep is ("so so" using gestures) some days good, some bad. He had a good night last night.    He continues to have difficulty with confrontational naming. Unable to independently state "bottle" or "water" Unable to correctly choose "bottle" when given choice of "can" or bottle, could say "water" when given choice with "juice". Calves soft no TTP, BUE and LE normal tone. Motor 5/5 BUe and LE. His overall mood appears stable, he does make errors when reading his therapy schedule, with some perseveration and paraphasic errors. He denies any headache, no dizziness. no B/B complaints. Repeat Head CT to f/u bleed next week    Labs and vitals reviewed. Patient has elevated LFTS (slow, mild elevation over last 2 draws). Discussed with hospitalist, would not hold statin for now, continue to monitor with labwork next week    continue program      RECENT LABS    Vital Signs Last 24 Hrs  T(C): 36.2 (13 Feb 2025 07:09), Max: 36.7 (12 Feb 2025 20:03)  T(F): 97.2 (13 Feb 2025 07:09), Max: 98 (12 Feb 2025 20:03)  HR: 60 (13 Feb 2025 07:09) (60 - 84)  BP: 116/74 (13 Feb 2025 07:09) (116/74 - 144/80)  BP(mean): --  RR: 15 (13 Feb 2025 07:09) (14 - 16)  SpO2: 96% (13 Feb 2025 07:09) (94% - 97%)    Parameters below as of 13 Feb 2025 05:20  Patient On (Oxygen Delivery Method): room air                              13.2   6.63  )-----------( 174      ( 13 Feb 2025 05:45 )             39.9     02-13    145  |  109[H]  |  25[H]  ----------------------------<  78  3.8   |  28  |  0.92    Ca    8.6      13 Feb 2025 05:45    TPro  6.3  /  Alb  2.6[L]  /  TBili  0.5  /  DBili  x   /  AST  37  /  ALT  55[H]  /  AlkPhos  126[H]  02-13      Urinalysis Basic - ( 13 Feb 2025 05:45 )    Color: x / Appearance: x / SG: x / pH: x  Gluc: 78 mg/dL / Ketone: x  / Bili: x / Urobili: x   Blood: x / Protein: x / Nitrite: x   Leuk Esterase: x / RBC: x / WBC x   Sq Epi: x / Non Sq Epi: x / Bacteria: x      CAPILLARY BLOOD GLUCOSE

## 2025-02-13 NOTE — PROGRESS NOTE ADULT - ASSESSMENT
74M with HTN, AF, hx CVA, now in acute rehab after ICH    #Non traumatic ICH  #Aphasia due to above  -PT/OT/SLP  -aggressive BP control  -Eliquis stopped    #History of CVA  -c/w statin  -Eliquis on hold    #PAF  -c/w Lopressor  -Hold Eliquis due to recent ICH  -outpatient neurology clearance to see when to resume    #ISIS  -c/w CPAP    #Essential HTN  -c/w Valsartan  -c/w Lopressor  -c/w Norvasc  -BP currently acceptable    #DVT ppx: Lovenox   74M with HTN, AF, hx CVA, now in acute rehab after ICH    #Non traumatic ICH  #Aphasia due to above  -PT/OT/SLP  -aggressive BP control  -Eliquis stopped    #History of CVA  -c/w statin  -Eliquis on hold    #PAF  -c/w Lopressor  -Hold Eliquis due to recent ICH  -outpatient neurology clearance to see when to resume    #ISIS  -c/w CPAP    #Transaminitis  -Mild, and non-specific, in a patient with a benign abdominal exam  -LFTs stable compared to 3 days ago  -routine repeat next week  -statin could play a role, but no need to stop it at this time  -if the LFTs start to rise further in the future, will check RUQ US    #Essential HTN  -c/w Valsartan  -c/w Lopressor  -c/w Norvasc  -BP currently acceptable    #DVT ppx: Lovenox

## 2025-02-14 PROCEDURE — 99232 SBSQ HOSP IP/OBS MODERATE 35: CPT

## 2025-02-14 RX ADMIN — Medication 320 MILLIGRAM(S): at 05:40

## 2025-02-14 RX ADMIN — ROSUVASTATIN CALCIUM 40 MILLIGRAM(S): 20 TABLET, FILM COATED ORAL at 21:04

## 2025-02-14 RX ADMIN — ENOXAPARIN SODIUM 40 MILLIGRAM(S): 100 INJECTION SUBCUTANEOUS at 17:14

## 2025-02-14 RX ADMIN — METOPROLOL SUCCINATE 25 MILLIGRAM(S): 50 TABLET, EXTENDED RELEASE ORAL at 17:16

## 2025-02-14 RX ADMIN — AMLODIPINE BESYLATE 10 MILLIGRAM(S): 10 TABLET ORAL at 05:39

## 2025-02-14 RX ADMIN — METOPROLOL SUCCINATE 25 MILLIGRAM(S): 50 TABLET, EXTENDED RELEASE ORAL at 05:39

## 2025-02-14 RX ADMIN — Medication 40 MILLIGRAM(S): at 05:40

## 2025-02-14 NOTE — PROGRESS NOTE ADULT - ASSESSMENT
74M RHD w/ PMH HTN, afib (on apixaban, started 2017), and CVA (w/ residual min right-sided weakness), admitted to [facility] acute rehab w/ left basal ganglia hemorrhage     # non traumatic acute left BG hemorrhage with right hemibody involvement, right HP, apraxia speech, motor apraxia, incoordination, dysarthria, dysphagia  - CT Head: 1/26 5.8 x 2.6 cm intracranial hemorrhage with mass effect and 1-2mm rightward midline  shift  - CTA h/n: stable near complete occlusion of left distal M1  - plan to repeat Head CT prior to dc f/u mass effect and shift (last 1/27). Discussed with patient 2/12  - Rosuvastatin 40mg qhs  - Continue comprehensive rehab program PT OT SLP 3 hours daily 5 x week. Recreation therapy.  Adjusted intensity to 90 min SLP 30 PT 60 OT starting 2/13  - Will perform K taping of upper back muscles to assist with posture in ambulation activities  - precautions: fall, aspiration, cardiac    # atrial fibrillation  # HTN  - *Hold apixaban through 03/09 (5-6 weeks per neuro). We reviewed with family medications, risks/benefits, and need for clearance by neurology before resumption  - amlodipine 10mg daily  - Lopressor 25mg BID  - Valsartan 320mg daily  - BP:  (138/71 - 146/75) 2/14    # Hypernatremia/KEAGAN  - urine osm 749 2/4. Sosm 304 2/5  - Encourage increased PO intake  - Na 148 2/3-> 144 2/6--> 144 2/10  - BUn/Cr 34/1 --> 29/0.87 2/6--> 24/0.84 2/10 --> 25/0.92 2/13 Stable  - CMP 2/18    # ISIS  # home CPAP  - Using home CPAP (family brought in personal machine)  - Patient compliant in community per family    # transaminitis 2/13    AST  37  /  ALT  55[H]  /  AlkPhos  126[H]  02-13  - no recent med changes. Has been on crestor (home med)  - Reviewed with hospitalist. Mild elevation, continue statin for now and repeat next week    # pain  - acetaminophen 650mg q6h PRN    # bowel  - scheduled: senna 2tab qhs  - PRN: Miralax BID    # Oral Thrush  - Nystatin s/s 4xdaily x7days    # sleep  - melatonin 3mg hs PRN  - still has interrupted, reduced quality sleep however defers additional sleeping meds at this time. Denies significant daytime drowsiness    # diet   - MBS 2/12 9 AM--> upgrade to regular solid and thin liquids with single sips DASH/TLC. Reviewed with SLP and patient    # DVT ppx  - LE doppler 1/30: neg for DVT bilat. LEs  - lovenox 40 mg daily (started 1/29)    # Case discussed in IDT rounds 2/10 (follow up)  - continent, skin intact, easy to chew with mildly thick liquids; improving speech and language skills akua receptive language; follows short sentences and write short phrases; improved oral reading skills; CG for ADLs and CG functional transfers without AD; can use RUE as gross functional assist but difficulty FMD; CG/min assist bed mobility and transfers, ambulates 125 feet with CG/min assist without AD. stairs with CG/min assist and 1 HR, Participates in music therapy to improve speech production, therapeutic choir  - barriers: right UP, stairs, communication decreased balance and coordination, insight  - goals: "enhance coordination"  supervision ADLs and transfers, communicate wants and needs effectively and consistently with min assist (progressing); ambulate to bathroom with appropriate AD and toilet with supervision (progressing); supervision  - target: 2/20 home with caregiver assist and home care referral PT and OT SLP  - caregiver training  - Medical meeting with wife and daughter 2/4    # LABS  CBC CMP 2/18  repeat Head CT 2/18        Corrected contact information:  Spouse Nicolle Browne - [phone]  Daughter Gracy: [phone]  ---------------  Outpatient Follow-up:    Lynda Zimmerman  NP in Family [facility]  [address], [address]  [place] [address]  Phone: [phone]  Fax: [phone]  Follow Up Time:     Manoj Edge  Cardiology  [address], [address]  [place] [address]  Phone: [phone]  Fax: [phone]  Follow Up Time: 2 weeks           74M RHD w/ PMH HTN, afib (on apixaban, started 2017), and CVA (w/ residual min right-sided weakness), admitted to [facility] acute rehab w/ left basal ganglia hemorrhage     # non traumatic acute left BG hemorrhage with right hemibody involvement, right HP, apraxia speech, motor apraxia, incoordination, dysarthria, dysphagia  - CT Head: 1/26 5.8 x 2.6 cm intracranial hemorrhage with mass effect and 1-2mm rightward midline  shift  - CTA h/n: stable near complete occlusion of left distal M1  - plan to repeat Head CT prior to dc f/u mass effect and shift (last 1/27). Discussed with patient 2/12  - Rosuvastatin 40mg qhs  - Continue comprehensive rehab program PT OT SLP 3 hours daily 5 x week. Recreation therapy.  Adjusted intensity to 90 min SLP 30 PT 60 OT starting 2/13  - Will perform K taping of upper back muscles to assist with posture in ambulation activities  - precautions: fall, aspiration, cardiac    # atrial fibrillation  # HTN  - *Hold apixaban through 03/09 (5-6 weeks per neuro). We reviewed with family medications, risks/benefits, and need for clearance by neurology before resumption  - amlodipine 10mg daily  - Lopressor 25mg BID  - Valsartan 320mg daily  - BP:  (138/71 - 146/75) 2/14    # Hypernatremia/KEAGAN  - urine osm 749 2/4. Sosm 304 2/5  - Encourage increased PO intake  - Na 148 2/3-> 144 2/6--> 144 2/10  - BUn/Cr 34/1 --> 29/0.87 2/6--> 24/0.84 2/10 --> 25/0.92 2/13 Stable  - CMP 2/18    # ISIS  # home CPAP  - Using home CPAP (family brought in personal machine)  - Patient compliant in community per family    # transaminitis 2/13    AST  37  /  ALT  55[H]  /  AlkPhos  126[H]  02-13  - no recent med changes. Has been on crestor (home med)  - Reviewed with hospitalist. Mild elevation, continue statin for now and repeat next week    # pain  - acetaminophen 650mg q6h PRN    # bowel  - scheduled: senna 2tab qhs  - PRN: Miralax BID    # Oral Thrush  - Nystatin s/s 4xdaily x7days    # sleep  - melatonin 3mg hs PRN  - still has interrupted, reduced quality sleep however defers additional sleeping meds at this time. Denies significant daytime drowsiness    # diet   - MBS 2/12 9 AM--> upgrade to regular solid and thin liquids with single sips DASH/TLC. Reviewed with SLP and patient    # DVT ppx  - LE doppler 1/30: neg for DVT bilat. LEs  - lovenox 40 mg daily (started 1/29)    # Case discussed in IDT rounds 2/10 (follow up)  - continent, skin intact,  upgrade to regular solid and thin liquids with single sips DASH/TLC.; improving speech and language skills akua receptive language; follows short sentences and write short phrases; improved oral reading skills; CG for ADLs and CG functional transfers without AD; can use RUE as gross functional assist but difficulty FMD; CG/min assist bed mobility and transfers, ambulates 125 feet with CG/min assist without AD. stairs with CG/min assist and 1 HR, Participates in music therapy to improve speech production, therapeutic choir  - barriers: right UP, stairs, communication decreased balance and coordination, insight  - goals: "enhance coordination"  supervision ADLs and transfers, communicate wants and needs effectively and consistently with min assist (progressing); ambulate to bathroom with appropriate AD and toilet with supervision (progressing); supervision  - target: 2/20 home with caregiver assist and home care referral PT and OT SLP  - caregiver training 2/18  - Medical meeting with wife and daughter 2/4    # LABS  CBC CMP 2/18  repeat Head CT 2/18        Corrected contact information:  Spouse Nicolle Browne - [phone]  Daughter Garcy: [phone]  ---------------  Outpatient Follow-up:    Lynda Zimmerman  NP in [facility]  [address] [place] [address], [address]  [place] [address]  Phone: [phone]  Fax: [phone]  Follow Up Time:     Manoj Edge  [facility]  [address], [address]  [place] [address]  Phone: [phone]  Fax: [phone]  Follow Up Time: 2 weeks           74M RHD w/ PMH HTN, afib (on apixaban, started 2017), and CVA (w/ residual min right-sided weakness), admitted to [facility] acute rehab w/ left basal ganglia hemorrhage     # non traumatic acute left BG hemorrhage with right hemibody involvement, right HP, apraxia speech, motor apraxia, incoordination, dysarthria, dysphagia  - CT Head: 1/26 5.8 x 2.6 cm intracranial hemorrhage with mass effect and 1-2mm rightward midline  shift  - CTA h/n: stable near complete occlusion of left distal M1  - plan to repeat Head CT prior to dc f/u mass effect and shift (last 1/27). Discussed with patient 2/12  - Rosuvastatin 40mg qhs  - Continue comprehensive rehab program PT OT SLP 3 hours daily 5 x week. Recreation therapy.  Adjusted intensity to 90 min SLP 30 PT 60 OT starting 2/13  - Will perform K taping of upper back muscles to assist with posture in ambulation activities  - precautions: fall, aspiration, cardiac    # atrial fibrillation  # HTN  - *Hold apixaban through 03/09 (5-6 weeks per neuro). We reviewed with family medications, risks/benefits, and need for clearance by neurology before resumption  - amlodipine 10mg daily  - Lopressor 25mg BID  - Valsartan 320mg daily  - BP:  (138/71 - 146/75) 2/14    # Hypernatremia/KEAGAN  - urine osm 749 2/4. Sosm 304 2/5  - Encourage increased PO intake  - Na 148 2/3-> 144 2/6--> 144 2/10  - BUn/Cr 34/1 --> 29/0.87 2/6--> 24/0.84 2/10 --> 25/0.92 2/13 Stable  - CMP 2/18    # ISIS  # home CPAP  - Using home CPAP (family brought in personal machine)  - Patient compliant in community per family    # transaminitis 2/13    AST  37  /  ALT  55[H]  /  AlkPhos  126[H]  02-13  - no recent med changes. Has been on crestor (home med)  - Reviewed with hospitalist. Mild elevation, continue statin for now and repeat next week    # pain  - acetaminophen 650mg q6h PRN    # bowel  - scheduled: senna 2tab qhs  - PRN: Miralax BID    # Oral Thrush  - Nystatin s/s 4xdaily x7days    # sleep  - melatonin 3mg hs PRN  - still has interrupted, reduced quality sleep however defers additional sleeping meds at this time. Denies significant daytime drowsiness    # diet   - MBS 2/12 9 AM--> upgrade to regular solid and thin liquids with single sips DASH/TLC. Reviewed with SLP and patient    # DVT ppx  - LE doppler 1/30: neg for DVT bilat. LEs  - lovenox 40 mg daily (started 1/29)    # Case discussed in IDT rounds 2/14 (follow up)  - continent, skin intact,  upgrade to regular solid and thin liquids with single sips DASH/TLC.; improving speech and language skills akua receptive language; follows short sentences and write short phrases; improved oral reading skills; CG for ADLs and CG functional transfers without AD; can use RUE as gross functional assist but difficulty FMD; CG/min assist bed mobility and transfers, ambulates 125 feet with CG/min assist without AD. stairs with CG/min assist and 1 HR, Participates in music therapy to improve speech production, therapeutic choir  - barriers: right UP, stairs, communication decreased balance and coordination, insight  - goals: "enhance coordination"  supervision ADLs and transfers, communicate wants and needs effectively and consistently with min assist (progressing); ambulate to bathroom with appropriate AD and toilet with supervision (progressing); supervision  - target: 2/20 home with caregiver assist and home care referral PT and OT SLP  - caregiver training 2/18  - Medical meeting with wife and daughter 2/4    # LABS  CBC CMP 2/18  repeat Head CT 2/18        Corrected contact information:  Spouse Nicolle Browne - [phone]  Daughter Gracy: [phone]  ---------------  Outpatient Follow-up:    Lynda Zimmerman  NP in [facility]  [address] [place] [address], [address]  [place] [address]  Phone: [phone]  Fax: [phone]  Follow Up Time:     Manoj Edge  [facility]  [address], [address]  [place] [address]  Phone: [phone]  Fax: [phone]  Follow Up Time: 2 weeks

## 2025-02-14 NOTE — PROGRESS NOTE ADULT - ATTENDING COMMENTS
Pt. seen with resident.  Agree with documentation above as per resident with amendments made as appropriate. Patient medically stable. Making progress towards rehab goals.        transaminitis 2/13    AST  37  /  ALT  55[H]  /  AlkPhos  126[H]  02-13  - no recent med changes. Has been on crestor (home med)  - Reviewed with hospitalist. Mild elevation, continue statin for now and repeat next week    --Pt. doing well.  No complaints.

## 2025-02-14 NOTE — PROGRESS NOTE ADULT - SUBJECTIVE AND OBJECTIVE BOX
Patient is a 74y old  Male who presents with a chief complaint of left basal ganglia hemorrhage w/ right hemiparesis, transcortical sensory aphasia, and dysphasia (11 Feb 2025 11:31)      HPI:   74M RHD w/ PMH HTN, afib (on apixaban, started 2017), and CVA (w/ residual min right-sided weakness), who presented to [facility] 01/26/2025 after being found on floor by wife w/ right-sided weakness and word-finding difficulties. Wife woke up in 0545 1/26 finding the patient on the floor and complaining of right sided weakness and word finding difficulties. LKN 1/25 2230 when the patient went to bed. He took his apixaban and medications for HLD and HTN prior to going to bed. Upon finding the patient on the floor, the patient was transferred to the [place] ED. NIHSS 10 (+1 face, +3 RUE, +3 RLE, +1 Aphasia, +2, dysarthria). mRS 0    CT head demonstrated 5.8 x 2.6 cm left basal ganglia hemorrhage with 1-2mm rightward midline shift. 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 Received andexanet and levetiracetam 1g x1. He was transferred to [facility] 01/26 for further eval. Neurology recommended holding apixab through 03/09 per neuro (hold apixaban for 5-6 weeks, then will make further decisions regarding restarting and/or a Watchman as outpatient given Afib).    Evaluated by PM&R and discharged to [facility] acute rehab 01/29.  (30 Jan 2025 16:58)      Subjective/ROS: Patient seen and evaluated at bedside.      PAST MEDICAL & SURGICAL HISTORY:  Stroke      Chronic atrial fibrillation      HTN (hypertension)      HLD (hyperlipidemia)      No significant past surgical history          MEDICATIONS  (STANDING):  amLODIPine   Tablet 10 milliGRAM(s) Oral daily  enoxaparin Injectable 40 milliGRAM(s) SubCutaneous every 24 hours  metoprolol tartrate 25 milliGRAM(s) Oral every 12 hours  pantoprazole    Tablet 40 milliGRAM(s) Oral before breakfast  rosuvastatin 40 milliGRAM(s) Oral at bedtime  senna 2 Tablet(s) Oral at bedtime  valsartan 320 milliGRAM(s) Oral daily    MEDICATIONS  (PRN):  acetaminophen     Tablet .. 650 milliGRAM(s) Oral every 6 hours PRN Moderate Pain (4 - 6)  melatonin 6 milliGRAM(s) Oral at bedtime PRN Insomnia  polyethylene glycol 3350 17 Gram(s) Oral daily PRN Constipation        Allergies    No Known Allergies    Intolerances          VITALS  Vital Signs Last 24 Hrs  T(C): 36.3 (14 Feb 2025 07:15), Max: 36.4 (13 Feb 2025 19:33)  T(F): 97.4 (14 Feb 2025 07:15), Max: 97.6 (13 Feb 2025 19:33)  HR: 65 (14 Feb 2025 07:15) (61 - 68)  BP: 143/87 (14 Feb 2025 07:15) (138/71 - 146/75)  BP(mean): --  RR: 15 (14 Feb 2025 07:15) (15 - 15)  SpO2: 97% (14 Feb 2025 07:15) (95% - 97%)    Parameters below as of 14 Feb 2025 07:15  Patient On (Oxygen Delivery Method): room air            Daily     Daily         RECENT LABS:                            13.2   6.63  )-----------( 174      ( 13 Feb 2025 05:45 )             39.9     02-13    145  |  109[H]  |  25[H]  ----------------------------<  78  3.8   |  28  |  0.92    Ca    8.6      13 Feb 2025 05:45    TPro  6.3  /  Alb  2.6[L]  /  TBili  0.5  /  DBili  x   /  AST  37  /  ALT  55[H]  /  AlkPhos  126[H]  02-13      Urinalysis Basic - ( 13 Feb 2025 05:45 )    Color: x / Appearance: x / SG: x / pH: x  Gluc: 78 mg/dL / Ketone: x  / Bili: x / Urobili: x   Blood: x / Protein: x / Nitrite: x   Leuk Esterase: x / RBC: x / WBC x   Sq Epi: x / Non Sq Epi: x / Bacteria: x      CAPILLARY BLOOD GLUCOSE                                             Patient is a 74y old  Male who presents with a chief complaint of left basal ganglia hemorrhage w/ right hemiparesis, transcortical sensory aphasia, and dysphasia (11 Feb 2025 11:31)      HPI:   74M RHD w/ PMH HTN, afib (on apixaban, started 2017), and CVA (w/ residual min right-sided weakness), who presented to [facility] 01/26/2025 after being found on floor by wife w/ right-sided weakness and word-finding difficulties. Wife woke up in 0545 1/26 finding the patient on the floor and complaining of right sided weakness and word finding difficulties. LKN 1/25 2230 when the patient went to bed. He took his apixaban and medications for HLD and HTN prior to going to bed. Upon finding the patient on the floor, the patient was transferred to the [place] ED. NIHSS 10 (+1 face, +3 RUE, +3 RLE, +1 Aphasia, +2, dysarthria). mRS 0    CT head demonstrated 5.8 x 2.6 cm left basal ganglia hemorrhage with 1-2mm rightward midline shift. 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 Received andexanet and levetiracetam 1g x1. He was transferred to [facility] 01/26 for further eval. Neurology recommended holding apixab through 03/09 per neuro (hold apixaban for 5-6 weeks, then will make further decisions regarding restarting and/or a Watchman as outpatient given Afib).    Evaluated by PM&R and discharged to [facility] acute rehab 01/29.  (30 Jan 2025 16:58)      Subjective/ROS: Patient seen and evaluated at bedside. Reports sleeping well. Denies headache, visual changes, new weakness/sensory changes, or other concerns at this time. No acute concerns at this time.      PAST MEDICAL & SURGICAL HISTORY:  Stroke      Chronic atrial fibrillation      HTN (hypertension)      HLD (hyperlipidemia)      No significant past surgical history          MEDICATIONS  (STANDING):  amLODIPine   Tablet 10 milliGRAM(s) Oral daily  enoxaparin Injectable 40 milliGRAM(s) SubCutaneous every 24 hours  metoprolol tartrate 25 milliGRAM(s) Oral every 12 hours  pantoprazole    Tablet 40 milliGRAM(s) Oral before breakfast  rosuvastatin 40 milliGRAM(s) Oral at bedtime  senna 2 Tablet(s) Oral at bedtime  valsartan 320 milliGRAM(s) Oral daily    MEDICATIONS  (PRN):  acetaminophen     Tablet .. 650 milliGRAM(s) Oral every 6 hours PRN Moderate Pain (4 - 6)  melatonin 6 milliGRAM(s) Oral at bedtime PRN Insomnia  polyethylene glycol 3350 17 Gram(s) Oral daily PRN Constipation        Allergies    No Known Allergies    Intolerances          VITALS  Vital Signs Last 24 Hrs  T(C): 36.3 (14 Feb 2025 07:15), Max: 36.4 (13 Feb 2025 19:33)  T(F): 97.4 (14 Feb 2025 07:15), Max: 97.6 (13 Feb 2025 19:33)  HR: 65 (14 Feb 2025 07:15) (61 - 68)  BP: 143/87 (14 Feb 2025 07:15) (138/71 - 146/75)  BP(mean): --  RR: 15 (14 Feb 2025 07:15) (15 - 15)  SpO2: 97% (14 Feb 2025 07:15) (95% - 97%)    Parameters below as of 14 Feb 2025 07:15  Patient On (Oxygen Delivery Method): room air            Daily     Daily         RECENT LABS:                            13.2   6.63  )-----------( 174      ( 13 Feb 2025 05:45 )             39.9     02-13    145  |  109[H]  |  25[H]  ----------------------------<  78  3.8   |  28  |  0.92    Ca    8.6      13 Feb 2025 05:45    TPro  6.3  /  Alb  2.6[L]  /  TBili  0.5  /  DBili  x   /  AST  37  /  ALT  55[H]  /  AlkPhos  126[H]  02-13      Urinalysis Basic - ( 13 Feb 2025 05:45 )    Color: x / Appearance: x / SG: x / pH: x  Gluc: 78 mg/dL / Ketone: x  / Bili: x / Urobili: x   Blood: x / Protein: x / Nitrite: x   Leuk Esterase: x / RBC: x / WBC x   Sq Epi: x / Non Sq Epi: x / Bacteria: x      CAPILLARY BLOOD GLUCOSE

## 2025-02-15 PROCEDURE — 99232 SBSQ HOSP IP/OBS MODERATE 35: CPT

## 2025-02-15 RX ADMIN — METOPROLOL SUCCINATE 25 MILLIGRAM(S): 50 TABLET, EXTENDED RELEASE ORAL at 17:33

## 2025-02-15 RX ADMIN — Medication 320 MILLIGRAM(S): at 06:07

## 2025-02-15 RX ADMIN — METOPROLOL SUCCINATE 25 MILLIGRAM(S): 50 TABLET, EXTENDED RELEASE ORAL at 06:07

## 2025-02-15 RX ADMIN — ENOXAPARIN SODIUM 40 MILLIGRAM(S): 100 INJECTION SUBCUTANEOUS at 17:33

## 2025-02-15 RX ADMIN — Medication 40 MILLIGRAM(S): at 06:07

## 2025-02-15 RX ADMIN — ROSUVASTATIN CALCIUM 40 MILLIGRAM(S): 20 TABLET, FILM COATED ORAL at 20:47

## 2025-02-15 RX ADMIN — AMLODIPINE BESYLATE 10 MILLIGRAM(S): 10 TABLET ORAL at 06:07

## 2025-02-15 NOTE — PROGRESS NOTE ADULT - ASSESSMENT
74M with HTN, AF, hx CVA, now in acute rehab after ICH    #Non traumatic ICH  #Aphasia due to above  -PT/OT/SLP  -aggressive BP control  -Eliquis stopped    #History of CVA  -c/w statin  -Eliquis on hold    #PAF  -c/w Lopressor  -Hold Eliquis due to recent ICH  -outpatient neurology clearance to see when to resume    #ISIS  -c/w CPAP    #Transaminitis  -routine repeat next week  -statin could play a role, but no need to stop it at this time  -if the LFTs start to rise further in the future, will check RUQ US    #Essential HTN  -c/w Valsartan  -c/w Lopressor  -c/w Norvasc  -BP currently acceptable    #DVT ppx: Lovenox

## 2025-02-15 NOTE — PROGRESS NOTE ADULT - SUBJECTIVE AND OBJECTIVE BOX
no acute events overnight      PHYSICAL EXAM:    Vital Signs Last 24 Hrs  T(F): 97.5 (15 Feb 2025 07:28), Max: 97.5 (14 Feb 2025 19:24)  HR: 60 (15 Feb 2025 07:28) (60 - 64)  BP: 134/80 (15 Feb 2025 07:28) (116/74 - 148/79)  RR: 16 (15 Feb 2025 07:28) (15 - 16)  SpO2: 96% (15 Feb 2025 07:28) (95% - 97%)  I&O's Summary      GENERAL: NAD  HEENT: NCAT  CHEST/LUNG: No increased WOB, Clear to percussion bilaterally; No rales, rhonchi, wheezing  HEART: Regular rate and rhythm; No murmurs  ABDOMEN: Soft, Nontender, Nondistended; Bowel sounds present  MUSCULOSKELETAL/EXTREMITIES:  2+ Peripheral Pulses, No LE edema  PSYCH: Appropriate affect, Alert & Oriented    LABS:                        13.2   6.63  )-----------( 174      ( 13 Feb 2025 05:45 )             39.9       02-13    145  |  109  |  25  ----------------------------<  78  3.8   |  28  |  0.92    Ca    8.6      13 Feb 2025 05:45    TPro  6.3  /  Alb  2.6  /  TBili  0.5  /  DBili  x   /  AST  37  /  ALT  55  /  AlkPhos  126  02-13 01-27 Chol 142 mg/dL LDL -- HDL 51 mg/dL Trig 124 mg/dL                  Urinalysis Basic - ( 13 Feb 2025 05:45 )    Color: x / Appearance: x / SG: x / pH: x  Gluc: 78 mg/dL / Ketone: x  / Bili: x / Urobili: x   Blood: x / Protein: x / Nitrite: x   Leuk Esterase: x / RBC: x / WBC x   Sq Epi: x / Non Sq Epi: x / Bacteria: x        COVID-19 PCR: NotDetec (01-30-25 @ 15:00)      MEDICATIONS  (STANDING):  amLODIPine   Tablet 10 milliGRAM(s) Oral daily  enoxaparin Injectable 40 milliGRAM(s) SubCutaneous every 24 hours  metoprolol tartrate 25 milliGRAM(s) Oral every 12 hours  pantoprazole    Tablet 40 milliGRAM(s) Oral before breakfast  rosuvastatin 40 milliGRAM(s) Oral at bedtime  senna 2 Tablet(s) Oral at bedtime  valsartan 320 milliGRAM(s) Oral daily    MEDICATIONS  (PRN):  acetaminophen     Tablet .. 650 milliGRAM(s) Oral every 6 hours PRN Moderate Pain (4 - 6)  melatonin 6 milliGRAM(s) Oral at bedtime PRN Insomnia  polyethylene glycol 3350 17 Gram(s) Oral daily PRN Constipation      Care Discussed with Consultants/Other Providers: Yes

## 2025-02-15 NOTE — PROGRESS NOTE ADULT - PROVIDER SPECIALTY LIST ADULT
Subjective:      Established patient    Patient ID: Floyd Luis is a 38 y.o. female.  Patient Active Problem List   Diagnosis    Asthma with COPD with exacerbation    SABINA on CPAP    Class 2 severe obesity due to excess calories with serious comorbidity and body mass index (BMI) of 35.0 to 35.9 in adult       Problem list has been reviewed.    Chief Complaint: Shortness of Breath (REV CXR); Cough; Asthma; COPD; and Sleep Apnea    Group Spirometric Classification Exacerbations / year mMRC CAT   Group B: Low risk; more symptoms  GOLD 1- 2  < = 1 >= 2 >=10     FEV1: 1.48 (54.9 % predicted       HPI      CXR reviewed with patient who voiced understanding.   She reports cough, wheezing ongoing for he past 4 weeks. She has been treated by PCP with oral ANTIBIOTICS => AZITHROMYCIN.   Was evaluated by ENT. CT sinuses reveled ACUTE SINUSITIS. ENT prescribed XYZAL       Her current respiratory therapy regimen is QVAR, VENTOLIN,SINGULAIR  which provides  relief. She is not adherent with her regimen.       Asthma is not well controlled.     Asthma Control Test  In the past 4  weeks, how much of the time did your asthma keep you from getting as much done at work, school or at home?: Most of the time  During the past 4 weeks, how often have you had shortness of breath?: Once or twice a week  During the past 4 weeks, how often did your asthma symptoms (wheezing, couging, shortness of breath, chest tightness or pain) wake you up at night or earlier than usual in the morning?: 2 or 3 nights a week  During the past 4 weeks, how often have you used your rescue inhaler or nebulizer medication (such as albuterol)?: 1 or 2 times per day  How would you rate your asthma control during the past 4 weeks?: Somewhat controlled  If your score is 19 or less, your asthma may not be under control: 13     COPD Questionnaire  How often do you cough?: All of the time  How often do you have phlegm (mucus) in your chest?: All of the  Rehab Medicine time  How often does your chest feel tight?: Almost never  When you walk up a hill or one flight of stairs, how often are you breathless?: All of the time  How often are you limited doing any activities at home?: A little of the time  How often are you confident leaving the house despite your lung condition?: Some of the time  How often do you sleep soundly?: Almost never  How often do you have energy?: A little of the time  Total score: 27     Current Disease Severity  frequent daytime asthma symptoms.   frequent nighttime asthma symptoms.   Frequency B-agonist use:several times per day.   Number of urgent/emergent visit in last year: 3   Current limitations in activity from asthma: none.   Number of days of school or work missed in the last month: not applicable.     Asthma Classification (General Symptom Frequency):  Mild Intermittent (< 2 x wk)  Mild Persistent (> 2 x wk, < daily)  Moderate Persistent (daily; almost daily inhaler)  Severe Persistent (continual; limited activities)      She reports that she just cant deal with the CPAP. She is currently not complaint with CPAP.        [place] Sleepiness Scale   EPWORTH SLEEPINESS SCALE 12/24/2019 9/10/2019 1/30/2019 8/16/2018 5/29/2018   Sitting and reading 0 1 0 3 0   Watching TV 0 1 0 2 1   Sitting, inactive in a public place (e.g. a theatre or a meeting) 0 0 0 3 1   As a passenger in a car for an hour without a break 0 3 0 0 1   Lying down to rest in the afternoon when circumstances permit 0 0 3 3 2   Sitting and talking to someone 0 0 0 0 0   Sitting quietly after a lunch without alcohol 0 1 1 2 2   In a car, while stopped for a few minutes in traffic 0 0 0 0 0   Total score 0 6 4 13 7     Immunization status reviewed and up to date.      A full  review of systems, past , family  and social histories was performed except as mentioned in the note above, these are non contributory to the main issues discussed today.       Previous Report Reviewed: lab reports,  office notes and radiology reports     The following portions of the patient's history were reviewed and updated as appropriate: She  has a past medical history of Asthma, Hypertension, and Thyroid disease.  She  has a past surgical history that includes Hysterectomy (2010); Hernia repair (2006); Bowel resection (Bilateral, 2002); Gallbladder surgery (2015); and Nasal endoscopy w/ ballon sinuplasty (Bilateral, 03/2019).  Her family history includes Hypertension in her brother and father.  She  reports that she has never smoked. She has never used smokeless tobacco. She reports that she does not drink alcohol or use drugs.  She has a current medication list which includes the following prescription(s): albuterol, amitiza, amlodipine, benazepril-hydrochlorthiazide, buspirone, fluticasone propionate, montelukast, nystatin, nystatin, paroxetine, potassium chloride, triamcinolone acetonide 0.1%, venlafaxine, vitamin d2, zolpidem, afluria qd 2019-20(3yr up)(pf), azelastine, beclomethasone, benzonatate, clindamycin, clotrimazole-betamethasone 1-0.05%, fluticasone-salmeterol 250-50 mcg/dose, furosemide, gabapentin, hydroxyzine pamoate, levocetirizine, levofloxacin, loratadine, losartan-hydrochlorothiazide 50-12.5 mg, nyamyc, polyethylene glycol, and prednisone, and the following Facility-Administered Medications: triamcinolone acetonide.  She is allergic to codeine; penicillins; sulfa (sulfonamide antibiotics); and sulfamethoxazole-trimethoprim..    Review of Systems   Constitutional: Negative for fever, fatigue and night sweats.   HENT: Positive for sinus pressure and sore throat. Negative for congestion and ear pain.    Eyes: Negative for itching.   Respiratory: Positive for cough, shortness of breath, wheezing and dyspnea on extertion. Negative for chest tightness.    Cardiovascular: Negative for chest pain and leg swelling.   Genitourinary: Negative for difficulty urinating.   Endocrine: Negative for cold intolerance  "and heat intolerance.    Musculoskeletal: Negative for arthralgias and back pain.   Skin: Negative for rash.   Gastrointestinal: Positive for nausea. Negative for vomiting and acid reflux.   Neurological: Positive for headaches. Negative for dizziness.   Hematological: Excessive bruising.   Psychiatric/Behavioral: The patient is nervous/anxious.    All other systems reviewed and are negative.       Objective:     /70   Pulse 100   Resp 18   Ht 5' 5" (1.651 m)   Wt 94 kg (207 lb 5.5 oz)   SpO2 97%   BMI 34.50 kg/m²   Body mass index is 34.5 kg/m².     Physical Exam   Constitutional: She is oriented to person, place, and time. She appears well-developed and well-nourished.   HENT:   Head: Normocephalic and atraumatic.   Eyes: Pupils are equal, round, and reactive to light. EOM are normal.   Neck: Normal range of motion. Neck supple.   Cardiovascular: Normal rate and regular rhythm.   Pulmonary/Chest: Effort normal and breath sounds normal.   Abdominal: Soft. Bowel sounds are normal.   Musculoskeletal: Normal range of motion.   Neurological: She is alert and oriented to person, place, and time.   Skin: Skin is warm and dry. Capillary refill takes less than 2 seconds.   Psychiatric: She has a normal mood and affect. Her behavior is normal.   Nursing note and vitals reviewed.      Personal Diagnostic Review  CXR: 12/24/19: No focal consolidation or effusion.  Atelectasis or scarring at the right lung base.  No focal consolidation.  Cardiomediastinal silhouette and osseous structures are unchanged    Assessment / Plan:       Discussed diagnosis, its evaluation, treatment and usual course. All questions answered.    Problem List Items Addressed This Visit        Pulmonary    Asthma with COPD with exacerbation - Primary    Current Assessment & Plan     Asthma with COPD ROS:  notes increased wheezing and dyspnea.  New concerns: Progressive dyspnea and increasing wheezing.    Exam:  wheezing noted diffusely. "   Assessment: Asthma with COPD loss of control due to intercurrent illness.   Plan:   1. KENALOG 80MG IM X 1  2. LEVAQUIN 750 MG PO DAILY X 7 DAYS  3. PREDNISONE TAPER PER ORDERS  4. CONTINUE QVAR,VENTOLIN,SINGULAIR  AS PRESCRIBED.    Re evaluate clinically in 1 month(s).         Relevant Medications    benzonatate (TESSALON) 100 MG capsule    levoFLOXacin (LEVAQUIN) 750 MG tablet    triamcinolone acetonide injection 80 mg (Start on 12/24/2019 12:30 PM)    predniSONE (DELTASONE) 10 MG tablet       Other    SABINA on CPAP    Current Assessment & Plan     Non complaince with CPAP of 8. (DME - [facility])     Complications of untreated sleep apnea discussed with pateint in detail including but not limited to  high blood pressure, heart attack, stroke, obesity,  diabetes and worsening heart failure. Patient voiced understanding.    Complaince encouraged.              Class 2 severe obesity due to excess calories with serious comorbidity and body mass index (BMI) of 35.0 to 35.9 in adult    Current Assessment & Plan     General weight loss/lifestyle modification strategies discussed (elicit support from others; identify saboteurs; non-food rewards, etc).  Diet interventions: low calorie (1000 kCal/d) deficit diet.                  TIME SPENT WITH PATIENT: Time spent: 40 minutes in face to face  discussion concerning diagnosis, prognosis, review of lab and test results, benefits of treatment as well as management of disease, counseling of patient and coordination of care between various health  care providers . Greater than half the time spent was used for coordination of care and counseling of patient.       Follow up in about 1 month (around 1/24/2020) for Asthma with COPD, SABINA, Obesity.

## 2025-02-15 NOTE — PROGRESS NOTE ADULT - SUBJECTIVE AND OBJECTIVE BOX
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   Pain controlled, no chest pain, no N/V, no Fevers/Chills. No other new ROS  Has been tolerating rehabilitation program.    acetaminophen     Tablet .. 650 milliGRAM(s) Oral every 6 hours PRN  amLODIPine   Tablet 10 milliGRAM(s) Oral daily  enoxaparin Injectable 40 milliGRAM(s) SubCutaneous every 24 hours  melatonin 6 milliGRAM(s) Oral at bedtime PRN  metoprolol tartrate 25 milliGRAM(s) Oral every 12 hours  pantoprazole    Tablet 40 milliGRAM(s) Oral before breakfast  polyethylene glycol 3350 17 Gram(s) Oral daily PRN  rosuvastatin 40 milliGRAM(s) Oral at bedtime  senna 2 Tablet(s) Oral at bedtime  valsartan 320 milliGRAM(s) Oral daily      T(C): 36.4 (02-15-25 @ 07:28), Max: 36.4 (02-14-25 @ 19:24)  HR: 60 (02-15-25 @ 07:28) (60 - 64)  BP: 134/80 (02-15-25 @ 07:28) (116/74 - 148/79)  RR: 16 (02-15-25 @ 07:28) (15 - 16)  SpO2: 96% (02-15-25 @ 07:28) (95% - 97%)    In NAD  HEENT- EOMI  Heart- RRR, S1S2  Lungs- no respiratory distress  Abd- + BS, NT  Ext- No calf pain  Neuro- Exam unchanged  R hemiparesis (strength 3-4/5). dysarthria, aphasia  able to state month and year with prompting        Imp: Patient with diagnosis of basal ganglia hemorrhage admitted for comprehensive acute rehabilitation.    Plan:  - Continue therapies  - DVT prophylaxis- lovenox  - Skin- Turn q2h, check skin daily  - Continue current medications; patient medically stable.   - Patient is stable to continue current rehabilitation program.

## 2025-02-16 PROCEDURE — 99232 SBSQ HOSP IP/OBS MODERATE 35: CPT

## 2025-02-16 PROCEDURE — 99231 SBSQ HOSP IP/OBS SF/LOW 25: CPT

## 2025-02-16 RX ADMIN — Medication 320 MILLIGRAM(S): at 06:16

## 2025-02-16 RX ADMIN — ROSUVASTATIN CALCIUM 40 MILLIGRAM(S): 20 TABLET, FILM COATED ORAL at 21:04

## 2025-02-16 RX ADMIN — METOPROLOL SUCCINATE 25 MILLIGRAM(S): 50 TABLET, EXTENDED RELEASE ORAL at 06:15

## 2025-02-16 RX ADMIN — AMLODIPINE BESYLATE 10 MILLIGRAM(S): 10 TABLET ORAL at 06:16

## 2025-02-16 RX ADMIN — METOPROLOL SUCCINATE 25 MILLIGRAM(S): 50 TABLET, EXTENDED RELEASE ORAL at 18:13

## 2025-02-16 RX ADMIN — Medication 40 MILLIGRAM(S): at 06:15

## 2025-02-16 RX ADMIN — ENOXAPARIN SODIUM 40 MILLIGRAM(S): 100 INJECTION SUBCUTANEOUS at 18:13

## 2025-02-16 NOTE — PROGRESS NOTE ADULT - SUBJECTIVE AND OBJECTIVE BOX
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   Pain controlled, no chest pain, no N/V, no Fevers/Chills. No other new ROS  Has been tolerating rehabilitation program.    acetaminophen     Tablet .. 650 milliGRAM(s) Oral every 6 hours PRN  amLODIPine   Tablet 10 milliGRAM(s) Oral daily  enoxaparin Injectable 40 milliGRAM(s) SubCutaneous every 24 hours  melatonin 6 milliGRAM(s) Oral at bedtime PRN  metoprolol tartrate 25 milliGRAM(s) Oral every 12 hours  pantoprazole    Tablet 40 milliGRAM(s) Oral before breakfast  polyethylene glycol 3350 17 Gram(s) Oral daily PRN  rosuvastatin 40 milliGRAM(s) Oral at bedtime  senna 2 Tablet(s) Oral at bedtime  valsartan 320 milliGRAM(s) Oral daily      T(C): 36.7 (02-16-25 @ 07:29), Max: 36.7 (02-16-25 @ 07:29)  HR: 75 (02-16-25 @ 07:29) (62 - 76)  BP: 139/75 (02-16-25 @ 07:29) (117/81 - 139/75)  RR: 15 (02-16-25 @ 07:29) (14 - 15)  SpO2: 94% (02-16-25 @ 07:29) (94% - 98%)      In NAD  HEENT- EOMI  Heart- RRR, S1S2  Lungs- no respiratory distress  Abd- + BS, NT  Ext- No calf pain  Neuro- Exam unchanged  R hemiparesis (strength 3-4/5). dysarthria, aphasia           Imp: Patient with diagnosis of basal ganglia hemorrhage admitted for comprehensive acute rehabilitation.    Plan:  - Continue therapies  - DVT prophylaxis- lovenox  - Skin- Turn q2h, check skin daily  - Continue current medications; patient medically stable.   - Patient is stable to continue current rehabilitation program.

## 2025-02-16 NOTE — PROGRESS NOTE ADULT - SUBJECTIVE AND OBJECTIVE BOX
no acute events overnight  doing well overall      PHYSICAL EXAM:    Vital Signs Last 24 Hrs  T(F): 98 (16 Feb 2025 07:29), Max: 98 (16 Feb 2025 07:29)  HR: 75 (16 Feb 2025 07:29) (62 - 76)  BP: 139/75 (16 Feb 2025 07:29) (117/81 - 139/75)  RR: 15 (16 Feb 2025 07:29) (14 - 15)  SpO2: 94% (16 Feb 2025 07:29) (94% - 98%)  I&O's Summary      GENERAL: NAD  HEENT: +facial drooping  CHEST/LUNG: No increased WOB, Clear to percussion bilaterally; No rales, rhonchi, wheezing  HEART: Regular rate and rhythm; No murmurs  ABDOMEN: Soft, Nontender, Nondistended; Bowel sounds present  MUSCULOSKELETAL/EXTREMITIES:  2+ Peripheral Pulses, No LE edema  PSYCH: Appropriate affect, Alert & Oriented    LABS:                        01-27 Chol 142 mg/dL LDL -- HDL 51 mg/dL Trig 124 mg/dL                      COVID-19 PCR: NotDetec (01-30-25 @ 15:00)      MEDICATIONS  (STANDING):  amLODIPine   Tablet 10 milliGRAM(s) Oral daily  enoxaparin Injectable 40 milliGRAM(s) SubCutaneous every 24 hours  metoprolol tartrate 25 milliGRAM(s) Oral every 12 hours  pantoprazole    Tablet 40 milliGRAM(s) Oral before breakfast  rosuvastatin 40 milliGRAM(s) Oral at bedtime  senna 2 Tablet(s) Oral at bedtime  valsartan 320 milliGRAM(s) Oral daily    MEDICATIONS  (PRN):  acetaminophen     Tablet .. 650 milliGRAM(s) Oral every 6 hours PRN Moderate Pain (4 - 6)  melatonin 6 milliGRAM(s) Oral at bedtime PRN Insomnia  polyethylene glycol 3350 17 Gram(s) Oral daily PRN Constipation      Care Discussed with Consultants/Other Providers: Yes

## 2025-02-17 PROCEDURE — 99231 SBSQ HOSP IP/OBS SF/LOW 25: CPT

## 2025-02-17 PROCEDURE — 99232 SBSQ HOSP IP/OBS MODERATE 35: CPT

## 2025-02-17 RX ADMIN — Medication 320 MILLIGRAM(S): at 05:51

## 2025-02-17 RX ADMIN — METOPROLOL SUCCINATE 25 MILLIGRAM(S): 50 TABLET, EXTENDED RELEASE ORAL at 05:51

## 2025-02-17 RX ADMIN — ROSUVASTATIN CALCIUM 40 MILLIGRAM(S): 20 TABLET, FILM COATED ORAL at 21:09

## 2025-02-17 RX ADMIN — AMLODIPINE BESYLATE 10 MILLIGRAM(S): 10 TABLET ORAL at 05:51

## 2025-02-17 RX ADMIN — ENOXAPARIN SODIUM 40 MILLIGRAM(S): 100 INJECTION SUBCUTANEOUS at 17:36

## 2025-02-17 RX ADMIN — METOPROLOL SUCCINATE 25 MILLIGRAM(S): 50 TABLET, EXTENDED RELEASE ORAL at 17:36

## 2025-02-17 RX ADMIN — Medication 40 MILLIGRAM(S): at 05:51

## 2025-02-17 NOTE — PROGRESS NOTE ADULT - SUBJECTIVE AND OBJECTIVE BOX
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  Pain controlled, no chest pain, no N/V, no Fevers/Chills. No other new ROS  Has been tolerating rehabilitation program.    acetaminophen     Tablet .. 650 milliGRAM(s) Oral every 6 hours PRN  amLODIPine   Tablet 10 milliGRAM(s) Oral daily  enoxaparin Injectable 40 milliGRAM(s) SubCutaneous every 24 hours  melatonin 6 milliGRAM(s) Oral at bedtime PRN  metoprolol tartrate 25 milliGRAM(s) Oral every 12 hours  pantoprazole    Tablet 40 milliGRAM(s) Oral before breakfast  polyethylene glycol 3350 17 Gram(s) Oral daily PRN  rosuvastatin 40 milliGRAM(s) Oral at bedtime  senna 2 Tablet(s) Oral at bedtime  valsartan 320 milliGRAM(s) Oral daily      T(C): 36.4 (02-17-25 @ 07:12), Max: 36.4 (02-16-25 @ 19:59)  HR: 60 (02-17-25 @ 07:12) (60 - 66)  BP: 113/74 (02-17-25 @ 07:12) (113/74 - 133/78)  RR: 15 (02-17-25 @ 07:12) (14 - 16)  SpO2: 94% (02-17-25 @ 07:12) (94% - 96%)       In NAD  HEENT- EOMI  Heart- RRR, S1S2  Lungs- no respiratory distress  Abd- + BS, NT  Ext- No calf pain  Neuro- Exam unchanged  R hemiparesis. dysarthria, aphasia  able to answer orientation questions, oriented x 3        Imp: Patient with diagnosis of basal ganglia hemorrhage admitted for comprehensive acute rehabilitation.    Plan:  - Continue therapies  - DVT prophylaxis- lovenox  - Skin- Turn q2h, check skin daily  - Continue current medications; patient medically stable.   - Patient is stable to continue current rehabilitation program.

## 2025-02-17 NOTE — PROGRESS NOTE ADULT - SUBJECTIVE AND OBJECTIVE BOX
no acute events overnight      PHYSICAL EXAM:    Vital Signs Last 24 Hrs  T(F): 97.6 (17 Feb 2025 07:12), Max: 97.6 (16 Feb 2025 19:59)  HR: 60 (17 Feb 2025 07:12) (60 - 66)  BP: 113/74 (17 Feb 2025 07:12) (113/74 - 133/78)  RR: 15 (17 Feb 2025 07:12) (14 - 16)  SpO2: 94% (17 Feb 2025 07:12) (94% - 96%)  I&O's Summary      GENERAL: NAD  HEENT: NCAT  CHEST/LUNG: No increased WOB, Clear to percussion bilaterally; No rales, rhonchi, wheezing  HEART: Regular rate and rhythm; No murmurs  ABDOMEN: Soft, Nontender, Nondistended; Bowel sounds present  MUSCULOSKELETAL/EXTREMITIES:  2+ Peripheral Pulses, No LE edema  PSYCH: Appropriate affect, Alert & Oriented    LABS:                        01-27 Chol 142 mg/dL LDL -- HDL 51 mg/dL Trig 124 mg/dL                      COVID-19 PCR: NotDetec (01-30-25 @ 15:00)      MEDICATIONS  (STANDING):  amLODIPine   Tablet 10 milliGRAM(s) Oral daily  enoxaparin Injectable 40 milliGRAM(s) SubCutaneous every 24 hours  metoprolol tartrate 25 milliGRAM(s) Oral every 12 hours  pantoprazole    Tablet 40 milliGRAM(s) Oral before breakfast  rosuvastatin 40 milliGRAM(s) Oral at bedtime  senna 2 Tablet(s) Oral at bedtime  valsartan 320 milliGRAM(s) Oral daily    MEDICATIONS  (PRN):  acetaminophen     Tablet .. 650 milliGRAM(s) Oral every 6 hours PRN Moderate Pain (4 - 6)  melatonin 6 milliGRAM(s) Oral at bedtime PRN Insomnia  polyethylene glycol 3350 17 Gram(s) Oral daily PRN Constipation      Care Discussed with Consultants/Other Providers: Yes

## 2025-02-18 DIAGNOSIS — I69.390: ICD-10-CM

## 2025-02-18 DIAGNOSIS — R48.2 APRAXIA: ICD-10-CM

## 2025-02-18 DIAGNOSIS — R27.8 OTHER LACK OF COORDINATION: ICD-10-CM

## 2025-02-18 DIAGNOSIS — I69.320 APHASIA FOLLOWING CEREBRAL INFARCTION: ICD-10-CM

## 2025-02-18 DIAGNOSIS — R26.89 OTHER ABNORMALITIES OF GAIT AND MOBILITY: ICD-10-CM

## 2025-02-18 PROBLEM — G81.91 RIGHT HEMIPARESIS: Status: ACTIVE | Noted: 2025-02-18

## 2025-02-18 LAB
ALBUMIN SERPL ELPH-MCNC: 2.7 G/DL — LOW (ref 3.3–5)
ALP SERPL-CCNC: 113 U/L — SIGNIFICANT CHANGE UP (ref 40–120)
ALT FLD-CCNC: 48 U/L — HIGH (ref 10–45)
ANION GAP SERPL CALC-SCNC: 11 MMOL/L — SIGNIFICANT CHANGE UP (ref 5–17)
AST SERPL-CCNC: 33 U/L — SIGNIFICANT CHANGE UP (ref 10–40)
BASOPHILS # BLD AUTO: 0.05 K/UL — SIGNIFICANT CHANGE UP (ref 0–0.2)
BASOPHILS NFR BLD AUTO: 0.8 % — SIGNIFICANT CHANGE UP (ref 0–2)
BILIRUB SERPL-MCNC: 0.6 MG/DL — SIGNIFICANT CHANGE UP (ref 0.2–1.2)
BUN SERPL-MCNC: 23 MG/DL — SIGNIFICANT CHANGE UP (ref 7–23)
CALCIUM SERPL-MCNC: 8.7 MG/DL — SIGNIFICANT CHANGE UP (ref 8.4–10.5)
CHLORIDE SERPL-SCNC: 108 MMOL/L — SIGNIFICANT CHANGE UP (ref 96–108)
CO2 SERPL-SCNC: 25 MMOL/L — SIGNIFICANT CHANGE UP (ref 22–31)
CREAT SERPL-MCNC: 0.9 MG/DL — SIGNIFICANT CHANGE UP (ref 0.5–1.3)
EGFR: 88 ML/MIN/1.73M2 — SIGNIFICANT CHANGE UP
EOSINOPHIL # BLD AUTO: 0.27 K/UL — SIGNIFICANT CHANGE UP (ref 0–0.5)
EOSINOPHIL NFR BLD AUTO: 4.4 % — SIGNIFICANT CHANGE UP (ref 0–6)
GLUCOSE SERPL-MCNC: 72 MG/DL — SIGNIFICANT CHANGE UP (ref 70–99)
HCT VFR BLD CALC: 41.3 % — SIGNIFICANT CHANGE UP (ref 39–50)
HGB BLD-MCNC: 14.1 G/DL — SIGNIFICANT CHANGE UP (ref 13–17)
IMM GRANULOCYTES NFR BLD AUTO: 0.2 % — SIGNIFICANT CHANGE UP (ref 0–0.9)
LYMPHOCYTES # BLD AUTO: 1.77 K/UL — SIGNIFICANT CHANGE UP (ref 1–3.3)
LYMPHOCYTES # BLD AUTO: 29 % — SIGNIFICANT CHANGE UP (ref 13–44)
MCHC RBC-ENTMCNC: 31.3 PG — SIGNIFICANT CHANGE UP (ref 27–34)
MCHC RBC-ENTMCNC: 34.1 G/DL — SIGNIFICANT CHANGE UP (ref 32–36)
MCV RBC AUTO: 91.6 FL — SIGNIFICANT CHANGE UP (ref 80–100)
MONOCYTES # BLD AUTO: 0.56 K/UL — SIGNIFICANT CHANGE UP (ref 0–0.9)
MONOCYTES NFR BLD AUTO: 9.2 % — SIGNIFICANT CHANGE UP (ref 2–14)
NEUTROPHILS # BLD AUTO: 3.45 K/UL — SIGNIFICANT CHANGE UP (ref 1.8–7.4)
NEUTROPHILS NFR BLD AUTO: 56.4 % — SIGNIFICANT CHANGE UP (ref 43–77)
NRBC BLD AUTO-RTO: 0 /100 WBCS — SIGNIFICANT CHANGE UP (ref 0–0)
PLATELET # BLD AUTO: 164 K/UL — SIGNIFICANT CHANGE UP (ref 150–400)
POTASSIUM SERPL-MCNC: 3.7 MMOL/L — SIGNIFICANT CHANGE UP (ref 3.5–5.3)
POTASSIUM SERPL-SCNC: 3.7 MMOL/L — SIGNIFICANT CHANGE UP (ref 3.5–5.3)
PROT SERPL-MCNC: 6.5 G/DL — SIGNIFICANT CHANGE UP (ref 6–8.3)
RBC # BLD: 4.51 M/UL — SIGNIFICANT CHANGE UP (ref 4.2–5.8)
RBC # FLD: 13 % — SIGNIFICANT CHANGE UP (ref 10.3–14.5)
SODIUM SERPL-SCNC: 144 MMOL/L — SIGNIFICANT CHANGE UP (ref 135–145)
WBC # BLD: 6.11 K/UL — SIGNIFICANT CHANGE UP (ref 3.8–10.5)
WBC # FLD AUTO: 6.11 K/UL — SIGNIFICANT CHANGE UP (ref 3.8–10.5)

## 2025-02-18 PROCEDURE — 99232 SBSQ HOSP IP/OBS MODERATE 35: CPT

## 2025-02-18 PROCEDURE — 70450 CT HEAD/BRAIN W/O DYE: CPT | Mod: 26

## 2025-02-18 RX ORDER — POLYETHYLENE GLYCOL 3350 17 G/17G
17 POWDER, FOR SOLUTION ORAL
Qty: 0 | Refills: 0 | DISCHARGE
Start: 2025-02-18

## 2025-02-18 RX ORDER — ROSUVASTATIN CALCIUM 20 MG/1
1 TABLET, FILM COATED ORAL
Qty: 30 | Refills: 1
Start: 2025-02-18 | End: 2025-04-18

## 2025-02-18 RX ORDER — AMLODIPINE BESYLATE 10 MG/1
1 TABLET ORAL
Qty: 30 | Refills: 1
Start: 2025-02-18 | End: 2025-04-18

## 2025-02-18 RX ORDER — METOPROLOL SUCCINATE 50 MG/1
1 TABLET, EXTENDED RELEASE ORAL
Qty: 60 | Refills: 1
Start: 2025-02-18 | End: 2025-04-18

## 2025-02-18 RX ORDER — MELATONIN 5 MG
2 TABLET ORAL
Qty: 0 | Refills: 0 | DISCHARGE
Start: 2025-02-18

## 2025-02-18 RX ADMIN — ROSUVASTATIN CALCIUM 40 MILLIGRAM(S): 20 TABLET, FILM COATED ORAL at 21:24

## 2025-02-18 RX ADMIN — Medication 320 MILLIGRAM(S): at 06:31

## 2025-02-18 RX ADMIN — ENOXAPARIN SODIUM 40 MILLIGRAM(S): 100 INJECTION SUBCUTANEOUS at 17:46

## 2025-02-18 RX ADMIN — AMLODIPINE BESYLATE 10 MILLIGRAM(S): 10 TABLET ORAL at 06:31

## 2025-02-18 RX ADMIN — METOPROLOL SUCCINATE 25 MILLIGRAM(S): 50 TABLET, EXTENDED RELEASE ORAL at 17:46

## 2025-02-18 RX ADMIN — METOPROLOL SUCCINATE 25 MILLIGRAM(S): 50 TABLET, EXTENDED RELEASE ORAL at 06:31

## 2025-02-18 RX ADMIN — Medication 40 MILLIGRAM(S): at 06:31

## 2025-02-18 NOTE — PROGRESS NOTE ADULT - SUBJECTIVE AND OBJECTIVE BOX
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 Wife woke up in 0545 1/26 finding the patient on the floor and complaining of right sided weakness and word finding difficulties. LKN 1/25 2230 when the patient went to bed. He took his apixaban and medications for HLD and HTN prior to going to bed. Upon finding the patient on the floor, the patient was transferred to the [place] ED. NIHSS 10 (+1 face, +3 RUE, +3 RLE, +1 Aphasia, +2, dysarthria). mRS 0    CT head demonstrated 5.8 x 2.6 cm left basal ganglia hemorrhage with 1-2mm rightward midline shift. 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 Received andexanet and levetiracetam 1g x1. He was transferred to [facility] 01/26 for further eval. Neurology recommended holding apixab through 03/09 per neuro (hold apixaban for 5-6 weeks, then will make further decisions regarding restarting and/or a Watchman as outpatient given Afib).    Evaluated by PM&R and discharged to [facility] acute rehab 01/29.  (30 Jan 2025 16:58)      Subjective/ROS: Mr Browne is seen seated in his wheelchair eating breakfast.  He has questions about family training and "will it be different", "can someone tell his wife".  He expresses concerns about his communication.  He feels he has slept well, and denies any headache, blurry vision, abdominal pain.  He feels he is having BMs every other day.  No overnight events or major concerns.        PAST MEDICAL & SURGICAL HISTORY:  Stroke      Chronic atrial fibrillation      HTN (hypertension)      HLD (hyperlipidemia)      No significant past surgical history          MEDICATIONS  (STANDING):  amLODIPine   Tablet 10 milliGRAM(s) Oral daily  enoxaparin Injectable 40 milliGRAM(s) SubCutaneous every 24 hours  metoprolol tartrate 25 milliGRAM(s) Oral every 12 hours  pantoprazole    Tablet 40 milliGRAM(s) Oral before breakfast  rosuvastatin 40 milliGRAM(s) Oral at bedtime  senna 2 Tablet(s) Oral at bedtime  valsartan 320 milliGRAM(s) Oral daily    MEDICATIONS  (PRN):  acetaminophen     Tablet .. 650 milliGRAM(s) Oral every 6 hours PRN Moderate Pain (4 - 6)  melatonin 6 milliGRAM(s) Oral at bedtime PRN Insomnia  polyethylene glycol 3350 17 Gram(s) Oral daily PRN Constipation        Allergies    No Known Allergies    Intolerances            ICU Vital Signs Last 24 Hrs  T(C): 36.4 (18 Feb 2025 07:51), Max: 36.4 (17 Feb 2025 19:58)  T(F): 97.6 (18 Feb 2025 07:51), Max: 97.6 (18 Feb 2025 07:51)  HR: 60 (18 Feb 2025 07:51) (60 - 78)  BP: 142/82 (18 Feb 2025 07:51) (128/78 - 155/84)  RR: 16 (18 Feb 2025 07:51) (14 - 16)  SpO2: 92% (18 Feb 2025 07:51) (92% - 98%)    O2 Parameters below as of 18 Feb 2025 07:51  Patient On (Oxygen Delivery Method): room air            LABS:                          14.1   6.11  )-----------( 164      ( 18 Feb 2025 06:50 )             41.3     02-18    144  |  108  |  23  ----------------------------<  72  3.7   |  25  |  0.90    Ca    8.7      18 Feb 2025 06:50    TPro  6.5  /  Alb  2.7[L]  /  TBili  0.6  /  DBili  x   /  AST  33  /  ALT  48[H]  /  AlkPhos  113  02-18      Urinalysis Basic - ( 18 Feb 2025 06:50 )    Color: x / Appearance: x / SG: x / pH: x  Gluc: 72 mg/dL / Ketone: x  / Bili: x / Urobili: x   Blood: x / Protein: x / Nitrite: x   Leuk Esterase: x / RBC: x / WBC x   Sq Epi: x / Non Sq Epi: x / Bacteria: x      CAPILLARY BLOOD GLUCOSE                                                       Patient is a 74y old  Male who presents with a chief complaint of 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 Wife woke up in 0545 1/26 finding the patient on the floor and complaining of right sided weakness and word finding difficulties. LKN 1/25 2230 when the patient went to bed. He took his apixaban and medications for HLD and HTN prior to going to bed. Upon finding the patient on the floor, the patient was transferred to the [place] ED. NIHSS 10 (+1 face, +3 RUE, +3 RLE, +1 Aphasia, +2, dysarthria). mRS 0    CT head demonstrated 5.8 x 2.6 cm left basal ganglia hemorrhage with 1-2mm rightward midline shift. 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 Received andexanet and levetiracetam 1g x1. He was transferred to [facility] 01/26 for further eval. Neurology recommended holding apixab through 03/09 per neuro (hold apixaban for 5-6 weeks, then will make further decisions regarding restarting and/or a Watchman as outpatient given Afib).    Evaluated by PM&R and discharged to [facility] acute rehab 01/29.  (30 Jan 2025 16:58)      Subjective/ROS: Mr Browne is seen seated in his wheelchair eating breakfast.  He has questions about family training and "will it be different", "can someone tell his wife".  He expresses concerns about his communication.  He feels he has slept well, and denies any headache, blurry vision, abdominal pain.  He feels he is having BMs every other day.  No overnight events or major concerns.        PAST MEDICAL & SURGICAL HISTORY:  Stroke      Chronic atrial fibrillation      HTN (hypertension)      HLD (hyperlipidemia)      No significant past surgical history          MEDICATIONS  (STANDING):  amLODIPine   Tablet 10 milliGRAM(s) Oral daily  enoxaparin Injectable 40 milliGRAM(s) SubCutaneous every 24 hours  metoprolol tartrate 25 milliGRAM(s) Oral every 12 hours  pantoprazole    Tablet 40 milliGRAM(s) Oral before breakfast  rosuvastatin 40 milliGRAM(s) Oral at bedtime  senna 2 Tablet(s) Oral at bedtime  valsartan 320 milliGRAM(s) Oral daily    MEDICATIONS  (PRN):  acetaminophen     Tablet .. 650 milliGRAM(s) Oral every 6 hours PRN Moderate Pain (4 - 6)  melatonin 6 milliGRAM(s) Oral at bedtime PRN Insomnia  polyethylene glycol 3350 17 Gram(s) Oral daily PRN Constipation        Allergies    No Known Allergies    Intolerances            ICU Vital Signs Last 24 Hrs  T(C): 36.4 (18 Feb 2025 07:51), Max: 36.4 (17 Feb 2025 19:58)  T(F): 97.6 (18 Feb 2025 07:51), Max: 97.6 (18 Feb 2025 07:51)  HR: 60 (18 Feb 2025 07:51) (60 - 78)  BP: 142/82 (18 Feb 2025 07:51) (128/78 - 155/84)  RR: 16 (18 Feb 2025 07:51) (14 - 16)  SpO2: 92% (18 Feb 2025 07:51) (92% - 98%)    O2 Parameters below as of 18 Feb 2025 07:51  Patient On (Oxygen Delivery Method): room air            LABS:                          14.1   6.11  )-----------( 164      ( 18 Feb 2025 06:50 )             41.3     02-18    144  |  108  |  23  ----------------------------<  72  3.7   |  25  |  0.90    Ca    8.7      18 Feb 2025 06:50    TPro  6.5  /  Alb  2.7[L]  /  TBili  0.6  /  DBili  x   /  AST  33  /  ALT  48[H]  /  AlkPhos  113  02-18      Urinalysis Basic - ( 18 Feb 2025 06:50 )    Color: x / Appearance: x / SG: x / pH: x  Gluc: 72 mg/dL / Ketone: x  / Bili: x / Urobili: x   Blood: x / Protein: x / Nitrite: x   Leuk Esterase: x / RBC: x / WBC x   Sq Epi: x / Non Sq Epi: x / Bacteria: x      CAPILLARY BLOOD GLUCOSE                PHYSICAL EXAM:  General: NAD, seated in wheelchair  CV: Extremities warm and well perfused  Resp: Even chest rise, Respirations non labored, CTAB  ABD: Non-tender to palpation, BS4  Extrem: SHELLY Calves Non-tender to palpation  Neuro:  Communication: Fluent aphasia, frequent paraphasias, word substitutions, some insight "am I making sense"  Psych: Calm, mild frustration while verbally communicating

## 2025-02-18 NOTE — PROGRESS NOTE ADULT - SUBJECTIVE AND OBJECTIVE BOX
no acute events overnight.       PHYSICAL EXAM:    Vital Signs Last 24 Hrs  T(F): 97.6 (18 Feb 2025 07:51), Max: 97.6 (18 Feb 2025 07:51)  HR: 60 (18 Feb 2025 07:51) (60 - 78)  BP: 142/82 (18 Feb 2025 07:51) (128/78 - 155/84)  RR: 16 (18 Feb 2025 07:51) (14 - 16)  SpO2: 92% (18 Feb 2025 07:51) (92% - 98%)  I&O's Summary    17 Feb 2025 07:01  -  18 Feb 2025 07:00  --------------------------------------------------------  IN: 480 mL / OUT: 0 mL / NET: 480 mL        GENERAL: NAD  HEENT: NCAT  CHEST/LUNG: No increased WOB, Clear to percussion bilaterally; No rales, rhonchi, wheezing  HEART: Regular rate and rhythm; No murmurs  ABDOMEN: Soft, Nontender, Nondistended; Bowel sounds present  MUSCULOSKELETAL/EXTREMITIES:  2+ Peripheral Pulses, No LE edema  PSYCH: Appropriate affect, Alert & Oriented    LABS:                        14.1   6.11  )-----------( 164      ( 18 Feb 2025 06:50 )             41.3       02-18    144  |  108  |  23  ----------------------------<  72  3.7   |  25  |  0.90    Ca    8.7      18 Feb 2025 06:50    TPro  6.5  /  Alb  2.7  /  TBili  0.6  /  DBili  x   /  AST  33  /  ALT  48  /  AlkPhos  113  02-18                01-27 Chol 142 mg/dL LDL -- HDL 51 mg/dL Trig 124 mg/dL                  Urinalysis Basic - ( 18 Feb 2025 06:50 )    Color: x / Appearance: x / SG: x / pH: x  Gluc: 72 mg/dL / Ketone: x  / Bili: x / Urobili: x   Blood: x / Protein: x / Nitrite: x   Leuk Esterase: x / RBC: x / WBC x   Sq Epi: x / Non Sq Epi: x / Bacteria: x        COVID-19 PCR: NotDetec (01-30-25 @ 15:00)      MEDICATIONS  (STANDING):  amLODIPine   Tablet 10 milliGRAM(s) Oral daily  enoxaparin Injectable 40 milliGRAM(s) SubCutaneous every 24 hours  metoprolol tartrate 25 milliGRAM(s) Oral every 12 hours  pantoprazole    Tablet 40 milliGRAM(s) Oral before breakfast  rosuvastatin 40 milliGRAM(s) Oral at bedtime  senna 2 Tablet(s) Oral at bedtime  valsartan 320 milliGRAM(s) Oral daily    MEDICATIONS  (PRN):  acetaminophen     Tablet .. 650 milliGRAM(s) Oral every 6 hours PRN Moderate Pain (4 - 6)  melatonin 6 milliGRAM(s) Oral at bedtime PRN Insomnia  polyethylene glycol 3350 17 Gram(s) Oral daily PRN Constipation      Care Discussed with Consultants/Other Providers: Yes

## 2025-02-18 NOTE — CHART NOTE - NSCHARTNOTEFT_GEN_A_CORE
Nutrition Follow Up Note  Source: Medical Record [X] Patient [x] Family [ ]         Diet: Regular, DASH/TLC, single sips, Ensure Plus High Protein (provides 350 kcal, 20 g protein/serving)  Pt tolerating diet with good PO intake, eating >75% of meals Per nursing flowsheets. Pt reports good appetite/PO intake but is getting tired of the food on the menu. Discussed menu alternates and ability to make changes to menu options on current diet. Denies nausea, vomiting, diarrhea, constipation. Pt denies chewing/swallowing difficulties on current diet.    Enteral/Parenteral Nutrition: N/A    Current Weight: 165.7 lbs (1-30)    Pertinent Medications: MEDICATIONS  (STANDING):  amLODIPine   Tablet 10 milliGRAM(s) Oral daily  enoxaparin Injectable 40 milliGRAM(s) SubCutaneous every 24 hours  metoprolol tartrate 25 milliGRAM(s) Oral every 12 hours  pantoprazole    Tablet 40 milliGRAM(s) Oral before breakfast  rosuvastatin 40 milliGRAM(s) Oral at bedtime  senna 2 Tablet(s) Oral at bedtime  valsartan 320 milliGRAM(s) Oral daily    MEDICATIONS  (PRN):  acetaminophen     Tablet .. 650 milliGRAM(s) Oral every 6 hours PRN Moderate Pain (4 - 6)  melatonin 6 milliGRAM(s) Oral at bedtime PRN Insomnia  polyethylene glycol 3350 17 Gram(s) Oral daily PRN Constipation      Pertinent Labs:  02-18 Na144 mmol/L Glu 72 mg/dL K+ 3.7 mmol/L Cr  0.90 mg/dL BUN 23 mg/dL 02-18 Alb 2.7 g/dL[L] 01-27 Chol 142 mg/dL LDL --    HDL 51 mg/dL Trig 124 mg/dL        Skin: Skin intact per nursing flow sheets     Edema: No edema per nursing flow sheets     Last BM: on 2-17 Per nursing flowsheets     Estimated Needs:   [X] No Change since Previous Assessment  [ ] Recalculated:     Previous Nutrition Diagnosis:   Swallowing difficulty    Nutrition Diagnosis is [X] resolved - pt now on regular consistency, thin liquids       New Nutrition Diagnosis: [X] Not Applicable  [ ] Inadequate Protein Energy Intake   [ ] Inadequate Oral Intake   [ ] Excessive Energy Intake   [ ] Increased Nutrient Needs   [ ] Obesity   [ ] Altered GI Function   [ ] Unintended Weight Loss   [ ] Food & Nutrition Related Knowledge Deficit  [ ] Limited Adherence to nutrition related recommendations   [ ] Malnutrition      Interventions:   1. Recommend continuing with current plan of care  2. Encourage PO intake  3. Obtain and honor food preferences as able      Monitoring & Evaluation:   [X] Weights   [X] PO Intake   [X] Follow Up (Per Protocol)  [X] Tolerance to Diet Prescription   [X] Other: Labs    RD Remains Available.  Priscila Malone, RD

## 2025-02-18 NOTE — PROGRESS NOTE ADULT - ATTENDING COMMENTS
Progress note amended to include my discussions with patient, resident, fellow, hospitalist, RN, SW, PT and my findings    Patient stable over the weekend, no significant headache, has made improvements in balance, strength and coordination but continues to have mod-severe expressive and receptive aphasia, with some insight into deficits. mood today stable, appears less frustrated, also less fatigued. For family training today.     Repeat Head CT performed prior to dc to follow up bleed as last one 3 weeks prior; shows significant reduction but still sizeable hemorrhage, aging and less dense, with persistent edema and mass effect, stable ventricular size. (official report below). Not cleared for AC, will follow up with neuro as outpatient with estimation resumption AC in about 6 weeks post onset stroke (but will need neuro clearance). HR currently controlled. Labs from today also reviewed, stable. He continues to have mild elevation transaminases without abdominal discomfort, good po intake, normal BM; as discussed with hospitalist, continue statin at current dose for mild elevation, will repeat LFTs prior to dc, and f/u PCP as outpatient    Continue program, on track for dc Thursday. Rx for outpatient services PT Ot SLP placed in [employer] today.,    Head CT 2/18:  The large left basal ganglia hemorrhage previously identified in the   lentiform nucleus measuring 5.4 cm in AP diameter it is smaller now   measuring 3.8 cm. There is also less density present consistent with   resolving hemorrhage. There is no change in mild vasogenic edema and mass   effect on the left lateral ventricle. Age-appropriate involutional and   ischemic gliotic changes are noted. There has been left-sided lens   replacement surgery.    IMPRESSION: Resolving left basal ganglia hemorrhage compared with   1/27/2025.    --- End of Report ---      RECENT LABS    Vital Signs Last 24 Hrs  T(C): 36.4 (18 Feb 2025 07:51), Max: 36.4 (17 Feb 2025 19:58)  T(F): 97.6 (18 Feb 2025 07:51), Max: 97.6 (18 Feb 2025 07:51)  HR: 60 (18 Feb 2025 07:51) (60 - 78)  BP: 142/82 (18 Feb 2025 07:51) (128/78 - 155/84)  BP(mean): --  RR: 16 (18 Feb 2025 07:51) (14 - 16)  SpO2: 92% (18 Feb 2025 07:51) (92% - 98%)    Parameters below as of 18 Feb 2025 07:51  Patient On (Oxygen Delivery Method): room air                              14.1   6.11  )-----------( 164      ( 18 Feb 2025 06:50 )             41.3     02-18    144  |  108  |  23  ----------------------------<  72  3.7   |  25  |  0.90    Ca    8.7      18 Feb 2025 06:50    TPro  6.5  /  Alb  2.7[L]  /  TBili  0.6  /  DBili  x   /  AST  33  /  ALT  48[H]  /  AlkPhos  113  02-18      Urinalysis Basic - ( 18 Feb 2025 06:50 )    Color: x / Appearance: x / SG: x / pH: x  Gluc: 72 mg/dL / Ketone: x  / Bili: x / Urobili: x   Blood: x / Protein: x / Nitrite: x   Leuk Esterase: x / RBC: x / WBC x   Sq Epi: x / Non Sq Epi: x / Bacteria: x      CAPILLARY BLOOD GLUCOSE

## 2025-02-18 NOTE — PROGRESS NOTE ADULT - ASSESSMENT
74M RHD w/ PMH HTN, afib (on apixaban, started 2017), and CVA (w/ residual min right-sided weakness), admitted to [facility] acute rehab w/ left basal ganglia hemorrhage     # non traumatic acute left BG hemorrhage with right hemibody involvement, right HP, apraxia speech, motor apraxia, incoordination, dysarthria, dysphagia  - CT Head: 1/26 5.8 x 2.6 cm intracranial hemorrhage with mass effect and 1-2mm rightward midline  shift  - CTA h/n: stable near complete occlusion of left distal M1  - plan to repeat Head CT prior to dc f/u mass effect and shift (last 1/27). Discussed with patient 2/12  - Rosuvastatin 40mg qhs  - Continue comprehensive rehab program PT OT SLP 3 hours daily 5 x week. Recreation therapy.  Adjusted intensity to 90 min SLP 30 PT 60 OT starting 2/13  - Will perform K taping of upper back muscles to assist with posture in ambulation activities  - precautions: fall, aspiration, cardiac    # atrial fibrillation  # HTN  - *Hold apixaban through 03/09 (5-6 weeks per neuro). We reviewed with family medications, risks/benefits, and need for clearance by neurology before resumption  - amlodipine 10mg daily  - Lopressor 25mg BID  - Valsartan 320mg daily  - BP:  (128/78 - 155/84) 2/18; HR  (60 - 78) 2/18    # ISIS  # home CPAP  - Using home CPAP (family brought in personal machine)  - Patient compliant in community per family    # Transaminitis 2/13, resolving 2/18    AST  37  /  ALT  55[H]  /  AlkPhos  126[H]  02-13 -> AST  33  /  ALT  48[H]  /  AlkPhos  113  02-18  - No recent med changes. Has been on crestor (home med)  - Mild elevation, continue statin for now and repeat   - CMP 2/20    # pain  - acetaminophen 650mg q6h PRN    # bowel  - scheduled: senna 2tab qhs  - PRN: Miralax BID    # Oral Thrush  - Nystatin s/s 4xdaily x7days    # sleep  - melatonin 3mg hs PRN  - still has interrupted, reduced quality sleep however defers additional sleeping meds at this time. Denies significant daytime drowsiness    # diet   - MBS 2/12 9 AM--> upgrade to regular solid and thin liquids with single sips DASH/TLC. Reviewed with SLP and patient    # Hypernatremia/KEAGAN, resolving  - urine osm 749 2/4. Sosm 304 2/5  - Encourage increased PO intake  - Na 148 2/3-> 144 2/6--> 144 2/10  - BUn/Cr 34/1 --> 29/0.87 2/6--> 24/0.84 2/10 --> 25/0.92 2/13 Stable --> 23/0.9 2/18 WNL  - CMP 2/20    # DVT ppx  - LE doppler 1/30: neg for DVT bilat. LEs  - lovenox 40 mg daily (started 1/29)    # Case discussed in IDT rounds 2/14 (follow up)  - continent, skin intact,  upgrade to regular solid and thin liquids with single sips DASH/TLC.; improving speech and language skills akua receptive language; follows short sentences and write short phrases; improved oral reading skills; CG for ADLs and CG functional transfers without AD; can use RUE as gross functional assist but difficulty FMD; CG/min assist bed mobility and transfers, ambulates 125 feet with CG/min assist without AD. stairs with CG/min assist and 1 HR, Participates in music therapy to improve speech production, therapeutic choir  - barriers: right UP, stairs, communication decreased balance and coordination, insight  - goals: "enhance coordination"  supervision ADLs and transfers, communicate wants and needs effectively and consistently with min assist (progressing); ambulate to bathroom with appropriate AD and toilet with supervision (progressing); supervision  - target: 2/20 home with caregiver assist and home care referral PT and OT SLP  - caregiver training 2/18  - Medical meeting with wife and daughter 2/4    # LABS  CBC CMP 2/20  repeat Head CT 2/18        Corrected contact information:  Spouse Nicolle Browne - [phone]  Daughter Gracy: [phone]  ---------------  Outpatient Follow-up:    Lynda Zimmerman  NP in [address], [address]  [place] [address]  Phone: [phone]  Fax: [phone]  Follow Up Time:     Manoj Edge  Cardiology  [address] [place] [address], [address]  [place] [address]  Phone: [phone]  Fax: [phone]  Follow Up Time: 2 weeks           74M RHD w/ PMH HTN, afib (on apixaban, started 2017), and CVA (w/ residual min right-sided weakness), admitted to [facility] acute rehab w/ left basal ganglia hemorrhage     # non traumatic acute left BG hemorrhage with right hemibody involvement, right HP, apraxia speech, motor apraxia, incoordination, dysarthria, dysphagia  - CT Head: 1/26 5.8 x 2.6 cm intracranial hemorrhage with mass effect and 1-2mm rightward midline  shift  - CTA h/n: stable near complete occlusion of left distal M1  - plan to repeat Head CT prior to dc f/u mass effect and shift (last 1/27). Discussed with patient 2/12  - CT HEAD 2/18 : Comparison is made with the prior CT of 1/27/2025.  The large left basal ganglia hemorrhage previously identified in the lentiform nucleus measuring 5.4 cm in AP diameter it is smaller now measuring 3.8 cm. There is also less density present consistent with resolving hemorrhage. There is no change in mild vasogenic edema and mass effect on the left lateral ventricle. Age-appropriate involutional and ischemic gliotic changes are noted. There has been left-sided lens replacement surgery. IMPRESSION: Resolving left basal ganglia hemorrhage compared with 1/27/2025.  - Rosuvastatin 40mg qhs  - Continue comprehensive rehab program PT OT SLP 3 hours daily 5 x week. Recreation therapy.  Adjusted intensity to 90 min SLP 30 PT 60 OT starting 2/13  - Will perform K taping of upper back muscles to assist with posture in ambulation activities  - precautions: fall, aspiration, cardiac    # atrial fibrillation  # HTN  - *Hold apixaban through 03/09 (5-6 weeks per neuro). We reviewed with family medications, risks/benefits, and need for clearance by neurology before resumption  - amlodipine 10mg daily  - Lopressor 25mg BID  - Valsartan 320mg daily  - BP:  (128/78 - 155/84) 2/18; HR  (60 - 78) 2/18    # ISIS  # home CPAP  - Using home CPAP (family brought in personal machine)  - Patient compliant in community per family    # Transaminitis 2/13, resolving 2/18    AST  37  /  ALT  55[H]  /  AlkPhos  126[H]  02-13 -> AST  33  /  ALT  48[H]  /  AlkPhos  113  02-18  - No recent med changes. Has been on crestor (home med)  - Mild elevation, continue statin for now and repeat   - CMP 2/20    # pain  - acetaminophen 650mg q6h PRN    # bowel  - scheduled: senna 2tab qhs  - PRN: Miralax BID    # Oral Thrush  - Nystatin s/s 4xdaily x7days    # sleep  - melatonin 3mg hs PRN  - still has interrupted, reduced quality sleep however defers additional sleeping meds at this time. Denies significant daytime drowsiness    # diet   - MBS 2/12 9 AM--> upgrade to regular solid and thin liquids with single sips DASH/TLC. Reviewed with SLP and patient    # Hypernatremia/KEAGAN, resolving  - urine osm 749 2/4. Sosm 304 2/5  - Encourage increased PO intake  - Na 148 2/3-> 144 2/6--> 144 2/10  - BUn/Cr 34/1 --> 29/0.87 2/6--> 24/0.84 2/10 --> 25/0.92 2/13 Stable --> 23/0.9 2/18 WNL  - CMP 2/20    # DVT ppx  - LE doppler 1/30: neg for DVT bilat. LEs  - lovenox 40 mg daily (started 1/29)    # Case discussed in IDT rounds 2/14 (follow up)  - continent, skin intact,  upgrade to regular solid and thin liquids with single sips DASH/TLC.; improving speech and language skills akua receptive language; follows short sentences and write short phrases; improved oral reading skills; CG for ADLs and CG functional transfers without AD; can use RUE as gross functional assist but difficulty FMD; CG/min assist bed mobility and transfers, ambulates 125 feet with CG/min assist without AD. stairs with CG/min assist and 1 HR, Participates in music therapy to improve speech production, therapeutic choir  - barriers: right UP, stairs, communication decreased balance and coordination, insight  - goals: "enhance coordination"  supervision ADLs and transfers, communicate wants and needs effectively and consistently with min assist (progressing); ambulate to bathroom with appropriate AD and toilet with supervision (progressing); supervision  - target: 2/20 home with caregiver assist and home care referral PT and OT SLP  - caregiver training 2/18  - Medical meeting with wife and daughter 2/4    # LABS  CBC CMP 2/20  repeat Head CT 2/18: Comparison is made with the prior CT of 1/27/2025.  The large left basal ganglia hemorrhage previously identified in the   lentiform nucleus measuring 5.4 cm in AP diameter it is smaller now   measuring 3.8 cm. There is also less density present consistent with   resolving hemorrhage. There is no change in mild vasogenic edema and mass   effect on the left lateral ventricle. Age-appropriate involutional and   ischemic gliotic changes are noted. There has been left-sided lens   replacement surgery.    IMPRESSION: Resolving left basal ganglia hemorrhage compared with   1/27/2025.        Corrected contact information:  Spouse Nicolle Browne - [phone]  Daughter Gracy: [phone]  ---------------  Outpatient Follow-up:    Lynda Zimmerman  NP in [address], [facility] [address]  [place] [address]  Phone: [phone]  Fax: [phone]  Follow Up Time:     Manoj Edge  [address], [address]  [place] [address]  Phone: [phone]  Fax: [phone]  Follow Up Time: 2 weeks           74M RHD w/ PMH HTN, afib (on apixaban, started 2017), and CVA (w/ residual min right-sided weakness), admitted to [facility] acute rehab w/ left basal ganglia hemorrhage     # non traumatic acute left BG hemorrhage with right hemibody involvement, right HP, apraxia speech, motor apraxia, incoordination, dysarthria, dysphagia  - CT Head: 1/26 5.8 x 2.6 cm intracranial hemorrhage with mass effect and 1-2mm rightward midline  shift  - CTA h/n: stable near complete occlusion of left distal M1  - CT HEAD 2/18 : Comparison is made with the prior CT of 1/27/2025.  The large left basal ganglia hemorrhage previously identified in the lentiform nucleus measuring 5.4 cm in AP diameter it is smaller now measuring 3.8 cm. There is also less density present consistent with resolving hemorrhage. There is no change in mild vasogenic edema and mass effect on the left lateral ventricle. Age-appropriate involutional and ischemic gliotic changes are noted. There has been left-sided lens replacement surgery. IMPRESSION: Resolving left basal ganglia hemorrhage compared with 1/27/2025.  - Rosuvastatin 40mg qhs  - Continue comprehensive rehab program PT OT SLP 3 hours daily 5 x week. Recreation therapy  - Will perform K taping of upper back muscles to assist with posture in ambulation activities  - precautions: fall, aspiration, cardiac    # atrial fibrillation  # HTN  - *Hold apixaban through 03/09 (5-6 weeks per neuro). We reviewed with family medications, risks/benefits, and need for clearance by neurology before resumption  - amlodipine 10mg daily  - Lopressor 25mg BID  - Valsartan 320mg daily  - BP:  (128/78 - 155/84) 2/18; HR  (60 - 78) 2/18    # ISIS  # home CPAP  - Using home CPAP (family brought in personal machine)  - Patient compliant in community per family    # Transaminitis 2/13, resolving 2/18    AST  37  /  ALT  55[H]  /  AlkPhos  126[H]  02-13 -> AST  33  /  ALT  48[H]  /  AlkPhos  113  02-18  - No recent med changes. Has been on crestor (home med)  - Mild elevation, continue statin for now and repeat   - CMP 2/20, PCP f/u on dc    # pain  - acetaminophen 650mg q6h PRN    # bowel  - scheduled: senna 2tab qhs  - PRN: Miralax BID    # Oral Thrush  - Nystatin s/s 4xdaily x7days    # sleep  - melatonin 3mg hs PRN  - still has interrupted, reduced quality sleep however defers additional sleeping meds at this time. Denies significant daytime drowsiness    # diet   - MBS 2/12 9 AM--> upgrade to regular solid and thin liquids with single sips DASH/TLC. Reviewed with SLP and patient    # Hypernatremia/KEAGAN, resolving  - urine osm 749 2/4. Sosm 304 2/5  - Encourage increased PO intake  - Na 148 2/3-> 144 2/6--> 144 2/10  - BUn/Cr 34/1 --> 29/0.87 2/6--> 24/0.84 2/10 --> 25/0.92 2/13 Stable --> 23/0.9 2/18 WNL  - CMP 2/20    # DVT ppx  - LE doppler 1/30: neg for DVT bilat. LEs  - lovenox 40 mg daily (started 1/29)    # Case discussed in IDT rounds 2/14 (follow up)  - continent, skin intact,  upgrade to regular solid and thin liquids with single sips DASH/TLC.; improving speech and language skills akua receptive language; follows short sentences and write short phrases; improved oral reading skills; CG for ADLs and CG functional transfers without AD; can use RUE as gross functional assist but difficulty FMD; CG/min assist bed mobility and transfers, ambulates 125 feet with CG/min assist without AD. stairs with CG/min assist and 1 HR, Participates in music therapy to improve speech production, therapeutic choir  - barriers: right UP, stairs, communication decreased balance and coordination, insight  - goals: "enhance coordination"  supervision ADLs and transfers, communicate wants and needs effectively and consistently with min assist (progressing); ambulate to bathroom with appropriate AD and toilet with supervision (progressing); supervision  - target: 2/20 home with caregiver assist. Patient/family opted for outpatient services at [facility], PT Ot SLP, RX placed in [facility]  - caregiver training today 2/18  - Medical meeting with wife and daughter 2/4    # LABS  CBC CMP 2/20=      Corrected contact information:  Spouse Nicolle Browne - [phone]  Daughter Gracy: [phone]  ---------------  Outpatient Follow-up:    Lynda Zimmerman  NP in [facility]  [address] [place] [address] [address]  [place] [address]  Phone: [phone]  Fax: [phone]  Follow Up Time:     Manoj Edge  [address], [address]  [place] [address]  Phone: [phone]  Fax: [phone]  Follow Up Time: 2 weeks

## 2025-02-18 NOTE — PROGRESS NOTE ADULT - ASSESSMENT
74M with HTN, AF, hx CVA, now in acute rehab after ICH    labs 2/18 reviewed    #Non traumatic ICH  #Aphasia due to above  -PT/OT/SLP  -aggressive BP control  -Eliquis stopped    #History of CVA  -c/w statin  -Eliquis on hold    #PAF  -c/w Lopressor  -Hold Eliquis due to recent ICH  -outpatient neurology clearance to see when to resume    #ISIS  -c/w CPAP    #Transaminitis  -routine repeat next week  -statin could play a role, but no need to stop it at this time  -if the LFTs start to rise further in the future, will check RUQ US    #Essential HTN  -c/w Valsartan  -c/w Lopressor  -c/w Norvasc  -BP currently acceptable    #DVT ppx: Lovenox

## 2025-02-19 PROCEDURE — 99231 SBSQ HOSP IP/OBS SF/LOW 25: CPT

## 2025-02-19 PROCEDURE — 99232 SBSQ HOSP IP/OBS MODERATE 35: CPT

## 2025-02-19 RX ADMIN — ENOXAPARIN SODIUM 40 MILLIGRAM(S): 100 INJECTION SUBCUTANEOUS at 17:25

## 2025-02-19 RX ADMIN — Medication 40 MILLIGRAM(S): at 06:08

## 2025-02-19 RX ADMIN — Medication 320 MILLIGRAM(S): at 06:08

## 2025-02-19 RX ADMIN — AMLODIPINE BESYLATE 10 MILLIGRAM(S): 10 TABLET ORAL at 06:09

## 2025-02-19 RX ADMIN — METOPROLOL SUCCINATE 25 MILLIGRAM(S): 50 TABLET, EXTENDED RELEASE ORAL at 17:25

## 2025-02-19 RX ADMIN — METOPROLOL SUCCINATE 25 MILLIGRAM(S): 50 TABLET, EXTENDED RELEASE ORAL at 06:09

## 2025-02-19 RX ADMIN — ROSUVASTATIN CALCIUM 40 MILLIGRAM(S): 20 TABLET, FILM COATED ORAL at 19:30

## 2025-02-19 NOTE — PROGRESS NOTE ADULT - ASSESSMENT
74M RHD w/ PMH HTN, afib (on apixaban, started 2017), and CVA (w/ residual min right-sided weakness), admitted to [facility] acute rehab w/ left basal ganglia hemorrhage     # non traumatic acute left BG hemorrhage with right hemibody involvement, right HP, apraxia speech, motor apraxia, incoordination, dysarthria, dysphagia  - CTA h/n: stable near complete occlusion of left distal M1  - CT HEAD 2/18 :  large left basal ganglia hemorrhage previously identified in the lentiform nucleus measuring 5.4 cm in AP diameter it is smaller now measuring 3.8 cm. There is also less density present consistent with resolving hemorrhage. There is no change in mild vasogenic edema and mass effect on the left lateral ventricle. IMPRESSION: Resolving left basal ganglia hemorrhage compared with 1/27/2025.  - Rosuvastatin 40mg qhs  - Continue comprehensive rehab program PT OT SLP 3 hours daily 5 x week. Recreation therapy  - Will perform K taping of upper back muscles to assist with posture in ambulation activities  - precautions: fall, aspiration, cardiac    # atrial fibrillation  # HTN  - *Hold apixaban through 03/09 (5-6 weeks per neuro). We reviewed with family medications, risks/benefits, and need for clearance by neurology before resumption  - amlodipine 10mg daily  - Lopressor 25mg BID  - Valsartan 320mg daily  - BP (125/83 - 137/80) HR 60-66 2/19  - cardiology f/u on dc    # ISIS  # home CPAP  - Using home CPAP (family brought in personal machine)  - Patient compliant in community per family    # Transaminitis   - No recent med changes. Has been on crestor (home med)  - Mild elevation, continue statin for now as discussed with hospitalist  -  AST  37  /  ALT  55[H]  /  AlkPhos  126[H]  02-13 -> AST  33  /  ALT  48[H]  /  AlkPhos  113  02-18  - CMP 2/20, PCP f/u on dc    # pain  - acetaminophen 650mg q6h PRN    # bowel  - scheduled: senna 2tab qhs  - PRN: Miralax BID    # Oral Thrush  - Nystatin s/s 4xdaily x7days    # sleep  - melatonin 3mg hs PRN  - still has interrupted, reduced quality sleep however defers additional sleeping meds at this time. Denies significant daytime drowsiness    # diet   - MBS 2/12 9 AM--> upgrade to regular solid and thin liquids with single sips DASH/TLC. Reviewed with SLP and patient    # Hypernatremia/KEAGAN, resolved  - urine osm 749 2/4. Sosm 304 2/5  - Encourage increased PO intake  - Na 148 2/3-> 144 2/6--> 144 2/10  - BUn/Cr 34/1 --> 29/0.87 2/6--> 24/0.84 2/10 --> 25/0.92 2/13 Stable --> 23/0.9 2/18 WNL  - CMP 2/20    # DVT ppx  - LE doppler 1/30: neg for DVT bilat. LEs  - lovenox 40 mg daily (started 1/29)    # Case discussed in IDT rounds 2/14 (follow up)  - continent, skin intact,  upgrade to regular solid and thin liquids with single sips DASH/TLC.; improving speech and language skills akua receptive language; follows short sentences and write short phrases; improved oral reading skills; CG for ADLs and CG functional transfers without AD; can use RUE as gross functional assist but difficulty FMD; CG/min assist bed mobility and transfers, ambulates 125 feet with CG/min assist without AD. stairs with CG/min assist and 1 HR, Participates in music therapy to improve speech production, therapeutic choir  - barriers: right UP, stairs, communication decreased balance and coordination, insight  - goals: "enhance coordination"  supervision ADLs and transfers, communicate wants and needs effectively and consistently with min assist (progressing); ambulate to bathroom with appropriate AD and toilet with supervision (progressing); supervision  - on target: 2/20 home with caregiver assist. Patient/family opted for outpatient services at [facility], PT Ot SLP, RX placed in [employer]  - caregiver training completed 2/18  - Medical meeting with wife and daughter 2/4    # LABS  CBC CMP 2/20=      Corrected contact information:  Spouse Nicolle Browne - [phone]  Daughter Gracy: [phone]  ---------------  Outpatient Follow-up:    Lynda Zimmerman  NP in [address], [address]  [place] [address]  Phone: [phone]  Fax: [phone]  Follow Up Time:     Manoj Edge  [address], [address]  [place] [address]  Phone: [phone]  Fax: [phone]  Follow Up Time: 2 weeks           74M RHD w/ PMH HTN, afib (on apixaban, started 2017), and CVA (w/ residual min right-sided weakness), admitted to [facility] acute rehab w/ left basal ganglia hemorrhage     # non traumatic acute left BG hemorrhage with right hemibody involvement, right HP, apraxia speech, motor apraxia, incoordination, dysarthria, dysphagia  - CTA h/n: stable near complete occlusion of left distal M1  - CT HEAD 2/18 :  large left basal ganglia hemorrhage previously identified in the lentiform nucleus measuring 5.4 cm in AP diameter it is smaller now measuring 3.8 cm. There is also less density present consistent with resolving hemorrhage. There is no change in mild vasogenic edema and mass effect on the left lateral ventricle. IMPRESSION: Resolving left basal ganglia hemorrhage compared with 1/27/2025.  - Rosuvastatin 40mg qhs  - Continue comprehensive rehab program PT OT SLP 3 hours daily 5 x week. Recreation therapy  - Will perform K taping of upper back muscles to assist with posture in ambulation activities  - Neurology: Dr Richard Libman  - precautions: fall, aspiration, cardiac    # atrial fibrillation  # HTN  - *Hold apixaban through 03/09 (5-6 weeks per neuro). We reviewed with family medications, risks/benefits, and need for clearance by neurology before resumption  - amlodipine 10mg daily  - Lopressor 25mg BID  - Valsartan 320mg daily  - BP (125/83 - 137/80) HR 60-66 2/19  - cardiology f/u on dc    # ISIS  # home CPAP  - Using home CPAP (family brought in personal machine)  - Patient compliant in community per family    # Transaminitis   - No recent med changes. Has been on crestor (home med)  - Mild elevation, continue statin for now as discussed with hospitalist  -  AST  37  /  ALT  55[H]  /  AlkPhos  126[H]  02-13 -> AST  33  /  ALT  48[H]  /  AlkPhos  113  02-18  - CMP 2/20, PCP f/u on dc    # pain  - acetaminophen 650mg q6h PRN    # bowel  - scheduled: senna 2tab qhs  - PRN: Miralax BID    # Oral Thrush  - Nystatin s/s 4xdaily x7days    # sleep  - melatonin 3mg hs PRN  - still has interrupted, reduced quality sleep however defers additional sleeping meds at this time. Denies significant daytime drowsiness    # diet   - MBS 2/12 9 AM--> upgrade to regular solid and thin liquids with single sips DASH/TLC. Reviewed with SLP and patient    # Hypernatremia/KEAGAN, resolved  - urine osm 749 2/4. Sosm 304 2/5  - Encourage increased PO intake  - Na 148 2/3-> 144 2/6--> 144 2/10  - BUn/Cr 34/1 --> 29/0.87 2/6--> 24/0.84 2/10 --> 25/0.92 2/13 Stable --> 23/0.9 2/18 WNL  - CMP 2/20    # DVT ppx  - LE doppler 1/30: neg for DVT bilat. LEs  - lovenox 40 mg daily (started 1/29)    # Case discussed in IDT rounds 2/14 (follow up)  - continent, skin intact,  upgrade to regular solid and thin liquids with single sips DASH/TLC.; improving speech and language skills akua receptive language; follows short sentences and write short phrases; improved oral reading skills; CG for ADLs and CG functional transfers without AD; can use RUE as gross functional assist but difficulty FMD; CG/min assist bed mobility and transfers, ambulates 125 feet with CG/min assist without AD. stairs with CG/min assist and 1 HR, Participates in music therapy to improve speech production, therapeutic choir  - barriers: right UP, stairs, communication decreased balance and coordination, insight  - goals: "enhance coordination"  supervision ADLs and transfers, communicate wants and needs effectively and consistently with min assist (progressing); ambulate to bathroom with appropriate AD and toilet with supervision (progressing); supervision  - on target: 2/20 home with caregiver assist. Patient/family opted for outpatient services at [facility], PT Ot SLP, RX placed in [employer]  - caregiver training completed 2/18  - Medical meeting with wife and daughter 2/4    # LABS  CBC CMP 2/20=      Corrected contact information:  Spouse Nicolle Browne - [phone]  Daughter Gracy: [phone]  ---------------  Outpatient Follow-up:    Lynda Zimmerman  NP in [address], [address]  [place] [address]  Phone: [phone]  Fax: [phone]  Follow Up Time:     Manoj Edge  Cardiology  [address], [address]  [place] [address]  Phone: [phone]  Fax: [phone]  Follow Up Time: 2 weeks           74M RHD w/ PMH HTN, afib (on apixaban, started 2017), and CVA (w/ residual min right-sided weakness), admitted to [facility] acute rehab w/ left basal ganglia hemorrhage     # non traumatic acute left BG hemorrhage with right hemibody involvement, right HP, apraxia speech, motor apraxia, incoordination, dysarthria, dysphagia  - CTA h/n: stable near complete occlusion of left distal M1  - CT HEAD 2/18 :  large left basal ganglia hemorrhage previously identified in the lentiform nucleus measuring 5.4 cm in AP diameter it is smaller now measuring 3.8 cm. There is also less density present consistent with resolving hemorrhage. There is no change in mild vasogenic edema and mass effect on the left lateral ventricle. IMPRESSION: Resolving left basal ganglia hemorrhage compared with 1/27/2025.  - Rosuvastatin 40mg qhs  - Continue comprehensive rehab program PT OT SLP 3 hours daily 5 x week. Recreation therapy. For dc tomorrow with outpatient PT, OT SLP at [facility]. Discussed with patient  - Neurology: Dr Richard Libman follow up on dc  - precautions: fall, aspiration, cardiac    # atrial fibrillation  # HTN  - *Hold apixaban through 03/09 (5-6 weeks per neuro). We reviewed with family medications, risks/benefits, and need for clearance by neurology before resumption  - amlodipine 10mg daily  - Lopressor 25mg BID  - Valsartan 320mg daily  - BP (125/83 - 137/80) HR 60-66 2/19  - cardiology f/u on dc, will need neuro clearance to resume AC. Discussed with patient    # ISIS  # home CPAP  - Using home CPAP (family brought in personal machine)  - Patient compliant in community per family    # Transaminitis   - No recent med changes. Has been on crestor (home med)  - Mild elevation, continue statin for now as discussed with hospitalist  -  AST  37  /  ALT  55[H]  /  AlkPhos  126[H]  02-13 -> AST  33  /  ALT  48[H]  /  AlkPhos  113  02-18  - CMP 2/20, PCP f/u on dc    # pain  - acetaminophen 650mg q6h PRN    # bowel  - scheduled: senna 2tab qhs  - PRN: Miralax BID    # Oral Thrush  - Nystatin s/s 4xdaily x7days    # sleep  - melatonin 3mg hs PRN  - still has interrupted, reduced quality sleep however defers additional sleeping meds at this time. Denies significant daytime drowsiness    # diet   - MBS 2/12 9 AM--> upgrade to regular solid and thin liquids with single sips DASH/TLC. Reviewed with SLP and patient    # Hypernatremia/KEAGAN, resolved  - urine osm 749 2/4. Sosm 304 2/5  - Encourage increased PO intake  - Na 148 2/3-> 144 2/6--> 144 2/10  - BUn/Cr 34/1 --> 29/0.87 2/6--> 24/0.84 2/10 --> 25/0.92 2/13 Stable --> 23/0.9 2/18 WNL  - CMP 2/20    # DVT ppx  - LE doppler 1/30: neg for DVT bilat. LEs  - lovenox 40 mg daily (started 1/29)    # Case discussed in IDT rounds 2/14 (follow up)  - continent, skin intact,  upgrade to regular solid and thin liquids with single sips DASH/TLC.; improving speech and language skills akua receptive language; follows short sentences and write short phrases; improved oral reading skills; CG for ADLs and CG functional transfers without AD; can use RUE as gross functional assist but difficulty FMD; CG/min assist bed mobility and transfers, ambulates 125 feet with CG/min assist without AD. stairs with CG/min assist and 1 HR, Participates in music therapy to improve speech production, therapeutic choir  - barriers: right UP, stairs, communication decreased balance and coordination, insight  - goals: "enhance coordination"  supervision ADLs and transfers, communicate wants and needs effectively and consistently with min assist (progressing); ambulate to bathroom with appropriate AD and toilet with supervision (progressing); supervision  - on target: 2/20 home with caregiver assist. Patient/family opted for outpatient services at [facility], PT Ot SLP, RX placed in [employer]  - caregiver training completed 2/18  - Medical meeting with wife and daughter 2/4    # LABS  CBC CMP 2/20      Corrected contact information:  Spouse Nicolle Browne - [phone]  Daughter Gracy: [phone]  ---------------  Outpatient Follow-up:    Lynda Zimmerman  NP in [facility]  [address], [address]  [place] [address]  Phone: [phone]  Fax: [phone]  Follow Up Time:     Manoj Edge  [facility]  [address], [address]  [place] [address]  Phone: [phone]  Fax: [phone]  Follow Up Time: 2 weeks

## 2025-02-19 NOTE — PROGRESS NOTE ADULT - CONSTITUTIONAL COMMENTS
easily arousable afebrile. No acute issues overnight. Was not CPAP this morning
Alert, mood stable no lability. He continues to have fluctuating output, with word finding difficulties , paraphasic and semantic errors. Fair awareness of deficits
alert, stable mood. Seen earlier in AM, reduced overall speech output and answering with head nods and simple yes/no responses predominantly
alert, has word finding difficulties with some paraphasic errors, perseveration, but fluctuating output
alert, sustained eye opening. NAD, Afebrile
continues to have word finding deficits, also continued receptive language deficits for more complex information Improved simple yes/no
arousable, NAD, stable mood O x 3
alert, mood stable. Has bursts of speech output and fluency but continues to have word finding difficulties and perseveration/some occasional frustration, and evidence of some circumlocution
alert, NAD
in bedside chair, comfortably eating breakfast in NAD.
Resting comfortably in bedside WC appearing in NAD.

## 2025-02-19 NOTE — PROGRESS NOTE ADULT - SUBJECTIVE AND OBJECTIVE BOX
Patient is a 74y old  Male who presents with a chief complaint of left basal ganglia hemorrhage w/ right hemiparesis, transcortical sensory aphasia, and dysphasia (18 Feb 2025 11:48)      HPI:   74M RHD w/ PMH HTN, afib (on apixaban, started 2017), and CVA (w/ residual min right-sided weakness), who presented to [facility] 01/26/2025 after being found on floor by wife w/ right-sided weakness and word-finding difficulties. Wife woke up in 0545 1/26 finding the patient on the floor and complaining of right sided weakness and word finding difficulties. LKN 1/25 2230 when the patient went to bed. He took his apixaban and medications for HLD and HTN prior to going to bed. Upon finding the patient on the floor, the patient was transferred to the [facility]. NIHSS 10 (+1 face, +3 RUE, +3 RLE, +1 Aphasia, +2, dysarthria). mRS 0    CT head demonstrated 5.8 x 2.6 cm left basal ganglia hemorrhage with 1-2mm rightward midline shift. 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 Received andexanet and levetiracetam 1g x1. He was transferred to [facility] 01/26 for further eval. Neurology recommended holding apixab through 03/09 per neuro (hold apixaban for 5-6 weeks, then will make further decisions regarding restarting and/or a Watchman as outpatient given Afib).    Evaluated by PM&R and discharged to [facility] acute rehab 01/29.  (30 Jan 2025 16:58)      PAST MEDICAL & SURGICAL HISTORY:  Stroke      Chronic atrial fibrillation      HTN (hypertension)      HLD (hyperlipidemia)      No significant past surgical history          MEDICATIONS  (STANDING):  amLODIPine   Tablet 10 milliGRAM(s) Oral daily  enoxaparin Injectable 40 milliGRAM(s) SubCutaneous every 24 hours  metoprolol tartrate 25 milliGRAM(s) Oral every 12 hours  pantoprazole    Tablet 40 milliGRAM(s) Oral before breakfast  rosuvastatin 40 milliGRAM(s) Oral at bedtime  senna 2 Tablet(s) Oral at bedtime  valsartan 320 milliGRAM(s) Oral daily    MEDICATIONS  (PRN):  acetaminophen     Tablet .. 650 milliGRAM(s) Oral every 6 hours PRN Moderate Pain (4 - 6)  melatonin 6 milliGRAM(s) Oral at bedtime PRN Insomnia  polyethylene glycol 3350 17 Gram(s) Oral daily PRN Constipation      Allergies    No Known Allergies    Intolerances          VITALS  74y  Vital Signs Last 24 Hrs  T(C): 36.3 (19 Feb 2025 08:02), Max: 36.7 (18 Feb 2025 19:50)  T(F): 97.4 (19 Feb 2025 08:02), Max: 98.1 (18 Feb 2025 19:50)  HR: 60 (19 Feb 2025 08:02) (60 - 66)  BP: 137/80 (19 Feb 2025 08:02) (125/83 - 137/80)  BP(mean): --  RR: 16 (19 Feb 2025 08:02) (15 - 16)  SpO2: 94% (19 Feb 2025 08:02) (93% - 95%)    Parameters below as of 19 Feb 2025 08:02  Patient On (Oxygen Delivery Method): room air      Daily     Daily         RECENT LABS:                          14.1   6.11  )-----------( 164      ( 18 Feb 2025 06:50 )             41.3     02-18    144  |  108  |  23  ----------------------------<  72  3.7   |  25  |  0.90    Ca    8.7      18 Feb 2025 06:50    TPro  6.5  /  Alb  2.7[L]  /  TBili  0.6  /  DBili  x   /  AST  33  /  ALT  48[H]  /  AlkPhos  113  02-18    LIVER FUNCTIONS - ( 18 Feb 2025 06:50 )  Alb: 2.7 g/dL / Pro: 6.5 g/dL / ALK PHOS: 113 U/L / ALT: 48 U/L / AST: 33 U/L / GGT: x             Urinalysis Basic - ( 18 Feb 2025 06:50 )    Color: x / Appearance: x / SG: x / pH: x  Gluc: 72 mg/dL / Ketone: x  / Bili: x / Urobili: x   Blood: x / Protein: x / Nitrite: x   Leuk Esterase: x / RBC: x / WBC x   Sq Epi: x / Non Sq Epi: x / Bacteria: x          CAPILLARY BLOOD GLUCOSE

## 2025-02-19 NOTE — PROGRESS NOTE ADULT - COMMENTS
Patient stable. Denies any abdominal discomfort or feelings of constipation although last BM Monday 2/17. He denies any headache. We reviewed the results of his head CT, although informed him he would need to follow with neurology on dc, and will likely need another CT prior to decision to restart his eliquis    He asked questions re: follow up, his physicians. [place] his training with family went well

## 2025-02-19 NOTE — PROGRESS NOTE ADULT - SUBJECTIVE AND OBJECTIVE BOX
no acute events overnight  no complaints      PHYSICAL EXAM:    Vital Signs Last 24 Hrs  T(F): 97.4 (19 Feb 2025 08:02), Max: 98.1 (18 Feb 2025 19:50)  HR: 60 (19 Feb 2025 08:02) (60 - 66)  BP: 137/80 (19 Feb 2025 08:02) (125/83 - 137/80)  RR: 16 (19 Feb 2025 08:02) (15 - 16)  SpO2: 94% (19 Feb 2025 08:02) (93% - 95%)  I&O's Summary    18 Feb 2025 07:01  -  19 Feb 2025 07:00  --------------------------------------------------------  IN: 480 mL / OUT: 0 mL / NET: 480 mL        GENERAL: NAD  HEENT: NCAT  CHEST/LUNG: No increased WOB, Clear to percussion bilaterally; No rales, rhonchi, wheezing  HEART: Regular rate and rhythm; No murmurs  ABDOMEN: Soft, Nontender, Nondistended; Bowel sounds present  MUSCULOSKELETAL/EXTREMITIES:  2+ Peripheral Pulses, No LE edema  PSYCH: Appropriate affect, Alert & Oriented    LABS:                        14.1   6.11  )-----------( 164      ( 18 Feb 2025 06:50 )             41.3       02-18    144  |  108  |  23  ----------------------------<  72  3.7   |  25  |  0.90    Ca    8.7      18 Feb 2025 06:50    TPro  6.5  /  Alb  2.7  /  TBili  0.6  /  DBili  x   /  AST  33  /  ALT  48  /  AlkPhos  113  02-18                01-27 Chol 142 mg/dL LDL -- HDL 51 mg/dL Trig 124 mg/dL                  Urinalysis Basic - ( 18 Feb 2025 06:50 )    Color: x / Appearance: x / SG: x / pH: x  Gluc: 72 mg/dL / Ketone: x  / Bili: x / Urobili: x   Blood: x / Protein: x / Nitrite: x   Leuk Esterase: x / RBC: x / WBC x   Sq Epi: x / Non Sq Epi: x / Bacteria: x        COVID-19 PCR: NotDetec (01-30-25 @ 15:00)      MEDICATIONS  (STANDING):  amLODIPine   Tablet 10 milliGRAM(s) Oral daily  enoxaparin Injectable 40 milliGRAM(s) SubCutaneous every 24 hours  metoprolol tartrate 25 milliGRAM(s) Oral every 12 hours  pantoprazole    Tablet 40 milliGRAM(s) Oral before breakfast  rosuvastatin 40 milliGRAM(s) Oral at bedtime  senna 2 Tablet(s) Oral at bedtime  valsartan 320 milliGRAM(s) Oral daily    MEDICATIONS  (PRN):  acetaminophen     Tablet .. 650 milliGRAM(s) Oral every 6 hours PRN Moderate Pain (4 - 6)  melatonin 6 milliGRAM(s) Oral at bedtime PRN Insomnia  polyethylene glycol 3350 17 Gram(s) Oral daily PRN Constipation      Care Discussed with Consultants/Other Providers: Yes

## 2025-02-20 ENCOUNTER — TRANSCRIPTION ENCOUNTER (OUTPATIENT)
Age: 75
End: 2025-02-20

## 2025-02-20 VITALS
DIASTOLIC BLOOD PRESSURE: 84 MMHG | SYSTOLIC BLOOD PRESSURE: 126 MMHG | HEART RATE: 60 BPM | RESPIRATION RATE: 15 BRPM | OXYGEN SATURATION: 96 % | TEMPERATURE: 98 F

## 2025-02-20 LAB
ALBUMIN SERPL ELPH-MCNC: 2.7 G/DL — LOW (ref 3.3–5)
ALP SERPL-CCNC: 106 U/L — SIGNIFICANT CHANGE UP (ref 40–120)
ALT FLD-CCNC: 47 U/L — HIGH (ref 10–45)
ANION GAP SERPL CALC-SCNC: 9 MMOL/L — SIGNIFICANT CHANGE UP (ref 5–17)
AST SERPL-CCNC: 30 U/L — SIGNIFICANT CHANGE UP (ref 10–40)
BASOPHILS # BLD AUTO: 0.05 K/UL — SIGNIFICANT CHANGE UP (ref 0–0.2)
BASOPHILS NFR BLD AUTO: 0.9 % — SIGNIFICANT CHANGE UP (ref 0–2)
BILIRUB SERPL-MCNC: 0.5 MG/DL — SIGNIFICANT CHANGE UP (ref 0.2–1.2)
BUN SERPL-MCNC: 21 MG/DL — SIGNIFICANT CHANGE UP (ref 7–23)
CALCIUM SERPL-MCNC: 8.7 MG/DL — SIGNIFICANT CHANGE UP (ref 8.4–10.5)
CHLORIDE SERPL-SCNC: 107 MMOL/L — SIGNIFICANT CHANGE UP (ref 96–108)
CO2 SERPL-SCNC: 27 MMOL/L — SIGNIFICANT CHANGE UP (ref 22–31)
CREAT SERPL-MCNC: 0.91 MG/DL — SIGNIFICANT CHANGE UP (ref 0.5–1.3)
EGFR: 87 ML/MIN/1.73M2 — SIGNIFICANT CHANGE UP
EOSINOPHIL # BLD AUTO: 0.29 K/UL — SIGNIFICANT CHANGE UP (ref 0–0.5)
EOSINOPHIL NFR BLD AUTO: 5 % — SIGNIFICANT CHANGE UP (ref 0–6)
GLUCOSE SERPL-MCNC: 84 MG/DL — SIGNIFICANT CHANGE UP (ref 70–99)
HCT VFR BLD CALC: 40.5 % — SIGNIFICANT CHANGE UP (ref 39–50)
HGB BLD-MCNC: 13.9 G/DL — SIGNIFICANT CHANGE UP (ref 13–17)
IMM GRANULOCYTES NFR BLD AUTO: 0.2 % — SIGNIFICANT CHANGE UP (ref 0–0.9)
LYMPHOCYTES # BLD AUTO: 1.88 K/UL — SIGNIFICANT CHANGE UP (ref 1–3.3)
LYMPHOCYTES # BLD AUTO: 32.1 % — SIGNIFICANT CHANGE UP (ref 13–44)
MCHC RBC-ENTMCNC: 31.2 PG — SIGNIFICANT CHANGE UP (ref 27–34)
MCHC RBC-ENTMCNC: 34.3 G/DL — SIGNIFICANT CHANGE UP (ref 32–36)
MCV RBC AUTO: 91 FL — SIGNIFICANT CHANGE UP (ref 80–100)
MONOCYTES # BLD AUTO: 0.52 K/UL — SIGNIFICANT CHANGE UP (ref 0–0.9)
MONOCYTES NFR BLD AUTO: 8.9 % — SIGNIFICANT CHANGE UP (ref 2–14)
NEUTROPHILS # BLD AUTO: 3.1 K/UL — SIGNIFICANT CHANGE UP (ref 1.8–7.4)
NEUTROPHILS NFR BLD AUTO: 52.9 % — SIGNIFICANT CHANGE UP (ref 43–77)
NRBC BLD AUTO-RTO: 0 /100 WBCS — SIGNIFICANT CHANGE UP (ref 0–0)
PLATELET # BLD AUTO: 159 K/UL — SIGNIFICANT CHANGE UP (ref 150–400)
POTASSIUM SERPL-MCNC: 3.8 MMOL/L — SIGNIFICANT CHANGE UP (ref 3.5–5.3)
POTASSIUM SERPL-SCNC: 3.8 MMOL/L — SIGNIFICANT CHANGE UP (ref 3.5–5.3)
PROT SERPL-MCNC: 6.5 G/DL — SIGNIFICANT CHANGE UP (ref 6–8.3)
RBC # BLD: 4.45 M/UL — SIGNIFICANT CHANGE UP (ref 4.2–5.8)
RBC # FLD: 12.8 % — SIGNIFICANT CHANGE UP (ref 10.3–14.5)
SODIUM SERPL-SCNC: 143 MMOL/L — SIGNIFICANT CHANGE UP (ref 135–145)
WBC # BLD: 5.85 K/UL — SIGNIFICANT CHANGE UP (ref 3.8–10.5)
WBC # FLD AUTO: 5.85 K/UL — SIGNIFICANT CHANGE UP (ref 3.8–10.5)

## 2025-02-20 PROCEDURE — 97161 PT EVAL LOW COMPLEX 20 MIN: CPT | Mod: GP

## 2025-02-20 PROCEDURE — 36415 COLL VENOUS BLD VENIPUNCTURE: CPT

## 2025-02-20 PROCEDURE — 92507 TX SP LANG VOICE COMM INDIV: CPT | Mod: GN

## 2025-02-20 PROCEDURE — 83935 ASSAY OF URINE OSMOLALITY: CPT

## 2025-02-20 PROCEDURE — 97112 NEUROMUSCULAR REEDUCATION: CPT | Mod: GO

## 2025-02-20 PROCEDURE — 99239 HOSP IP/OBS DSCHRG MGMT >30: CPT

## 2025-02-20 PROCEDURE — 97535 SELF CARE MNGMENT TRAINING: CPT | Mod: GO

## 2025-02-20 PROCEDURE — 97116 GAIT TRAINING THERAPY: CPT | Mod: GP

## 2025-02-20 PROCEDURE — 80053 COMPREHEN METABOLIC PANEL: CPT

## 2025-02-20 PROCEDURE — 83930 ASSAY OF BLOOD OSMOLALITY: CPT

## 2025-02-20 PROCEDURE — 97110 THERAPEUTIC EXERCISES: CPT | Mod: GO

## 2025-02-20 PROCEDURE — 87635 SARS-COV-2 COVID-19 AMP PRB: CPT

## 2025-02-20 PROCEDURE — 92523 SPEECH SOUND LANG COMPREHEN: CPT | Mod: GN

## 2025-02-20 PROCEDURE — 85025 COMPLETE CBC W/AUTO DIFF WBC: CPT

## 2025-02-20 PROCEDURE — 80048 BASIC METABOLIC PNL TOTAL CA: CPT

## 2025-02-20 PROCEDURE — 93970 EXTREMITY STUDY: CPT

## 2025-02-20 PROCEDURE — 92610 EVALUATE SWALLOWING FUNCTION: CPT | Mod: GN

## 2025-02-20 PROCEDURE — 70450 CT HEAD/BRAIN W/O DYE: CPT | Mod: MC

## 2025-02-20 PROCEDURE — 92611 MOTION FLUOROSCOPY/SWALLOW: CPT | Mod: GN

## 2025-02-20 PROCEDURE — 97165 OT EVAL LOW COMPLEX 30 MIN: CPT | Mod: GO

## 2025-02-20 PROCEDURE — 97530 THERAPEUTIC ACTIVITIES: CPT | Mod: GP

## 2025-02-20 PROCEDURE — 74230 X-RAY XM SWLNG FUNCJ C+: CPT

## 2025-02-20 PROCEDURE — 99231 SBSQ HOSP IP/OBS SF/LOW 25: CPT

## 2025-02-20 PROCEDURE — 92526 ORAL FUNCTION THERAPY: CPT | Mod: GN

## 2025-02-20 RX ADMIN — Medication 320 MILLIGRAM(S): at 06:38

## 2025-02-20 RX ADMIN — METOPROLOL SUCCINATE 25 MILLIGRAM(S): 50 TABLET, EXTENDED RELEASE ORAL at 06:39

## 2025-02-20 RX ADMIN — AMLODIPINE BESYLATE 10 MILLIGRAM(S): 10 TABLET ORAL at 06:38

## 2025-02-20 RX ADMIN — Medication 40 MILLIGRAM(S): at 06:39

## 2025-02-20 NOTE — PROGRESS NOTE ADULT - RESPIRATORY
clear to auscultation bilaterally
clear to auscultation bilaterally/no respiratory distress
clear to auscultation bilaterally/no wheezes/no use of accessory muscles/breath sounds equal/good air movement
clear to auscultation bilaterally/no respiratory distress
no respiratory distress/no use of accessory muscles/respirations non-labored
normal/clear to auscultation bilaterally/no wheezes/no rales/no rhonchi/no respiratory distress/no use of accessory muscles/good air movement
clear to auscultation bilaterally/no wheezes/no rhonchi/no respiratory distress/no use of accessory muscles/breath sounds equal/good air movement/respirations non-labored
clear to auscultation bilaterally/no respiratory distress
clear to auscultation bilaterally/no wheezes/no respiratory distress
clear to auscultation bilaterally/no wheezes/no rales/no respiratory distress/good air movement

## 2025-02-20 NOTE — PROGRESS NOTE ADULT - MENTAL STATUS
Alert and oriented. Still with word-finding difficulty and semantic errors, but able to make needs known and following commands and answering questions appropriately.
has some difficulty with multi step command following
Alert and oriented. Conversant. Still with aphasia, but improving overall and able to answer yes/no questions and make needs known.
Alert and oriented. Following commands and able to make needs known verbally with improving aphasia.
Alert, oriented. Baseline level of aphasia and difficulty getting complex sentences/messages across, but able to consistently answer yes/no questions and follow simple commands. Pleasant overall although somewhat frustrated with difficulty getting correct words out to get point across (more detail in ROS/subjective section).
Alert and oriented x3. Still with some speech difficulty with both fluent and nonfluent components of aphasia with apraxia. Is able to understand and respond with "yes/no" answers and follow simple commands appropriately and able to make needs known.

## 2025-02-20 NOTE — PROGRESS NOTE ADULT - CARDIOVASCULAR
regular rate and rhythm/S1 S2 present
normal/regular rate and rhythm/S1 S2 present
regular rate and rhythm/S1 S2 present
regular rate and rhythm
regular rate and rhythm
regular rate and rhythm/S1 S2 present
regular rate and rhythm
regular rate and rhythm/S1 S2 present
rate controlled/regular rate and rhythm
regular rate and rhythm
S1 S2 present/no pedal edema
regular rate and rhythm

## 2025-02-20 NOTE — PROGRESS NOTE ADULT - MOTOR
BUE and LE normal tone  motor 5/5 BUE and LE with very minor weakness right hand
RUE  shAbduction 4/5. EF and EE 5/5.  4/5.  LUE 5/5 throughout.  Bl LEs 5/5 throughout in HF, KE, DF and PF.  Intact to LT in bl UEs and LEs  bilateral calves soft no TTP
no calf swelling +soft no tTP  no tremors  BUE and LE motor 5/.5 except right hand 5-/5
RUE mild hypotonia, shoulder 4+/5 elbow flexion 5-/5 extension 5-/5 gross grasp 4/5  left UE normal tone motor 5/5  right HF 4/5 quad 5/5 ham 5-/5 ankle DF 5-/5  lef tLE 5/5  no calf swelling or pedal edema
right gross grasp and intrinsic 4+/5  left 5/5  bilateral shoulder, elbow flexion and extension 5./5  BLE 5/5
4/5  on RUE. Otherwise RUE 5/5 and LUE 5/5 throughout.  Bl LEs with 5/5 HF, DF, and PF.
BUE and LE motor 5/5  calves soft no TTP  reduced FMC but significant improvement dysmetria right
BUE and LE normal tone and ROM  motor 5/5 except right gross grasp 5-/5 wrist extension 5-/5  calves soft no TTP
motor 5/5 BUE and LE except right gross grasp 5-/5  calves soft no TTP
No focal deficits.
BUE and LE normal tone  motor 5/5 BUE and LE  calves soft no TTP  reduced finger to nose coordination +dysmetria
5/5 strength throughout bl UE and bl LE.
5/5 strength throughout bl UE and bl LE.
RUE  shAbduction 4/5. EF and EE 5/5.  4/5.  LUE 5/5 throughout.  Bl LEs 5/5 throughout in HF, KE, DF and PF.

## 2025-02-20 NOTE — PROGRESS NOTE ADULT - REASON FOR ADMISSION
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

## 2025-02-20 NOTE — PROGRESS NOTE ADULT - SUBJECTIVE AND OBJECTIVE BOX
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 Wife woke up in 0545 1/26 finding the patient on the floor and complaining of right sided weakness and word finding difficulties. LKN 1/25 2230 when the patient went to bed. He took his apixaban and medications for HLD and HTN prior to going to bed. Upon finding the patient on the floor, the patient was transferred to the [place] ED. NIHSS 10 (+1 face, +3 RUE, +3 RLE, +1 Aphasia, +2, dysarthria). mRS 0    CT head demonstrated 5.8 x 2.6 cm left basal ganglia hemorrhage with 1-2mm rightward midline shift. 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 Received andexanet and levetiracetam 1g x1. He was transferred to [facility] 01/26 for further eval. Neurology recommended holding apixab through 03/09 per neuro (hold apixaban for 5-6 weeks, then will make further decisions regarding restarting and/or a Watchman as outpatient given Afib).    Evaluated by PM&R and discharged to [facility] acute rehab 01/29.  (30 Jan 2025 16:58)    Subjective/ROS: Patient seen and evaluated at bedside. Discussed plan for dc this afternoon and patient is eager to return home. Reviewed plan and medications for discharge to which patient expressed understanding and agreement. Also previously discussed with family with understanding and agreement. Denies any issues/complaints overnight. Denies any HA/visual changes or other symptoms this AM. All questions/concerns addressed in full at bedside.       PAST MEDICAL & SURGICAL HISTORY:  Stroke      Chronic atrial fibrillation      HTN (hypertension)      HLD (hyperlipidemia)      No significant past surgical history          MEDICATIONS  (STANDING):  amLODIPine   Tablet 10 milliGRAM(s) Oral daily  enoxaparin Injectable 40 milliGRAM(s) SubCutaneous every 24 hours  metoprolol tartrate 25 milliGRAM(s) Oral every 12 hours  pantoprazole    Tablet 40 milliGRAM(s) Oral before breakfast  rosuvastatin 40 milliGRAM(s) Oral at bedtime  senna 2 Tablet(s) Oral at bedtime  valsartan 320 milliGRAM(s) Oral daily    MEDICATIONS  (PRN):  acetaminophen     Tablet .. 650 milliGRAM(s) Oral every 6 hours PRN Moderate Pain (4 - 6)  melatonin 6 milliGRAM(s) Oral at bedtime PRN Insomnia  polyethylene glycol 3350 17 Gram(s) Oral daily PRN Constipation        Allergies    No Known Allergies    Intolerances          VITALS  74y  Vital Signs Last 24 Hrs  T(C): 36.4 (20 Feb 2025 07:53), Max: 36.4 (20 Feb 2025 07:53)  T(F): 97.6 (20 Feb 2025 07:53), Max: 97.6 (20 Feb 2025 07:53)  HR: 60 (20 Feb 2025 07:53) (60 - 90)  BP: 126/84 (20 Feb 2025 07:53) (126/84 - 142/80)  BP(mean): --  RR: 15 (20 Feb 2025 07:53) (15 - 16)  SpO2: 96% (20 Feb 2025 07:53) (93% - 96%)    Parameters below as of 20 Feb 2025 07:53  Patient On (Oxygen Delivery Method): room air          RECENT LABS:                                    13.9   5.85  )-----------( 159      ( 20 Feb 2025 05:20 )             40.5     02-20    143  |  107  |  21  ----------------------------<  84  3.8   |  27  |  0.91    Ca    8.7      20 Feb 2025 05:20    TPro  6.5  /  Alb  2.7[L]  /  TBili  0.5  /  DBili  x   /  AST  30  /  ALT  47[H]  /  AlkPhos  106  02-20      Urinalysis Basic - ( 20 Feb 2025 05:20 )    Color: x / Appearance: x / SG: x / pH: x  Gluc: 84 mg/dL / Ketone: x  / Bili: x / Urobili: x   Blood: x / Protein: x / Nitrite: x   Leuk Esterase: x / RBC: x / WBC x   Sq Epi: x / Non Sq Epi: x / Bacteria: x      CAPILLARY BLOOD GLUCOSE                             Patient is a 74y old  Male who presents with a chief complaint of 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 Wife woke up in 0545 1/26 finding the patient on the floor and complaining of right sided weakness and word finding difficulties. LKN 1/25 2230 when the patient went to bed. He took his apixaban and medications for HLD and HTN prior to going to bed. Upon finding the patient on the floor, the patient was transferred to the [place] ED. NIHSS 10 (+1 face, +3 RUE, +3 RLE, +1 Aphasia, +2, dysarthria). mRS 0    CT head demonstrated 5.8 x 2.6 cm left basal ganglia hemorrhage with 1-2mm rightward midline shift. 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 Received andexanet and levetiracetam 1g x1. He was transferred to [facility] 01/26 for further eval. Neurology recommended holding apixab through 03/09 per neuro (hold apixaban for 5-6 weeks, then will make further decisions regarding restarting and/or a Watchman as outpatient given Afib).    Evaluated by PM&R and discharged to [facility] acute rehab 01/29.  (30 Jan 2025 16:58)    Subjective/ROS: Patient seen and evaluated at bedside. Discussed plan for dc this afternoon and patient is eager to return home. Reviewed plan and medications for discharge to which patient expressed understanding and agreement. Also previously discussed with family with understanding and agreement. Denies any issues/complaints overnight. Denies any HA/visual changes or other symptoms this AM. All questions/concerns addressed in full at bedside.       PAST MEDICAL & SURGICAL HISTORY:  Stroke      Chronic atrial fibrillation      HTN (hypertension)      HLD (hyperlipidemia)      No significant past surgical history          MEDICATIONS  (STANDING):  amLODIPine   Tablet 10 milliGRAM(s) Oral daily  enoxaparin Injectable 40 milliGRAM(s) SubCutaneous every 24 hours  metoprolol tartrate 25 milliGRAM(s) Oral every 12 hours  pantoprazole    Tablet 40 milliGRAM(s) Oral before breakfast  rosuvastatin 40 milliGRAM(s) Oral at bedtime  senna 2 Tablet(s) Oral at bedtime  valsartan 320 milliGRAM(s) Oral daily    MEDICATIONS  (PRN):  acetaminophen     Tablet .. 650 milliGRAM(s) Oral every 6 hours PRN Moderate Pain (4 - 6)  melatonin 6 milliGRAM(s) Oral at bedtime PRN Insomnia  polyethylene glycol 3350 17 Gram(s) Oral daily PRN Constipation        Allergies    No Known Allergies    Intolerances          VITALS  74y  Vital Signs Last 24 Hrs  T(C): 36.4 (20 Feb 2025 07:53), Max: 36.4 (20 Feb 2025 07:53)  T(F): 97.6 (20 Feb 2025 07:53), Max: 97.6 (20 Feb 2025 07:53)  HR: 60 (20 Feb 2025 07:53) (60 - 90)  BP: 126/84 (20 Feb 2025 07:53) (126/84 - 142/80)  BP(mean): --  RR: 15 (20 Feb 2025 07:53) (15 - 16)  SpO2: 96% (20 Feb 2025 07:53) (93% - 96%)    Parameters below as of 20 Feb 2025 07:53  Patient On (Oxygen Delivery Method): room air          RECENT LABS:                                    13.9   5.85  )-----------( 159      ( 20 Feb 2025 05:20 )             40.5     02-20    143  |  107  |  21  ----------------------------<  84  3.8   |  27  |  0.91    Ca    8.7      20 Feb 2025 05:20    TPro  6.5  /  Alb  2.7[L]  /  TBili  0.5  /  DBili  x   /  AST  30  /  ALT  47[H]  /  AlkPhos  106  02-20      Urinalysis Basic - ( 20 Feb 2025 05:20 )    Color: x / Appearance: x / SG: x / pH: x  Gluc: 84 mg/dL / Ketone: x  / Bili: x / Urobili: x   Blood: x / Protein: x / Nitrite: x   Leuk Esterase: x / RBC: x / WBC x   Sq Epi: x / Non Sq Epi: x / Bacteria: x      CAPILLARY BLOOD GLUCOSE

## 2025-02-20 NOTE — PROGRESS NOTE ADULT - CONSTITUTIONAL
normal/well-groomed
normal/well-groomed/no distress
well-groomed/no distress
well-groomed/no distress

## 2025-02-20 NOTE — DISCHARGE NOTE NURSING/CASE MANAGEMENT/SOCIAL WORK - PATIENT PORTAL LINK FT
You can access the FollowMyHealth Patient Portal offered by [facility] by registering at the following website: [email or web address]. By joining [facility]’s FollowMyHealth portal, you will also be able to view your health information using other applications (apps) compatible with our system.

## 2025-02-20 NOTE — PROGRESS NOTE ADULT - GASTROINTESTINAL
soft/nontender/nondistended
soft/nontender/nondistended
soft/nontender/nondistended/normal active bowel sounds
soft/nontender/nondistended
soft/nontender
soft/nontender/nondistended
soft/nontender/nondistended
soft/nontender/normal active bowel sounds
soft/nontender
soft/nontender/nondistended
soft/nontender/normal active bowel sounds
soft/nontender/nondistended
soft/nontender/nondistended
soft/nontender

## 2025-02-20 NOTE — PROGRESS NOTE ADULT - TIME BILLING
Time spent includes direct patient care  (interview and examination of patient), discussion with other providers, support staff and/or patient's family members, review of medical records, imaging and lab results.
Time spent includes direct patient care (interview and examination of patient), discussion with other providers, support staff and/or patient's family members, review of medical records, ordering diagnostic tests and analyzing results, and documentation.
Time spent includes direct patient care  (interview and examination of patient), discussion with other providers, support staff and/or patient's family members, review of medical records, imaging and lab results.
Time spent includes direct patient care  (interview and examination of patient), discussion with other providers, support staff and/or patient's family members, review of medical records, ordering diagnostic tests and analyzing results, and documentation.
Time spent includes direct patient care  (interview and examination of patient), discussion with other providers, support staff and/or patient's family members, review of medical records, imaging and lab results.
Time spent includes direct patient care  (interview and examination of patient), discussion with other providers, support staff and/or patient's family members, review of medical records, imaging and lab results.
Time spent includes direct patient care  (interview and examination of patient), discussion with other providers, support staff and/or patient's family members, review of medical records, ordering diagnostic tests and analyzing results, and documentation.
Time spent includes direct patient care  (interview and examination of patient), discussion with other providers, support staff and/or patient's family members, review of medical records, ordering diagnostic tests and analyzing results, and documentation.

## 2025-02-20 NOTE — PROGRESS NOTE ADULT - SUBJECTIVE AND OBJECTIVE BOX
overall doing well, no complaints      PHYSICAL EXAM:    Vital Signs Last 24 Hrs  T(F): 97.6 (20 Feb 2025 07:53), Max: 97.6 (20 Feb 2025 07:53)  HR: 60 (20 Feb 2025 07:53) (60 - 90)  BP: 126/84 (20 Feb 2025 07:53) (126/84 - 142/80)  RR: 15 (20 Feb 2025 07:53) (15 - 16)  SpO2: 96% (20 Feb 2025 07:53) (93% - 96%)  I&O's Summary      GENERAL: NAD  HEENT: NCAT  CHEST/LUNG: No increased WOB, Clear to percussion bilaterally; No rales, rhonchi, wheezing  HEART: Regular rate and rhythm; No murmurs  ABDOMEN: Soft, Nontender, Nondistended; Bowel sounds present  MUSCULOSKELETAL/EXTREMITIES:  2+ Peripheral Pulses, No LE edema  PSYCH: Appropriate affect, Alert & Oriented    LABS:                        13.9   5.85  )-----------( 159      ( 20 Feb 2025 05:20 )             40.5       02-20    143  |  107  |  21  ----------------------------<  84  3.8   |  27  |  0.91    Ca    8.7      20 Feb 2025 05:20    TPro  6.5  /  Alb  2.7  /  TBili  0.5  /  DBili  x   /  AST  30  /  ALT  47  /  AlkPhos  106  02-20 01-27 Chol 142 mg/dL LDL -- HDL 51 mg/dL Trig 124 mg/dL                  Urinalysis Basic - ( 20 Feb 2025 05:20 )    Color: x / Appearance: x / SG: x / pH: x  Gluc: 84 mg/dL / Ketone: x  / Bili: x / Urobili: x   Blood: x / Protein: x / Nitrite: x   Leuk Esterase: x / RBC: x / WBC x   Sq Epi: x / Non Sq Epi: x / Bacteria: x        COVID-19 PCR: NotDetec (01-30-25 @ 15:00)      MEDICATIONS  (STANDING):  amLODIPine   Tablet 10 milliGRAM(s) Oral daily  enoxaparin Injectable 40 milliGRAM(s) SubCutaneous every 24 hours  metoprolol tartrate 25 milliGRAM(s) Oral every 12 hours  pantoprazole    Tablet 40 milliGRAM(s) Oral before breakfast  rosuvastatin 40 milliGRAM(s) Oral at bedtime  senna 2 Tablet(s) Oral at bedtime  valsartan 320 milliGRAM(s) Oral daily    MEDICATIONS  (PRN):  acetaminophen     Tablet .. 650 milliGRAM(s) Oral every 6 hours PRN Moderate Pain (4 - 6)  melatonin 6 milliGRAM(s) Oral at bedtime PRN Insomnia  polyethylene glycol 3350 17 Gram(s) Oral daily PRN Constipation      Care Discussed with Consultants/Other Providers: Yes

## 2025-02-20 NOTE — PROGRESS NOTE ADULT - ATTENDING COMMENTS
Progress note amended to include my discussions with patient, resident, hospitalist, RN, SW, PT and my findings    Patient seen eating breakfast. no coughing or choking, no impulsivity noted during feeds. He is aware of going home, less anxious than yesterday. Neurologist who saw him at [facility] Dr Richard Libman, however referral made for neuro closer to his residence (Dr. Hickman). Informed patient that if he has difficulty in obtaining timely appointment to let our team know so we can reach out, as will need neuro in conjunction with cardiology clearance re: possible restarting eliquis. His BP and HR are currently controlled on his current medications    He feels noted improvement in his word finding, and is more hopeful overall. During our conversation today, he strung 3 long phrases together, with minimal word finding issues (occ self corrects, less perseverative, some paraphasic errors). Mood overall also seems improved. We discussed outpatient therapy referrals, time frame re: recovery, positive outlook given continued gains in speech including recently. He is tolerating his medications, and mild transaminitis has nearly resolved, to continue on statin    Patient is medically cleared for dc home today, and he is agreeable with dc and plan. Caregiver training has been completed, Stroke education discussed. Time spent for evaluation, discussion, coordination care for dc 40 min      RECENT LABS    Vital Signs Last 24 Hrs  T(C): 36.4 (20 Feb 2025 07:53), Max: 36.4 (20 Feb 2025 07:53)  T(F): 97.6 (20 Feb 2025 07:53), Max: 97.6 (20 Feb 2025 07:53)  HR: 60 (20 Feb 2025 07:53) (60 - 90)  BP: 126/84 (20 Feb 2025 07:53) (126/84 - 142/80)  BP(mean): --  RR: 15 (20 Feb 2025 07:53) (15 - 16)  SpO2: 96% (20 Feb 2025 07:53) (93% - 96%)    Parameters below as of 20 Feb 2025 07:53  Patient On (Oxygen Delivery Method): room air                              13.9   5.85  )-----------( 159      ( 20 Feb 2025 05:20 )             40.5     02-20    143  |  107  |  21  ----------------------------<  84  3.8   |  27  |  0.91    Ca    8.7      20 Feb 2025 05:20    TPro  6.5  /  Alb  2.7[L]  /  TBili  0.5  /  DBili  x   /  AST  30  /  ALT  47[H]  /  AlkPhos  106  02-20      Urinalysis Basic - ( 20 Feb 2025 05:20 )    Color: x / Appearance: x / SG: x / pH: x  Gluc: 84 mg/dL / Ketone: x  / Bili: x / Urobili: x   Blood: x / Protein: x / Nitrite: x   Leuk Esterase: x / RBC: x / WBC x   Sq Epi: x / Non Sq Epi: x / Bacteria: x      CAPILLARY BLOOD GLUCOSE

## 2025-02-20 NOTE — PROGRESS NOTE ADULT - ASSESSMENT
74M with HTN, AF, hx CVA, now in acute rehab after ICH    labs 2/20 reviewed    #Non traumatic ICH  #Aphasia due to above  -PT/OT/SLP  -aggressive BP control  -Eliquis stopped    #History of CVA  -c/w statin  -Eliquis on hold    #PAF  -c/w Lopressor  -Hold Eliquis due to recent ICH  -outpatient neurology clearance to see when to resume    #ISIS  -c/w CPAP    #Transaminitis  -routine repeat next week  -statin could play a role, but no need to stop it at this time  -if the LFTs start to rise further in the future, will check RUQ US    #Essential HTN  -c/w Valsartan  -c/w Lopressor  -c/w Norvasc  -BP currently acceptable    #DVT ppx: Lovenox

## 2025-02-20 NOTE — PROGRESS NOTE ADULT - ASSESSMENT
74M RHD w/ PMH HTN, afib (on apixaban, started 2017), and CVA (w/ residual min right-sided weakness), admitted to [facility] acute rehab w/ left basal ganglia hemorrhage     # non traumatic acute left BG hemorrhage with right hemibody involvement, right HP, apraxia speech, motor apraxia, incoordination, dysarthria, dysphagia  - CTA h/n: stable near complete occlusion of left distal M1  - CT HEAD 2/18 :  large left basal ganglia hemorrhage previously identified in the lentiform nucleus measuring 5.4 cm in AP diameter it is smaller now measuring 3.8 cm. There is also less density present consistent with resolving hemorrhage. There is no change in mild vasogenic edema and mass effect on the left lateral ventricle. IMPRESSION: Resolving left basal ganglia hemorrhage compared with 1/27/2025.  - Rosuvastatin 40mg qhs  - Continue comprehensive rehab program PT OT SLP 3 hours daily 5 x week. Recreation therapy.   - Plan for dc today with outpatient PT, OT SLP at [facility]. Discussed with patient/family  - Neurology: Dr Richard Libman follow up on dc  - precautions: fall, aspiration, cardiac    # atrial fibrillation  # HTN  - *Hold apixaban through 03/09 (5-6 weeks per neuro). We reviewed with family medications, risks/benefits, and need for clearance by neurology before resumption  - amlodipine 10mg daily  - Lopressor 25mg BID  - Valsartan 320mg daily  - BP:  (126/84 - 142/80) 2/20  - Cardiology f/u on dc, will need neuro clearance to resume AC. Discussed with patient    # ISIS  # home CPAP  - Using home CPAP (family brought in personal machine)  - Patient compliant in community per family    # Transaminitis   - No recent med changes. Has been on crestor (home med)  - Mild elevation, continue statin for now as discussed with hospitalist  -  AST  37  /  ALT  55[H]  /  AlkPhos  126[H]  02-13 -> AST  33  /  ALT  48[H]  /  AlkPhos  113  02-18 --> AST  30  /  ALT  47[H]  /  AlkPhos  106  02-20  - CMP 2/20 with resolving transaminases as above; PCP f/u on dc    # pain  - acetaminophen 650mg q6h PRN    # bowel  - scheduled: senna 2tab qhs  - PRN: Miralax BID    # Oral Thrush  - Nystatin s/s 4xdaily x7days    # sleep  - melatonin 3mg hs PRN  - still has interrupted, reduced quality sleep however defers additional sleeping meds at this time. Denies significant daytime drowsiness    # diet   - MBS 2/12 9 AM--> upgrade to regular solid and thin liquids with single sips DASH/TLC. Reviewed with SLP and patient    # Hypernatremia/KEAGAN, resolved  - urine osm 749 2/4. Sosm 304 2/5  - Encourage increased PO intake  - Na 148 2/3-> 144 2/6--> 144 2/10  - BUn/Cr 34/1 --> 29/0.87 2/6--> 24/0.84 2/10 --> 25/0.92 2/13 Stable --> 23/0.9 2/18 --> 21/0.91 stable on 2/20  - PCP f/u on dc    # DVT ppx  - LE doppler 1/30: neg for DVT bilat. LEs  - lovenox 40 mg daily (started 1/29)    # Case discussed in IDT rounds 2/14 (follow up)  - continent, skin intact,  upgrade to regular solid and thin liquids with single sips DASH/TLC.; improving speech and language skills akua receptive language; follows short sentences and write short phrases; improved oral reading skills; CG for ADLs and CG functional transfers without AD; can use RUE as gross functional assist but difficulty FMD; CG/min assist bed mobility and transfers, ambulates 125 feet with CG/min assist without AD. stairs with CG/min assist and 1 HR, Participates in music therapy to improve speech production, therapeutic choir  - barriers: right UP, stairs, communication decreased balance and coordination, insight  - goals: "enhance coordination"  supervision ADLs and transfers, communicate wants and needs effectively and consistently with min assist (progressing); ambulate to bathroom with appropriate AD and toilet with supervision (progressing); supervision  - on target: 2/20 home with caregiver assist. Patient/family opted for outpatient services at [facility], PT Ot SLP, RX placed in [employer]  - caregiver training completed 2/18  - Medical meeting with wife and daughter 2/4    # LABS      Corrected contact information:  Spouse Nicolle Browne - [phone]  Daughter Gracy: [phone]  ---------------  Outpatient Follow-up:    Lynda Zimmerman  NP in Family Health  [address], [address]  [place] [address]  Phone: [phone]  Fax: [phone]  Follow Up Time:     Manoj Edge  [address], [address]  [place] [address]  Phone: [phone]  Fax: [phone]  Follow Up Time: 2 weeks           74M RHD w/ PMH HTN, afib (on apixaban, started 2017), and CVA (w/ residual min right-sided weakness), admitted to [facility] acute rehab w/ left basal ganglia hemorrhage     # non traumatic acute left BG hemorrhage with right hemibody involvement, right HP, apraxia speech, motor apraxia, incoordination, dysarthria, dysphagia  - CT HEAD 2/18 :  large left basal ganglia hemorrhage previously identified in the lentiform nucleus measuring 5.4 cm in AP diameter it is smaller now measuring 3.8 cm. There is also less density present consistent with resolving hemorrhage. There is no change in mild vasogenic edema and mass effect on the left lateral ventricle. IMPRESSION: Resolving left basal ganglia hemorrhage compared with 1/27/2025.  - Rosuvastatin 40mg qhs  - Plan for dc today with outpatient PT, OT SLP at [facility]  - Neurology: Dr Baltazar Hickman follow up on dc    # atrial fibrillation  # HTN  - amlodipine 10mg daily  - Lopressor 25mg BID  - Valsartan 320mg daily  - BP:  (126/84 - 142/80) 2/20  - Cardiology f/u Dr Edge on dc, will need neuro clearance to resume AC (est 5-6 weeks post CVA). Discussed with patient    # ISIS  # home CPAP  - Using home CPAP    # Transaminitis   - No recent med changes. Has been on crestor (home med)  - Mild elevation, continue statin for now as discussed with hospitalist  -  AST  37  /  ALT  55[H]  /  AlkPhos  126[H]  02-13 -> AST  33  /  ALT  48[H]  /  AlkPhos  113  02-18 --> AST  30  /  ALT  47[H]  /  AlkPhos  106  02-20 resolving  - PCP f/u on dc    # pain  - acetaminophen 650mg q6h PRN    # bowel  - scheduled: senna 2tab qhs  - PRN: Miralax BID    # diet   -  regular solid and thin liquids with single sips DASH/TLC    # Hypernatremia/KEAGAN, resolved  - urine osm 749 2/4. Sosm 304 2/5  - Encourage increased PO intake  - Na 148 2/3-> 143 2/20 resolved  - BUn/Cr 34/1 --> 21/0.91 stable on 2/20  - PCP f/u on dc    # DVT ppx  - LE doppler 1/30: neg for DVT bilat. LEs  - lovenox 40 mg daily (started 1/29)    # Medically cleared for dc home 2/20 with caregiver assist. Patient/family opted for outpatient services at [facility], PT Ot SLP, RX placed in [employer]  - caregiver training completed 2/18  - Medical meeting with wife and daughter 2/4      Corrected contact information:  Spouse Nicolle Browne - [phone]  Daughter Gracy: [phone]  ---------------  Outpatient Follow-up:    Lynda Zimmerman  NP in Family Health  [address] [place] [address], [address]  [place] [address]  Phone: [phone]  Fax: [phone]  Follow Up Time:     Manoj Edge  Cardiology  [address] [place] [address], [address]  [place] [address]  Phone: [phone]  Fax: [phone]  Follow Up Time: 2 weeks    Neurology: Dr Baltazar Hickman  PMR; Dr Gracy Hines

## 2025-02-20 NOTE — DISCHARGE NOTE NURSING/CASE MANAGEMENT/SOCIAL WORK - FINANCIAL ASSISTANCE
[facility] provides services at a reduced cost to those who are determined to be eligible through [facility]’s financial assistance program. Information regarding [facility]’s financial assistance program can be found by going to [email or web address] or by calling [phone].

## 2025-02-21 RX ORDER — AMLODIPINE BESYLATE 10 MG/1
10 TABLET ORAL
Qty: 90 | Refills: 3 | Status: ACTIVE | COMMUNITY
Start: 2025-02-21

## 2025-02-21 RX ORDER — VALSARTAN 320 MG/1
320 TABLET, COATED ORAL DAILY
Refills: 0 | Status: ACTIVE | COMMUNITY
Start: 2025-02-21

## 2025-02-25 ENCOUNTER — NON-APPOINTMENT (OUTPATIENT)
Age: 75
End: 2025-02-25

## 2025-02-25 ENCOUNTER — APPOINTMENT (OUTPATIENT)
Dept: NEUROLOGY | Facility: CLINIC | Age: 75
End: 2025-02-25
Payer: MEDICARE

## 2025-02-25 VITALS
BODY MASS INDEX: 26.22 KG/M2 | WEIGHT: 173 LBS | SYSTOLIC BLOOD PRESSURE: 119 MMHG | DIASTOLIC BLOOD PRESSURE: 84 MMHG | HEIGHT: 68 IN | HEART RATE: 81 BPM | TEMPERATURE: 98.4 F

## 2025-02-25 PROCEDURE — G2211 COMPLEX E/M VISIT ADD ON: CPT

## 2025-02-25 PROCEDURE — 99215 OFFICE O/P EST HI 40 MIN: CPT

## 2025-02-28 ENCOUNTER — APPOINTMENT (OUTPATIENT)
Dept: FAMILY MEDICINE | Facility: CLINIC | Age: 75
End: 2025-02-28
Payer: MEDICARE

## 2025-02-28 VITALS
RESPIRATION RATE: 20 BRPM | HEIGHT: 68 IN | HEART RATE: 76 BPM | SYSTOLIC BLOOD PRESSURE: 114 MMHG | BODY MASS INDEX: 26.07 KG/M2 | DIASTOLIC BLOOD PRESSURE: 70 MMHG | WEIGHT: 172 LBS

## 2025-02-28 DIAGNOSIS — G81.91 HEMIPLEGIA, UNSPECIFIED AFFECTING RIGHT DOMINANT SIDE: ICD-10-CM

## 2025-02-28 DIAGNOSIS — I61.0 NONTRAUMATIC INTRACEREBRAL HEMORRHAGE IN HEMISPHERE, SUBCORTICAL: ICD-10-CM

## 2025-02-28 PROCEDURE — 36415 COLL VENOUS BLD VENIPUNCTURE: CPT

## 2025-02-28 PROCEDURE — 99495 TRANSJ CARE MGMT MOD F2F 14D: CPT

## 2025-03-01 LAB
ALBUMIN SERPL ELPH-MCNC: 4.1 G/DL
ALP BLD-CCNC: 101 U/L
ALT SERPL-CCNC: 27 U/L
ANION GAP SERPL CALC-SCNC: 14 MMOL/L
AST SERPL-CCNC: 28 U/L
BILIRUB SERPL-MCNC: 0.6 MG/DL
BUN SERPL-MCNC: 15 MG/DL
CALCIUM SERPL-MCNC: 9.2 MG/DL
CHLORIDE SERPL-SCNC: 105 MMOL/L
CHOLEST SERPL-MCNC: 140 MG/DL
CO2 SERPL-SCNC: 26 MMOL/L
CREAT SERPL-MCNC: 1.15 MG/DL
EGFR: 66 ML/MIN/1.73M2
FOLATE SERPL-MCNC: 12.6 NG/ML
GLUCOSE SERPL-MCNC: 86 MG/DL
HCT VFR BLD CALC: 44.5 %
HDLC SERPL-MCNC: 51 MG/DL
HGB BLD-MCNC: 14.8 G/DL
LDLC SERPL CALC-MCNC: 65 MG/DL
MCHC RBC-ENTMCNC: 31.6 PG
MCHC RBC-ENTMCNC: 33.3 G/DL
MCV RBC AUTO: 94.9 FL
NONHDLC SERPL-MCNC: 89 MG/DL
PLATELET # BLD AUTO: 170 K/UL
POTASSIUM SERPL-SCNC: 3.9 MMOL/L
PROT SERPL-MCNC: 7 G/DL
RBC # BLD: 4.69 M/UL
RBC # FLD: 13.2 %
SODIUM SERPL-SCNC: 144 MMOL/L
TRIGL SERPL-MCNC: 143 MG/DL
VIT B12 SERPL-MCNC: 777 PG/ML
WBC # FLD AUTO: 5.79 K/UL

## 2025-03-03 LAB
ESTIMATED AVERAGE GLUCOSE: 97 MG/DL
HBA1C MFR BLD HPLC: 5 %

## 2025-03-06 ENCOUNTER — APPOINTMENT (OUTPATIENT)
Dept: CARDIOLOGY | Facility: CLINIC | Age: 75
End: 2025-03-06
Payer: MEDICARE

## 2025-03-06 ENCOUNTER — NON-APPOINTMENT (OUTPATIENT)
Age: 75
End: 2025-03-06

## 2025-03-06 VITALS
BODY MASS INDEX: 25.76 KG/M2 | DIASTOLIC BLOOD PRESSURE: 74 MMHG | WEIGHT: 170 LBS | OXYGEN SATURATION: 95 % | HEIGHT: 68 IN | HEART RATE: 65 BPM | SYSTOLIC BLOOD PRESSURE: 125 MMHG

## 2025-03-06 DIAGNOSIS — Z87.891 PERSONAL HISTORY OF NICOTINE DEPENDENCE: ICD-10-CM

## 2025-03-06 DIAGNOSIS — I69.320 APHASIA FOLLOWING CEREBRAL INFARCTION: ICD-10-CM

## 2025-03-06 DIAGNOSIS — I48.91 UNSPECIFIED ATRIAL FIBRILLATION: ICD-10-CM

## 2025-03-06 DIAGNOSIS — I67.9 CEREBROVASCULAR DISEASE, UNSPECIFIED: ICD-10-CM

## 2025-03-06 PROCEDURE — 99215 OFFICE O/P EST HI 40 MIN: CPT

## 2025-03-06 PROCEDURE — G2211 COMPLEX E/M VISIT ADD ON: CPT

## 2025-03-06 PROCEDURE — 93000 ELECTROCARDIOGRAM COMPLETE: CPT

## 2025-03-21 ENCOUNTER — RESULT CHARGE (OUTPATIENT)
Age: 75
End: 2025-03-21

## 2025-03-21 ENCOUNTER — APPOINTMENT (OUTPATIENT)
Dept: ELECTROPHYSIOLOGY | Facility: CLINIC | Age: 75
End: 2025-03-21
Payer: MEDICARE

## 2025-03-21 VITALS
WEIGHT: 172 LBS | HEART RATE: 59 BPM | DIASTOLIC BLOOD PRESSURE: 75 MMHG | OXYGEN SATURATION: 95 % | SYSTOLIC BLOOD PRESSURE: 128 MMHG | BODY MASS INDEX: 26.15 KG/M2

## 2025-03-21 PROCEDURE — 99205 OFFICE O/P NEW HI 60 MIN: CPT

## 2025-03-21 PROCEDURE — 93280 PM DEVICE PROGR EVAL DUAL: CPT

## 2025-03-21 PROCEDURE — 93000 ELECTROCARDIOGRAM COMPLETE: CPT | Mod: XU

## 2025-03-21 RX ORDER — METOPROLOL SUCCINATE 100 MG/1
100 TABLET, EXTENDED RELEASE ORAL DAILY
Qty: 90 | Refills: 0 | Status: ACTIVE | COMMUNITY
Start: 2025-03-21 | End: 1900-01-01

## 2025-03-24 ENCOUNTER — NON-APPOINTMENT (OUTPATIENT)
Age: 75
End: 2025-03-24

## 2025-03-24 ENCOUNTER — APPOINTMENT (OUTPATIENT)
Dept: PHYSICAL MEDICINE AND REHAB | Facility: CLINIC | Age: 75
End: 2025-03-24
Payer: MEDICARE

## 2025-03-24 VITALS
TEMPERATURE: 98.1 F | OXYGEN SATURATION: 96 % | HEART RATE: 80 BPM | WEIGHT: 172 LBS | BODY MASS INDEX: 26.07 KG/M2 | HEIGHT: 68 IN | DIASTOLIC BLOOD PRESSURE: 79 MMHG | SYSTOLIC BLOOD PRESSURE: 119 MMHG

## 2025-03-24 DIAGNOSIS — G81.91 HEMIPLEGIA, UNSPECIFIED AFFECTING RIGHT DOMINANT SIDE: ICD-10-CM

## 2025-03-24 DIAGNOSIS — I69.320 APHASIA FOLLOWING CEREBRAL INFARCTION: ICD-10-CM

## 2025-03-24 DIAGNOSIS — R48.2 APRAXIA: ICD-10-CM

## 2025-03-24 DIAGNOSIS — I61.0 NONTRAUMATIC INTRACEREBRAL HEMORRHAGE IN HEMISPHERE, SUBCORTICAL: ICD-10-CM

## 2025-03-24 PROCEDURE — 99214 OFFICE O/P EST MOD 30 MIN: CPT

## 2025-04-05 ENCOUNTER — OUTPATIENT (OUTPATIENT)
Dept: OUTPATIENT SERVICES | Facility: HOSPITAL | Age: 75
LOS: 1 days | End: 2025-04-05
Payer: MEDICARE

## 2025-04-05 ENCOUNTER — APPOINTMENT (OUTPATIENT)
Dept: CT IMAGING | Facility: CLINIC | Age: 75
End: 2025-04-05
Payer: MEDICARE

## 2025-04-05 DIAGNOSIS — I48.91 UNSPECIFIED ATRIAL FIBRILLATION: ICD-10-CM

## 2025-04-05 PROCEDURE — 75572 CT HRT W/3D IMAGE: CPT

## 2025-04-05 PROCEDURE — 75572 CT HRT W/3D IMAGE: CPT | Mod: 26

## 2025-04-07 ENCOUNTER — APPOINTMENT (OUTPATIENT)
Dept: ELECTROPHYSIOLOGY | Facility: CLINIC | Age: 75
End: 2025-04-07

## 2025-04-09 ENCOUNTER — APPOINTMENT (OUTPATIENT)
Dept: CT IMAGING | Facility: HOSPITAL | Age: 75
End: 2025-04-09
Payer: MEDICARE

## 2025-04-09 ENCOUNTER — OUTPATIENT (OUTPATIENT)
Dept: OUTPATIENT SERVICES | Facility: HOSPITAL | Age: 75
LOS: 1 days | End: 2025-04-09
Payer: MEDICARE

## 2025-04-09 DIAGNOSIS — I61.0 NONTRAUMATIC INTRACEREBRAL HEMORRHAGE IN HEMISPHERE, SUBCORTICAL: ICD-10-CM

## 2025-04-09 PROCEDURE — 70450 CT HEAD/BRAIN W/O DYE: CPT

## 2025-04-09 PROCEDURE — 70450 CT HEAD/BRAIN W/O DYE: CPT | Mod: 26

## 2025-04-11 ENCOUNTER — RX RENEWAL (OUTPATIENT)
Age: 75
End: 2025-04-11

## 2025-04-14 ENCOUNTER — NON-APPOINTMENT (OUTPATIENT)
Age: 75
End: 2025-04-14

## 2025-04-17 NOTE — PATIENT PROFILE ADULT - NSPROPTRIGHTNOTIFY_GEN_A_NUR
Lizette Tovar DO at [facility]       CURRENT MEDICATIONS    Previous Medications    ALBUTEROL SULFATE HFA (VENTOLIN HFA) 108 (90 BASE) MCG/ACT INHALER    Inhale 2 puffs into the lungs 4 times daily as needed for Wheezing    BUDESONIDE-FORMOTEROL (SYMBICORT) 160-4.5 MCG/ACT AERO    Inhale 2 puffs into the lungs 2 times daily    FLUTICASONE FUROATE-VILANTEROL (BREO ELLIPTA) 200-25 MCG/ACT AEPB INHALER    Inhale 1 puff into the lungs daily    PANTOPRAZOLE (PROTONIX) 40 MG TABLET    Take 1 tablet by mouth every morning (before breakfast)       ALLERGIES    Latex    FAMILY HISTORY    Family History   Problem Relation Age of Onset    Diabetes Mother     Diabetes Sister     Breast Cancer Maternal Grandmother     Hypertension 
declines

## 2025-04-25 ENCOUNTER — APPOINTMENT (OUTPATIENT)
Dept: NEUROLOGY | Facility: CLINIC | Age: 75
End: 2025-04-25
Payer: MEDICARE

## 2025-04-25 VITALS
HEART RATE: 76 BPM | BODY MASS INDEX: 25.61 KG/M2 | HEIGHT: 68 IN | WEIGHT: 169 LBS | DIASTOLIC BLOOD PRESSURE: 74 MMHG | SYSTOLIC BLOOD PRESSURE: 116 MMHG

## 2025-04-25 PROCEDURE — G2212 PROLONG OUTPT/OFFICE VIS: CPT

## 2025-04-25 PROCEDURE — 99215 OFFICE O/P EST HI 40 MIN: CPT

## 2025-04-25 PROCEDURE — G2211 COMPLEX E/M VISIT ADD ON: CPT

## 2025-05-01 ENCOUNTER — APPOINTMENT (OUTPATIENT)
Dept: NEUROLOGY | Facility: CLINIC | Age: 75
End: 2025-05-01

## 2025-05-05 ENCOUNTER — APPOINTMENT (OUTPATIENT)
Dept: FAMILY MEDICINE | Facility: CLINIC | Age: 75
End: 2025-05-05
Payer: MEDICARE

## 2025-05-05 VITALS — RESPIRATION RATE: 20 BRPM | SYSTOLIC BLOOD PRESSURE: 115 MMHG | DIASTOLIC BLOOD PRESSURE: 70 MMHG | HEART RATE: 76 BPM

## 2025-05-05 DIAGNOSIS — R60.0 LOCALIZED EDEMA: ICD-10-CM

## 2025-05-05 PROCEDURE — 99213 OFFICE O/P EST LOW 20 MIN: CPT

## 2025-05-08 ENCOUNTER — APPOINTMENT (OUTPATIENT)
Dept: NEUROLOGY | Facility: CLINIC | Age: 75
End: 2025-05-08
Payer: MEDICARE

## 2025-05-08 VITALS
HEIGHT: 68 IN | HEART RATE: 75 BPM | DIASTOLIC BLOOD PRESSURE: 80 MMHG | BODY MASS INDEX: 25.61 KG/M2 | SYSTOLIC BLOOD PRESSURE: 119 MMHG | OXYGEN SATURATION: 93 % | WEIGHT: 169 LBS | TEMPERATURE: 98.1 F

## 2025-05-08 VITALS
HEIGHT: 68 IN | TEMPERATURE: 98.1 F | BODY MASS INDEX: 25.61 KG/M2 | OXYGEN SATURATION: 93 % | WEIGHT: 169 LBS | DIASTOLIC BLOOD PRESSURE: 80 MMHG | SYSTOLIC BLOOD PRESSURE: 119 MMHG | HEART RATE: 75 BPM

## 2025-05-08 DIAGNOSIS — I61.0 NONTRAUMATIC INTRACEREBRAL HEMORRHAGE IN HEMISPHERE, SUBCORTICAL: ICD-10-CM

## 2025-05-08 DIAGNOSIS — I67.9 CEREBROVASCULAR DISEASE, UNSPECIFIED: ICD-10-CM

## 2025-05-08 PROCEDURE — 99215 OFFICE O/P EST HI 40 MIN: CPT

## 2025-05-08 PROCEDURE — G2211 COMPLEX E/M VISIT ADD ON: CPT

## 2025-06-04 ENCOUNTER — APPOINTMENT (OUTPATIENT)
Dept: CARDIOLOGY | Facility: CLINIC | Age: 75
End: 2025-06-04

## 2025-06-06 ENCOUNTER — OUTPATIENT (OUTPATIENT)
Dept: OUTPATIENT SERVICES | Facility: HOSPITAL | Age: 75
LOS: 1 days | End: 2025-06-06
Payer: MEDICARE

## 2025-06-06 VITALS
WEIGHT: 169.09 LBS | OXYGEN SATURATION: 97 % | SYSTOLIC BLOOD PRESSURE: 122 MMHG | DIASTOLIC BLOOD PRESSURE: 78 MMHG | TEMPERATURE: 98 F | HEART RATE: 70 BPM | RESPIRATION RATE: 14 BRPM | HEIGHT: 68 IN

## 2025-06-06 DIAGNOSIS — I48.0 PAROXYSMAL ATRIAL FIBRILLATION: ICD-10-CM

## 2025-06-06 DIAGNOSIS — Z98.890 OTHER SPECIFIED POSTPROCEDURAL STATES: Chronic | ICD-10-CM

## 2025-06-06 DIAGNOSIS — Z90.89 ACQUIRED ABSENCE OF OTHER ORGANS: Chronic | ICD-10-CM

## 2025-06-06 DIAGNOSIS — Z01.818 ENCOUNTER FOR OTHER PREPROCEDURAL EXAMINATION: ICD-10-CM

## 2025-06-06 LAB
ANION GAP SERPL CALC-SCNC: 12 MMOL/L — SIGNIFICANT CHANGE UP (ref 5–17)
BLD GP AB SCN SERPL QL: NEGATIVE — SIGNIFICANT CHANGE UP
BUN SERPL-MCNC: 17 MG/DL — SIGNIFICANT CHANGE UP (ref 7–23)
CALCIUM SERPL-MCNC: 9.4 MG/DL — SIGNIFICANT CHANGE UP (ref 8.4–10.5)
CHLORIDE SERPL-SCNC: 104 MMOL/L — SIGNIFICANT CHANGE UP (ref 96–108)
CO2 SERPL-SCNC: 24 MMOL/L — SIGNIFICANT CHANGE UP (ref 22–31)
CREAT SERPL-MCNC: 0.85 MG/DL — SIGNIFICANT CHANGE UP (ref 0.5–1.3)
EGFR: 90 ML/MIN/1.73M2 — SIGNIFICANT CHANGE UP
EGFR: 90 ML/MIN/1.73M2 — SIGNIFICANT CHANGE UP
GLUCOSE SERPL-MCNC: 82 MG/DL — SIGNIFICANT CHANGE UP (ref 70–99)
HCT VFR BLD CALC: 42.6 % — SIGNIFICANT CHANGE UP (ref 39–50)
HGB BLD-MCNC: 14.5 G/DL — SIGNIFICANT CHANGE UP (ref 13–17)
MCHC RBC-ENTMCNC: 31.4 PG — SIGNIFICANT CHANGE UP (ref 27–34)
MCHC RBC-ENTMCNC: 34 G/DL — SIGNIFICANT CHANGE UP (ref 32–36)
MCV RBC AUTO: 92.2 FL — SIGNIFICANT CHANGE UP (ref 80–100)
NRBC BLD AUTO-RTO: 0 /100 WBCS — SIGNIFICANT CHANGE UP (ref 0–0)
PLATELET # BLD AUTO: 175 K/UL — SIGNIFICANT CHANGE UP (ref 150–400)
POTASSIUM SERPL-MCNC: 3.8 MMOL/L — SIGNIFICANT CHANGE UP (ref 3.5–5.3)
POTASSIUM SERPL-SCNC: 3.8 MMOL/L — SIGNIFICANT CHANGE UP (ref 3.5–5.3)
RBC # BLD: 4.62 M/UL — SIGNIFICANT CHANGE UP (ref 4.2–5.8)
RBC # FLD: 12.9 % — SIGNIFICANT CHANGE UP (ref 10.3–14.5)
RH IG SCN BLD-IMP: POSITIVE — SIGNIFICANT CHANGE UP
SODIUM SERPL-SCNC: 140 MMOL/L — SIGNIFICANT CHANGE UP (ref 135–145)
WBC # BLD: 5.57 K/UL — SIGNIFICANT CHANGE UP (ref 3.8–10.5)
WBC # FLD AUTO: 5.57 K/UL — SIGNIFICANT CHANGE UP (ref 3.8–10.5)

## 2025-06-06 PROCEDURE — 80048 BASIC METABOLIC PNL TOTAL CA: CPT

## 2025-06-06 PROCEDURE — 86901 BLOOD TYPING SEROLOGIC RH(D): CPT

## 2025-06-06 PROCEDURE — G0463: CPT

## 2025-06-06 PROCEDURE — 85027 COMPLETE CBC AUTOMATED: CPT

## 2025-06-06 PROCEDURE — 86850 RBC ANTIBODY SCREEN: CPT

## 2025-06-06 PROCEDURE — 86900 BLOOD TYPING SEROLOGIC ABO: CPT

## 2025-06-06 NOTE — H&P PST ADULT - HISTORY OF PRESENT ILLNESS
Pleasant 74 year old male with PMHx of  ISIS uses nocturnal CPAP,  HTN,  HLD, Bradycardia S/P PPM implanted  on 8/13/2018, Atrial Fibrillation, Ischemic CVA 7 years ago and left basal ganglia hemorrhage in January 2025 while on A/C with eliquis (on low dose aspirin, has been off eliquis since January 2025) who presents to PST prior to scheduled Watchman Procedure on 6/17/2025. Patient has residual right sided weakness. Denies fever, chills, SOB, FERRARA,  chest pain, palpitations, dizziness, syncope, new visual, motor or sensory changes.         ?

## 2025-06-06 NOTE — H&P PST ADULT - NSICDXPASTMEDICALHX_GEN_ALL_CORE_FT
PAST MEDICAL HISTORY:  Chronic atrial fibrillation     HLD (hyperlipidemia)     HTN (hypertension)     ISIS on CPAP     Stroke

## 2025-06-06 NOTE — H&P PST ADULT - PROBLEM SELECTOR PLAN 1
Watchman  -cbc, bmp, type and screen @ PST  -PPM interrogation report on chart (dated 3/21/2025)  -preop instructions discussed. understanding verbalized  -continue asprin  -ABO on admit

## 2025-06-09 RX ORDER — METOPROLOL TARTRATE 25 MG/1
25 TABLET ORAL
Qty: 180 | Refills: 3 | Status: ACTIVE | COMMUNITY
Start: 2025-06-09 | End: 1900-01-01

## 2025-06-11 ENCOUNTER — NON-APPOINTMENT (OUTPATIENT)
Age: 75
End: 2025-06-11

## 2025-06-17 ENCOUNTER — RESULT REVIEW (OUTPATIENT)
Age: 75
End: 2025-06-17

## 2025-06-17 ENCOUNTER — INPATIENT (INPATIENT)
Facility: HOSPITAL | Age: 75
LOS: 0 days | Discharge: HOME | DRG: 229 | End: 2025-06-18
Attending: INTERNAL MEDICINE | Admitting: INTERNAL MEDICINE
Payer: MEDICARE

## 2025-06-17 VITALS
DIASTOLIC BLOOD PRESSURE: 84 MMHG | SYSTOLIC BLOOD PRESSURE: 139 MMHG | RESPIRATION RATE: 18 BRPM | TEMPERATURE: 98 F | HEART RATE: 82 BPM | OXYGEN SATURATION: 96 %

## 2025-06-17 DIAGNOSIS — Z98.890 OTHER SPECIFIED POSTPROCEDURAL STATES: Chronic | ICD-10-CM

## 2025-06-17 DIAGNOSIS — Z90.89 ACQUIRED ABSENCE OF OTHER ORGANS: Chronic | ICD-10-CM

## 2025-06-17 DIAGNOSIS — I48.0 PAROXYSMAL ATRIAL FIBRILLATION: ICD-10-CM

## 2025-06-17 LAB
BLD GP AB SCN SERPL QL: NEGATIVE — SIGNIFICANT CHANGE UP
HCT VFR BLD CALC: 38.5 % — LOW (ref 39–50)
HGB BLD-MCNC: 12.7 G/DL — LOW (ref 13–17)
MCHC RBC-ENTMCNC: 30.8 PG — SIGNIFICANT CHANGE UP (ref 27–34)
MCHC RBC-ENTMCNC: 33 G/DL — SIGNIFICANT CHANGE UP (ref 32–36)
MCV RBC AUTO: 93.4 FL — SIGNIFICANT CHANGE UP (ref 80–100)
NRBC # BLD AUTO: 0 K/UL — SIGNIFICANT CHANGE UP (ref 0–0)
NRBC # FLD: 0 K/UL — SIGNIFICANT CHANGE UP (ref 0–0)
NRBC BLD AUTO-RTO: 0 /100 WBCS — SIGNIFICANT CHANGE UP (ref 0–0)
PLATELET # BLD AUTO: 155 K/UL — SIGNIFICANT CHANGE UP (ref 150–400)
PMV BLD: 10.7 FL — SIGNIFICANT CHANGE UP (ref 7–13)
RBC # BLD: 4.12 M/UL — LOW (ref 4.2–5.8)
RBC # FLD: 13.2 % — SIGNIFICANT CHANGE UP (ref 10.3–14.5)
RH IG SCN BLD-IMP: POSITIVE — SIGNIFICANT CHANGE UP
WBC # BLD: 9.08 K/UL — SIGNIFICANT CHANGE UP (ref 3.8–10.5)
WBC # FLD AUTO: 9.08 K/UL — SIGNIFICANT CHANGE UP (ref 3.8–10.5)

## 2025-06-17 PROCEDURE — 33340 PERQ CLSR TCAT L ATR APNDGE: CPT | Mod: Q0

## 2025-06-17 PROCEDURE — 33999 UNLISTED PX CARDIAC SURGERY: CPT

## 2025-06-17 PROCEDURE — 99152 MOD SED SAME PHYS/QHP 5/>YRS: CPT

## 2025-06-17 PROCEDURE — 93010 ELECTROCARDIOGRAM REPORT: CPT

## 2025-06-17 PROCEDURE — 76376 3D RENDER W/INTRP POSTPROCES: CPT | Mod: 26,59

## 2025-06-17 PROCEDURE — 71045 X-RAY EXAM CHEST 1 VIEW: CPT | Mod: 26

## 2025-06-17 PROCEDURE — ZZZZZ: CPT

## 2025-06-17 PROCEDURE — 93325 DOPPLER ECHO COLOR FLOW MAPG: CPT | Mod: 26,59

## 2025-06-17 PROCEDURE — 93320 DOPPLER ECHO COMPLETE: CPT | Mod: 26,59

## 2025-06-17 PROCEDURE — 93355 ECHO TRANSESOPHAGEAL (TEE): CPT

## 2025-06-17 PROCEDURE — 36013 PLACE CATHETER IN ARTERY: CPT

## 2025-06-17 RX ORDER — ROSUVASTATIN CALCIUM 20 MG/1
40 TABLET, FILM COATED ORAL AT BEDTIME
Refills: 0 | Status: DISCONTINUED | OUTPATIENT
Start: 2025-06-17 | End: 2025-06-18

## 2025-06-17 RX ORDER — METOPROLOL SUCCINATE 50 MG/1
25 TABLET, EXTENDED RELEASE ORAL EVERY 12 HOURS
Refills: 0 | Status: DISCONTINUED | OUTPATIENT
Start: 2025-06-17 | End: 2025-06-18

## 2025-06-17 RX ORDER — APIXABAN 2.5 MG/1
2.5 TABLET, FILM COATED ORAL
Refills: 0 | Status: DISCONTINUED | OUTPATIENT
Start: 2025-06-17 | End: 2025-06-18

## 2025-06-17 RX ORDER — APIXABAN 2.5 MG/1
5 TABLET, FILM COATED ORAL
Refills: 0 | Status: DISCONTINUED | OUTPATIENT
Start: 2025-06-17 | End: 2025-06-17

## 2025-06-17 RX ORDER — APIXABAN 2.5 MG/1
5 TABLET, FILM COATED ORAL ONCE
Refills: 0 | Status: COMPLETED | OUTPATIENT
Start: 2025-06-17 | End: 2025-06-17

## 2025-06-17 RX ORDER — ASPIRIN 325 MG
0 TABLET ORAL
Refills: 0 | DISCHARGE

## 2025-06-17 RX ORDER — AMLODIPINE BESYLATE 10 MG/1
10 TABLET ORAL DAILY
Refills: 0 | Status: DISCONTINUED | OUTPATIENT
Start: 2025-06-17 | End: 2025-06-18

## 2025-06-17 RX ORDER — APIXABAN 2.5 MG/1
2.5 TABLET, FILM COATED ORAL
Refills: 0 | Status: DISCONTINUED | OUTPATIENT
Start: 2025-06-17 | End: 2025-06-17

## 2025-06-17 RX ORDER — ASPIRIN 325 MG
81 TABLET ORAL DAILY
Refills: 0 | Status: DISCONTINUED | OUTPATIENT
Start: 2025-06-17 | End: 2025-06-18

## 2025-06-17 RX ORDER — TETRACAINE HYDROCHLORIDE 5 MG/ML
1 SOLUTION OPHTHALMIC ONCE
Refills: 0 | Status: COMPLETED | OUTPATIENT
Start: 2025-06-17 | End: 2025-06-17

## 2025-06-17 RX ADMIN — APIXABAN 5 MILLIGRAM(S): 2.5 TABLET, FILM COATED ORAL at 13:55

## 2025-06-17 RX ADMIN — Medication 81 MILLIGRAM(S): at 14:35

## 2025-06-17 RX ADMIN — TETRACAINE HYDROCHLORIDE 1 DROP(S): 5 SOLUTION OPHTHALMIC at 17:36

## 2025-06-17 RX ADMIN — ROSUVASTATIN CALCIUM 40 MILLIGRAM(S): 20 TABLET, FILM COATED ORAL at 21:19

## 2025-06-17 RX ADMIN — METOPROLOL SUCCINATE 25 MILLIGRAM(S): 50 TABLET, EXTENDED RELEASE ORAL at 21:19

## 2025-06-17 RX ADMIN — APIXABAN 2.5 MILLIGRAM(S): 2.5 TABLET, FILM COATED ORAL at 23:01

## 2025-06-17 NOTE — PATIENT PROFILE ADULT - NSPROGENPREVTRANSF_GEN_A_NUR
pt interviewed with resident: I doubt seizure occurred.  pt focusing on methadone administration.  refusing to wait for lab results.  pt has capacity for medical decision making. no history of blood product transfusion

## 2025-06-17 NOTE — PATIENT PROFILE ADULT - FUNCTIONAL SCREEN CURRENT LEVEL: COMMUNICATION, MLM
Alert and oriented, no focal deficits, no motor or sensory deficits.
0 = understands/communicates without difficulty

## 2025-06-17 NOTE — PATIENT PROFILE ADULT - TRANSPORTATION
20  Name: Barba Schwab    : 1964    Sex: male    Age: 59 y.o. Subjective:  Chief Complaint: Patient is here for   8 mo ck  Re   Sugar  gerd  Ca prostate     Feel ok  No cp or sob        Unable to miss a fay prevacid but  Told him try to taper off   La with  ghb  Poor  Had infsuion   abou   Ten d bfeore lab    david v     wbc      Review of Systems   Constitutional: Negative. HENT: Negative. Eyes: Negative. Respiratory: Negative. Cardiovascular: Negative. Gastrointestinal: Negative. Endocrine: Negative. Genitourinary: Negative. Musculoskeletal: Negative. Skin: Negative. Allergic/Immunologic: Negative. Neurological: Negative. Hematological: Negative. Psychiatric/Behavioral: Negative.           Current Outpatient Medications:     lansoprazole (PREVACID) 30 MG delayed release capsule, Take 1 capsule by mouth daily, Disp: 90 capsule, Rfl: 3    vitamin B-12 (CYANOCOBALAMIN) 1000 MCG tablet, Take 1,000 mcg by mouth daily Ld , Disp: , Rfl:     Cholecalciferol (VITAMIN D3) 2000 units CAPS, Take 1 capsule by mouth daily , Disp: , Rfl:     ibuprofen (ADVIL;MOTRIN) 200 MG tablet, Take 200 mg by mouth every 6 hours as needed for Pain, Disp: , Rfl:   No Known Allergies  Social History     Socioeconomic History    Marital status:      Spouse name: Not on file    Number of children: Not on file    Years of education: Not on file    Highest education level: Not on file   Occupational History    Not on file   Tobacco Use    Smoking status: Never Smoker    Smokeless tobacco: Never Used   Vaping Use    Vaping Use: Never used   Substance and Sexual Activity    Alcohol use: Yes     Comment: socially    Drug use: No    Sexual activity: Not on file   Other Topics Concern    Not on file   Social History Narrative     Thoracic and lumbosacral neuritis, Refractory migraine without aura, Conduction disorder of    the heart, Essential hypertension, Mixed hyperlipidemia    CLOSED HEAD INJURY 2-97    GERD    LIPID    DEPRESSION--OFF LESAPRO 2015    SLEEP APNEA 9-10 --PRESSURE 9---PT DC USE    ELEV PSA 3-6 1-16 DULCE TO SEE UROL 2016     referred again  1-19    DIET DM    QUIT ASUNCION---WORKS [employer] IN [place]---RETIRED 9-18    WIFE PHARMACIST IN [place]---AND PER SHABANA AT [facility] DAUGHERS    eval dr Carlos forrester 10-19  He   Ref echo---done    Chronic HA   Gave  topamax  1-19    Colon  2-19  Three polys  Dr Moraes Cassette  Due 3 yrs    Prostatte cancer    2020   dr Wheatley----30 rad tx    dr moss in [place]    Covid  12-21  got infusion     Social Determinants of Health     Financial Resource Strain:     Difficulty of Paying Living Expenses: Not on file   Food Insecurity:     Worried About Running Out of Food in the Last Year: Not on file    [place] of Food in the Last Year: Not on file   Transportation Needs:     Lack of Transportation (Medical): Not on file    Lack of Transportation (Non-Medical):  Not on file   Physical Activity:     Days of Exercise per Week: Not on file    Minutes of Exercise per Session: Not on file   Stress:     Feeling of Stress : Not on file   Social Connections:     Frequency of Communication with Friends and Family: Not on file    Frequency of Social Gatherings with Friends and Family: Not on file    Attends [employer] Services: Not on file    Active Member of [address] [place] [address] or Organizations: Not on file    Attends Club or Organization Meetings: Not on file    Marital Status: Not on file   Intimate Partner Violence:     Fear of Current or Ex-Partner: Not on file    Emotionally Abused: Not on file    Physically Abused: Not on file    Sexually Abused: Not on file   Housing Stability:     Unable to Pay for Housing in the Last Year: Not on file    Number of [place] in the Last Year: Not on file    Unstable Housing in the Last Year: Not on file      Past Medical History:   Diagnosis Date    GERD (gastroesophageal reflux disease)     Migraine     BORIS (obstructive sleep apnea)      Family History   Problem Relation Age of Onset    Heart Attack Father     Heart Disease Father       Past Surgical History:   Procedure Laterality Date    BACK SURGERY  1997    COLONOSCOPY N/A 2/18/2019    COLONOSCOPY WITH BIOPSY performed by Barbara Kilgore MD at [address] [place] [address] Beatriz, COLON, DIAGNOSTIC      HERNIA REPAIR Right 1978    with mesh      Vitals:    01/14/22 0757   BP: 128/78   Temp: 96.9 °F (36.1 °C)   TempSrc: Infrared   Weight: 209 lb (94.8 kg)   Height: 5' 7\" (1.702 m)       Objective:    Physical Exam  Vitals reviewed. Constitutional:       Appearance: He is well-developed. HENT:      Head: Normocephalic. Eyes:      Pupils: Pupils are equal, round, and reactive to light. Cardiovascular:      Rate and Rhythm: Normal rate and regular rhythm. Pulmonary:      Effort: Pulmonary effort is normal.      Breath sounds: Normal breath sounds. Abdominal:      Palpations: Abdomen is soft. Musculoskeletal:         General: Normal range of motion. Cervical back: Normal range of motion. Skin:     General: Skin is warm. Neurological:      Mental Status: He is alert and oriented to person, place, and time. Psychiatric:         Behavior: Behavior normal.         Valencia Edmonds was seen today for discuss labs. Diagnoses and all orders for this visit:    Encounter for annual general medical examination with abnormal findings in adult    Gastroesophageal reflux disease without esophagitis  -     lansoprazole (PREVACID) 30 MG delayed release capsule; Take 1 capsule by mouth daily    Diet-controlled diabetes mellitus ([address]  -     Comprehensive Metabolic Panel; Future    Mixed hyperlipidemia    Other elevated white blood cell (WBC) count  -     Comprehensive Metabolic Panel; Future        Comments: die texer hm isses         Cbc in two weeks    ,I educated the patient about all medications.   Make sure they were correct and educated  on the risk associated with missing meds or taking them incorrectly. A list of medications is being sent home with patient today. I informed patient about the risk associated with noncompliance of medication and taking medications incorrectly. Appropriate follow-up with myself and all specialist.  Encourage family members to take active role in assisting with medications and medical care. If any confusion should develop to notify my office immediately to avoid risk of worsening medical condition      Aggressive low-fat diet. Avoid red meats, greasy fried foods, dairy products. Avoid processed foods. Take cholesterol medications without food. A great deal of time spent reviewing medications, diet, exercise, social issues. Also reviewing the chart before entering the room with patient and finishing charting after leaving patient's room. More than half of that time was spent face to face with the patient in counseling and coordinating care. Follow Up: Return in about 6 months (around 7/14/2022) for Lab Before.      Seen by:  Tacos Corley DO no

## 2025-06-17 NOTE — H&P CARDIOLOGY - HISTORY OF PRESENT ILLNESS
H and P dated 6/6/25  Allergies reviewed  Medications reconciled  PE unchanged    Pt presents today for MAY closure device.

## 2025-06-17 NOTE — CHART NOTE - NSCHARTNOTEFT_GEN_A_CORE
Pt c/o left eye irritation.    LEFT EYE:  +injected  +watering  +lid noted to be red and slightly edematout  vison clear in all field    Medicate with Tetracaine 0.5 % one drop left eye x 1    Dr DOMINGA Steiner (anesthesia) aware.       Ming Chester, NP

## 2025-06-17 NOTE — PATIENT PROFILE ADULT - FALL HARM RISK - HARM RISK INTERVENTIONS

## 2025-06-18 ENCOUNTER — TRANSCRIPTION ENCOUNTER (OUTPATIENT)
Age: 75
End: 2025-06-18

## 2025-06-18 VITALS
RESPIRATION RATE: 18 BRPM | SYSTOLIC BLOOD PRESSURE: 103 MMHG | OXYGEN SATURATION: 96 % | HEART RATE: 69 BPM | DIASTOLIC BLOOD PRESSURE: 64 MMHG | TEMPERATURE: 98 F

## 2025-06-18 DIAGNOSIS — E78.5 HYPERLIPIDEMIA, UNSPECIFIED: ICD-10-CM

## 2025-06-18 DIAGNOSIS — I10 ESSENTIAL (PRIMARY) HYPERTENSION: ICD-10-CM

## 2025-06-18 DIAGNOSIS — I48.20 CHRONIC ATRIAL FIBRILLATION, UNSPECIFIED: ICD-10-CM

## 2025-06-18 LAB
ANION GAP SERPL CALC-SCNC: 12 MMOL/L — SIGNIFICANT CHANGE UP (ref 5–17)
BUN SERPL-MCNC: 23 MG/DL — SIGNIFICANT CHANGE UP (ref 7–23)
CALCIUM SERPL-MCNC: 8 MG/DL — LOW (ref 8.4–10.5)
CHLORIDE SERPL-SCNC: 107 MMOL/L — SIGNIFICANT CHANGE UP (ref 96–108)
CO2 SERPL-SCNC: 21 MMOL/L — LOW (ref 22–31)
CREAT SERPL-MCNC: 1 MG/DL — SIGNIFICANT CHANGE UP (ref 0.5–1.3)
EGFR: 78 ML/MIN/1.73M2 — SIGNIFICANT CHANGE UP
EGFR: 78 ML/MIN/1.73M2 — SIGNIFICANT CHANGE UP
GLUCOSE SERPL-MCNC: 148 MG/DL — HIGH (ref 70–99)
HCT VFR BLD CALC: 34.9 % — LOW (ref 39–50)
HGB BLD-MCNC: 11.5 G/DL — LOW (ref 13–17)
MCHC RBC-ENTMCNC: 30.7 PG — SIGNIFICANT CHANGE UP (ref 27–34)
MCHC RBC-ENTMCNC: 33 G/DL — SIGNIFICANT CHANGE UP (ref 32–36)
MCV RBC AUTO: 93.1 FL — SIGNIFICANT CHANGE UP (ref 80–100)
NRBC # BLD AUTO: 0 K/UL — SIGNIFICANT CHANGE UP (ref 0–0)
NRBC # FLD: 0 K/UL — SIGNIFICANT CHANGE UP (ref 0–0)
NRBC BLD AUTO-RTO: 0 /100 WBCS — SIGNIFICANT CHANGE UP (ref 0–0)
PLATELET # BLD AUTO: 159 K/UL — SIGNIFICANT CHANGE UP (ref 150–400)
PMV BLD: 10.9 FL — SIGNIFICANT CHANGE UP (ref 7–13)
POTASSIUM SERPL-MCNC: 3.8 MMOL/L — SIGNIFICANT CHANGE UP (ref 3.5–5.3)
POTASSIUM SERPL-SCNC: 3.8 MMOL/L — SIGNIFICANT CHANGE UP (ref 3.5–5.3)
RBC # BLD: 3.75 M/UL — LOW (ref 4.2–5.8)
RBC # FLD: 13.2 % — SIGNIFICANT CHANGE UP (ref 10.3–14.5)
SODIUM SERPL-SCNC: 140 MMOL/L — SIGNIFICANT CHANGE UP (ref 135–145)
WBC # BLD: 7.04 K/UL — SIGNIFICANT CHANGE UP (ref 3.8–10.5)
WBC # FLD AUTO: 7.04 K/UL — SIGNIFICANT CHANGE UP (ref 3.8–10.5)

## 2025-06-18 PROCEDURE — 71045 X-RAY EXAM CHEST 1 VIEW: CPT

## 2025-06-18 PROCEDURE — 33999 UNLISTED PX CARDIAC SURGERY: CPT

## 2025-06-18 PROCEDURE — 36415 COLL VENOUS BLD VENIPUNCTURE: CPT

## 2025-06-18 PROCEDURE — 93312 ECHO TRANSESOPHAGEAL: CPT

## 2025-06-18 PROCEDURE — 93325 DOPPLER ECHO COLOR FLOW MAPG: CPT

## 2025-06-18 PROCEDURE — 93308 TTE F-UP OR LMTD: CPT

## 2025-06-18 PROCEDURE — C9399: CPT

## 2025-06-18 PROCEDURE — C1887: CPT

## 2025-06-18 PROCEDURE — 93355 ECHO TRANSESOPHAGEAL (TEE): CPT

## 2025-06-18 PROCEDURE — 86850 RBC ANTIBODY SCREEN: CPT

## 2025-06-18 PROCEDURE — 71046 X-RAY EXAM CHEST 2 VIEWS: CPT | Mod: 26

## 2025-06-18 PROCEDURE — 85027 COMPLETE CBC AUTOMATED: CPT

## 2025-06-18 PROCEDURE — C1769: CPT

## 2025-06-18 PROCEDURE — 76376 3D RENDER W/INTRP POSTPROCES: CPT

## 2025-06-18 PROCEDURE — 86901 BLOOD TYPING SEROLOGIC RH(D): CPT

## 2025-06-18 PROCEDURE — C1894: CPT

## 2025-06-18 PROCEDURE — 80048 BASIC METABOLIC PNL TOTAL CA: CPT

## 2025-06-18 PROCEDURE — 93320 DOPPLER ECHO COMPLETE: CPT

## 2025-06-18 PROCEDURE — 93005 ELECTROCARDIOGRAM TRACING: CPT

## 2025-06-18 PROCEDURE — 93010 ELECTROCARDIOGRAM REPORT: CPT

## 2025-06-18 PROCEDURE — 71046 X-RAY EXAM CHEST 2 VIEWS: CPT

## 2025-06-18 PROCEDURE — C1893: CPT

## 2025-06-18 PROCEDURE — C1757: CPT

## 2025-06-18 PROCEDURE — 99238 HOSP IP/OBS DSCHRG MGMT 30/<: CPT

## 2025-06-18 PROCEDURE — C1889: CPT

## 2025-06-18 PROCEDURE — 36013 PLACE CATHETER IN ARTERY: CPT

## 2025-06-18 PROCEDURE — 86900 BLOOD TYPING SEROLOGIC ABO: CPT

## 2025-06-18 PROCEDURE — 33340 PERQ CLSR TCAT L ATR APNDGE: CPT

## 2025-06-18 RX ORDER — APIXABAN 2.5 MG/1
1 TABLET, FILM COATED ORAL
Qty: 60 | Refills: 1
Start: 2025-06-18 | End: 2025-08-16

## 2025-06-18 RX ADMIN — METOPROLOL SUCCINATE 25 MILLIGRAM(S): 50 TABLET, EXTENDED RELEASE ORAL at 08:39

## 2025-06-18 RX ADMIN — Medication 320 MILLIGRAM(S): at 05:35

## 2025-06-18 RX ADMIN — AMLODIPINE BESYLATE 10 MILLIGRAM(S): 10 TABLET ORAL at 05:35

## 2025-06-18 RX ADMIN — APIXABAN 2.5 MILLIGRAM(S): 2.5 TABLET, FILM COATED ORAL at 08:40

## 2025-06-18 RX ADMIN — Medication 81 MILLIGRAM(S): at 05:34

## 2025-06-18 NOTE — DISCHARGE NOTE PROVIDER - NSDCCPTREATMENT_GEN_ALL_CORE_FT
PRINCIPAL PROCEDURE  Procedure: Insertion, Watchman left atrial appendage closure device  Findings and Treatment: Watchman MAY closure device placement via B/L femoral vein access

## 2025-06-18 NOTE — PROGRESS NOTE ADULT - PROBLEM SELECTOR PLAN 3
Cholesterol is a substance that is found in the blood. Everyone has some. It is needed for good health. The problem is, people sometimes have too much cholesterol. Compared with people with normal cholesterol, people with high cholesterol have a higher risk of heart attack, stroke, and other health problems. The higher your cholesterol, the higher your risk of these problems.  Not everyone who has high cholesterol needs medicines. Your doctor will decide if you need them based on your age, family history, and other health concerns.  The medicines most often used to treat high cholesterol are called "statins."  You should probably take a statin if you:   - Already had a heart attack or stroke   - Have known heart disease   - Have diabetes   - Have a condition called" peripheral artery disease," which makes it painful to walk, and happens when the arteries in your legs get clogged with fatty deposits   - Have an "abdominal aortic aneurysm," which is a widening of the main artery in the belly  Most people with any of the conditions listed above should take a statin no matter what their cholesterol level is.  If your doctor or nurse prescribes a statin, it's important to keep taking it. The medicine might not make you feel any different. But it can help prevent heart attack, stroke, and death  You can help lower your cholesterol by doing these things:   - You can lower your LDL, or "bad," cholesterol by avoiding red meat, butter, fried foods, cheese, and other foods that have a lot of saturated fat.   - You can lower triglycerides by avoiding sugary foods, fried foods, and excess alcohol.   - If you are overweight, it can help to lose weight. Your doctor or nurse can help you do this in a healthy way.   - Try to get regular physical activity. Even gentle forms of exercise, like walking, are good for your health.  Even if these steps do little to change your cholesterol, they can improve your health in many other ways

## 2025-06-18 NOTE — DISCHARGE NOTE PROVIDER - NSDCMRMEDTOKEN_GEN_ALL_CORE_FT
amLODIPine 10 mg oral tablet: 1 tab(s) orally once a day  aspirin 81 mg oral tablet: orally once a day  metoprolol tartrate 25 mg oral tablet: 1 tab(s) orally every 12 hours  rosuvastatin 40 mg oral tablet: 1 tab(s) orally once a day (at bedtime)  valsartan 320 mg oral tablet: 1 tab(s) orally once a day   amLODIPine 10 mg oral tablet: 1 tab(s) orally once a day  apixaban 2.5 mg oral tablet: 1 tab(s) orally 2 times a day  aspirin 81 mg oral tablet: orally once a day  metoprolol tartrate 25 mg oral tablet: 1 tab(s) orally every 12 hours  rosuvastatin 40 mg oral tablet: 1 tab(s) orally once a day (at bedtime)  valsartan 320 mg oral tablet: 1 tab(s) orally once a day

## 2025-06-18 NOTE — DISCHARGE NOTE NURSING/CASE MANAGEMENT/SOCIAL WORK - PATIENT PORTAL LINK FT
You can access the FollowMyHealth Patient Portal offered by [facility] by registering at the following website: [email or web address]. By joining [employer]’s FollowMyHealth portal, you will also be able to view your health information using other applications (apps) compatible with our system.

## 2025-06-18 NOTE — DISCHARGE NOTE NURSING/CASE MANAGEMENT/SOCIAL WORK - FINANCIAL ASSISTANCE
[facility] provides services at a reduced cost to those who are determined to be eligible through [facility]’s financial assistance program. Information regarding [facility]’s financial assistance program can be found by going to [email or web address] or by calling [phone].

## 2025-06-18 NOTE — DISCHARGE NOTE PROVIDER - PROVIDER TOKENS
PROVIDER:[TOKEN:[[id number]],SCHEDULEDAPPT:[07/02/2025]] PROVIDER:[TOKEN:[[id number]],SCHEDULEDAPPT:[07/02/2025],SCHEDULEDAPPTTIME:[03:00 PM]]

## 2025-06-18 NOTE — DISCHARGE NOTE PROVIDER - HOSPITAL COURSE
HPI:  H and P dated 6/6/25  Allergies reviewed  Medications reconciled  PE unchanged    Pt presents today for MAY closure device.  (17 Jun 2025 06:17)    6/17 Watchman MAY closure device placed via B/L femoral access, sites without swelling, bleeding.   74 year old male with PMHx of  ISIS uses nocturnal CPAP,  HTN,  HLD, Bradycardia S/P PPM implanted  on 8/13/2018, Atrial Fibrillation, Ischemic CVA 7 years ago and left basal ganglia hemorrhage in January 2025 while on A/C with eliquis (on low dose aspirin, has been off eliquis since January 2025).  Pt presents today for MAY closure device.  (17 Jun 2025 06:17)  6/17 Watchman MAY closure device placed via B/L femoral access, sites without swelling, bleeding.  6/17 TTE CONCLUSIONS:   1. Limited TTE post procedure for pericardial effusion.   2. No pericardial effusion seen.   3. Compared to the transesophageal echocardiogram performed on 6/17/2025, there have been no significant interval changes.  6/18 CHEST XRAY PA/LAT ready by Dr Mcnair: device in place

## 2025-06-18 NOTE — DISCHARGE NOTE PROVIDER - NSDCFUSCHEDAPPT_GEN_ALL_CORE_FT
Gracy Hines  [facility]  PHYSMED [address]  Scheduled Appointment: 07/01/2025    Jose Mcnair  [facility]  ELECTROPH [address]  Scheduled Appointment: 07/02/2025    [facility]  NEUROLOGY [address]  Scheduled Appointment: 07/29/2025    [facility]  NEUROLOGY [address]  Scheduled Appointment: 07/29/2025    Denise Ya  [facility]  NEUROLOGY [address]  Scheduled Appointment: 08/04/2025    Baltazar Hickman  [facility]  NEUROLOGY [address]  Scheduled Appointment: 08/19/2025

## 2025-06-18 NOTE — PROGRESS NOTE ADULT - SUBJECTIVE AND OBJECTIVE BOX
Patient is a 74y old  Male who presents with a chief complaint of Atrial fib (18 Jun 2025 00:35)          Allergies    No Known Allergies    Intolerances        Medications:  amLODIPine   Tablet 10 milliGRAM(s) Oral daily  apixaban 2.5 milliGRAM(s) Oral two times a day  aspirin enteric coated 81 milliGRAM(s) Oral daily  metoprolol tartrate 25 milliGRAM(s) Oral every 12 hours  rosuvastatin 40 milliGRAM(s) Oral at bedtime  valsartan 320 milliGRAM(s) Oral daily      Vitals:  T(C): 36.7 (06-18-25 @ 00:17), Max: 36.7 (06-18-25 @ 00:17)  HR: 70 (06-18-25 @ 00:17) (69 - 82)  BP: 90/54 (06-18-25 @ 00:17) (86/51 - 139/84)  BP(mean): 67 (06-18-25 @ 00:17) (63 - 105)  RR: 17 (06-18-25 @ 00:17) (16 - 18)  SpO2: 98% (06-18-25 @ 00:17) (92% - 98%)  Wt(kg): --  Daily Height in cm: 172.72 (17 Jun 2025 08:36)    Daily   I&O's Summary        Physical Exam:  Appearance: Normal  Eyes: PERRL, EOMI  HENT: Normal oral [place], NC/AT  Cardiovascular: S1S2, RRR, No M/R/G, no JVD, No Lower extremity edema  Procedural Access Site: No hematoma, Non-tender to palpation, 2+ pulse, No bruit, No Ecchymosis  Respiratory: Clear to auscultation bilaterally  Gastrointestinal: Soft, Non tender, Normal Bowel Sounds  Musculoskeletal: No clubbing, No joint deformity   Neurologic: Non-focal  Lymphatic: No lymphadenopathy  Psychiatry: AAOx3, Mood & affect appropriate  Skin: No rashes, No ecchymoses, No cyanosis                  Lipid panel   Hgb A1c                         12.7   9.08  )-----------( 155      ( 17 Jun 2025 18:22 )             38.5         ECG: A-paced 71 bpm

## 2025-06-18 NOTE — PROGRESS NOTE ADULT - PROBLEM SELECTOR PLAN 2
Hypertension, also known simply as "high blood pressure" is very common, however can lead to many significant complications if left uncontrolled. When the blood pressure is elevated, the force the blood puts on the walls of the arteries is high and can lead to artery damage. Also, when the heart muscle has to pump blood against a high blood pressure, it thickens and enlarges, just like any muscle does when it has to do more work (think of a weight ). When the blood pressure is very high, people may feel a headache or tired. Some people can feel pounding in their head or have blurry vision. Hearing the heart beating in the ear especially at night can be a sign of high blood pressure. Eventually, symptoms of stroke, heart attack, heart failure or irregular heartbeats can occur  - Exercise: Doing cardiovascular exercise such as running, biking or swimming at least 30 minutes per day most days of the week is recommended to help keep blood pressure healthy  - Lose weight: Maintaining a normal BMI (body mass index) is very important in keeping blood pressure readings normal   - Avoid salt: Sodium in the diet increases the blood pressure in many ways. Salt comes in many foods, so just because you don't add salt to your food it does not mean that you are eating a low salt diet. Read labels and keep sodium intake to less than 2000 mg per day   - Avoid alcohol: Even 1 or 2 alcoholic drinks can significantly increase blood pressure   - DASH Diet: The DASH diet has been shown to reduce blood pressure   - Take all medication as prescribed.   - Follow up with your medical doctor for routine blood pressure monitoring at your next visit.   - Notify your doctor if you have any of the following symptoms:    - Dizziness, Lightheadedness, Blurry vision, Headache, Chest pain, Shortness of breath

## 2025-06-18 NOTE — DISCHARGE NOTE PROVIDER - CARE PROVIDER_API CALL
Jose Mcnair  Clinical Cardiac Electrophysiology  [address] [place] [address], [address] [place] [id number]  Phone: [phone]  Fax: [phone]  Scheduled Appointment: 07/02/2025   Jose Mcnair  Clinical Cardiac Electrophysiology  [address] [place] [address], [address] [place] [id number]  Phone: [phone]  Fax: [phone]  Scheduled Appointment: 07/02/2025 03:00 PM

## 2025-06-18 NOTE — DISCHARGE NOTE NURSING/CASE MANAGEMENT/SOCIAL WORK - NSDCPEFALRISK_GEN_ALL_CORE
For information on Fall & Injury Prevention, visit: [email or web address] OR  [email or web address] OR  https://www.cdc.gov/steadi/patient.html

## 2025-06-19 ENCOUNTER — APPOINTMENT (OUTPATIENT)
Dept: CARDIOLOGY | Facility: CLINIC | Age: 75
End: 2025-06-19

## 2025-06-24 PROBLEM — G47.33 OBSTRUCTIVE SLEEP APNEA (ADULT) (PEDIATRIC): Chronic | Status: ACTIVE | Noted: 2025-06-06

## 2025-06-25 ENCOUNTER — OUTPATIENT (OUTPATIENT)
Dept: OUTPATIENT SERVICES | Facility: HOSPITAL | Age: 75
LOS: 1 days | End: 2025-06-25
Payer: MEDICARE

## 2025-06-25 ENCOUNTER — APPOINTMENT (OUTPATIENT)
Dept: NEUROLOGY | Facility: CLINIC | Age: 75
End: 2025-06-25
Payer: MEDICARE

## 2025-06-25 ENCOUNTER — APPOINTMENT (OUTPATIENT)
Dept: PHYSICAL MEDICINE AND REHAB | Facility: CLINIC | Age: 75
End: 2025-06-25
Payer: MEDICARE

## 2025-06-25 ENCOUNTER — APPOINTMENT (OUTPATIENT)
Dept: CT IMAGING | Facility: HOSPITAL | Age: 75
End: 2025-06-25
Payer: MEDICARE

## 2025-06-25 VITALS
DIASTOLIC BLOOD PRESSURE: 68 MMHG | SYSTOLIC BLOOD PRESSURE: 104 MMHG | HEIGHT: 68 IN | TEMPERATURE: 98.7 F | OXYGEN SATURATION: 99 % | BODY MASS INDEX: 26.98 KG/M2 | WEIGHT: 178 LBS | HEART RATE: 78 BPM

## 2025-06-25 VITALS
WEIGHT: 169 LBS | OXYGEN SATURATION: 95 % | DIASTOLIC BLOOD PRESSURE: 67 MMHG | HEIGHT: 68 IN | SYSTOLIC BLOOD PRESSURE: 103 MMHG | TEMPERATURE: 97.5 F | BODY MASS INDEX: 25.61 KG/M2 | HEART RATE: 73 BPM

## 2025-06-25 DIAGNOSIS — I67.9 CEREBROVASCULAR DISEASE, UNSPECIFIED: ICD-10-CM

## 2025-06-25 DIAGNOSIS — Z98.890 OTHER SPECIFIED POSTPROCEDURAL STATES: Chronic | ICD-10-CM

## 2025-06-25 DIAGNOSIS — I61.0 NONTRAUMATIC INTRACEREBRAL HEMORRHAGE IN HEMISPHERE, SUBCORTICAL: ICD-10-CM

## 2025-06-25 PROBLEM — R29.898 TRANSIENT WEAKNESS OF RIGHT LOWER EXTREMITY: Status: ACTIVE | Noted: 2025-06-25

## 2025-06-25 PROCEDURE — G2211 COMPLEX E/M VISIT ADD ON: CPT

## 2025-06-25 PROCEDURE — 99214 OFFICE O/P EST MOD 30 MIN: CPT

## 2025-06-25 PROCEDURE — 70450 CT HEAD/BRAIN W/O DYE: CPT

## 2025-06-25 PROCEDURE — 99215 OFFICE O/P EST HI 40 MIN: CPT

## 2025-06-25 PROCEDURE — 70450 CT HEAD/BRAIN W/O DYE: CPT | Mod: 26

## 2025-06-30 ENCOUNTER — NON-APPOINTMENT (OUTPATIENT)
Age: 75
End: 2025-06-30

## 2025-07-02 ENCOUNTER — APPOINTMENT (OUTPATIENT)
Dept: ELECTROPHYSIOLOGY | Facility: CLINIC | Age: 75
End: 2025-07-02
Payer: MEDICARE

## 2025-07-02 VITALS
SYSTOLIC BLOOD PRESSURE: 130 MMHG | DIASTOLIC BLOOD PRESSURE: 81 MMHG | HEART RATE: 73 BPM | OXYGEN SATURATION: 99 % | WEIGHT: 179 LBS | BODY MASS INDEX: 27.22 KG/M2

## 2025-07-02 PROCEDURE — 99214 OFFICE O/P EST MOD 30 MIN: CPT | Mod: 25

## 2025-07-02 PROCEDURE — 93000 ELECTROCARDIOGRAM COMPLETE: CPT

## 2025-07-02 RX ORDER — ASPIRIN 81 MG
81 TABLET,CHEWABLE ORAL DAILY
Refills: 0 | Status: ACTIVE | COMMUNITY

## 2025-07-02 RX ORDER — APIXABAN 2.5 MG/1
2.5 TABLET, FILM COATED ORAL
Qty: 60 | Refills: 11 | Status: ACTIVE | COMMUNITY
Start: 1900-01-01 | End: 1900-01-01

## 2025-07-05 PROBLEM — Z95.818 PRESENCE OF WATCHMAN LEFT ATRIAL APPENDAGE CLOSURE DEVICE: Status: ACTIVE | Noted: 2025-06-24

## 2025-07-31 ENCOUNTER — APPOINTMENT (OUTPATIENT)
Dept: PHYSICAL MEDICINE AND REHAB | Facility: CLINIC | Age: 75
End: 2025-07-31
Payer: MEDICARE

## 2025-07-31 VITALS
HEIGHT: 68 IN | TEMPERATURE: 98.4 F | DIASTOLIC BLOOD PRESSURE: 68 MMHG | WEIGHT: 169 LBS | OXYGEN SATURATION: 95 % | SYSTOLIC BLOOD PRESSURE: 105 MMHG | HEART RATE: 74 BPM | BODY MASS INDEX: 25.61 KG/M2

## 2025-07-31 DIAGNOSIS — I69.390: ICD-10-CM

## 2025-07-31 DIAGNOSIS — R60.0 LOCALIZED EDEMA: ICD-10-CM

## 2025-07-31 DIAGNOSIS — I69.320 APHASIA FOLLOWING CEREBRAL INFARCTION: ICD-10-CM

## 2025-07-31 PROCEDURE — 99214 OFFICE O/P EST MOD 30 MIN: CPT

## 2025-07-31 RX ORDER — MEMANTINE 5 MG/1
5 TABLET ORAL
Qty: 60 | Refills: 1 | Status: ACTIVE | COMMUNITY
Start: 2025-07-31 | End: 1900-01-01

## 2025-08-01 ENCOUNTER — RESULT REVIEW (OUTPATIENT)
Age: 75
End: 2025-08-01

## 2025-08-01 ENCOUNTER — OUTPATIENT (OUTPATIENT)
Dept: OUTPATIENT SERVICES | Facility: HOSPITAL | Age: 75
LOS: 1 days | End: 2025-08-01
Payer: MEDICARE

## 2025-08-01 DIAGNOSIS — Z98.890 OTHER SPECIFIED POSTPROCEDURAL STATES: Chronic | ICD-10-CM

## 2025-08-01 DIAGNOSIS — R60.0 LOCALIZED EDEMA: ICD-10-CM

## 2025-08-01 DIAGNOSIS — Z90.89 ACQUIRED ABSENCE OF OTHER ORGANS: Chronic | ICD-10-CM

## 2025-08-01 PROCEDURE — 93970 EXTREMITY STUDY: CPT | Mod: 26

## 2025-08-01 PROCEDURE — 93970 EXTREMITY STUDY: CPT

## 2025-08-04 ENCOUNTER — APPOINTMENT (OUTPATIENT)
Dept: NEUROLOGY | Facility: CLINIC | Age: 75
End: 2025-08-04
Payer: MEDICARE

## 2025-08-04 DIAGNOSIS — I61.0 NONTRAUMATIC INTRACEREBRAL HEMORRHAGE IN HEMISPHERE, SUBCORTICAL: ICD-10-CM

## 2025-08-04 DIAGNOSIS — G81.91 HEMIPLEGIA, UNSPECIFIED AFFECTING RIGHT DOMINANT SIDE: ICD-10-CM

## 2025-08-04 PROCEDURE — 93880 EXTRACRANIAL BILAT STUDY: CPT

## 2025-08-04 PROCEDURE — 93886 INTRACRANIAL COMPLETE STUDY: CPT

## 2025-08-04 PROCEDURE — 99214 OFFICE O/P EST MOD 30 MIN: CPT

## 2025-08-04 PROCEDURE — 93892 TCD EMBOLI DETECT W/O INJ: CPT

## 2025-08-04 PROCEDURE — G2211 COMPLEX E/M VISIT ADD ON: CPT

## 2025-08-06 ENCOUNTER — APPOINTMENT (OUTPATIENT)
Dept: CV DIAGNOSITCS | Facility: HOSPITAL | Age: 75
End: 2025-08-06

## 2025-08-19 ENCOUNTER — APPOINTMENT (OUTPATIENT)
Dept: NEUROLOGY | Facility: CLINIC | Age: 75
End: 2025-08-19
Payer: MEDICARE

## 2025-08-19 VITALS
WEIGHT: 169 LBS | BODY MASS INDEX: 25.61 KG/M2 | SYSTOLIC BLOOD PRESSURE: 121 MMHG | HEIGHT: 68 IN | HEART RATE: 76 BPM | DIASTOLIC BLOOD PRESSURE: 80 MMHG | TEMPERATURE: 97.5 F

## 2025-08-19 VITALS
BODY MASS INDEX: 25.61 KG/M2 | TEMPERATURE: 97.5 F | HEART RATE: 76 BPM | OXYGEN SATURATION: 95 % | WEIGHT: 169 LBS | DIASTOLIC BLOOD PRESSURE: 80 MMHG | HEIGHT: 68 IN | SYSTOLIC BLOOD PRESSURE: 121 MMHG

## 2025-08-19 DIAGNOSIS — I61.0 NONTRAUMATIC INTRACEREBRAL HEMORRHAGE IN HEMISPHERE, SUBCORTICAL: ICD-10-CM

## 2025-08-19 DIAGNOSIS — I67.9 CEREBROVASCULAR DISEASE, UNSPECIFIED: ICD-10-CM

## 2025-08-19 PROCEDURE — G2211 COMPLEX E/M VISIT ADD ON: CPT

## 2025-08-19 PROCEDURE — 99214 OFFICE O/P EST MOD 30 MIN: CPT

## 2025-09-05 ENCOUNTER — OUTPATIENT (OUTPATIENT)
Dept: OUTPATIENT SERVICES | Facility: HOSPITAL | Age: 75
LOS: 1 days | End: 2025-09-05
Payer: MEDICARE

## 2025-09-05 ENCOUNTER — RESULT REVIEW (OUTPATIENT)
Age: 75
End: 2025-09-05

## 2025-09-05 ENCOUNTER — APPOINTMENT (OUTPATIENT)
Dept: CV DIAGNOSITCS | Facility: HOSPITAL | Age: 75
End: 2025-09-05

## 2025-09-05 VITALS
TEMPERATURE: 97 F | HEART RATE: 74 BPM | SYSTOLIC BLOOD PRESSURE: 137 MMHG | RESPIRATION RATE: 15 BRPM | OXYGEN SATURATION: 98 % | DIASTOLIC BLOOD PRESSURE: 84 MMHG

## 2025-09-05 VITALS — HEART RATE: 74 BPM | OXYGEN SATURATION: 97 % | RESPIRATION RATE: 15 BRPM

## 2025-09-05 DIAGNOSIS — Z90.89 ACQUIRED ABSENCE OF OTHER ORGANS: Chronic | ICD-10-CM

## 2025-09-05 DIAGNOSIS — Z98.890 OTHER SPECIFIED POSTPROCEDURAL STATES: Chronic | ICD-10-CM

## 2025-09-05 DIAGNOSIS — I25.10 ATHEROSCLEROTIC HEART DISEASE OF NATIVE CORONARY ARTERY WITHOUT ANGINA PECTORIS: ICD-10-CM

## 2025-09-05 PROCEDURE — 93312 ECHO TRANSESOPHAGEAL: CPT | Mod: 26

## 2025-09-05 PROCEDURE — 76377 3D RENDER W/INTRP POSTPROCES: CPT | Mod: 26

## 2025-09-05 PROCEDURE — 93320 DOPPLER ECHO COMPLETE: CPT | Mod: 26

## 2025-09-05 PROCEDURE — 93325 DOPPLER ECHO COLOR FLOW MAPG: CPT | Mod: 26

## 2025-09-05 RX ORDER — ONDANSETRON HCL/PF 4 MG/2 ML
4 VIAL (ML) INJECTION ONCE
Refills: 0 | Status: DISCONTINUED | OUTPATIENT
Start: 2025-09-05 | End: 2025-09-05

## 2025-09-10 RX ORDER — CLOPIDOGREL BISULFATE 75 MG/1
75 TABLET, FILM COATED ORAL DAILY
Qty: 90 | Refills: 1 | Status: ACTIVE | COMMUNITY
Start: 2025-09-10 | End: 1900-01-01

## 2025-09-25 PROBLEM — R26.89 FOOT SLAP: Status: ACTIVE | Noted: 2025-09-25

## 2025-09-25 PROBLEM — R26.9 NEUROLOGIC GAIT DISORDER: Status: ACTIVE | Noted: 2025-09-25
